# Patient Record
Sex: MALE | Race: WHITE | NOT HISPANIC OR LATINO | Employment: PART TIME | URBAN - METROPOLITAN AREA
[De-identification: names, ages, dates, MRNs, and addresses within clinical notes are randomized per-mention and may not be internally consistent; named-entity substitution may affect disease eponyms.]

---

## 2017-01-12 DIAGNOSIS — K25.9 GASTRIC ULCER: ICD-10-CM

## 2017-01-12 DIAGNOSIS — K21.9 GASTROESOPHAGEAL REFLUX DISEASE WITHOUT ESOPHAGITIS: Primary | ICD-10-CM

## 2017-01-12 RX ORDER — OMEPRAZOLE 40 MG/1
CAPSULE, DELAYED RELEASE ORAL
Qty: 90 CAPSULE | Refills: 0 | Status: SHIPPED | OUTPATIENT
Start: 2017-01-12 | End: 2017-02-16

## 2017-01-12 NOTE — PROGRESS NOTES
SUBJECTIVE:                                                    Dev Powell is a 71 year old male who presents to clinic today for the following health issues:    Dev reported URI symptoms with an onset of 10 days ago, characterized by rhinorrhea with clear color and postnasal drip, ear pressure/popping, sinus congestion, sore throat, productive cough with clear sputum, worse in the morning. Denied vomiting or diarrhea.     Heartburn -- well controlled with omeprazole     Low T -- using Androgel intermittently    HTN -- lisinopril  BP Readings from Last 3 Encounters:   01/13/17 132/82   01/28/16 124/76   07/23/15 125/75       Acute Illness   Acute illness concerns: Sore Throat  Onset: x 10 days     Fever: no    Chills/Sweats: no    Headache (location?): no    Sinus Pressure:no    Conjunctivitis:  no    Ear Pain: no    Rhinorrhea: YES    Congestion: YES    Sore Throat: YES     Cough: YES-productive of clear sputum    Wheeze: no    Decreased Appetite: no    Nausea: no    Vomiting: no    Diarrhea:  no    Dysuria/Freq.: no    Fatigue/Achiness: no    Sick/Strep Exposure: no     Therapies Tried and outcome: none        Problem list and histories reviewed & adjusted, as indicated.  Additional history: as documented    BP Readings from Last 3 Encounters:   01/13/17 132/82   01/28/16 124/76   07/23/15 125/75       Wt Readings from Last 4 Encounters:   01/13/17 235 lb (106.595 kg)   01/28/16 233 lb (105.688 kg)   03/25/15 246 lb (111.585 kg)   02/10/15 246 lb (111.585 kg)       Health Maintenance    Health Maintenance Due   Topic Date Due     HEPATITIS C SCREENING  03/28/1963     PNEUMOCOCCAL (1 of 2 - PCV13) 03/28/2010     FALL RISK ASSESSMENT  01/30/2016     WELLNESS VISIT Q1 YR (NO INBASKET)  01/30/2016     INFLUENZA VACCINE (SYSTEM ASSIGNED)  09/01/2016     BMP Q1 YR (NO INBASKET)  01/29/2017     MICROALBUMIN Q1 YEAR( NO INBASKET)  01/29/2017       Current Problem List    Patient Active Problem List   Diagnosis      Gastroesophageal reflux disease without esophagitis = ran out of PPI- has been taking lots of TUMS     Hyperlipidemia with target LDL less than 130- pravastatin = leg cramps; lipitor =calf pains     Snoring     Erectile dysfunction     Osteoarthritis     Lipoma of skin     Gastric ulcer     Malignant melanoma (H)     Tobacco use disorder     Enlarged prostate- saw Dr. Solomon     Rectal pain     Prostatitis, acute     Hearing loss     Inguinal hernia- right      Pulmonary nodules- tiny per screening chest CT - repeat CT chest low dose without contrast in 6 months 7/2015- or sooner if needed.      Hypertension goal BP (blood pressure) < 140/90     Hypotestosteronism- never started androgel last year     Hypothyroidism, unspecified     Chronic constipation       Past Medical History    Past Medical History   Diagnosis Date     Hyperlipidemia LDL goal < 130      lipitor 40mg= calf pain-off since 2008     Elevated blood pressure (not hypertension)      GERD (gastroesophageal reflux disease)      worse lying down at night.      Snoring      Erectile dysfunction      Lipoma of skin 2007     chest - biopsied at [facility] = benign      Osteoarthritis      mostly hands and knees      Gastric ulcer 9/25/2007     EGD @ [facility]-EGD - gastric ulcer w/ erosions/ LA grade B erosive esophagitis     Malignant melanoma (H) 1995       Past Surgical History    Past Surgical History   Procedure Laterality Date     Colonoscopy  9/25/2007     repeat in 2017      esophagoscopy, diagnostic  9/25/2007     EGD - gastric ulcer w/ erosions/ LA grade B erosive esophagitis     Wide excision  1995     for malignant melanoma     Esophagoscopy, gastroscopy, duodenoscopy (egd), combined N/A 2/5/2015     Procedure: COMBINED ESOPHAGOSCOPY, GASTROSCOPY, DUODENOSCOPY (EGD);  Surgeon: Ender Dixon MD;  Location:  GI       Current Medications    Current Outpatient Prescriptions   Medication Sig Dispense Refill     omeprazole (PRILOSEC) 40 MG capsule TAKE  1 CAPSULE BY MOUTH ONCE DAILY TAKE 30-60 MINUTES BEFORE A MEAL. 90 capsule 0     levothyroxine (SYNTHROID, LEVOTHROID) 88 MCG tablet TAKE 1 TABLET BY MOUTH ONCE DAILY 90 tablet 0     lisinopril (PRINIVIL,ZESTRIL) 10 MG tablet TAKE 1 TABLET BY MOUTH ONCE DAILY (DUE FOR YEARLY PHYSICAL FOR FUTURE REFILLS) 90 tablet 0     ANDROGEL 1.62% PUMP 20.25 MG/ACT gel PLACE 1 PUMP (20.25MG ) ONTO THE SKIN ONCE DAILY TO CLEAN,DRY,INTACT SKIN OF UPPER ARMS & SHOULDER 750 mg 5     cholecalciferol (VITAMIN D) 1000 UNIT tablet Take 1 tablet (1,000 Units) by mouth daily 100 tablet 3     aspirin 81 MG tablet Take by mouth daily 30 tablet      Multiple Vitamins-Minerals (CENTRUM SILVER) per tablet Take 1 tablet by mouth daily 30 tablet      omega-3 fatty acids 1200 MG capsule Take 1 capsule by mouth daily 90 capsule      Glucosamine-Chondroit-Vit C-Mn (GLUCOSAMINE CHONDROITIN 1500 COMPLEX) CAPS Take by mouth daily         Allergies    No Known Allergies    Immunizations    Immunization History   Administered Date(s) Administered     Influenza (IIV3) 2007     TD (ADULT, 7+) 2007     TDAP (BOOSTRIX AGES 10-64) 2013       Family History    Family History   Problem Relation Age of Onset     C.A.D. Father 62      at age 62 of MI     DIABETES Father      early type 2      Neurologic Disorder Mother      mild dementia - @ 92     C.A.D. 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      Weight Concern Yes     Exercise Yes     stays very active      Seat Belt Yes     always      Self-Exams Yes     RANJIT encouraged monthly      Social History Narrative       All above reviewed and updated, all stable unless otherwise noted    Recent labs reviewed    ROS:  Constitutional, HEENT, cardiovascular, pulmonary, GI, , musculoskeletal, neuro, skin, endocrine and psych systems are negative, except as in HPI or otherwise noted     OBJECTIVE:                                                    /82 mmHg  Pulse 104  Temp(Src) 97.3  F (36.3  C) (Tympanic)  Resp 12  Ht 6' 1\" (1.854 m)  Wt 235 lb (106.595 kg)  BMI 31.01 kg/m2  SpO2 97%  Body mass index is 31.01 kg/(m^2).  GENERAL: healthy, alert and no distress, obese   EYES: Eyes grossly normal to inspection, extraocular movements - intact, and PERRL  HENT: ear canals- normal; TMs- normal; Nose- normal; Mouth- erythema noted to posterior oropharynx with viral ulcers x 2  NECK: mild tenderness, no adenopathy, no asymmetry, no masses, no stiffness; thyroid- normal to palpation  RESP: lungs clear to auscultation - no rales, no rhonchi, no wheezes  CV: regular rates and rhythm, normal S1 S2, no S3 or S4 and no murmur, no click or rub -  ABDOMEN: soft, no tenderness, no  hepatosplenomegaly, no masses, normal bowel sounds  MS: extremities- no gross deformities noted, no edema  SKIN: no suspicious lesions, no rashes  NEURO: mentation intact and speech normal  BACK: no CVA tenderness, no paralumbar tenderness  PSYCH: Alert and oriented " times 3; speech- coherent , normal rate and volume; able to articulate logical thoughts, able to abstract reason, no tangential thoughts, no hallucinations or delusions, affect- normal    DIAGNOSTICS/PROCEDURES:                                                      Results for orders placed or performed in visit on 01/13/17   Strep, Rapid Screen   Result Value Ref Range    Specimen Description Throat     Rapid Strep A Screen       NEGATIVE: No Group A streptococcal antigen detected by immunoassay, await   culture report.      Micro Report Status FINAL 01/13/2017         ASSESSMENT/PLAN:                                                        ICD-10-CM    1. Throat pain R07.0 Strep, Rapid Screen     Beta strep group A culture   2. Acute sinusitis with symptoms > 10 days J01.90    3. Viral syndrome B34.9    4. Gastroesophageal reflux disease without esophagitis = ran out of PPI- has been taking lots of TUMS K21.9    5. Hypertension goal BP (blood pressure) < 140/90 I10    6. Medication monitoring encounter Z51.81      Discussed treatment/modality options, including risk and benefits, he desires further health care maintenance, antibiotics if not improving, OTC meds and observation. All diagnosis above reviewed and noted above, otherwise stable.  See [facility] orders for further details.  Follow up as needed.    Sx cares, antibiotics if not improving    Health Maintenance Due   Topic Date Due     HEPATITIS C SCREENING  03[birth year]28/1963     PNEUMOCOCCAL (1 of 2 - PCV13) 03/28/2010     FALL RISK ASSESSMENT  01/30/2016     WELLNESS VISIT Q1 YR (NO INBASKET)  01/30/2016     INFLUENZA VACCINE (SYSTEM ASSIGNED)  09/01/2016     BMP Q1 YR (NO INBASKET)  01/29/2017     MICROALBUMIN Q1 YEAR( NO INBASKET)  01/29/2017       See Patient Instructions    This document serves as a record of the services and decisions personally performed and made by Israel Fernández MD [facility]. It was created on their behalf by Paramjit Bo, a trained medical scribe.  The creation of this document is based the provider's statements to the medical scribe.  Paramjit Bo January 13, 2017 8:32 AM               Israel Fernández MD [address]  [address] [phone] Fax

## 2017-01-12 NOTE — TELEPHONE ENCOUNTER
omeprazole      Last Written Prescription Date: 1/28/2016  Last Fill Quantity: 90,  # refills: 3   Last Office Visit with [facility], P or [facility] prescribing provider: 1/28/2016    Medication is being filled for 1 time refill only due to:  Patient needs to be seen because will be due for an OV for further refills.   Georgia Fu RN

## 2017-01-13 ENCOUNTER — OFFICE VISIT (OUTPATIENT)
Dept: FAMILY MEDICINE | Facility: CLINIC | Age: 72
End: 2017-01-13
Payer: COMMERCIAL

## 2017-01-13 VITALS
BODY MASS INDEX: 31.14 KG/M2 | SYSTOLIC BLOOD PRESSURE: 132 MMHG | WEIGHT: 235 LBS | TEMPERATURE: 97.3 F | OXYGEN SATURATION: 97 % | HEART RATE: 104 BPM | RESPIRATION RATE: 12 BRPM | HEIGHT: 73 IN | DIASTOLIC BLOOD PRESSURE: 82 MMHG

## 2017-01-13 DIAGNOSIS — I10 HYPERTENSION GOAL BP (BLOOD PRESSURE) < 140/90: ICD-10-CM

## 2017-01-13 DIAGNOSIS — R07.0 THROAT PAIN: Primary | ICD-10-CM

## 2017-01-13 DIAGNOSIS — K21.9 GASTROESOPHAGEAL REFLUX DISEASE WITHOUT ESOPHAGITIS: ICD-10-CM

## 2017-01-13 DIAGNOSIS — Z51.81 MEDICATION MONITORING ENCOUNTER: ICD-10-CM

## 2017-01-13 DIAGNOSIS — J01.90 ACUTE SINUSITIS WITH SYMPTOMS > 10 DAYS: ICD-10-CM

## 2017-01-13 DIAGNOSIS — B34.9 VIRAL SYNDROME: ICD-10-CM

## 2017-01-13 LAB
DEPRECATED S PYO AG THROAT QL EIA: NORMAL
MICRO REPORT STATUS: NORMAL
SPECIMEN SOURCE: NORMAL

## 2017-01-13 PROCEDURE — 87081 CULTURE SCREEN ONLY: CPT | Performed by: FAMILY MEDICINE

## 2017-01-13 PROCEDURE — 87880 STREP A ASSAY W/OPTIC: CPT | Performed by: FAMILY MEDICINE

## 2017-01-13 PROCEDURE — 99213 OFFICE O/P EST LOW 20 MIN: CPT | Performed by: FAMILY MEDICINE

## 2017-01-13 NOTE — NURSING NOTE
"Chief Complaint   Patient presents with     Pharyngitis       Initial /82 mmHg  Pulse 104  Temp(Src) 97.3  F (36.3  C) (Tympanic)  Resp 12  Ht 6' 1\" (1.854 m)  Wt 235 lb (106.595 kg)  BMI 31.01 kg/m2  SpO2 97% Estimated body mass index is 31.01 kg/(m^2) as calculated from the following:    Height as of this encounter: 6' 1\" (1.854 m).    Weight as of this encounter: 235 lb (106.595 kg).  BP completed using cuff size: large  "

## 2017-01-13 NOTE — PATIENT INSTRUCTIONS
[facility]                        To reach your care team during and after hours:   [phone]  To reach our pharmacy:        [phone]    Clinic Hours                        Our clinic hours are:    Monday   7:30 am to 7:00 pm                  Tuesday through Friday 7:30 am to 5:00 pm                             Saturday   8:00 am to 12:00 pm      Sunday   Closed      Pharmacy Hours                        Our pharmacy hours are:    Monday   8:30 am to 7:00 pm       Tuesday to Friday  8:30 am to 6:00 pm                       Saturday    9:00 am to 1:00 pm              Sunday    Closed              There is also information available at our web site:  [email or web address]    If your provider ordered any lab tests and you do not receive the results within 10 business days, please call the clinic.    If you need a medication refill please contact your pharmacy.  Please allow 2-3 business days for your refill to be completed.    Our clinic offers telephone visits and e visits.  Please ask one of your team members to explain more.      Use [employer] (secure email communication and access to your chart) to send your primary care provider a message or make an appointment. Ask someone on your Team how to sign up for [employer].  Immunizations                      Immunization History   Administered Date(s) Administered     Influenza (IIV3) 12/13/2007     TD (ADULT, 7+) 09/20/2007     TDAP (BOOSTRIX AGES 10-64) 09/18/2013        Health Maintenance                         Health Maintenance Due   Topic Date Due     Hepatitis C Screening  03/28/1963     Pneumococcal Vaccine (1 of 2 - PCV13) 03/28/2010     FALL RISK ASSESSMENT  01/30/2016     Wellness Visit with your Primary Provider - yearly  01/30/2016     Flu Vaccine - yearly  09/01/2016     Basic Metabolic Lab - yearly  01/29/2017     Microalbumin Lab - yearly  01/29/2017       Use OTC Mucinex DM and warm showers to help with sinus and nasal  congestion.    Tylenol and Motrin as needed

## 2017-01-13 NOTE — MR AVS SNAPSHOT
After Visit Summary   1/13/2017    Dev Powell    MRN: [id number]           Patient Information     Date Of Birth          1945        Visit Information        Provider Department      1/13/2017 8:00 AM Israel Fernández MD [facility]  [place]        Today's Diagnoses     Throat pain    -  1       Care Instructions        [facility]                        To reach your care team during and after hours:   [phone]  To reach our pharmacy:        [phone]    Clinic Hours                        Our clinic hours are:    Monday   7:30 am to 7:00 pm                  Tuesday through Friday 7:30 am to 5:00 pm                             Saturday   8:00 am to 12:00 pm      Sunday   Closed      Pharmacy Hours                        Our pharmacy hours are:    Monday   8:30 am to 7:00 pm       Tuesday to Friday  8:30 am to 6:00 pm                       Saturday    9:00 am to 1:00 pm              Sunday    Closed              There is also information available at our web site:  [email or web address]    If your provider ordered any lab tests and you do not receive the results within 10 business days, please call the clinic.    If you need a medication refill please contact your pharmacy.  Please allow 2-3 business days for your refill to be completed.    Our clinic offers telephone visits and e visits.  Please ask one of your team members to explain more.      Use [employer] (secure email communication and access to your chart) to send your primary care provider a message or make an appointment. Ask someone on your Team how to sign up for [employer].  Immunizations                      Immunization History   Administered Date(s) Administered     Influenza (IIV3) 12/13/2007     TD (ADULT, 7+) 09/20/2007     TDAP (BOOSTRIX AGES 10-64) 09/18/2013        Health Maintenance                         Health Maintenance Due   Topic Date Due     Hepatitis C Screening  03/28/1963     Pneumococcal  "Vaccine (1 of 2 - PCV13) 2010     FALL RISK ASSESSMENT  2016     Wellness Visit with your Primary Provider - yearly  2016     Flu Vaccine - yearly  2016     Basic Metabolic Lab - yearly  2017     Microalbumin Lab - yearly  2017       Use OTC Mucinex DM and warm showers to help with sinus and nasal congestion.    Tylenol and Motrin as needed        Follow-ups after your visit        Who to contact     If you have questions or need follow up information about today's clinic visit or your schedule please contact [facility] [place] directly at [phone].  Normal or non-critical lab and imaging results will be communicated to you by [employer], letter or phone within 4 business days after the clinic has received the results. If you do not hear from us within 7 days, please contact the clinic through [employer] or phone. If you have a critical or abnormal lab result, we will notify you by phone as soon as possible.  Submit refill requests through [employer] or call your pharmacy and they will forward the refill request to us. Please allow 3 business days for your refill to be completed.          Additional Information About Your Visit        [employer] Information     [employer] lets you send messages to your doctor, view your test results, renew your prescriptions, schedule appointments and more. To sign up, go to [email or web address] . Click on \"Log in\" on the left side of the screen, which will take you to the Welcome page. Then click on \"Sign up Now\" on the right side of the page.     You will be asked to enter the access code listed below, as well as some personal information. Please follow the directions to create your username and password.     Your access code is: [email or web address]  Expires: 2017  8:40 AM     Your access code will  in 90 days. If you need help or a new code, please call your [facility] clinic or [phone].        Care EveryWhere ID     This is your Care " "EveryWhere ID. This could be used by other organizations to access your [facility] medical records  [id number]        Your Vitals Were     Pulse Temperature Respirations Height BMI (Body Mass Index) Pulse Oximetry    104 97.3  F (36.3  C) (Tympanic) 12 6' 1\" (1.854 m) 31.01 kg/m2 97%       Blood Pressure from Last 3 Encounters:   01/13/17 132/82   01/28/16 124/76   07/23/15 125/75    Weight from Last 3 Encounters:   01/13/17 235 lb (106.595 kg)   01/28/16 233 lb (105.688 kg)   03/25/15 246 lb (111.585 kg)              We Performed the Following     Beta strep group A culture     Strep, Rapid Screen        Primary Care Provider Office Phone # Fax #    Vee Beth [phone]       [facility] [address] [place] [id number]        Thank you!     Thank you for choosing [facility]  for your care. Our goal is always to provide you with excellent care. Hearing back from our patients is one way we can continue to improve our services. Please take a few minutes to complete the written survey that you may receive in the mail after your visit with us. Thank you!             Your Updated Medication List - Protect others around you: Learn how to safely use, store and throw away your medicines at www.disposemymeds.org.          This list is accurate as of: 1/13/17  8:40 AM.  Always use your most recent med list.                   Brand Name Dispense Instructions for use    ANDROGEL 1.62% PUMP 20.25 MG/ACT gel   Generic drug:  testosterone     750 mg    PLACE 1 PUMP (20.25MG ) ONTO THE SKIN ONCE DAILY TO CLEAN,DRY,INTACT SKIN OF UPPER ARMS & SHOULDER       aspirin 81 MG tablet     30 tablet    Take by mouth daily       CENTRUM SILVER per tablet     30 tablet    Take 1 tablet by mouth daily       cholecalciferol 1000 UNIT tablet    vitamin D    100 tablet    Take 1 tablet (1,000 Units) by mouth daily       glucosamine chondroitin 1500 complex Caps      Take by " mouth daily       levothyroxine 88 MCG tablet    SYNTHROID/LEVOTHROID    90 tablet    TAKE 1 TABLET BY MOUTH ONCE DAILY       lisinopril 10 MG tablet    PRINIVIL/ZESTRIL    90 tablet    TAKE 1 TABLET BY MOUTH ONCE DAILY (DUE FOR YEARLY PHYSICAL FOR FUTURE REFILLS)       omega-3 fatty acids 1200 MG capsule     90 capsule    Take 1 capsule by mouth daily       omeprazole 40 MG capsule    priLOSEC    90 capsule    TAKE 1 CAPSULE BY MOUTH ONCE DAILY TAKE 30-60 MINUTES BEFORE A MEAL.

## 2017-01-16 LAB
BACTERIA SPEC CULT: NORMAL
MICRO REPORT STATUS: NORMAL
SPECIMEN SOURCE: NORMAL

## 2017-02-11 DIAGNOSIS — I10 HYPERTENSION GOAL BP (BLOOD PRESSURE) < 140/90: Primary | ICD-10-CM

## 2017-02-13 RX ORDER — LISINOPRIL 10 MG/1
10 TABLET ORAL DAILY
Qty: 90 TABLET | Refills: 0 | Status: SHIPPED | OUTPATIENT
Start: 2017-02-13 | End: 2017-03-27

## 2017-02-13 NOTE — TELEPHONE ENCOUNTER
LISINOPRIL 10 MG TABLET      Last Written Prescription Date: 11/08/2016  Last Fill Quantity: 90, # refills: 0  Last Office Visit with G, P or [facility] prescribing provider: 01/13/2017       Potassium   Date Value Ref Range Status   01/29/2016 4.2 3.4 - 5.3 mmol/L Final     Creatinine   Date Value Ref Range Status   01/29/2016 1.08 0.66 - 1.25 mg/dL Final     BP Readings from Last 3 Encounters:   01/13/17 132/82   01/28/16 124/76   07/23/15 125/75

## 2017-02-14 NOTE — TELEPHONE ENCOUNTER
.Prescription approved per [facility] Refill Protocol.  Georgia Fu RN

## 2017-02-16 ENCOUNTER — TELEPHONE (OUTPATIENT)
Dept: FAMILY MEDICINE | Facility: CLINIC | Age: 72
End: 2017-02-16

## 2017-02-16 DIAGNOSIS — K21.9 GASTROESOPHAGEAL REFLUX DISEASE WITHOUT ESOPHAGITIS: ICD-10-CM

## 2017-02-16 DIAGNOSIS — K25.9 GASTRIC ULCER: ICD-10-CM

## 2017-02-16 RX ORDER — OMEPRAZOLE 40 MG/1
CAPSULE, DELAYED RELEASE ORAL
Qty: 90 CAPSULE | Refills: 0 | Status: SHIPPED | OUTPATIENT
Start: 2017-02-16 | End: 2017-03-27

## 2017-02-16 NOTE — TELEPHONE ENCOUNTER
Pt also asked for omeprazole. Given refill.  The patient indicates understanding of these issues and agrees with the plan.  Georgia Fu RN

## 2017-02-16 NOTE — TELEPHONE ENCOUNTER
Attempted to call patient.  Received patients voicemail.  Left a detailed message pt can call clinic back and make a first available med check appointment and we will refill until med check .  Advised to call back and speak with any triage nurse with any questions or concerns.     Georgia Baez RN, BSN   [place], Triage

## 2017-02-16 NOTE — TELEPHONE ENCOUNTER
Reason for Call: Patient called to schedule a med check with Dr. Beth only. He stated he would like to be seen ASAP, possibly tomorrow Friday 2/17 early in the AM. He stated he is out of his Lisinopril and was told he needs to be seen again before they will refill.     Best phone number to reach pt at is: [phone]  Ok to leave a message with medical info? Yes    Pharmacy preferred (if calling for a refill): [facility]

## 2017-02-20 DIAGNOSIS — E03.9 HYPOTHYROIDISM, UNSPECIFIED: ICD-10-CM

## 2017-02-20 NOTE — TELEPHONE ENCOUNTER
Levothyroxine     Last Written Prescription Date: 11/08/2016  Last Quantity: 90, # refills: 0  Last Office Visit with G, P or [facility] prescribing provider: 01/13/2017   Next 5 appointments (look out 90 days)     Mar 27, 2017  7:45 AM CDT   [facility] with [facility]ee Beth [facility]   [facility]    [address] [place] [id number] [phone]                   TSH   Date Value Ref Range Status   04/01/2016 7.23 (H) 0.40 - 4.00 mU/L Final

## 2017-02-21 RX ORDER — LEVOTHYROXINE SODIUM 88 UG/1
TABLET ORAL
Qty: 45 TABLET | Refills: 0 | Status: SHIPPED | OUTPATIENT
Start: 2017-02-21 | End: 2017-03-27

## 2017-02-21 NOTE — TELEPHONE ENCOUNTER
Due for an Office visit for further refills, only fill for 30 days     Elsi Thomas RN, BSN  [facility]

## 2017-03-27 ENCOUNTER — OFFICE VISIT (OUTPATIENT)
Dept: FAMILY MEDICINE | Facility: CLINIC | Age: 72
End: 2017-03-27
Payer: COMMERCIAL

## 2017-03-27 VITALS
WEIGHT: 240 LBS | HEIGHT: 73 IN | OXYGEN SATURATION: 95 % | SYSTOLIC BLOOD PRESSURE: 136 MMHG | HEART RATE: 105 BPM | TEMPERATURE: 98.3 F | BODY MASS INDEX: 31.81 KG/M2 | DIASTOLIC BLOOD PRESSURE: 84 MMHG

## 2017-03-27 DIAGNOSIS — E03.9 HYPOTHYROIDISM, UNSPECIFIED: ICD-10-CM

## 2017-03-27 DIAGNOSIS — H91.93 HEARING LOSS, BILATERAL: ICD-10-CM

## 2017-03-27 DIAGNOSIS — R22.2 MASS OF LEFT CHEST WALL: ICD-10-CM

## 2017-03-27 DIAGNOSIS — I10 ESSENTIAL HYPERTENSION WITH GOAL BLOOD PRESSURE LESS THAN 140/90: Chronic | ICD-10-CM

## 2017-03-27 DIAGNOSIS — F17.200 TOBACCO USE DISORDER: Chronic | ICD-10-CM

## 2017-03-27 DIAGNOSIS — C43.62 MALIGNANT MELANOMA OF LEFT UPPER EXTREMITY INCLUDING SHOULDER (H): ICD-10-CM

## 2017-03-27 DIAGNOSIS — K25.9 GASTRIC ULCER, UNSPECIFIED CHRONICITY: ICD-10-CM

## 2017-03-27 DIAGNOSIS — I10 HYPERTENSION GOAL BP (BLOOD PRESSURE) < 140/90: ICD-10-CM

## 2017-03-27 DIAGNOSIS — Z23 NEED FOR PROPHYLACTIC VACCINATION AGAINST STREPTOCOCCUS PNEUMONIAE (PNEUMOCOCCUS): ICD-10-CM

## 2017-03-27 DIAGNOSIS — E78.5 HYPERLIPIDEMIA WITH TARGET LDL LESS THAN 130: Primary | Chronic | ICD-10-CM

## 2017-03-27 DIAGNOSIS — Z12.5 SCREENING FOR PROSTATE CANCER: ICD-10-CM

## 2017-03-27 DIAGNOSIS — R91.8 PULMONARY NODULES: Chronic | ICD-10-CM

## 2017-03-27 DIAGNOSIS — D22.9 MULTIPLE PIGMENTED NEVI: ICD-10-CM

## 2017-03-27 DIAGNOSIS — K21.9 GASTROESOPHAGEAL REFLUX DISEASE WITHOUT ESOPHAGITIS: ICD-10-CM

## 2017-03-27 DIAGNOSIS — N52.9 VASCULOGENIC ERECTILE DYSFUNCTION, UNSPECIFIED VASCULOGENIC ERECTILE DYSFUNCTION TYPE: ICD-10-CM

## 2017-03-27 DIAGNOSIS — Z11.59 NEED FOR HEPATITIS C SCREENING TEST: ICD-10-CM

## 2017-03-27 DIAGNOSIS — F17.200 NEEDS SMOKING CESSATION EDUCATION: ICD-10-CM

## 2017-03-27 DIAGNOSIS — N40.0 ENLARGED PROSTATE: ICD-10-CM

## 2017-03-27 DIAGNOSIS — E34.9 HYPOTESTOSTERONISM: ICD-10-CM

## 2017-03-27 LAB
BASOPHILS # BLD AUTO: 0 10E9/L (ref 0–0.2)
BASOPHILS NFR BLD AUTO: 0.3 %
CREAT UR-MCNC: 84 MG/DL
DIFFERENTIAL METHOD BLD: NORMAL
EOSINOPHIL # BLD AUTO: 0.1 10E9/L (ref 0–0.7)
EOSINOPHIL NFR BLD AUTO: 2.3 %
ERYTHROCYTE [DISTWIDTH] IN BLOOD BY AUTOMATED COUNT: 14.2 % (ref 10–15)
HCT VFR BLD AUTO: 46.3 % (ref 40–53)
HGB BLD-MCNC: 15.4 G/DL (ref 13.3–17.7)
LYMPHOCYTES # BLD AUTO: 1.3 10E9/L (ref 0.8–5.3)
LYMPHOCYTES NFR BLD AUTO: 20.5 %
MCH RBC QN AUTO: 30.7 PG (ref 26.5–33)
MCHC RBC AUTO-ENTMCNC: 33.3 G/DL (ref 31.5–36.5)
MCV RBC AUTO: 92 FL (ref 78–100)
MICROALBUMIN UR-MCNC: 63 MG/L
MICROALBUMIN/CREAT UR: 74.97 MG/G CR (ref 0–17)
MONOCYTES # BLD AUTO: 0.6 10E9/L (ref 0–1.3)
MONOCYTES NFR BLD AUTO: 10.3 %
NEUTROPHILS # BLD AUTO: 4.1 10E9/L (ref 1.6–8.3)
NEUTROPHILS NFR BLD AUTO: 66.6 %
PLATELET # BLD AUTO: 198 10E9/L (ref 150–450)
RBC # BLD AUTO: 5.02 10E12/L (ref 4.4–5.9)
T3 SERPL-MCNC: 109 NG/DL (ref 60–181)
WBC # BLD AUTO: 6.2 10E9/L (ref 4–11)

## 2017-03-27 PROCEDURE — 99215 OFFICE O/P EST HI 40 MIN: CPT | Mod: 25 | Performed by: FAMILY MEDICINE

## 2017-03-27 PROCEDURE — 85025 COMPLETE CBC W/AUTO DIFF WBC: CPT | Performed by: FAMILY MEDICINE

## 2017-03-27 PROCEDURE — 84439 ASSAY OF FREE THYROXINE: CPT | Performed by: FAMILY MEDICINE

## 2017-03-27 PROCEDURE — 82043 UR ALBUMIN QUANTITATIVE: CPT | Performed by: FAMILY MEDICINE

## 2017-03-27 PROCEDURE — 83735 ASSAY OF MAGNESIUM: CPT | Performed by: FAMILY MEDICINE

## 2017-03-27 PROCEDURE — 84443 ASSAY THYROID STIM HORMONE: CPT | Performed by: FAMILY MEDICINE

## 2017-03-27 PROCEDURE — 90670 PCV13 VACCINE IM: CPT | Performed by: FAMILY MEDICINE

## 2017-03-27 PROCEDURE — 80053 COMPREHEN METABOLIC PANEL: CPT | Performed by: FAMILY MEDICINE

## 2017-03-27 PROCEDURE — 86803 HEPATITIS C AB TEST: CPT | Performed by: FAMILY MEDICINE

## 2017-03-27 PROCEDURE — G0103 PSA SCREENING: HCPCS | Performed by: FAMILY MEDICINE

## 2017-03-27 PROCEDURE — 84480 ASSAY TRIIODOTHYRONINE (T3): CPT | Performed by: FAMILY MEDICINE

## 2017-03-27 PROCEDURE — 90471 IMMUNIZATION ADMIN: CPT | Performed by: FAMILY MEDICINE

## 2017-03-27 PROCEDURE — 36415 COLL VENOUS BLD VENIPUNCTURE: CPT | Performed by: FAMILY MEDICINE

## 2017-03-27 PROCEDURE — 80061 LIPID PANEL: CPT | Performed by: FAMILY MEDICINE

## 2017-03-27 RX ORDER — OMEPRAZOLE 40 MG/1
CAPSULE, DELAYED RELEASE ORAL
Qty: 90 CAPSULE | Refills: 3 | Status: SHIPPED | OUTPATIENT
Start: 2017-03-27 | End: 2018-03-28

## 2017-03-27 RX ORDER — SILDENAFIL 50 MG/1
25-50 TABLET, FILM COATED ORAL DAILY PRN
Qty: 8 TABLET | Refills: 11 | Status: SHIPPED | OUTPATIENT
Start: 2017-03-27 | End: 2018-03-28

## 2017-03-27 RX ORDER — LISINOPRIL 20 MG/1
20 TABLET ORAL DAILY
Qty: 90 TABLET | Refills: 1 | Status: SHIPPED | OUTPATIENT
Start: 2017-03-27 | End: 2017-11-18

## 2017-03-27 RX ORDER — TESTOSTERONE 1.62 MG/G
GEL TRANSDERMAL
Qty: 750 MG | Refills: 5 | Status: SHIPPED | OUTPATIENT
Start: 2017-03-27 | End: 2017-04-19

## 2017-03-27 RX ORDER — LISINOPRIL 10 MG/1
10 TABLET ORAL DAILY
Qty: 90 TABLET | Refills: 1 | Status: SHIPPED | OUTPATIENT
Start: 2017-03-27 | End: 2017-03-27 | Stop reason: ALTCHOICE

## 2017-03-27 RX ORDER — LEVOTHYROXINE SODIUM 88 UG/1
TABLET ORAL
Qty: 90 TABLET | Refills: 3 | Status: SHIPPED | OUTPATIENT
Start: 2017-03-27 | End: 2018-01-04

## 2017-03-27 NOTE — PATIENT INSTRUCTIONS
Recheck with me in 6 months or sooner if needed.     Increase your current lisinopril 10mg to 2 tabs  Nightly- when those run out , fill the 20mg tablets and take 1 tab nightly. If you get lightheaded or dizzy when you stand up or make other position changes decrease back to 10mg and call.     Recheck your blood pressure in our pharmacy in 1 week or sooner if needed.  Have pharmacy send me their note.        TIPS FOR QUITTING  There are more than 37 million ex-smokers in the [place] Each one had to make the same decision you re thinking about now.  Smoking cigarettes is an expensive and destructive habit. It s time to stop.  You ve probably heard of all the reasons why you should quit, so we won t dwell on them here. However, you should reflect on the benefits of quitting. When you quit smoking, the body starts to repair itself almost immediately, unless damage has been done that cannot be reversed. Familiar symptoms like shortness of breath, sinus troubles, persistent cough start to disappear.  PREPARING TO QUIT  1. Ask yourself 3 key questions: How much do I smoke? Why do I smoke? What will be my most difficult rafal in quitting?  2. If you re feeling ambivalent about quitting, ask yourself which you want most: to smoke or to stop (Remember, you don t have to get rid of the desire to smoke before stopping)  3. Choose a method of quitting. Cold turkey is the most successful, but a gradual approach is fine.  4. Set a final quit date.  WAYS TO CUT DOWN YOUR SMOKING DAY BY DAY  (NOTE: Do not allow this gradual approach to become a way of procrastinating, rather than quitting)  1. Decide to cut down by a certain number of cigarettes per day, and increase your reduction by that number each succeeding day. OR postpone the 1st cigarette of the day by an hour and extend that time daily.  2. Make it hard to get and smoke a cigarette. Wrap up the package and put elastic bands around it. Smoke with your left hand if you usually  smoke with your right.  3. Change to a brand you don t like.  Buy only one pack @ a time.  4. If you always have a smoke with your coffee, switch to tea, juice or soda.  5. Do something for your body. Get into shape. Exercise is great for relaxation.  6. Call your friends and tell them you re going to quit.(Choose to tell the friends who will offer only positive reinforcement.)  7. If you quit for one day, you can quit for another. Try it.  8. Save all the money you would have spent on cigarettes and buy yourself something special.  You deserve it.  9. If you break down and have a cigarette, don t give up. Some people take several tries before they make it. Just don t have a 2nd cigarette.  ON THE DAY YOU QUIT  1. Throw away all cigarettes and matches. Hide Lighters and ashtrays  2. Visit the dentist and have your teeth cleaned to get rid of the tobacco stains. Notice how nice they look and resolve to keep them that way.  3. Make a list of things you d like to buy yourself or someone else. Estimate the cost in terms of packs of cigarettes and out the money aside to buy these presents.  4. Keep very busy on the big day. Go to the movies, exercise, take long walks, go bike riding.  5. Buy yourself a treat or do something special to celebrate.  TIPS FOR STAYING QUIT  1. For the 1st few days after you quit, spend as much time as possible in places where smoking is prohibited-libraries,museums,theaters,churches.  2. Drink large quantities of water and fruit juice  3. Avoid alcohol, coffee and other beverages that you associate with smoking.  4. Strike up a conversation instead of a match for a cigarette.  5. If you miss the sensation of having a cigarette in your hand, play with something else like a pencil, a paper clip or marble.  6. If you miss having something in our mouth ,try toothpicks,cinnamon,celery or carrots sticks.  7. AVOID TEMPTATION: Stay away from situations you associate with smoking.  8. FIND NEW HABITS  and develop a non-smoking environment around you.  9. Stress constructive, not destructive thinking to lesson discomfort.  10. Avoid resuming the habit by anticipating future situations/crises that might lead to smoking and assert your reasons for not giving in  11. Take deep rhythmic breaths, similar to smoking, to relax.  12. Remember your goal and the fact that the urge will eventually pass.  13. Think positive thoughts and avoid negative ones.  14. Brush your teeth  15. Do brief exercise (isometrics,push-ups,deep knee bends,walk up a flight of stairs)  16. Call a supportive friend  17. Compile a list of  Urge Activities   and start at the top when it hits  18. Eat several small meals. This maintains constant blood sugar levels and helps prevent the urge to smoke. Avoid sugary or spicy foods that trigger a desire for cigarettes.  19. Above all, reward yourself. Plan to do something fun for doing your best  WHEN YOU GET THE   CRAZIES    1. Keep oral substitutes handy: carrots,pickles,apples,celery,raisins or gum.  2. Take 10 deep breaths, hold the last one while lighting  a match. Exhale slowly, and blow out the match. Pretend it is a cigarette and out it out in an ashtray.  3. Take a bath or shower if possible.  4. Learn to relax quickly and deeply.Make yourself limp. Visualize a soothing pleasing situation and get away from it all.  5. Light incense or a candle, instead of a cigarette.  6. Never allow yourself to think that  one won t hurt , because it will.                Recognizing Skin Cancer  Doing monthly skin checkups is the best way to find new marks or skin changes. During your skin checkups, be sure to follow the ABCDEs of skin checks. This means checking moles or growths for Asymmetry, Border, Color, Diameter, and Evolving (changing). Note, too, if your growths bleed, itch, or are painful.  The ABCDEs of Skin Checks  Check your moles or growths for signs of melanoma using ABCDE:    Asymmetry: the  sides of the mole or growth don t match    Border: the edges are ragged, notched, or blurred    Color: the color within the mole or growth varies    Diameter: the mole or growth is larger than 6 mm (size of a pencil eraser)    Evolving: the size, shape, or color of the mole or growth is changing (evolving is not shown below.)     Who s At Risk?  Anyone can get skin cancer. But you are at greater risk if you have:    Fair skin, light-colored hair, or light-colored eyes    Many moles on your skin    A history of sunburns from sunlight or tanning beds    A family history of skin cancer    A history of exposure to radiation or chemicals    A weakened immune system  Also, a personal history of skin cancer puts you at risk for recurring skin cancer.  How to Check Your Skin  Do your monthly skin checkups in front of a full-length mirror. Check all parts of your body, including your:    Head (ears, face, neck, and scalp)    Torso (front, back, and sides)    Arms (tops, undersides, upper, and lower)    Hands (palms, backs, and fingers)    Buttocks and genitals    Legs (front, back, and sides)    Feet (tops, soles, toes, and between toes)  If you have a lot of moles, take digital photos of them each month. Make sure to take photos both up close and from a distance. These can help you see if any moles change over time.  When to Seek Medical Treatment  Most skin changes are not cancer. But if you see any changes in your skin, call your doctor right away. Only he or she can diagnose a problem. If you have skin cancer, seeing your doctor can be the first step toward getting the treatment that could save your life.     [id number] [facility], [address] [place] [address], [place] [address]. All rights reserved. This information is not intended as a substitute for professional medical care. Always follow your healthcare professional's instructions.                   Groin Hernia         What is a groin hernia?   When you have a  hernia, part of the intestine (bowel) bulges through a weak area or gap in the muscles in your belly. A groin hernia happens in the groin. The groin is the lower abdominal area where the legs join the body. Another name for groin hernia is inguinal hernia.   How does it occur?   A groin hernia happens when the bowel pushes through a weak spot in the inguinal canal. The inguinal canal is an opening between layers of muscle in the groin.   Some people, especially men, are born with a weakness in their groin muscles. With or without this weakness, a hernia may be caused by anything that causes the intestine to push against the inguinal canal. Activities or conditions that might cause this pressure are:   lifting heavy objects   coughing or sneezing a lot   being constipated or pushing too hard when having a bowel movement   being overweight   being pregnant   in men, pushing too hard to urinate if their prostate is enlarged.   What are the symptoms?   Symptoms of a groin hernia may include:   a lump in the groin that you can push back in   pain or discomfort in the lower belly or groin, especially with physical activity   a lump in the groin that cannot be pushed back in.   A lump that cannot be pushed back in can become a life-threatening problem. The bowel may get caught in the gap. This could cut off its blood supply. Or bowel movement might be blocked and not able to move through the bowel.   How is it diagnosed?   Your healthcare provider will ask about your symptoms and medical history and examine you. You may have X-rays, ultrasound or CT scans, or blood tests.   How is it treated?   The main treatment for a painful groin hernia is surgery to repair the opening in the muscle wall. The surgeon closes the weak spot. Sometimes, before closing the skin, the surgeon will sew a piece of mesh over the weak spot and under the skin to make the area stronger. Your healthcare provider will usually suggest that you have the  operation as soon as possible to avoid complications.   If your hernia is causing few or no symptoms, you may choose not to have surgery. You may need to use a groin support. You need to discuss with your provider what symptoms you should watch for and when you should seek medical care for possible problems resulting from your hernia, such as bowel blockage.   How long will the effects last?   The hernia will not get better on its own, but it may not get worse for months or even years. A complication of a groin hernia is that after the bowel has pushed through the muscle wall, its contents may become trapped. A dangerous complication of this trapping is that the blood supply to the bowel may be cut off and the tissue may die, resulting in gangrene. This is a medical emergency requiring surgery.   How can I take care of myself?   Follow your healthcare provider's instructions.   Be careful when you lift, pull, or push heavy objects. Learn to lift, push, or pull heavy objects the correct way. Change your work duties or your recreational activities if necessary.   Ask your provider if you need to wear a groin support. Follow your provider's advice for losing weight if you are overweight.   Avoid constipation by eating foods that are high in fiber, using stool softeners, or drinking a natural stimulant beverage such as prune juice. Use laxatives or enemas only if recommended by your provider.   Avoid smoking to help prevent coughing. Coughing puts extra pressure on the abdominal and groin muscles.   Take medicine to reduce sneezing and coughing from allergies.   If your symptoms continue or if you develop new symptoms, tell your provider right away.   Also call your healthcare provider if:   You have nausea and vomiting that doesn't get better after a few hours.   You can't have a bowel movement.   You are unable to urinate.   The hernia bulges through the muscles and will not go back in.   The skin over the hernia  becomes red or darker than your usual skin color.   You have severe abdominal pain.   You have a fever higher than 101.5? F (38.6? C) orally.   How can I help prevent a groin hernia?   Follow safe practices when you move heavy things. Learn how to lift and move heavy items safely. Remember to use your legs. Bend at your knees, not at your waist.   Keep a healthy weight.   Avoid becoming constipated.     Published by [employer].  This content is reviewed periodically and is subject to change as new health information becomes available. The information is intended to inform and educate and is not a replacement for medical evaluation, advice, diagnosis or treatment by a healthcare professional.   Developed by [employer].   ? 2010 [employer] and/or its affiliates. All Rights Reserved.   Copyright   [employer] 2011  Adult [employer] Advisor                 Thank you for choosing [facility]  for your Health Care. It was a pleasure seeing you at your visit today. Please contact us with any questions or concerns you may have.                   Vee Beth MD                                  To reach your [facility] care team after hours call:   [phone]    Our clinic hours are:     Monday- 7:30 am - 7:00 pm                             Tuesday through Friday- 7:30 am - 5:00 pm                                        Saturday- 8:00 am - 12:00 pm                  Phone:  [phone]    Our pharmacy hours are:     Monday  8:00 am to 7:00 pm      Tuesday through Friday 8:00am to 6:00pm                        Saturday - 9:00 am to 1:00 pm      Sunday : Closed.              Phone:  [phone]      There is also information available at our web site:  [email or web address]    If your provider ordered any lab tests and you do not receive the results within 10 business days, please call the clinic.    If you need a medication refill please contact your pharmacy.  Please  allow 2 business days for your refill to be completed.    Our clinic offers telephone visits and e visits.  Please ask one of your team members to explain more.      Use MyChart (secure email communication and access to your chart) to send your primary care provider a message or make an appointment. Ask someone on your Team how to sign up for [employer].

## 2017-03-27 NOTE — PROGRESS NOTES
SUBJECTIVE:                                                    Dev Powell is a 71 year old male who presents to clinic today for the following health issues:    Hyperlipidemia Follow-Up:       Rate your low fat/cholesterol diet?: not monitoring fat    Taking statin?  Stopped due to side effects    Other lipid medications/supplements?:  No  Recent Labs   Lab Test  01/29/16   0842  01/30/15   1305  04/30/14   0802   CHOL  247*  237*  219*   HDL  52  46  38*   LDL  178*  149*  155*   TRIG  85  208*  135   CHOLHDLRATIO   --   5.2*  5.8*            Hypertension Follow-up:       Outpatient blood pressures are being checked at home.  Results are 190/100.    Low Salt Diet: not monitoring salt.  Hasn't been taking his blood pressure medication regularly lately.   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    Problems taking medications regularly: No    Medication side effects: none    Diet: regular (no restrictions)    Acute Illness:    Acute illness concerns: Cold  Onset: 10 days    Fever: no    Chills/Sweats: no    Headache (location?): YES- frontal    Sinus Pressure:no    Conjunctivitis:  no    Ear Pain: no    Rhinorrhea: YES- clear    Congestion: YES- nasal    Sore Throat: no     Cough: YES - on and off, minimal phlegm production    Wheeze: no    Decreased Appetite: no    Nausea: no    Vomiting: no    Diarrhea:  no    Dysuria/Freq.: no    Fatigue/Achiness: YES- fatigue    Sick/Strep Exposure: no     Therapies Tried and outcome: None    Having difficulty getting an erection and maintaining an erection for years.  Requests viagra.  Was on this several years ago.  Wife has a lot of medical issues as well, but is  feeling better and would like to resume sexual activity.   Not having any chest pain or shortness of breath with exertion.   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       Pt also noting hearing loss bilaterally.  Not always wearing hearing protection when using grinders, etc. Strongly encouraged him to do so.     Still smoking - 1/2 ppd of cigarettes. Strongly encouraged smoking cessation again.   Had a chest ct for pulmonary nodules 2/2/2016: IMPRESSION:  1. Multiple tiny indeterminate bilateral lung nodules unchanged since  prior exam performed one year ago. Return to interval 12 months low  dose chest CT screening recommended. No new or enlarging lung nodules.  2. Calcified atherosclerotic changes in the coronary arteries and  thoracic aorta. No evidence of aortic aneurysm.  Problem list and histories reviewed & adjusted, as indicated.  Additional history: as documented    Reviewed and updated as needed this visit by clinical staff  Tobacco  Allergies  Meds  Med Hx  Surg Hx  Fam Hx  Soc Hx      Reviewed and updated as needed this visit by Provider       Patient Active Problem List   Diagnosis     Gastroesophageal reflux disease without esophagitis = ran out of PPI- has been taking lots of TUMS     Hyperlipidemia with target LDL less than 130- pravastatin = leg cramps; lipitor =calf pains     Snoring     Vasculogenic erectile dysfunction, unspecified vasculogenic erectile dysfunction type     Osteoarthritis     Lipoma of skin     Gastric ulcer     Malignant melanoma of left upper extremity including shoulder (H)     Tobacco use disorder     Enlarged prostate- saw Dr. Solomon in the past      Rectal pain     Prostatitis, acute     Hearing loss     Inguinal hernia- right      Pulmonary nodules- tiny per screening chest CT -  repeat CT chest low dose w/o contrast in 2/2017-- or sooner if needed.       Essential hypertension with goal blood pressure less than 140/90     Hypotestosteronism- ran out of androgel last year     Hypothyroidism, unspecified     Chronic constipation     Mass of left chest wall - ? there for 18+ years        Current Outpatient Prescriptions   Medication Sig Dispense Refill     sildenafil (REVATIO/VIAGRA) 50 MG cap/tab Take 0.5-1 tablets (25-50 mg) by mouth daily as needed for erectile dysfunction Take 30 min to 4 hours before intercourse.  Never use with nitroglycerin, terazosin or doxazosin. 8 tablet 11     levothyroxine (SYNTHROID/LEVOTHROID) 88 MCG tablet TAKE 1 TABLET BY MOUTH ONCE DAILY( NEED TO BE SEEN ) 90 tablet 3     omeprazole (PRILOSEC) 40 MG capsule TAKE 1 CAPSULE BY MOUTH ONCE DAILY TAKE 30-60 MINUTES BEFORE A MEAL. 90 capsule 3     lisinopril (PRINIVIL/ZESTRIL) 10 MG tablet Take 1 tablet (10 mg) by mouth daily 90 tablet 1     testosterone (ANDROGEL 1.62% PUMP) 20.25 MG/ACT gel PLACE 1 PUMP (20.25MG ) ONTO THE SKIN ONCE DAILY TO CLEAN,DRY,INTACT SKIN OF UPPER ARMS & SHOULDER 750 mg 5     Glucosamine-Chondroit-Vit C-Mn (GLUCOSAMINE CHONDROITIN 1500 COMPLEX) CAPS Take by mouth daily       aspirin 81 MG tablet Take by mouth daily 30 tablet      Multiple Vitamins-Minerals (CENTRUM SILVER) per tablet Take 1 tablet by mouth daily 30 tablet      [DISCONTINUED] levothyroxine (SYNTHROID/LEVOTHROID) 88 MCG tablet TAKE 1 TABLET BY MOUTH ONCE DAILY( NEED TO BE SEEN ) 45 tablet 0     [DISCONTINUED] lisinopril (PRINIVIL/ZESTRIL) 10 MG tablet Take 1 tablet (10 mg) by mouth daily 90 tablet 0     [DISCONTINUED] ANDROGEL 1.62% PUMP 20.25 MG/ACT gel PLACE 1 PUMP (20.25MG ) ONTO THE SKIN ONCE DAILY TO CLEAN,DRY,INTACT SKIN OF UPPER ARMS & SHOULDER 750 mg 5        No Known Allergies       ROS:   ROS: 12 point ROS neg other than the symptoms noted above    OBJECTIVE:                                                    BP (!) 138/96 (BP Location: Left arm, Patient Position: Chair, Cuff Size:  "Adult Regular)  Pulse 105  Temp 98.3  F (36.8  C) (Oral)  Ht 6' 1\" (1.854 m)  Wt 240 lb (108.9 kg)  SpO2 95%  BMI 31.66 kg/m2  Body mass index is 31.66 kg/(m^2).   GENERAL: healthy, alert, well nourished, well hydrated, no distress  HENT: ear canals- normal; TMs- normal; Nose- normal; Mouth- no ulcers, no lesions  NECK: no tenderness, no adenopathy, no asymmetry, no masses, no stiffness; thyroid- normal to palpation  RESP: lungs clear to auscultation - no rales, no rhonchi, no wheezes  CV: regular rates and rhythm, normal S1 S2, no S3 or S4 and no murmur, no click or rub   Breast: Breasts are symmetric.  There is a 1cm diameter mildly firm mass at 7:30 in the left chest/breast area. Pt thinks this has been there for many years -? 18-20 years.  No hx of breast cancer in family.  No other dominant, discrete, fixed  or suspicious masses are noted.  Mild symmetric fibrocystic densities are noted in both upper outer quadrants. No skin or nipple changes or axillary nodes. Self exam is taught and encouraged. Mammogram - not for men. .   ABDOMEN: soft, no tenderness, no  hepatosplenomegaly, no masses, normal bowel sounds  MS: extremities- no gross deformities noted, no edema  - male: testicles- normal, no atrophy, no masses;  Right direct inguinal hernia noted - easily reducible   RECTAL- male: no masses, no hemorhoids, Prostate- symmetric, no  nodularity, no masses, no hypertrophy    Note:pt declined a chaperone for the genital and rectal exams. --Vee Beth MD     Diagnostic test results:  See [facility] orders.      ASSESSMENT/PLAN:                                                        ICD-10-CM    1. Hyperlipidemia with target LDL less than 130- pravastatin = leg cramps; lipitor =calf pains E78.5 Albumin Random Urine Quantitative     Comprehensive metabolic panel     Lipid Profile with reflex to direct LDL     CBC with platelets differential   2. Tobacco use disorder F17.200 CT Chest w/o Contrast "   3. Essential hypertension with goal blood pressure less than 140/90 - not well controlled today - not taking any decongestants I10 Albumin Random Urine Quantitative     Comprehensive metabolic panel     CBC with platelets differential     lisinopril (PRINIVIL/ZESTRIL) 20 MG tablet   4. Hypothyroidism, unspecified E03.9 TSH     T4, free     T3, total     levothyroxine (SYNTHROID/LEVOTHROID) 88 MCG tablet   5. Multiple pigmented nevi D22.9    6. Malignant melanoma of left upper extremity including shoulder (H) C43.62 SKIN CARE REFERRAL   7. Pulmonary nodules- tiny per screening chest CT - repeat CT chest low dose w/o contrast in 2/2017-or sooner if needed. R91.8 CT Chest w/o Contrast   8. Gastroesophageal reflux disease without esophagitis = ran out of PPI- has been taking lots of TUMS K21.9 Magnesium     omeprazole (PRILOSEC) 40 MG capsule   9. Vasculogenic erectile dysfunction, unspecified vasculogenic erectile dysfunction type N52.9 TESTOSTERONE, FREE & TOTAL     sildenafil (REVATIO/VIAGRA) 50 MG cap/tab   10. Enlarged prostate- saw Dr. Solomon in the past  N40.0 Prostate spec antigen screen   11. Need for prophylactic vaccination against Streptococcus pneumoniae (pneumococcus) Z23 Pneumococcal vaccine 13 valent PCV13 IM (Prevnar) [34889]     ADMIN: Vaccine, Initial (24162)   12. Need for hepatitis C screening test Z11.59 Hepatitis C Screen Reflex to HCV RNA Quant and Genotype   13. Screening for prostate cancer Z12.5 Prostate spec antigen screen   14. Hypotestosteronism- ran out of androgel last week  E29.1 TESTOSTERONE, FREE & TOTAL   15. Hearing loss, bilateral H91.93 OTOLARYNGOLOGY REFERRAL   16. Gastric ulcer, unspecified chronicity K25.9 omeprazole (PRILOSEC) 40 MG capsule   17. Hypertension goal BP (blood pressure) < 140/90 I10 DISCONTINUED: lisinopril (PRINIVIL/ZESTRIL) 10 MG tablet   18. Hypotestosteronism- never started androgel last year E29.1 testosterone (ANDROGEL 1.62% PUMP) 20.25 MG/ACT gel   19.  Mass of left chest wall- left breast 1cm mass at 7:30  R22.2 US Breast Left Complete 4 Quadrants     GENERAL SURG ADULT REFERRAL     STOP SMOKING INSTRUCTION:    Instruction is provided on the importance of smoking cessation and its impact on current and future health.    - alternatives available to help   - making commitment and decision to quit   - The nature of nicotine addiction is discussed   - behavioral therapy is discussed and suggested.    - nicotine replacement therapyis discussed.    - Chantix side effects and risk discussed   Handout given with warning about psychiatric risks- advised to report if excessive dysphoria   Pt declined any smoking cessation intervention today.     Increase lisinopril to 20mg daily since home bp's have been in the high 190's /100. Recheck your blood pressure in our pharmacy in 1 week or sooner if needed.  Have pharmacy send me their note.       See Patient Instructions.      Spent  50 minutes on pt care today outside of preventative care. All face to face time from 0805 to 0855am.  Greater than 50% of time spent in coordination of care/counseling today re:  1. Hyperlipidemia with target LDL less than 130- pravastatin = leg cramps; lipitor =calf pains    2. Tobacco use disorder    3. Essential hypertension with goal blood pressure less than 140/90 - not well controlled today - not taking any decongestants    4. Hypothyroidism, unspecified    5. Multiple pigmented nevi    6. Malignant melanoma of left upper extremity including shoulder (H)    7. Pulmonary nodules- tiny per screening chest CT - repeat CT chest low dose w/o contrast in 2/2017-or sooner if needed.    8. Gastroesophageal reflux disease without esophagitis = ran out of PPI- has been taking lots of TUMS    9. Vasculogenic erectile dysfunction, unspecified vasculogenic erectile dysfunction type    10. Enlarged prostate- saw Dr. Solomon in the past     11. Need for prophylactic vaccination against Streptococcus  pneumoniae (pneumococcus)    12. Need for hepatitis C screening test    13. Screening for prostate cancer    14. Hypotestosteronism- ran out of androgel last week     15. Hearing loss, bilateral    16. Gastric ulcer, unspecified chronicity    17. Hypertension goal BP (blood pressure) < 140/90    18. Hypotestosteronism- never started androgel last year    19. Mass of left chest wall- left breast 1cm mass at 7:30               Vee Beth MD    [facility]

## 2017-03-27 NOTE — MR AVS SNAPSHOT
After Visit Summary   3/27/2017    Dev Powell    MRN: [id number]           Patient Information     Date Of Birth          1945        Visit Information        Provider Department      3/27/2017 7:45 AM Vee Beth MD [facility] [place]        Today's Diagnoses     Hyperlipidemia with target LDL less than 130- pravastatin = leg cramps; lipitor =calf pains    -  1    Tobacco use disorder        Essential hypertension with goal blood pressure less than 140/90 - not well controlled today - not taking any decongestants        Hypothyroidism, unspecified        Multiple pigmented nevi        Malignant melanoma of left upper extremity including shoulder (H)        Pulmonary nodules- tiny per screening chest CT - repeat CT chest low dose w/o contrast in 2/2017-or sooner if needed.        Gastroesophageal reflux disease without esophagitis = ran out of PPI- has been taking lots of TUMS        Vasculogenic erectile dysfunction, unspecified vasculogenic erectile dysfunction type        Enlarged prostate- saw Dr. Solomon in the past         Need for prophylactic vaccination against Streptococcus pneumoniae (pneumococcus)        Need for hepatitis C screening test        Screening for prostate cancer        Hypotestosteronism- ran out of androgel last week         Hearing loss, bilateral        Gastric ulcer, unspecified chronicity        Hypertension goal BP (blood pressure) < 140/90        Hypotestosteronism- never started androgel last year        Mass of left chest wall          Care Instructions    Recheck with me in 6 months or sooner if needed.     Increase your current lisinopril 10mg to 2 tabs  Nightly- when those run out , fill the 20mg tablets and take 1 tab nightly. If you get lightheaded or dizzy when you stand up or make other position changes decrease back to 10mg and call.     Recheck your blood pressure in our pharmacy in 1 week or sooner if needed.  Have pharmacy send  me their note.        TIPS FOR QUITTING  There are more than 37 million ex-smokers in the [place] Each one had to make the same decision you re thinking about now.  Smoking cigarettes is an expensive and destructive habit. It s time to stop.  You ve probably heard of all the reasons why you should quit, so we won t dwell on them here. However, you should reflect on the benefits of quitting. When you quit smoking, the body starts to repair itself almost immediately, unless damage has been done that cannot be reversed. Familiar symptoms like shortness of breath, sinus troubles, persistent cough start to disappear.  PREPARING TO QUIT  1. Ask yourself 3 key questions: How much do I smoke? Why do I smoke? What will be my most difficult rafal in quitting?  2. If you re feeling ambivalent about quitting, ask yourself which you want most: to smoke or to stop (Remember, you don t have to get rid of the desire to smoke before stopping)  3. Choose a method of quitting. Cold turkey is the most successful, but a gradual approach is fine.  4. Set a final quit date.  WAYS TO CUT DOWN YOUR SMOKING DAY BY DAY  (NOTE: Do not allow this gradual approach to become a way of procrastinating, rather than quitting)  1. Decide to cut down by a certain number of cigarettes per day, and increase your reduction by that number each succeeding day. OR postpone the 1st cigarette of the day by an hour and extend that time daily.  2. Make it hard to get and smoke a cigarette. Wrap up the package and put elastic bands around it. Smoke with your left hand if you usually smoke with your right.  3. Change to a brand you don t like.  Buy only one pack @ a time.  4. If you always have a smoke with your coffee, switch to tea, juice or soda.  5. Do something for your body. Get into shape. Exercise is great for relaxation.  6. Call your friends and tell them you re going to quit.(Choose to tell the friends who will offer only positive reinforcement.)  7. If  you quit for one day, you can quit for another. Try it.  8. Save all the money you would have spent on cigarettes and buy yourself something special.  You deserve it.  9. If you break down and have a cigarette, don t give up. Some people take several tries before they make it. Just don t have a 2nd cigarette.  ON THE DAY YOU QUIT  1. Throw away all cigarettes and matches. Hide Lighters and ashtrays  2. Visit the dentist and have your teeth cleaned to get rid of the tobacco stains. Notice how nice they look and resolve to keep them that way.  3. Make a list of things you d like to buy yourself or someone else. Estimate the cost in terms of packs of cigarettes and out the money aside to buy these presents.  4. Keep very busy on the big day. Go to the movies, exercise, take long walks, go bike riding.  5. Buy yourself a treat or do something special to celebrate.  TIPS FOR STAYING QUIT  1. For the 1st few days after you quit, spend as much time as possible in places where smoking is prohibited-libraries,museums,theaters,churches.  2. Drink large quantities of water and fruit juice  3. Avoid alcohol, coffee and other beverages that you associate with smoking.  4. Strike up a conversation instead of a match for a cigarette.  5. If you miss the sensation of having a cigarette in your hand, play with something else like a pencil, a paper clip or marble.  6. If you miss having something in our mouth ,try toothpicks,cinnamon,celery or carrots sticks.  7. AVOID TEMPTATION: Stay away from situations you associate with smoking.  8. FIND NEW HABITS and develop a non-smoking environment around you.  9. Stress constructive, not destructive thinking to lesson discomfort.  10. Avoid resuming the habit by anticipating future situations/crises that might lead to smoking and assert your reasons for not giving in  11. Take deep rhythmic breaths, similar to smoking, to relax.  12. Remember your goal and the fact that the urge will  eventually pass.  13. Think positive thoughts and avoid negative ones.  14. Brush your teeth  15. Do brief exercise (isometrics,push-ups,deep knee bends,walk up a flight of stairs)  16. Call a supportive friend  17. Compile a list of  Urge Activities   and start at the top when it hits  18. Eat several small meals. This maintains constant blood sugar levels and helps prevent the urge to smoke. Avoid sugary or spicy foods that trigger a desire for cigarettes.  19. Above all, reward yourself. Plan to do something fun for doing your best  WHEN YOU GET THE   CRAZIES    1. Keep oral substitutes handy: carrots,pickles,apples,celery,raisins or gum.  2. Take 10 deep breaths, hold the last one while lighting  a match. Exhale slowly, and blow out the match. Pretend it is a cigarette and out it out in an ashtray.  3. Take a bath or shower if possible.  4. Learn to relax quickly and deeply.Make yourself limp. Visualize a soothing pleasing situation and get away from it all.  5. Light incense or a candle, instead of a cigarette.  6. Never allow yourself to think that  one won t hurt , because it will.                Recognizing Skin Cancer  Doing monthly skin checkups is the best way to find new marks or skin changes. During your skin checkups, be sure to follow the ABCDEs of skin checks. This means checking moles or growths for Asymmetry, Border, Color, Diameter, and Evolving (changing). Note, too, if your growths bleed, itch, or are painful.  The ABCDEs of Skin Checks  Check your moles or growths for signs of melanoma using ABCDE:    Asymmetry: the sides of the mole or growth don t match    Border: the edges are ragged, notched, or blurred    Color: the color within the mole or growth varies    Diameter: the mole or growth is larger than 6 mm (size of a pencil eraser)    Evolving: the size, shape, or color of the mole or growth is changing (evolving is not shown below.)     Who s At Risk?  Anyone can get skin cancer. But you  are at greater risk if you have:    Fair skin, light-colored hair, or light-colored eyes    Many moles on your skin    A history of sunburns from sunlight or tanning beds    A family history of skin cancer    A history of exposure to radiation or chemicals    A weakened immune system  Also, a personal history of skin cancer puts you at risk for recurring skin cancer.  How to Check Your Skin  Do your monthly skin checkups in front of a full-length mirror. Check all parts of your body, including your:    Head (ears, face, neck, and scalp)    Torso (front, back, and sides)    Arms (tops, undersides, upper, and lower)    Hands (palms, backs, and fingers)    Buttocks and genitals    Legs (front, back, and sides)    Feet (tops, soles, toes, and between toes)  If you have a lot of moles, take digital photos of them each month. Make sure to take photos both up close and from a distance. These can help you see if any moles change over time.  When to Seek Medical Treatment  Most skin changes are not cancer. But if you see any changes in your skin, call your doctor right away. Only he or she can diagnose a problem. If you have skin cancer, seeing your doctor can be the first step toward getting the treatment that could save your life.     [id number] [facility], [address] [place] [address], [place] [address]. All rights reserved. This information is not intended as a substitute for professional medical care. Always follow your healthcare professional's instructions.                   Groin Hernia         What is a groin hernia?   When you have a hernia, part of the intestine (bowel) bulges through a weak area or gap in the muscles in your belly. A groin hernia happens in the groin. The groin is the lower abdominal area where the legs join the body. Another name for groin hernia is inguinal hernia.   How does it occur?   A groin hernia happens when the bowel pushes through a weak spot in the inguinal canal. The inguinal canal is  an opening between layers of muscle in the groin.   Some people, especially men, are born with a weakness in their groin muscles. With or without this weakness, a hernia may be caused by anything that causes the intestine to push against the inguinal canal. Activities or conditions that might cause this pressure are:   lifting heavy objects   coughing or sneezing a lot   being constipated or pushing too hard when having a bowel movement   being overweight   being pregnant   in men, pushing too hard to urinate if their prostate is enlarged.   What are the symptoms?   Symptoms of a groin hernia may include:   a lump in the groin that you can push back in   pain or discomfort in the lower belly or groin, especially with physical activity   a lump in the groin that cannot be pushed back in.   A lump that cannot be pushed back in can become a life-threatening problem. The bowel may get caught in the gap. This could cut off its blood supply. Or bowel movement might be blocked and not able to move through the bowel.   How is it diagnosed?   Your healthcare provider will ask about your symptoms and medical history and examine you. You may have X-rays, ultrasound or CT scans, or blood tests.   How is it treated?   The main treatment for a painful groin hernia is surgery to repair the opening in the muscle wall. The surgeon closes the weak spot. Sometimes, before closing the skin, the surgeon will sew a piece of mesh over the weak spot and under the skin to make the area stronger. Your healthcare provider will usually suggest that you have the operation as soon as possible to avoid complications.   If your hernia is causing few or no symptoms, you may choose not to have surgery. You may need to use a groin support. You need to discuss with your provider what symptoms you should watch for and when you should seek medical care for possible problems resulting from your hernia, such as bowel blockage.   How long will the effects  last?   The hernia will not get better on its own, but it may not get worse for months or even years. A complication of a groin hernia is that after the bowel has pushed through the muscle wall, its contents may become trapped. A dangerous complication of this trapping is that the blood supply to the bowel may be cut off and the tissue may die, resulting in gangrene. This is a medical emergency requiring surgery.   How can I take care of myself?   Follow your healthcare provider's instructions.   Be careful when you lift, pull, or push heavy objects. Learn to lift, push, or pull heavy objects the correct way. Change your work duties or your recreational activities if necessary.   Ask your provider if you need to wear a groin support. Follow your provider's advice for losing weight if you are overweight.   Avoid constipation by eating foods that are high in fiber, using stool softeners, or drinking a natural stimulant beverage such as prune juice. Use laxatives or enemas only if recommended by your provider.   Avoid smoking to help prevent coughing. Coughing puts extra pressure on the abdominal and groin muscles.   Take medicine to reduce sneezing and coughing from allergies.   If your symptoms continue or if you develop new symptoms, tell your provider right away.   Also call your healthcare provider if:   You have nausea and vomiting that doesn't get better after a few hours.   You can't have a bowel movement.   You are unable to urinate.   The hernia bulges through the muscles and will not go back in.   The skin over the hernia becomes red or darker than your usual skin color.   You have severe abdominal pain.   You have a fever higher than 101.5? F (38.6? C) orally.   How can I help prevent a groin hernia?   Follow safe practices when you move heavy things. Learn how to lift and move heavy items safely. Remember to use your legs. Bend at your knees, not at your waist.   Keep a healthy weight.   Avoid becoming  constipated.     Published by [employer].  This content is reviewed periodically and is subject to change as new health information becomes available. The information is intended to inform and educate and is not a replacement for medical evaluation, advice, diagnosis or treatment by a healthcare professional.   Developed by [employer].   ? 2010 [facility] and/or its affiliates. All Rights Reserved.   Copyright   [employer] Reference [employer] 2011  Adult Health Advisor                 Thank you for choosing [facility]  for your Health Care. It was a pleasure seeing you at your visit today. Please contact us with any questions or concerns you may have.                   Vee Beth MD                                  To reach your [facility] care team after hours call:   [phone]    Our clinic hours are:     Monday- 7:30 am - 7:00 pm                             Tuesday through Friday- 7:30 am - 5:00 pm                                        Saturday- 8:00 am - 12:00 pm                  Phone:  [phone]    Our pharmacy hours are:     Monday  8:00 am to 7:00 pm      Tuesday through Friday 8:00am to 6:00pm                        Saturday - 9:00 am to 1:00 pm      Sunday : Closed.              Phone:  [phone]      There is also information available at our web site:  [email or web address]    If your provider ordered any lab tests and you do not receive the results within 10 business days, please call the clinic.    If you need a medication refill please contact your pharmacy.  Please allow 2 business days for your refill to be completed.    Our clinic offers telephone visits and e visits.  Please ask one of your team members to explain more.      Use Offerialt (secure email communication and access to your chart) to send your primary care provider a message or make an appointment. Ask someone on your Team how to sign up for Offerialt.                     Follow-ups after  your visit        Additional Services     GENERAL SURG ADULT REFERRAL       Your provider has referred you to: FMG: [facility] [place] [phone]   [email or web address]    Please be aware that coverage of these services is subject to the terms and limitations of your health insurance plan.  Call member services at your health plan with any benefit or coverage questions.      Please bring the following with you to your appointment:    (1) Any X-Rays, CTs or MRIs which have been performed.  Contact the facility where they were done to arrange for  prior to your scheduled appointment.   (2) List of current medications   (3) This referral request   (4) Any documents/labs given to you for this referral            OTOLARYNGOLOGY REFERRAL       Your provider has referred you to: N: [facility] [phone]   [email or web address]  [place] [phone]   [email or web address]    N: [facility] [place] [phone]   [email or web address]  [place] [phone]   [email or web address]  [place] [phone]   [email or web address]  [place] [phone]   [email or web address]    Please be aware that coverage of these services is subject to the terms and limitations of your health insurance plan.  Call member services at your health plan with any benefit or coverage questions.      Please bring the following with you to your appointment:    (1) Any X-Rays, CTs or MRIs which have been performed.  Contact the facility where they were done to arrange for  prior to your scheduled appointment.   (2) List of current medications  (3) This referral request   (4) Any documents/labs given to you for this referral            SKIN CARE REFERRAL       Your provider has referred you to:  FMG: [facility] [place] [phone]   [email or web address]     Please be aware that coverage of these services is subject to the terms and limitations of your health insurance plan.  Please check with your insurance prior to the appointment to ensure appropriate coverage for any services considered cosmetic in nature or not medically necessary.    Please bring the following with you to your appointment:    (1) Any X-Rays, CTs or MRIs which have been performed.  Contact the facility where they were done to arrange for  prior to your scheduled appointment.  Any new CT, MRI or other procedures ordered by your specialist must be performed at a [facility] facility or coordinated by your clinic's referral office.  (2) List of current medications  (3) This referral request   (4) Any documents/labs given to you for this referral                  Future tests that were ordered for you today     Open Future Orders        Priority Expected Expires Ordered    US Breast Left Complete 4 Quadrants Routine  3/27/2018 3/27/2017    CT Chest w/o Contrast Routine  3/27/2018 3/27/2017            Who to contact     If you have questions or need follow up information about today's clinic visit or your schedule please contact [facility] directly at [phone].  Normal or non-critical lab and imaging results will be communicated to you by MyChart, letter or phone within 4 business days after the clinic has received the results. If you do not hear from us within 7 days, please contact the clinic through [employer] or phone. If you have a critical or abnormal lab result, we will notify you by phone as soon as possible.  Submit refill requests through [employer] or call your pharmacy and they will forward the refill request to us. Please allow 3 business days for your refill to be completed.          Additional Information About Your Visit        MyChart Information  "    RegBinder lets you send messages to your doctor, view your test results, renew your prescriptions, schedule appointments and more. To sign up, go to [email or web address] . Click on \"Log in\" on the left side of the screen, which will take you to the Welcome page. Then click on \"Sign up Now\" on the right side of the page.     You will be asked to enter the access code listed below, as well as some personal information. Please follow the directions to create your username and password.     Your access code is: [email or web address]  Expires: 2017  9:40 AM     Your access code will  in 90 days. If you need help or a new code, please call your [place] clinic or [phone].        Care EveryWhere ID     This is your Care EveryWhere ID. This could be used by other organizations to access your [place] medical records  [id number]        Your Vitals Were     Pulse Temperature Height Pulse Oximetry BMI (Body Mass Index)       105 98.3  F (36.8  C) (Oral) 6' 1\" (1.854 m) 95% 31.66 kg/m2        Blood Pressure from Last 3 Encounters:   17 136/84   17 132/82   16 124/76    Weight from Last 3 Encounters:   17 240 lb (108.9 kg)   17 235 lb (106.6 kg)   16 233 lb (105.7 kg)              We Performed the Following     ADMIN: Vaccine, Initial (90630)     Albumin Random Urine Quantitative     CBC with platelets differential     Comprehensive metabolic panel     GENERAL SURG ADULT REFERRAL     Hepatitis C Screen Reflex to HCV RNA Quant and Genotype     Lipid Profile with reflex to direct LDL     Magnesium     OTOLARYNGOLOGY REFERRAL     Pneumococcal vaccine 13 valent PCV13 IM (Prevnar) [62040]     Prostate spec antigen screen     SKIN CARE REFERRAL     T3, total     T4, free     TESTOSTERONE, FREE & TOTAL     TSH          Today's Medication Changes          These changes are accurate as of: 3/27/17  8:55 AM.  If you have any questions, ask your nurse or doctor.               Start taking " these medicines.        Dose/Directions    sildenafil 50 MG cap/tab   Commonly known as:  REVATIO/VIAGRA   Used for:  Vasculogenic erectile dysfunction, unspecified vasculogenic erectile dysfunction type   Started by:  Vee Beth MD        Dose:  25-50 mg   Take 0.5-1 tablets (25-50 mg) by mouth daily as needed for erectile dysfunction Take 30 min to 4 hours before intercourse.  Never use with nitroglycerin, terazosin or doxazosin.   Quantity:  8 tablet   Refills:  11         These medicines have changed or have updated prescriptions.        Dose/Directions    levothyroxine 88 MCG tablet   Commonly known as:  SYNTHROID/LEVOTHROID   This may have changed:  See the new instructions.   Used for:  Hypothyroidism, unspecified   Changed by:  Vee Beth MD        TAKE 1 TABLET BY MOUTH ONCE DAILY( NEED TO BE SEEN )   Quantity:  90 tablet   Refills:  3       lisinopril 20 MG tablet   Commonly known as:  PRINIVIL/ZESTRIL   This may have changed:    - medication strength  - how much to take   Used for:  Essential hypertension with goal blood pressure less than 140/90   Changed by:  Vee Beth MD        Dose:  20 mg   Take 1 tablet (20 mg) by mouth daily   Quantity:  90 tablet   Refills:  1       testosterone 20.25 MG/ACT gel   Commonly known as:  ANDROGEL 1.62% PUMP   This may have changed:  See the new instructions.   Used for:  Hypotestosteronism, Hypotestosteronism   Changed by:  Vee Beth MD        PLACE 1 PUMP (20.25MG ) ONTO THE SKIN ONCE DAILY TO CLEAN,DRY,INTACT SKIN OF UPPER ARMS & SHOULDER   Quantity:  750 mg   Refills:  5            Where to get your medicines      These medications were sent to [facility]/pharmacy #[id number] - [place], [place] - [address] [facility]  [address] [facility] [address]    Hours:  [facility] Phone:  [phone]     levothyroxine 88 MCG tablet    lisinopril 20 MG tablet    omeprazole 40 MG capsule         Some of these will  need a paper prescription and others can be bought over the counter.  Ask your nurse if you have questions.     Bring a paper prescription for each of these medications     sildenafil 50 MG cap/tab    testosterone 20.25 MG/ACT gel                Primary Care Provider Office Phone # Fax #    Vee Beth [phone]       [facility] [address] [place] [id number]        Thank you!     Thank you for choosing [facility] [facility]  for your care. Our goal is always to provide you with excellent care. Hearing back from our patients is one way we can continue to improve our services. Please take a few minutes to complete the written survey that you may receive in the mail after your visit with us. Thank you!             Your Updated Medication List - Protect others around you: Learn how to safely use, store and throw away your medicines at www.disposemymeds.org.          This list is accurate as of: 3/27/17  8:55 AM.  Always use your most recent med list.                   Brand Name Dispense Instructions for use    aspirin 81 MG tablet     30 tablet    Take by mouth daily       CENTRUM SILVER per tablet     30 tablet    Take 1 tablet by mouth daily       glucosamine chondroitin 1500 complex Caps      Take by mouth daily       levothyroxine 88 MCG tablet    SYNTHROID/LEVOTHROID    90 tablet    TAKE 1 TABLET BY MOUTH ONCE DAILY( NEED TO BE SEEN )       lisinopril 20 MG tablet    PRINIVIL/ZESTRIL    90 tablet    Take 1 tablet (20 mg) by mouth daily       omeprazole 40 MG capsule    priLOSEC    90 capsule    TAKE 1 CAPSULE BY MOUTH ONCE DAILY TAKE 30-60 MINUTES BEFORE A MEAL.       sildenafil 50 MG cap/tab    REVATIO/VIAGRA    8 tablet    Take 0.5-1 tablets (25-50 mg) by mouth daily as needed for erectile dysfunction Take 30 min to 4 hours before intercourse.  Never use with nitroglycerin, terazosin or doxazosin.       testosterone 20.25 MG/ACT gel    ANDROGEL  1.62% PUMP    750 mg    PLACE 1 PUMP (20.25MG ) ONTO THE SKIN ONCE DAILY TO CLEAN,DRY,INTACT SKIN OF UPPER ARMS & SHOULDER

## 2017-03-27 NOTE — NURSING NOTE
"Chief Complaint   Patient presents with     Recheck Medication       Initial BP (!) 138/96 (BP Location: Left arm, Patient Position: Chair, Cuff Size: Adult Regular)  Pulse 105  Temp 98.3  F (36.8  C) (Oral)  Ht 6' 1\" (1.854 m)  Wt 240 lb (108.9 kg)  SpO2 95%  BMI 31.66 kg/m2 Estimated body mass index is 31.66 kg/(m^2) as calculated from the following:    Height as of this encounter: 6' 1\" (1.854 m).    Weight as of this encounter: 240 lb (108.9 kg).  Medication Reconciliation: complete   Jessi Thurston CMA  "

## 2017-03-28 LAB
ALBUMIN SERPL-MCNC: 4 G/DL (ref 3.4–5)
ALP SERPL-CCNC: 103 U/L (ref 40–150)
ALT SERPL W P-5'-P-CCNC: 20 U/L (ref 0–70)
ANION GAP SERPL CALCULATED.3IONS-SCNC: 7 MMOL/L (ref 3–14)
AST SERPL W P-5'-P-CCNC: 14 U/L (ref 0–45)
BILIRUB SERPL-MCNC: 0.5 MG/DL (ref 0.2–1.3)
BUN SERPL-MCNC: 18 MG/DL (ref 7–30)
CALCIUM SERPL-MCNC: 9.3 MG/DL (ref 8.5–10.1)
CHLORIDE SERPL-SCNC: 106 MMOL/L (ref 94–109)
CHOLEST SERPL-MCNC: 276 MG/DL
CO2 SERPL-SCNC: 28 MMOL/L (ref 20–32)
CREAT SERPL-MCNC: 1.11 MG/DL (ref 0.66–1.25)
GFR SERPL CREATININE-BSD FRML MDRD: 65 ML/MIN/1.7M2
GLUCOSE SERPL-MCNC: 108 MG/DL (ref 70–99)
HCV AB SERPL QL IA: NORMAL
HDLC SERPL-MCNC: 45 MG/DL
LDLC SERPL CALC-MCNC: 198 MG/DL
MAGNESIUM SERPL-MCNC: 2.2 MG/DL (ref 1.6–2.3)
NONHDLC SERPL-MCNC: 231 MG/DL
POTASSIUM SERPL-SCNC: 4.7 MMOL/L (ref 3.4–5.3)
PROT SERPL-MCNC: 7.7 G/DL (ref 6.8–8.8)
PSA SERPL-ACNC: 0.77 UG/L (ref 0–4)
SODIUM SERPL-SCNC: 141 MMOL/L (ref 133–144)
T4 FREE SERPL-MCNC: 1.09 NG/DL (ref 0.76–1.46)
TRIGL SERPL-MCNC: 166 MG/DL
TSH SERPL DL<=0.05 MIU/L-ACNC: 6.8 MU/L (ref 0.4–4)

## 2017-04-03 DIAGNOSIS — E78.5 HYPERLIPIDEMIA LDL GOAL <130: Primary | ICD-10-CM

## 2017-04-03 DIAGNOSIS — E03.9 HYPOTHYROIDISM: ICD-10-CM

## 2017-04-03 RX ORDER — ROSUVASTATIN CALCIUM 10 MG/1
10 TABLET, COATED ORAL DAILY
Qty: 90 TABLET | Refills: 0 | Status: SHIPPED | OUTPATIENT
Start: 2017-04-03 | End: 2017-07-20

## 2017-04-03 RX ORDER — LEVOTHYROXINE SODIUM 100 UG/1
100 TABLET ORAL DAILY
Qty: 90 TABLET | Refills: 0 | Status: SHIPPED | OUTPATIENT
Start: 2017-04-03 | End: 2017-07-20

## 2017-04-10 ENCOUNTER — HOSPITAL ENCOUNTER (OUTPATIENT)
Dept: CT IMAGING | Facility: CLINIC | Age: 72
Discharge: HOME | End: 2017-04-10
Attending: FAMILY MEDICINE | Admitting: FAMILY MEDICINE
Payer: COMMERCIAL

## 2017-04-10 DIAGNOSIS — R91.8 PULMONARY NODULES: Chronic | ICD-10-CM

## 2017-04-10 DIAGNOSIS — F17.200 TOBACCO USE DISORDER: Chronic | ICD-10-CM

## 2017-04-10 PROCEDURE — 71250 CT THORAX DX C-: CPT

## 2017-04-14 ENCOUNTER — TELEPHONE (OUTPATIENT)
Dept: FAMILY MEDICINE | Facility: CLINIC | Age: 72
End: 2017-04-14

## 2017-04-14 NOTE — TELEPHONE ENCOUNTER
Central scheduling calling to see if pt still needs to US and mammo on order as he had a recent CT chest.  Please advise.  Caty can be reached at [phone] until 330 today.  After that please call Geri at [phone].    Cindy Luis  Patient

## 2017-04-14 NOTE — TELEPHONE ENCOUNTER
No mention on the chest CT report re the left outer chest mass.   Please do have pt go forward with US of the left chest and also call radiology to have them comment if they see anything on the chest CT re: a left upper chest extrathoracic mass. Please call Geri castellanos at Central scheduling, too.

## 2017-04-14 NOTE — TELEPHONE ENCOUNTER
Caty from below will schedule this, no need to call central scheduling per her.    Called radiology per below to ask about CT imaging.  Left detailed vm for them to call us back to advise.      Georgia Fu RN

## 2017-04-17 NOTE — TELEPHONE ENCOUNTER
John at [facility].      They will clarify with radiology and call us back.    Georgia Fu RN

## 2017-04-17 NOTE — TELEPHONE ENCOUNTER
Attempt #2 per below, radiology call back.    Left detailed vm for them to call us back with advice/recommendations.     Georgia uF RN

## 2017-04-17 NOTE — TELEPHONE ENCOUNTER
Dr. Snow read this and is off today.      They will read this and do an addendum to the note.    Georgia Fu RN

## 2017-04-18 DIAGNOSIS — E34.9 HYPOTESTOSTERONISM: ICD-10-CM

## 2017-04-19 DIAGNOSIS — E34.9 HYPOTESTOSTERONISM: ICD-10-CM

## 2017-04-19 RX ORDER — TESTOSTERONE 16.2 MG/G
GEL TRANSDERMAL
Qty: 75 G | OUTPATIENT
Start: 2017-04-19

## 2017-04-19 NOTE — TELEPHONE ENCOUNTER
ANDROGEL 1.62% GEL PUMP      Last Written Prescription Date: 03/27/2017  Last Fill Quantity: 750mg, # refills: 5  Last Office Visit with [facility], [facility] or [facility] prescribing provider: 03/27/2017       BP Readings from Last 3 Encounters:   03/27/17 136/84   01/13/17 132/82   01/28/16 124/76

## 2017-04-19 NOTE — TELEPHONE ENCOUNTER
See addendum on imaging please.  Routing to PCP for further review/recommendations/orders.  Georgia Fu RN

## 2017-04-20 ENCOUNTER — HOSPITAL ENCOUNTER (OUTPATIENT)
Dept: MAMMOGRAPHY | Facility: CLINIC | Age: 72
Discharge: HOME | End: 2017-04-20
Attending: FAMILY MEDICINE | Admitting: FAMILY MEDICINE
Payer: COMMERCIAL

## 2017-04-20 ENCOUNTER — HOSPITAL ENCOUNTER (OUTPATIENT)
Dept: ULTRASOUND IMAGING | Facility: CLINIC | Age: 72
End: 2017-04-20
Attending: FAMILY MEDICINE
Payer: COMMERCIAL

## 2017-04-20 DIAGNOSIS — R22.2 MASS OF LEFT CHEST WALL: ICD-10-CM

## 2017-04-20 PROCEDURE — 76642 ULTRASOUND BREAST LIMITED: CPT | Mod: LT

## 2017-04-20 PROCEDURE — G0204 DX MAMMO INCL CAD BI: HCPCS

## 2017-04-21 NOTE — TELEPHONE ENCOUNTER
Androgel       Last Written Prescription Date: 3/27/17  Last Fill Quantity: 750mg,  # refills: 5   Last Office Visit with G, [facility] or [facility] prescribing provider: 3/17/17

## 2017-04-24 RX ORDER — TESTOSTERONE 16.2 MG/G
GEL TRANSDERMAL
Qty: 75 G | Refills: 1 | Status: SHIPPED | OUTPATIENT
Start: 2017-04-24 | End: 2017-10-04

## 2017-04-24 NOTE — TELEPHONE ENCOUNTER
Routing refill request to provider for review/approval because:  Drug not on the [facility] refill protocol     Elsi Thomas RN, BSN  [place] [facility]

## 2017-04-24 NOTE — TELEPHONE ENCOUNTER
PSA   Date Value Ref Range Status   03/27/2017 0.77 0 - 4 ug/L Final     Comment:     Assay Method:  Chemiluminescence using Siemens Vista analyzer

## 2017-04-24 NOTE — TELEPHONE ENCOUNTER
mammo and US report= negative - almost entirely fat.   pt aware of results. Informed by radiology on date of imaging.

## 2017-04-26 NOTE — PROGRESS NOTES
Please call pt with results below:        Focused ultrasound of the left breast was performed by radiologist and  technologist. Prominent fat lobule is seen at the patient's palpable  area of concern. No concerning findings identified.         IMPRESSION: BI-RADS CATEGORY: 2 - Benign Finding(s).    Recommend if enlarging,  surgical consultation for possible removal altogether.

## 2017-06-23 DIAGNOSIS — K25.9 GASTRIC ULCER: ICD-10-CM

## 2017-06-23 DIAGNOSIS — K21.9 GASTROESOPHAGEAL REFLUX DISEASE WITHOUT ESOPHAGITIS: ICD-10-CM

## 2017-06-23 RX ORDER — OMEPRAZOLE 40 MG/1
CAPSULE, DELAYED RELEASE ORAL
Qty: 90 CAPSULE | Refills: 0 | OUTPATIENT
Start: 2017-06-23

## 2017-06-23 NOTE — TELEPHONE ENCOUNTER
omeprazole (PRILOSEC) 40 MG capsule      Last Written Prescription Date: 3/27/2017  Last Fill Quantity: 90 capsule,  # refills: 3   Last Office Visit with [facility], [facility] or [facility] prescribing provider: 3/27/2017

## 2017-07-10 DIAGNOSIS — E78.5 HYPERLIPIDEMIA LDL GOAL <130: ICD-10-CM

## 2017-07-10 DIAGNOSIS — E03.9 HYPOTHYROIDISM: ICD-10-CM

## 2017-07-10 PROCEDURE — 36415 COLL VENOUS BLD VENIPUNCTURE: CPT | Performed by: FAMILY MEDICINE

## 2017-07-10 PROCEDURE — 84443 ASSAY THYROID STIM HORMONE: CPT | Performed by: FAMILY MEDICINE

## 2017-07-10 PROCEDURE — 84439 ASSAY OF FREE THYROXINE: CPT | Performed by: FAMILY MEDICINE

## 2017-07-10 PROCEDURE — 80061 LIPID PANEL: CPT | Performed by: FAMILY MEDICINE

## 2017-07-11 LAB
CHOLEST SERPL-MCNC: 188 MG/DL
HDLC SERPL-MCNC: 58 MG/DL
LDLC SERPL CALC-MCNC: 103 MG/DL
NONHDLC SERPL-MCNC: 130 MG/DL
TRIGL SERPL-MCNC: 137 MG/DL
TSH SERPL DL<=0.005 MIU/L-ACNC: 8.04 MU/L (ref 0.4–4)

## 2017-07-12 LAB — T4 FREE SERPL-MCNC: 0.95 NG/DL (ref 0.76–1.46)

## 2017-07-20 DIAGNOSIS — E03.9 HYPOTHYROIDISM, UNSPECIFIED: Primary | ICD-10-CM

## 2017-07-20 DIAGNOSIS — E78.5 HYPERLIPIDEMIA WITH TARGET LDL LESS THAN 130: Chronic | ICD-10-CM

## 2017-07-20 DIAGNOSIS — E78.5 HYPERLIPIDEMIA LDL GOAL <130: ICD-10-CM

## 2017-07-20 RX ORDER — ROSUVASTATIN CALCIUM 10 MG/1
10 TABLET, COATED ORAL DAILY
Qty: 90 TABLET | Refills: 1 | Status: SHIPPED | OUTPATIENT
Start: 2017-07-20 | End: 2018-02-04

## 2017-07-20 RX ORDER — LEVOTHYROXINE SODIUM 112 UG/1
112 TABLET ORAL DAILY
Qty: 90 TABLET | Refills: 1 | Status: SHIPPED | OUTPATIENT
Start: 2017-07-20 | End: 2018-02-12

## 2017-07-20 NOTE — PROGRESS NOTES
Please call pt with results below:     Still  hypothyroid - ? Exactly what dose is pt taking ? 88mcg daily or 100mcg daily.   If 88mcg daily, then increase dose [id number] daily and adjust med list, please.   If 100mcg daily, then increase dose to112 mcg daily and adjust med list, please.      after med changed: Recheck tsh, free t4, total t3 in 4-6 weeks. Please  enter future orders for this and pt  can do this as a lab only appointment.       Lipids = much improved - please continue current medications and recheck fasting lipids, ast , alt (liver function tests)  in 6 months again. Please reorder pt's crestor - same dose - #90 tabs with 1 refill.

## 2017-10-04 DIAGNOSIS — E34.9 HYPOTESTOSTERONISM: Primary | ICD-10-CM

## 2017-10-04 RX ORDER — TESTOSTERONE 16.2 MG/G
GEL TRANSDERMAL
Qty: 150 G | Refills: 2 | Status: SHIPPED | OUTPATIENT
Start: 2017-10-04 | End: 2018-03-28

## 2017-10-04 NOTE — TELEPHONE ENCOUNTER
Pt's last PSA = 0.77 3/2017. Refilled. at [facility] unit coordinator's in basket /file box.

## 2017-10-04 NOTE — TELEPHONE ENCOUNTER
Controlled Substance Refill Request for Androgel  Problem List Complete:  No     PROVIDER TO CONSIDER COMPLETION OF PROBLEM LIST AND OVERVIEW/CONTROLLED SUBSTANCE AGREEMENT    Last Written Prescription Date:  4/24/2017  Last Fill Quantity: 75 g,   # refills: 1    Last Office Visit with [facility] primary care provider: 3/27/2017    Future Office visit:     Controlled substance agreement on file: No.     Processing:  Fax Rx to [facility] pharmacy     checked in past 6 months?  No      DAYNA Clifton, RN, PHN  [facility] [phone]

## 2017-11-18 DIAGNOSIS — I10 ESSENTIAL HYPERTENSION WITH GOAL BLOOD PRESSURE LESS THAN 140/90: Chronic | ICD-10-CM

## 2017-11-20 RX ORDER — LISINOPRIL 20 MG/1
TABLET ORAL
Qty: 90 TABLET | Refills: 1 | Status: SHIPPED | OUTPATIENT
Start: 2017-11-20 | End: 2018-03-28

## 2017-11-20 NOTE — TELEPHONE ENCOUNTER
Requested Prescriptions   Pending Prescriptions Disp Refills     lisinopril (PRINIVIL/ZESTRIL) 20 MG tablet [Pharmacy Med Name: LISINOPRIL 20 MG TABLET] 90 tablet 1     Sig: TAKE 1 TABLET BY MOUTH ONCE DAILY    ACE Inhibitors (Including Combos) Protocol Passed    11/18/2017 12:25 PM       Passed - Blood pressure under 140/90    BP Readings from Last 3 Encounters:   03/27/17 136/84   01/13/17 132/82   01/28/16 124/76                Passed - Recent or future visit with authorizing provider's specialty    Patient had office visit in the last year or has a visit in the next 30 days with authorizing provider.  See chart review.              Passed - Patient is age 18 or older       Passed - Normal serum creatinine on file in past 12 months    Recent Labs   Lab Test  03/27/17   0902   CR  1.11            Passed - Normal serum potassium on file in past 12 months    Recent Labs   Lab Test  03/27/17   0902   POTASSIUM  4.7             Prescription approved per [facility] Refill Protocol.  Georgia Fu RN  [facility]

## 2018-01-04 ENCOUNTER — OFFICE VISIT (OUTPATIENT)
Dept: FAMILY MEDICINE | Facility: CLINIC | Age: 73
End: 2018-01-04
Payer: COMMERCIAL

## 2018-01-04 VITALS
TEMPERATURE: 98.8 F | SYSTOLIC BLOOD PRESSURE: 134 MMHG | HEART RATE: 112 BPM | BODY MASS INDEX: 29.39 KG/M2 | WEIGHT: 229 LBS | HEIGHT: 74 IN | RESPIRATION RATE: 14 BRPM | OXYGEN SATURATION: 97 % | DIASTOLIC BLOOD PRESSURE: 76 MMHG

## 2018-01-04 DIAGNOSIS — J10.1 INFLUENZA A: Primary | ICD-10-CM

## 2018-01-04 DIAGNOSIS — J01.90 ACUTE NON-RECURRENT SINUSITIS, UNSPECIFIED LOCATION: ICD-10-CM

## 2018-01-04 DIAGNOSIS — I10 ESSENTIAL HYPERTENSION WITH GOAL BLOOD PRESSURE LESS THAN 140/90: Chronic | ICD-10-CM

## 2018-01-04 DIAGNOSIS — R05.9 COUGH: ICD-10-CM

## 2018-01-04 DIAGNOSIS — H92.03 OTALGIA, BILATERAL: ICD-10-CM

## 2018-01-04 DIAGNOSIS — K21.9 GASTROESOPHAGEAL REFLUX DISEASE WITHOUT ESOPHAGITIS: ICD-10-CM

## 2018-01-04 DIAGNOSIS — Z51.81 MEDICATION MONITORING ENCOUNTER: ICD-10-CM

## 2018-01-04 DIAGNOSIS — J06.9 ACUTE URI: ICD-10-CM

## 2018-01-04 LAB
FLUAV+FLUBV AG SPEC QL: NEGATIVE
FLUAV+FLUBV AG SPEC QL: POSITIVE
SPECIMEN SOURCE: ABNORMAL

## 2018-01-04 PROCEDURE — 99214 OFFICE O/P EST MOD 30 MIN: CPT | Performed by: FAMILY MEDICINE

## 2018-01-04 PROCEDURE — 87804 INFLUENZA ASSAY W/OPTIC: CPT | Performed by: FAMILY MEDICINE

## 2018-01-04 RX ORDER — OSELTAMIVIR PHOSPHATE 75 MG/1
75 CAPSULE ORAL 2 TIMES DAILY
Qty: 10 CAPSULE | Refills: 0 | Status: SHIPPED | OUTPATIENT
Start: 2018-01-04 | End: 2018-03-28

## 2018-01-04 RX ORDER — DEXTROMETHORPHAN HYDROBROMIDE AND PROMETHAZINE HYDROCHLORIDE 15; 6.25 MG/5ML; MG/5ML
5 SYRUP ORAL EVERY 4 HOURS PRN
Qty: 180 ML | Refills: 1 | Status: SHIPPED | OUTPATIENT
Start: 2018-01-04 | End: 2018-03-28

## 2018-01-04 NOTE — PROGRESS NOTES
SUBJECTIVE:   Dev Powell is a 72 year old male who presents to clinic today for the following health issues:    Acute Illness   Acute illness concerns: URI  Onset: 01/01/2018    Fever: no    Chills/Sweats: YES    Headache (location?): YES    Sinus Pressure:YES    Conjunctivitis:  no    Ear Pain: YES: bilateral    Rhinorrhea: YES    Congestion: YES    Sore Throat: no     Cough: YES-productive of clear sputum    Wheeze: YES    Decreased Appetite: YES    Nausea: no    Vomiting: no    Diarrhea:  no    Dysuria/Freq.: no    Fatigue/Achiness: YES-     Sick/Strep Exposure: YES- Wife in [facility] with Pneumonia     Therapies Tried and outcome: Tylenol    Patient with productive cough, chest congestion, sinus pressure/congestion, post nasal drip, bilateral ear pain, and chills/sweats for 2-3 days. Reduced sleep quality due to cough. At onset, he was experiencing wheezing and sore throat, but these have improved. He has been taking Tylenol and OTC decongestant with some relief.  Denies diarrhea, emesis. GERD symptoms have been controlled. His wife is currently hospitalized with pneumonia.    Problem list and histories reviewed & adjusted, as indicated.  Additional history: as documented        Reviewed and updated as needed this visit by clinical staffTobacco  Allergies  Meds  Med Hx  Surg Hx  Fam Hx  Soc Hx      Reviewed and updated as needed this visit by Provider         BP Readings from Last 3 Encounters:   01/04/18 134/76   03/27/17 136/84   01/13/17 132/82       Wt Readings from Last 4 Encounters:   01/04/18 229 lb (103.9 kg)   03/27/17 240 lb (108.9 kg)   01/13/17 235 lb (106.6 kg)   01/28/16 233 lb (105.7 kg)       Health Maintenance    Health Maintenance Due   Topic Date Due     WELLNESS VISIT Q1 YR  01/30/2016     INFLUENZA VACCINE (SYSTEM ASSIGNED)  09/01/2017     COLON CANCER SCREEN (SYSTEM ASSIGNED)  09/25/2017     FALL RISK ASSESSMENT  01/13/2018       Current Problem List    Patient Active Problem List    Diagnosis     Gastroesophageal reflux disease without esophagitis = ran out of PPI- has been taking lots of TUMS     Hyperlipidemia with target LDL less than 130- pravastatin = leg cramps; lipitor =calf pains     Snoring     Vasculogenic erectile dysfunction, unspecified vasculogenic erectile dysfunction type     Osteoarthritis     Lipoma of skin     Gastric ulcer     Malignant melanoma of left upper extremity including shoulder (H)     Tobacco use disorder     Enlarged prostate- saw Dr. Solomon in the past      Rectal pain     Prostatitis, acute     Hearing loss     Inguinal hernia- right      Pulmonary nodules- tiny per screening chest CT -  repeat CT chest low dose w/o contrast in 2/2017-- or sooner if needed.      Essential hypertension with goal blood pressure less than 140/90     Hypotestosteronism- androgel working well      Hypothyroidism, unspecified     Chronic constipation     Mass of left chest wall - ? there for 18+ years -left breast 1cm mass at 7:30        Past Medical History    Past Medical History:   Diagnosis Date     Elevated blood pressure (not hypertension)      Erectile dysfunction      Gastric ulcer 9/25/2007    EGD @ [place] - gastric ulcer w/ erosions/ LA grade B erosive esophagitis     GERD (gastroesophageal reflux disease)     worse lying down at night.      Hyperlipidemia LDL goal < 130     lipitor 40mg= calf pain-off since 2008     Lipoma of skin 2007    chest - biopsied at [place] = benign      Malignant melanoma (H) 1995     Osteoarthritis     mostly hands and knees      Snoring        Past Surgical History    Past Surgical History:   Procedure Laterality Date     COLONOSCOPY  9/25/2007    repeat in 2017     ESOPHAGOSCOPY, GASTROSCOPY, DUODENOSCOPY (EGD), COMBINED N/A 2/5/2015    Procedure: COMBINED ESOPHAGOSCOPY, GASTROSCOPY, DUODENOSCOPY (EGD);  Surgeon: Ender Dixon MD;  Location: [facility] ESOPHAGOSCOPY, DIAGNOSTIC  9/25/2007    EGD - gastric ulcer w/ erosions/ LA grade B  erosive esophagitis     wide excision      for malignant melanoma       Current Medications    Current Outpatient Prescriptions   Medication Sig Dispense Refill     oseltamivir (TAMIFLU) 75 MG capsule Take 1 capsule (75 mg) by mouth 2 times daily 10 capsule 0     promethazine-DM (PHENERGAN-DM) 6.25-15 MG/5ML SYRP syrup Take 5 mLs by mouth every 4 hours as needed 180 mL 1     lisinopril (PRINIVIL/ZESTRIL) 20 MG tablet TAKE 1 TABLET BY MOUTH ONCE DAILY 90 tablet 1     levothyroxine (SYNTHROID/LEVOTHROID) 112 MCG tablet Take 1 tablet (112 mcg) by mouth daily 90 tablet 1     rosuvastatin (CRESTOR) 10 MG tablet Take 1 tablet (10 mg) by mouth daily 90 tablet 1     Glucosamine-Chondroit-Vit C-Mn (GLUCOSAMINE CHONDROITIN 1500 COMPLEX) CAPS Take by mouth daily       aspirin 81 MG tablet Take by mouth daily 30 tablet      Multiple Vitamins-Minerals (CENTRUM SILVER) per tablet Take 1 tablet by mouth daily 30 tablet      ANDROGEL 1.62% PUMP 20.25 MG/ACT gel PLACE 1 PUMP TO CLEAN DRY HAIRLESS SKIN OF UPPER ARMS & SHOULDER ONCE DAILY 150 g 2     sildenafil (REVATIO/VIAGRA) 50 MG cap/tab Take 0.5-1 tablets (25-50 mg) by mouth daily as needed for erectile dysfunction Take 30 min to 4 hours before intercourse.  Never use with nitroglycerin, terazosin or doxazosin. 8 tablet 11     omeprazole (PRILOSEC) 40 MG capsule TAKE 1 CAPSULE BY MOUTH ONCE DAILY TAKE 30-60 MINUTES BEFORE A MEAL. 90 capsule 3     [DISCONTINUED] levothyroxine (SYNTHROID/LEVOTHROID) 88 MCG tablet TAKE 1 TABLET BY MOUTH ONCE DAILY( NEED TO BE SEEN ) 90 tablet 3       Allergies    No Known Allergies    Immunizations    Immunization History   Administered Date(s) Administered     Influenza (IIV3) PF 2007     Pneumo Conj 13-V (2010&after) 2017     TD (ADULT, 7+) 2007     TDAP Vaccine (Boostrix) 2013       Family History    Family History   Problem Relation Age of Onset     C.A.D. Father 62      at age 62 of MI     DIABETES Father       early type 2      Neurologic Disorder Mother      mild dementia - @ 92     C.A.D. 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    This document serves as a record of the services and decisions personally performed and made by Israel Fernández MD. It was created on their behalf by Isabel Chaparro, a trained medical scribe. The creation of this document is based the provider's statements to the medical scribe.  Isabel Chaparro 2018 11:25 AM      OBJECTIVE:                                                   "  /76  Pulse 112  Temp 98.8  F (37.1  C) (Tympanic)  Resp 14  Ht 6' 2\" (1.88 m)  Wt 229 lb (103.9 kg)  SpO2 97%  BMI 29.4 kg/m2  Body mass index is 29.4 kg/(m^2).  GENERAL: healthy, alert and no distress  EYES: Eyes grossly normal to inspection, extraocular movements - intact, and PERRL  HENT: ear canals- normal; TMs- normal; Nose- normal; Mouth- no ulcers, no lesions; Mild erythema of posterior pharynx   NECK: no tenderness, no adenopathy, no asymmetry, no masses, no stiffness; thyroid- normal to palpation  RESP: lungs clear to auscultation - no rales, no rhonchi, no wheezes  CV: regular rates and rhythm, normal S1 S2, no S3 or S4 and no murmur, no click or rub -  ABDOMEN: soft, no tenderness, no  hepatosplenomegaly, no masses, normal bowel sounds  MS: extremities- no gross deformities noted, no edema  SKIN: no suspicious lesions, no rashes  NEURO: strength and tone- normal, sensory exam- grossly normal, mentation- intact, speech- normal, reflexes- symmetric  BACK: no CVA tenderness, no paralumbar tenderness  PSYCH: Alert and oriented times 3; speech- coherent , normal rate and volume; able to articulate logical thoughts, able to abstract reason, no tangential thoughts, no hallucinations or delusions, affect- normal    DIAGNOSTICS/PROCEDURES:                                                      Results for orders placed or performed in visit on 01/04/18 (from the past 24 hour(s))   Influenza A/B antigen   Result Value Ref Range    Influenza A/B Agn Specimen Nasal     Influenza A Positive (A) NEG^Negative    Influenza B Negative NEG^Negative        ASSESSMENT/PLAN:                                                        ICD-10-CM    1. Influenza A J10.1 oseltamivir (TAMIFLU) 75 MG capsule     promethazine-DM (PHENERGAN-DM) 6.25-15 MG/5ML SYRP syrup   2. Acute URI J06.9 oseltamivir (TAMIFLU) 75 MG capsule     promethazine-DM (PHENERGAN-DM) 6.25-15 MG/5ML SYRP syrup   3. Acute non-recurrent sinusitis, " unspecified location J01.90    4. Cough R05 Influenza A/B antigen   5. Otalgia, bilateral H92.03    6. Essential hypertension with goal blood pressure less than 140/90 I10    7. Gastroesophageal reflux disease without esophagitis = ran out of PPI- has been taking lots of TUMS K21.9    8. Medication monitoring encounter Z51.81        Discussed treatment/modality options, including risk and benefits, he desires new medication(Tamiflu and phernergan DM), symptomatic cares reviewed, avoid contact with wife in the hospital. All diagnosis above reviewed and noted above, otherwise stable.  See [facility] orders for further details.  Follow up as needed or if worsening.     Health Maintenance Due   Topic Date Due     WELLNESS VISIT Q1 YR  01/30/2016     INFLUENZA VACCINE (SYSTEM ASSIGNED)  09/01/2017     COLON CANCER SCREEN (SYSTEM ASSIGNED)  09/25/2017     FALL RISK ASSESSMENT  01/13/2018       The information in this document, created by the medical scribe for me, accurately reflects the services I personally performed and the decisions made by me. I have reviewed and approved this document for accuracy.   Israel Fernández MD FAAFP              Israel Fernández MD [address]  [address] [phone] Fax

## 2018-01-04 NOTE — NURSING NOTE
"Chief Complaint   Patient presents with     URI       Initial /76  Pulse 112  Temp 98.8  F (37.1  C) (Tympanic)  Resp 14  Ht 6' 2\" (1.88 m)  Wt 229 lb (103.9 kg)  SpO2 97%  BMI 29.4 kg/m2 Estimated body mass index is 29.4 kg/(m^2) as calculated from the following:    Height as of this encounter: 6' 2\" (1.88 m).    Weight as of this encounter: 229 lb (103.9 kg).  Medication Reconciliation: complete   Indira Bloedow LPN    "

## 2018-01-04 NOTE — MR AVS SNAPSHOT
After Visit Summary   1/4/2018    Dev Powell    MRN: [id number]           Patient Information     Date Of Birth          1945        Visit Information        Provider Department      1/4/2018 11:20 AM Israel Fernández MD [facility]        Today's Diagnoses     Influenza A    -  1    Acute URI        Acute non-recurrent sinusitis, unspecified location        Cough        Otalgia, bilateral        Essential hypertension with goal blood pressure less than 140/90        Gastroesophageal reflux disease without esophagitis = ran out of PPI- has been taking lots of TUMS        Medication monitoring encounter          Care Instructions    Take Tamiflu 1 tablet twice daily for 5 days     Take Phenergan DM     Take OTC Tylenol as needed     Follow up with worsening symptoms     [facility]                        To reach your care team during and after hours:   [phone]  To reach our pharmacy:        [phone]    Clinic Hours                        Our clinic hours are:    Monday   7:30 am to 7:00 pm                  Tuesday through Friday 7:30 am to 5:00 pm                             Saturday   8:00 am to 12:00 pm      Sunday   Closed      Pharmacy Hours                        Our pharmacy hours are:    Monday   8:30 am to 7:00 pm       Tuesday to Friday  8:30 am to 6:00 pm                       Saturday    9:00 am to 1:00 pm              Sunday    Closed              There is also information available at our web site:  [email or web address]    If your provider ordered any lab tests and you do not receive the results within 10 business days, please call the clinic.    If you need a medication refill please contact your pharmacy.  Please allow 2-3 business days for your refill to be completed.    Our clinic offers telephone visits and e visits.  Please ask one of your team members to explain more.      Use pMDsoft (secure email communication and access to your chart) to  "send your primary care provider a message or make an appointment. Ask someone on your Team how to sign up for [employer].  Immunizations                      Immunization History   Administered Date(s) Administered     Influenza (IIV3) PF 12/13/2007     Pneumo Conj 13-V (2010&after) 03/27/2017     TD (ADULT, 7+) 09/20/2007     TDAP Vaccine (Boostrix) 09/18/2013        Health Maintenance                         Health Maintenance Due   Topic Date Due     Wellness Visit with your Primary Provider - yearly  01/30/2016     Flu Vaccine - yearly  09/01/2017     Colon Cancer Screening - every 10 years.  09/25/2017     FALL RISK ASSESSMENT  01/13/2018               Follow-ups after your visit        Who to contact     If you have questions or need follow up information about today's clinic visit or your schedule please contact [facility] directly at [phone].  Normal or non-critical lab and imaging results will be communicated to you by MyChart, letter or phone within 4 business days after the clinic has received the results. If you do not hear from us within 7 days, please contact the clinic through [employer] or phone. If you have a critical or abnormal lab result, we will notify you by phone as soon as possible.  Submit refill requests through [employer] or call your pharmacy and they will forward the refill request to us. Please allow 3 business days for your refill to be completed.          Additional Information About Your Visit        [employer] Information     [employer] lets you send messages to your doctor, view your test results, renew your prescriptions, schedule appointments and more. To sign up, go to [email or web address] . Click on \"Log in\" on the left side of the screen, which will take you to the Welcome page. Then click on \"Sign up Now\" on the right side of the page.     You will be asked to enter the access code listed below, as well as some personal information. Please follow the directions to " "create your username and password.     Your access code is: [email or web address]  Expires: 2018 11:42 AM     Your access code will  in 90 days. If you need help or a new code, please call your [facility] or [phone].        Care EveryWhere ID     This is your Care EveryWhere ID. This could be used by other organizations to access your [facility] medical records  [id number]        Your Vitals Were     Pulse Temperature Respirations Height Pulse Oximetry BMI (Body Mass Index)    112 98.8  F (37.1  C) (Tympanic) 14 6' 2\" (1.88 m) 97% 29.4 kg/m2       Blood Pressure from Last 3 Encounters:   18 134/76   17 136/84   17 132/82    Weight from Last 3 Encounters:   18 229 lb (103.9 kg)   17 240 lb (108.9 kg)   17 235 lb (106.6 kg)              We Performed the Following     Influenza A/B antigen          Today's Medication Changes          These changes are accurate as of: 18 11:42 AM.  If you have any questions, ask your nurse or doctor.               Start taking these medicines.        Dose/Directions    oseltamivir 75 MG capsule   Commonly known as:  TAMIFLU   Used for:  Influenza A, Acute URI   Started by:  Israel Fernández MD        Dose:  75 mg   Take 1 capsule (75 mg) by mouth 2 times daily   Quantity:  10 capsule   Refills:  0       promethazine-DM 6.25-15 MG/5ML Syrp syrup   Commonly known as:  PHENERGAN-DM   Used for:  Influenza A, Acute URI   Started by:  Israel Fernández MD        Dose:  5 mL   Take 5 mLs by mouth every 4 hours as needed   Quantity:  180 mL   Refills:  1            Where to get your medicines      These medications were sent to [facility]/pharmacy #[id number] - [place], [place] - [address] [facility] [facility]  [address] [facility] [address]    Hours:  [facility] [employer] Phone:  [phone]     oseltamivir 75 MG capsule    promethazine-DM 6.25-15 MG/5ML Syrp syrup                Primary Care Provider Office Phone # Fax #    Vee Beth MD " [phone]       [address] [place] [address]        Equal Access to Services     ELIZABETH ART : Hadii aad ku hadtazdaphne Lyric, wabiancada latoshatray, qamoniqueta kakadeemda che, liliam jalenin hayaan rogerjasper couch kelly yousif. So [facility] [phone].    ATENCIÓN: Si habla español, tiene a cope disposición servicios gratuitos de asistencia lingüística. Llame al [phone].    We comply with applicable federal civil rights laws and [place] laws. We do not discriminate on the basis of race, color, national origin, age, disability, sex, sexual orientation, or gender identity.            Thank you!     Thank you for choosing [facility]  for your care. Our goal is always to provide you with excellent care. Hearing back from our patients is one way we can continue to improve our services. Please take a few minutes to complete the written survey that you may receive in the mail after your visit with us. Thank you!             Your Updated Medication List - Protect others around you: Learn how to safely use, store and throw away your medicines at www.disposemymeds.org.          This list is accurate as of: 1/4/18 11:42 AM.  Always use your most recent med list.                   Brand Name Dispense Instructions for use Diagnosis    ANDROGEL 1.62% PUMP 20.25 MG/ACT gel   Generic drug:  testosterone     150 g    PLACE 1 PUMP TO CLEAN DRY HAIRLESS SKIN OF UPPER ARMS & SHOULDER ONCE DAILY    Hypotestosteronism       aspirin 81 MG tablet     30 tablet    Take by mouth daily        CENTRUM SILVER per tablet     30 tablet    Take 1 tablet by mouth daily        glucosamine chondroitin 1500 complex Caps      Take by mouth daily        levothyroxine 112 MCG tablet    SYNTHROID/LEVOTHROID    90 tablet    Take 1 tablet (112 mcg) by mouth daily    Hypothyroidism, unspecified       lisinopril 20 MG tablet    PRINIVIL/ZESTRIL    90 tablet    TAKE 1 TABLET BY MOUTH ONCE DAILY    Essential hypertension with  goal blood pressure less than 140/90       omeprazole 40 MG capsule    priLOSEC    90 capsule    TAKE 1 CAPSULE BY MOUTH ONCE DAILY TAKE 30-60 MINUTES BEFORE A MEAL.    Gastroesophageal reflux disease without esophagitis, Gastric ulcer, unspecified chronicity       oseltamivir 75 MG capsule    TAMIFLU    10 capsule    Take 1 capsule (75 mg) by mouth 2 times daily    Influenza A, Acute URI       promethazine-DM 6.25-15 MG/5ML Syrp syrup    PHENERGAN-DM    180 mL    Take 5 mLs by mouth every 4 hours as needed    Influenza A, Acute URI       rosuvastatin 10 MG tablet    CRESTOR    90 tablet    Take 1 tablet (10 mg) by mouth daily    Hyperlipidemia LDL goal <130       sildenafil 50 MG tablet    VIAGRA    8 tablet    Take 0.5-1 tablets (25-50 mg) by mouth daily as needed for erectile dysfunction Take 30 min to 4 hours before intercourse.  Never use with nitroglycerin, terazosin or doxazosin.    Vasculogenic erectile dysfunction, unspecified vasculogenic erectile dysfunction type

## 2018-01-04 NOTE — PATIENT INSTRUCTIONS
Take Tamiflu 1 tablet twice daily for 5 days     Take Phenergan DM     Take OTC Tylenol as needed     Follow up with worsening symptoms     [facility]                        To reach your care team during and after hours:   [phone]  To reach our pharmacy:        [phone]    Clinic Hours                        Our clinic hours are:    Monday   7:30 am to 7:00 pm                  Tuesday through Friday 7:30 am to 5:00 pm                             Saturday   8:00 am to 12:00 pm      Sunday   Closed      Pharmacy Hours                        Our pharmacy hours are:    Monday   8:30 am to 7:00 pm       Tuesday to Friday  8:30 am to 6:00 pm                       Saturday    9:00 am to 1:00 pm              Sunday    Closed              There is also information available at our web site:  [email or web address]    If your provider ordered any lab tests and you do not receive the results within 10 business days, please call the clinic.    If you need a medication refill please contact your pharmacy.  Please allow 2-3 business days for your refill to be completed.    Our clinic offers telephone visits and e visits.  Please ask one of your team members to explain more.      Use [employer] (secure email communication and access to your chart) to send your primary care provider a message or make an appointment. Ask someone on your Team how to sign up for [employer].  Immunizations                      Immunization History   Administered Date(s) Administered     Influenza (IIV3) PF 12/13/2007     Pneumo Conj 13-V (2010&after) 03/27/2017     TD (ADULT, 7+) 09/20/2007     TDAP Vaccine (Boostrix) 09/18/2013        Health Maintenance                         Health Maintenance Due   Topic Date Due     Wellness Visit with your Primary Provider - yearly  01/30/2016     Flu Vaccine - yearly  09/01/2017     Colon Cancer Screening - every 10 years.  09/25/2017     FALL RISK ASSESSMENT  01/13/2018

## 2018-02-04 DIAGNOSIS — E78.5 HYPERLIPIDEMIA LDL GOAL <130: ICD-10-CM

## 2018-02-07 RX ORDER — ROSUVASTATIN CALCIUM 10 MG/1
TABLET, COATED ORAL
Qty: 90 TABLET | Refills: 0 | Status: SHIPPED | OUTPATIENT
Start: 2018-02-07 | End: 2018-03-28

## 2018-02-07 NOTE — TELEPHONE ENCOUNTER
Patient due to recheck labs (labs ordered in chart)    Called [phone] - LAB ONLY scheduled 02/09/2018  Patient stated he is completely out of medication - temporary supply sent      Emily Wilson RN  [place] [facility]

## 2018-02-12 DIAGNOSIS — E03.9 HYPOTHYROIDISM: ICD-10-CM

## 2018-02-13 NOTE — TELEPHONE ENCOUNTER
"Requested Prescriptions   Pending Prescriptions Disp Refills     levothyroxine (SYNTHROID/LEVOTHROID) 112 MCG tablet [Pharmacy Med Name: LEVOTHYROXINE 112 MCG TABLET] 90 tablet 1    Last Written Prescription Date:  7.20.17  Last Fill Quantity: 90,  # refills: 1   Last office visit: 1/4/2018 with prescribing provider:  1.4.18   Future Office Visit:   Next 5 appointments (look out 90 days)     Mar 28, 2018  8:15 AM CDT   PHYSICAL with Vee Beth MD   [facility]    [address] [place] [id number]   [phone]                  Sig: TAKE 1 TABLET BY MOUTH ONCE DAILY    Thyroid Protocol Failed    2/12/2018 10:01 AM       Failed - Normal TSH on file in past 12 months    Recent Labs   Lab Test  07/10/17   0957   TSH  8.04*             Passed - Patient is 12 years or older       Passed - Recent or future visit with authorizing provider's specialty    Patient had office visit in the last year or has a visit in the next 30 days with authorizing provider.  See \"Patient Info\" tab in inbasket, or \"Choose Columns\" in Meds & Orders section of the refill encounter.               "

## 2018-02-15 RX ORDER — LEVOTHYROXINE SODIUM 112 UG/1
TABLET ORAL
Qty: 90 TABLET | Refills: 0 | Status: SHIPPED | OUTPATIENT
Start: 2018-02-15 | End: 2018-03-28

## 2018-02-15 NOTE — TELEPHONE ENCOUNTER
Medication is being filled for 1 time refill only due to:  Patient needs labs see protocol, pt is scheduled for appt.   Georgia Fu RN  [place] [facility]

## 2018-03-28 ENCOUNTER — OFFICE VISIT (OUTPATIENT)
Dept: FAMILY MEDICINE | Facility: CLINIC | Age: 73
End: 2018-03-28
Payer: COMMERCIAL

## 2018-03-28 VITALS
SYSTOLIC BLOOD PRESSURE: 136 MMHG | HEART RATE: 89 BPM | OXYGEN SATURATION: 95 % | TEMPERATURE: 97.9 F | BODY MASS INDEX: 30.39 KG/M2 | WEIGHT: 236.8 LBS | DIASTOLIC BLOOD PRESSURE: 88 MMHG | HEIGHT: 74 IN

## 2018-03-28 DIAGNOSIS — E34.9 HYPOTESTOSTERONISM: ICD-10-CM

## 2018-03-28 DIAGNOSIS — E78.5 HYPERLIPIDEMIA WITH TARGET LDL LESS THAN 130: Chronic | ICD-10-CM

## 2018-03-28 DIAGNOSIS — Z00.01 ENCOUNTER FOR GENERAL ADULT MEDICAL EXAMINATION WITH ABNORMAL FINDINGS: Primary | ICD-10-CM

## 2018-03-28 DIAGNOSIS — C43.62 MALIGNANT MELANOMA OF LEFT UPPER EXTREMITY INCLUDING SHOULDER (H): ICD-10-CM

## 2018-03-28 DIAGNOSIS — I10 ESSENTIAL HYPERTENSION WITH GOAL BLOOD PRESSURE LESS THAN 140/90: Chronic | ICD-10-CM

## 2018-03-28 DIAGNOSIS — E03.9 HYPOTHYROIDISM, UNSPECIFIED TYPE: ICD-10-CM

## 2018-03-28 DIAGNOSIS — Z00.00 MEDICARE ANNUAL WELLNESS VISIT, SUBSEQUENT: ICD-10-CM

## 2018-03-28 DIAGNOSIS — N52.9 VASCULOGENIC ERECTILE DYSFUNCTION, UNSPECIFIED VASCULOGENIC ERECTILE DYSFUNCTION TYPE: ICD-10-CM

## 2018-03-28 DIAGNOSIS — N40.0 ENLARGED PROSTATE: ICD-10-CM

## 2018-03-28 DIAGNOSIS — D22.9 MULTIPLE PIGMENTED NEVI: ICD-10-CM

## 2018-03-28 DIAGNOSIS — F17.210 CIGARETTE SMOKER ONE HALF PACK A DAY OR LESS: ICD-10-CM

## 2018-03-28 DIAGNOSIS — K21.9 GASTROESOPHAGEAL REFLUX DISEASE WITHOUT ESOPHAGITIS: ICD-10-CM

## 2018-03-28 DIAGNOSIS — K40.90 REDUCIBLE RIGHT INGUINAL HERNIA: ICD-10-CM

## 2018-03-28 DIAGNOSIS — E78.5 HYPERLIPIDEMIA LDL GOAL <130: ICD-10-CM

## 2018-03-28 DIAGNOSIS — R91.8 PULMONARY NODULES: Chronic | ICD-10-CM

## 2018-03-28 DIAGNOSIS — R22.2 MASS OF LEFT CHEST WALL: ICD-10-CM

## 2018-03-28 DIAGNOSIS — Z23 NEED FOR PROPHYLACTIC VACCINATION AGAINST STREPTOCOCCUS PNEUMONIAE (PNEUMOCOCCUS): ICD-10-CM

## 2018-03-28 DIAGNOSIS — Z12.11 SCREEN FOR COLON CANCER: ICD-10-CM

## 2018-03-28 PROCEDURE — G0439 PPPS, SUBSEQ VISIT: HCPCS | Performed by: FAMILY MEDICINE

## 2018-03-28 PROCEDURE — 99214 OFFICE O/P EST MOD 30 MIN: CPT | Mod: 25 | Performed by: FAMILY MEDICINE

## 2018-03-28 PROCEDURE — 90732 PPSV23 VACC 2 YRS+ SUBQ/IM: CPT | Performed by: FAMILY MEDICINE

## 2018-03-28 PROCEDURE — G0009 ADMIN PNEUMOCOCCAL VACCINE: HCPCS | Performed by: FAMILY MEDICINE

## 2018-03-28 RX ORDER — LISINOPRIL 20 MG/1
20 TABLET ORAL DAILY
Qty: 90 TABLET | Refills: 1 | Status: SHIPPED | OUTPATIENT
Start: 2018-03-28 | End: 2019-01-30

## 2018-03-28 RX ORDER — ROSUVASTATIN CALCIUM 10 MG/1
10 TABLET, COATED ORAL DAILY
Qty: 90 TABLET | Refills: 1 | Status: SHIPPED | OUTPATIENT
Start: 2018-03-28 | End: 2018-11-10

## 2018-03-28 RX ORDER — OMEPRAZOLE 40 MG/1
CAPSULE, DELAYED RELEASE ORAL
Qty: 90 CAPSULE | Refills: 3 | Status: SHIPPED | OUTPATIENT
Start: 2018-03-28 | End: 2019-03-28

## 2018-03-28 RX ORDER — TESTOSTERONE 1.62 MG/G
GEL TRANSDERMAL
Qty: 150 G | Refills: 3 | Status: SHIPPED | OUTPATIENT
Start: 2018-03-28 | End: 2018-05-17

## 2018-03-28 RX ORDER — SILDENAFIL 50 MG/1
25-50 TABLET, FILM COATED ORAL DAILY PRN
Qty: 8 TABLET | Refills: 11 | Status: SHIPPED | OUTPATIENT
Start: 2018-03-28 | End: 2019-03-28

## 2018-03-28 RX ORDER — LEVOTHYROXINE SODIUM 112 UG/1
112 TABLET ORAL DAILY
Qty: 90 TABLET | Refills: 3 | Status: SHIPPED | OUTPATIENT
Start: 2018-03-28 | End: 2018-08-20

## 2018-03-28 ASSESSMENT — ANXIETY QUESTIONNAIRES
7. FEELING AFRAID AS IF SOMETHING AWFUL MIGHT HAPPEN: NOT AT ALL
IF YOU CHECKED OFF ANY PROBLEMS ON THIS QUESTIONNAIRE, HOW DIFFICULT HAVE THESE PROBLEMS MADE IT FOR YOU TO DO YOUR WORK, TAKE CARE OF THINGS AT HOME, OR GET ALONG WITH OTHER PEOPLE: NOT DIFFICULT AT ALL
5. BEING SO RESTLESS THAT IT IS HARD TO SIT STILL: NOT AT ALL
3. WORRYING TOO MUCH ABOUT DIFFERENT THINGS: NOT AT ALL
GAD7 TOTAL SCORE: 0
2. NOT BEING ABLE TO STOP OR CONTROL WORRYING: NOT AT ALL
1. FEELING NERVOUS, ANXIOUS, OR ON EDGE: NOT AT ALL
6. BECOMING EASILY ANNOYED OR IRRITABLE: NOT AT ALL

## 2018-03-28 ASSESSMENT — PATIENT HEALTH QUESTIONNAIRE - PHQ9: 5. POOR APPETITE OR OVEREATING: NOT AT ALL

## 2018-03-28 NOTE — NURSING NOTE
"Chief Complaint   Patient presents with     Wellness Visit       Initial BP (!) 140/96 (BP Location: Right arm, Patient Position: Chair, Cuff Size: Adult Large)  Pulse 89  Temp 97.9  F (36.6  C) (Oral)  Ht 6' 2\" (1.88 m)  Wt 236 lb 12.8 oz (107.4 kg)  SpO2 95%  BMI 30.4 kg/m2 Estimated body mass index is 30.4 kg/(m^2) as calculated from the following:    Height as of this encounter: 6' 2\" (1.88 m).    Weight as of this encounter: 236 lb 12.8 oz (107.4 kg).  Medication Reconciliation: complete   Jessi Thurston CMA  "

## 2018-03-28 NOTE — PROGRESS NOTES
SUBJECTIVE:   Dev Powell is a 73 year old male who presents for Preventive Visit.  Are you in the first 12 months of your Medicare Part B coverage?  No    Healthy Habits:    Do you get at least three servings of calcium containing foods daily (dairy, green leafy vegetables, etc.)? yes    Amount of exercise or daily activities, outside of work: carrying cement and stone    Problems taking medications regularly No    Medication side effects: No    Have you had an eye exam in the past two years? no    Do you see a dentist twice per year? no    Do you have sleep apnea, excessive snoring or daytime drowsiness? no      Ability to successfully perform activities of daily living: Yes, no assistance needed    Home safety:  none identified     Hearing impairment: Yes    Fall risk:  Fallen 2 or more times in the past year?: No  Any fall with injury in the past year?: No      About a month ago, his right eye started to become irritated and feels like there is something in there.  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 senthil to totally gut the basement.   Has been about 5 yrs since he's had an eye exam.  Has been using some Visine A drops which seem to help.      COGNITIVE SCREEN:   1) Repeat 3 items (Banana, Sunrise, Chair)  2) Clock draw: NORMAL  3) 3 item recall: Recalls 3 objects  Results: 3 items recalled: COGNITIVE IMPAIRMENT LESS LIKELY    Mini-CogTM Copyright S Leonor. Licensed by the author for use in [facility]; reprinted with permission (inés@.[facility]). All rights reserved.        Hyperlipidemia Follow-Up:       Rate your low fat/cholesterol diet?: fair    Taking statin?  Yes, no muscle aches from statin    Other lipid medications/supplements?:  none    Lab Results   Component Value Date    CHOL 188 07/10/2017    CHOL 276 03/27/2017     Lab Results   Component Value Date    HDL 58 07/10/2017    HDL 45 03/27/2017     Lab Results   Component Value Date     07/10/2017      03/27/2017     Lab Results   Component Value Date    TRIG 137 07/10/2017    TRIG 166 03/27/2017     Lab Results   Component Value Date    CHOLHDLRATIO 5.2 01/30/2015    CHOLHDLRATIO 5.8 04/30/2014       Hypertension Follow-up:       Outpatient blood pressures are being checked at home.  Results are 120s/80s.    Low Salt Diet: no added salt.     BP Readings from Last 5 Encounters:   03/28/18 136/88   01/04/18 134/76   03/27/17 136/84   01/13/17 132/82   01/28/16 124/76         Hypothyroidism Follow-up:       Since last visit, patient describes the following symptoms: Weight stable, no hair loss, no skin changes, no constipation, no loose stools    TSH   Date Value Ref Range Status   07/10/2017 8.04 (H) 0.40 - 4.00 mU/L Final   03/27/2017 6.80 (H) 0.40 - 4.00 mU/L Final   04/01/2016 7.23 (H) 0.40 - 4.00 mU/L Final   01/29/2016 21.41 (H) 0.40 - 4.00 mU/L Final   02/10/2015 35.24 (H) 0.40 - 4.00 mU/L Final     Comment:     Effective 7/30/2014, the reference range for this assay has changed to reflect   new instrumentation/methodology.       T4 Free   Date Value Ref Range Status   07/10/2017 0.95 0.76 - 1.46 ng/dL Final   03/27/2017 1.09 0.76 - 1.46 ng/dL Final   04/01/2016 0.99 0.76 - 1.46 ng/dL Final   01/29/2016 0.76 0.76 - 1.46 ng/dL Final   02/10/2015 0.59 (L) 0.76 - 1.46 ng/dL Final     Comment:     Effective 7/30/2014, the reference range for this assay has changed to reflect   new instrumentation/methodology.         Reviewed and updated as needed this visit by clinical staff  Tobacco  Allergies  Meds  Med Hx  Surg Hx  Fam Hx  Soc Hx        Reviewed and updated as needed this visit by Provider        Social History   Substance Use Topics     Smoking status: Current Every Day Smoker     Packs/day: 0.50     Years: 50.00     Smokeless tobacco: Never Used      Comment: strongly encourage smoking cessation with every visit     Alcohol use 0.0 oz/week     0 Standard drinks or equivalent per week      Comment: not  regular - very occasional 1-2 martinis when out to dinner 1-2x/month, if that        If you drink alcohol do you typically have >3 drinks per day or >7 drinks per week? No                        Today's PHQ-2 Score:   PHQ-2 ( 1999 [employer]) 3/28/2018 1/13/2017   Q1: Little interest or pleasure in doing things 0 0   Q2: Feeling down, depressed or hopeless 0 0   PHQ-2 Score 0 0       Do you feel safe in your environment - Yes    Do you have a Health Care Directive?: No: Advance care planning was reviewed with patient; patient declined at this time.    Current providers sharing in care for this patient include:   Patient Care Team:  Vee Beth MD as PCP - General (Family Practice)  Vee Beth MD as MD (Family Practice)    The following health maintenance items are reviewed in Epic and correct as of today:  Health Maintenance   Topic Date Due     FIT Q1 YR  09/24/2014     COLONOSCOPY Q10 YR  09/25/2017     FALL RISK ASSESSMENT  01/13/2018     BMP Q1 YR  03/27/2018     MICROALBUMIN Q1 YEAR  03/27/2018     INFLUENZA VACCINE (SYSTEM ASSIGNED)  09/01/2018     WELLNESS VISIT Q1 YR  03/28/2019     ADVANCE DIRECTIVE PLANNING Q5 YRS  01/30/2020     LIPID SCREEN Q5 YR MALE (SYSTEM ASSIGNED)  07/10/2022     TETANUS IMMUNIZATION (SYSTEM ASSIGNED)  09/18/2023     PNEUMOCOCCAL  Completed     AORTIC ANEURYSM SCREENING (SYSTEM ASSIGNED)  Completed     HEPATITIS C SCREENING  Completed     BP Readings from Last 3 Encounters:   03/28/18 136/88   01/04/18 134/76   03/27/17 136/84    Wt Readings from Last 3 Encounters:   03/28/18 236 lb 12.8 oz (107.4 kg)   01/04/18 229 lb (103.9 kg)   03/27/17 240 lb (108.9 kg)                  Patient Active Problem List   Diagnosis     Gastroesophageal reflux disease without esophagitis = ran out of PPI- has been taking lots of TUMS     Hyperlipidemia LDL goal <130 - pravastatin = leg cramps; lipitor =calf pains     Snoring     Vasculogenic erectile dysfunction, unspecified  vasculogenic erectile dysfunction type     Osteoarthritis     Lipoma of skin     Gastric ulcer     Malignant melanoma of left upper extremity including shoulder (H)     Cigarette smoker one half pack a day or less- for > 45 years      Enlarged prostate- has seen  Dr. Solomon in the past - doesn't need to see him again per Dr. Solomon     Rectal pain     Prostatitis, acute     Hearing loss     Inguinal hernia- right      Pulmonary nodules- tiny per screening chest CT -  repeat CT chest low dose w/o contrast 2017 = no change since  - no need for further CT's      Essential hypertension with goal blood pressure less than 140/90     Hypotestosteronism- androgel working well      Hypothyroidism, unspecified type     Chronic constipation     Mass of left chest wall - ? there for 18+ years -left breast 1cm mass at 7:30      Multiple pigmented nevi     Reducible right inguinal hernia     Past Surgical History:   Procedure Laterality Date     COLONOSCOPY  2007    repeat in      ESOPHAGOSCOPY, GASTROSCOPY, DUODENOSCOPY (EGD), COMBINED N/A 2015    Procedure: COMBINED ESOPHAGOSCOPY, GASTROSCOPY, DUODENOSCOPY (EGD);  Surgeon: Ender Dixon MD;  [facility] ESOPHAGOSCOPY, DIAGNOSTIC  2007    EGD - gastric ulcer w/ erosions/ LA grade B erosive esophagitis     wide excision      for malignant melanoma       Social History   Substance Use Topics     Smoking status: Current Every Day Smoker     Packs/day: 0.50     Years: 50.00     Smokeless tobacco: Never Used      Comment: strongly encourage smoking cessation with every visit     Alcohol use 0.0 oz/week     0 Standard drinks or equivalent per week      Comment: not regular - very occasional 1-2 martinis when out to dinner 1-2x/month, if that      Family History   Problem Relation Age of Onset     C.A.D. Father 62      at age 62 of MI     DIABETES Father      early type 2      Neurologic Disorder Mother      mild dementia - @ 92      C.A.D. Mother      angina      Thyroid Disease Mother      s/p thyroid          Current Outpatient Prescriptions   Medication Sig Dispense Refill     levothyroxine (SYNTHROID/LEVOTHROID) 112 MCG tablet Take 1 tablet (112 mcg) by mouth daily 90 tablet 3     rosuvastatin (CRESTOR) 10 MG tablet Take 1 tablet (10 mg) by mouth daily 90 tablet 1     lisinopril (PRINIVIL/ZESTRIL) 20 MG tablet Take 1 tablet (20 mg) by mouth daily 90 tablet 1     testosterone (ANDROGEL 1.62% PUMP) 20.25 MG/ACT gel PLACE 1 PUMP TO CLEAN DRY HAIRLESS SKIN OF UPPER ARMS & SHOULDER ONCE DAILY 150 g 3     sildenafil (VIAGRA) 50 MG tablet Take 0.5-1 tablets (25-50 mg) by mouth daily as needed Take 30 min to 4 hours before intercourse.  Never use with nitroglycerin, terazosin or doxazosin. 8 tablet 11     omeprazole (PRILOSEC) 40 MG capsule TAKE 1 CAPSULE BY MOUTH ONCE DAILY TAKE 30-60 MINUTES BEFORE A MEAL. 90 capsule 3     Glucosamine-Chondroit-Vit C-Mn (GLUCOSAMINE CHONDROITIN 1500 COMPLEX) CAPS Take by mouth daily       aspirin 81 MG tablet Take by mouth daily 30 tablet      Multiple Vitamins-Minerals (CENTRUM SILVER) per tablet Take 1 tablet by mouth daily 30 tablet      [DISCONTINUED] levothyroxine (SYNTHROID/LEVOTHROID) 112 MCG tablet TAKE 1 TABLET BY MOUTH ONCE DAILY 90 tablet 0     [DISCONTINUED] rosuvastatin (CRESTOR) 10 MG tablet TAKE 1 TABLET BY MOUTH ONCE DAILY 90 tablet 0     [DISCONTINUED] lisinopril (PRINIVIL/ZESTRIL) 20 MG tablet TAKE 1 TABLET BY MOUTH ONCE DAILY 90 tablet 1     [DISCONTINUED] ANDROGEL 1.62% PUMP 20.25 MG/ACT gel PLACE 1 PUMP TO CLEAN DRY HAIRLESS SKIN OF UPPER ARMS & SHOULDER ONCE DAILY 150 g 2     [DISCONTINUED] sildenafil (REVATIO/VIAGRA) 50 MG cap/tab Take 0.5-1 tablets (25-50 mg) by mouth daily as needed for erectile dysfunction Take 30 min to 4 hours before intercourse.  Never use with nitroglycerin, terazosin or doxazosin. 8 tablet 11     No Known Allergies  Recent Labs   Lab Test  07/10/17   0957   "03/27/17   0902   01/29/16   0842   01/30/15   1305   LDL  103*  198*   --   178*   --   149*   HDL  58  45   --   52   --   46   TRIG  137  166*   --   85   --   208*   ALT   --   20   --   18   --   21   CR   --   1.11   --   1.08   --   1.09   GFRESTIMATED   --   65   --   67   --   67   GFRESTBLACK   --   79   --   82   --   81   POTASSIUM   --   4.7   --   4.2   --   4.4   TSH  8.04*  6.80*   < >  21.41*   < >   --     < > = values in this interval not displayed.        Pneumonia Vaccine:Adults age 65+ who have not received previous Pneumovax (PPSV23) or PCV13 as an adult: Should first be given PCV13 AND then should be given PPSV23 6-12 months after PCV13    Rechecked CT scan results from 4/2017 -no need for further CT's for the nodules.   ROS:  Constitutional, HEENT, cardiovascular, pulmonary, GI, , musculoskeletal, neuro, skin, endocrine and psych systems are negative, except as otherwise noted.    OBJECTIVE:   /88  Pulse 89  Temp 97.9  F (36.6  C) (Oral)  Ht 6' 2\" (1.88 m)  Wt 236 lb 12.8 oz (107.4 kg)  SpO2 95%  BMI 30.4 kg/m2 Estimated body mass index is 30.4 kg/(m^2) as calculated from the following:    Height as of this encounter: 6' 2\" (1.88 m).    Weight as of this encounter: 236 lb 12.8 oz (107.4 kg).  EXAM:   GENERAL: healthy, alert and no distress  EYES: Eyes grossly normal to inspection, PERRL and conjunctivae and sclerae normal  HENT: ear canals and TM's normal, nose and mouth without ulcers or lesions  NECK: no adenopathy, no asymmetry, masses, or scars and thyroid normal to palpation  RESP: lungs clear to auscultation - no rales, rhonchi or wheezes  BREAST: normal without masses, tenderness or nipple discharge and no palpable axillary masses or adenopathy  CV: regular rate and rhythm, normal S1 S2, no S3 or S4, no murmur, click or rub, no peripheral edema and peripheral pulses strong  ABDOMEN: soft, nontender, no hepatosplenomegaly, no masses and bowel sounds normal   (male): " normal male genitalia without lesions or urethral discharge, right reducible direct inguinal hernia noted. - not painful for patient and stable from previous.   RECTAL: normal sphincter tone, no rectal masses, prostate normal size, smooth, nontender without nodules or masses  MS: no gross musculoskeletal defects noted, no edema  SKIN: no suspicious lesions or rashes, but has many pigmented nevi - needs full body skin exam - referral given.   NEURO: Normal strength and tone, mentation intact and speech normal  PSYCH: mentation appears normal, affect normal/bright.     Note:pt declined a chaperone for the genital and rectal exams. --Vee Beth MD       ASSESSMENT / PLAN:       ICD-10-CM    1. Encounter for general adult medical examination with abnormal findings Z00.01    2. Medicare annual wellness visit, subsequent Z00.00    3. Hyperlipidemia with target LDL less than 130- pravastatin = leg cramps; lipitor =calf pains E78.5 Lipid panel reflex to direct LDL Fasting   4. Essential hypertension with goal blood pressure less than 140/90 I10 Albumin Random Urine Quantitative with Creat Ratio     Comprehensive metabolic panel     CBC with platelets   5. Hypothyroidism, unspecified type E03.9 TSH     T4 free     T3, total     levothyroxine (SYNTHROID/LEVOTHROID) 112 MCG tablet   6. Screen for colon cancer Z12.11 GASTROENTEROLOGY ADULT REF PROCEDURE ONLY [facility] [phone]; [facility]     Fecal colorectal cancer screen FIT - Future (S+30)   7. Need for prophylactic vaccination against Streptococcus pneumoniae (pneumococcus) Z23 Pneumococcal vaccine 23 valent PPSV23  (Pneumovax) [34287]     ADMIN: Vaccine, Initial (73016)   8. Hyperlipidemia LDL goal <130 E78.5 rosuvastatin (CRESTOR) 10 MG tablet   9. Cigarette smoker one half pack a day or less- for > 45 years  F17.210    10. Gastroesophageal reflux disease without esophagitis = ran out of PPI- has been taking lots of TUMS K21.9  "omeprazole (PRILOSEC) 40 MG capsule   11. Vasculogenic erectile dysfunction, unspecified vasculogenic erectile dysfunction type N52.9 sildenafil (VIAGRA) 50 MG tablet     TESTOSTERONE, FREE & TOTAL   12. Enlarged prostate- has seen  Dr. Solomon in the past - doesn't need to see him again per Dr. Solomon N40.0    13. Malignant melanoma of left upper extremity including shoulder (H) C43.62 DERMATOLOGY REFERRAL   14. Mass of left chest wall - ? there for 18+ years -left breast 1cm mass at 7:30  R22.2    15. Essential hypertension with goal blood pressure less than 140/90 - not well controlled today - not taking any decongestants I10 lisinopril (PRINIVIL/ZESTRIL) 20 MG tablet   16. Hypotestosteronism- androgel working well  E34.9 testosterone (ANDROGEL 1.62% PUMP) 20.25 MG/ACT gel     TESTOSTERONE, FREE & TOTAL   17. Pulmonary nodules- tiny per screening chest CT -  repeat CT chest low dose w/o contrast 4/2017 = no change since 2015 - no need for further CT's  R91.8    18. Multiple pigmented nevi D22.9 DERMATOLOGY REFERRAL   19. Reducible right inguinal hernia K40.90        End of Life Planning:  Patient currently has an advanced directive: Yes.  Practitioner is supportive of decision. Full code desired.     COUNSELING:  Reviewed preventive health counseling, as reflected in patient instructions        Estimated body mass index is 30.4 kg/(m^2) as calculated from the following:    Height as of this encounter: 6' 2\" (1.88 m).    Weight as of this encounter: 236 lb 12.8 oz (107.4 kg).  Weight management plan: see next     Start walking for exercise - If walking outside,   - rain or shine - walk a block, then come home, next day walk   2 blocks , then come home ; and then add a block further from home daily - work up to 30-45minutes daily or 3-4x/week - or can work  up to  3-4 miles briskly daily.       If walking on treadmill or at  the mall, start with 5 minutes first day, then 6 minutes next day , then 7 minutes next " "day, then 8 minutes next day, etc.   Gradually work up to the above goals.  No stopping.      Can also try marching in place - try to have your knees come up as high to your chest as possible.  Start with 1-2 minutes at a time, and gradually add 30-60 seconds each workout. Try to work up to 15-20 minutes of walking/marching in place  Daily - great for during those days, when you are not comfortable walking outside.     Establish an exercise regimen. Activity goal: 45 minutes 5 days a week. New exercise routine: cardiovascular workout on exercise equipment, walking and weightlifting. Diet regimen was discussed and plan is self-directed dieting: reduce calories, reduce portions, reduce processed  carbs, increase fruits/vegetables and avoid sweets and supervised diet program. Avoid artificial sweeteners and \"diet\" drinks and sodas.        reports that he has been smoking.  He has a 25.00 pack-year smoking history. He has never used smokeless tobacco.  Tobacco Cessation Action Plan: Information offered: Patient not interested at this time    Appropriate preventive services were discussed with this patient, including applicable screening as appropriate for cardiovascular disease, diabetes, osteopenia/osteoporosis, and glaucoma.  As appropriate for age/gender, discussed screening for colorectal cancer, prostate cancer, breast cancer, and cervical cancer. Checklist reviewing preventive services available has been given to the patient.    Reviewed patients plan of care and provided an AVS. The Complex Care Plan (for patients with higher acuity and needing more deliberate coordination of services) for Dev meets the Care Plan requirement. This Care Plan has been established and reviewed with the Patient.    Spent  25 minutes on pt care today outside of preventative care. All face to face time from 0900am  to 0925 hrs .  Greater than 50% of time spent in coordination of care/counseling today re:  Hyperlipidemia with target " LDL less than 130- pravastatin = leg cramps; lipitor =calf pains    Essential hypertension with goal blood pressure less than 140/90    Hypothyroidism, unspecified type    Hyperlipidemia LDL goal <130    Cigarette smoker one half pack a day or less- for > 45 years  - strongly encouraged smoking cessation yet again today - pt very resistant to quitting.    Gastroesophageal reflux disease without esophagitis = ran out of PPI- has been taking lots of TUMS    Vasculogenic erectile dysfunction, unspecified vasculogenic erectile dysfunction type    Enlarged prostate- has seen  Dr. Solomon in the past - doesn't need to see him again per Dr. Solomon    Malignant melanoma of left upper extremity including shoulder (H)    Mass of left chest wall - ? there for 18+ years -left breast 1cm mass at 7:30     Essential hypertension with goal blood pressure less than 140/90 - not well controlled today - not taking any decongestants    Hypotestosteronism- androgel working well     Pulmonary nodules- tiny per screening chest CT -  repeat CT chest low dose w/o contrast 4/2017 = no change since 2015 - no need for further CT's     Multiple pigmented nevi    Reducible right inguinal hernia       Recheck with me in BP Recheck, cholesterol recheck in 6months and fasting lab only in the next month.      Counseling Resources:  ATP IV Guidelines  Pooled Cohorts Equation Calculator  Breast Cancer Risk Calculator  FRAX Risk Assessment  ICSI Preventive Guidelines  Dietary Guidelines for Americans, 2010  USDA's MyPlate  ASA Prophylaxis  Lung CA Screening    Vee Beth MD  [facility]

## 2018-03-28 NOTE — PATIENT INSTRUCTIONS
Preventive Health Recommendations:       Male Ages 65 and over    Yearly exam:             See your health care provider every year in order to  o   Review health changes.   o   Discuss preventive care.    o   Review your medicines if your doctor has prescribed any.    Talk with your health care provider about whether you should have a test to screen for prostate cancer (PSA).    Every 3 years, have a diabetes test (fasting glucose). If you are at risk for diabetes, you should have this test more often.    Every 5 years, have a cholesterol test. Have this test more often if you are at risk for high cholesterol or heart disease.     Every 10 years, have a colonoscopy. Or, have a yearly FIT test (stool test). These exams will check for colon cancer.    Talk to with your health care provider about screening for Abdominal Aortic Aneurysm if you have a family history of AAA or have a history of smoking.  Shots:     Get a flu shot each year.     Get a tetanus shot every 10 years.     Talk to your doctor about your pneumonia vaccines. There are now two you should receive - Pneumovax (PPSV 23) and Prevnar (PCV 13).    Talk to your doctor about a shingles vaccine.     Talk to your doctor about the hepatitis B vaccine.  Nutrition:     Eat at least 5 servings of fruits and vegetables each day.     Eat whole-grain bread, whole-wheat pasta and brown rice instead of white grains and rice.     Talk to your doctor about Calcium and Vitamin D.   Lifestyle    Exercise for at least 150 minutes a week (30 minutes a day, 5 days a week). This will help you control your weight and prevent disease.     Limit alcohol to one drink per day.     No smoking.     Wear sunscreen to prevent skin cancer.     See your dentist every six months for an exam and cleaning.     See your eye doctor every 1 to 2 years to screen for conditions such as glaucoma, macular degeneration and cataracts.               Thank you for choosing [facility]  for your Health Care. It was a pleasure seeing you at your visit today. Please contact us with any questions or concerns you may have.                   Vee Beth MD                                  To reach your [facility] care team after hours call:   [phone]    Our clinic hours are:     Monday- 7:30 am - 7:00 pm                             Tuesday through Friday- 7:30 am - 5:00 pm                                        Saturday- 8:00 am - 12:00 pm                  Phone:  [phone]    Our pharmacy hours are:     Monday  8:00 am to 7:00 pm      Tuesday through Friday 8:00am to 6:00pm                        Saturday - 9:00 am to 1:00 pm      Sunday : Closed.              Phone:  [phone]      There is also information available at our web site:  [email or web address]    If your provider ordered any lab tests and you do not receive the results within 10 business days, please call the clinic.    If you need a medication refill please contact your pharmacy.  Please allow 2 business days for your refill to be completed.    Our clinic offers telephone visits and e visits.  Please ask one of your team members to explain more.      Use [employer] (secure email communication and access to your chart) to send your primary care provider a message or make an appointment. Ask someone on your Team how to sign up for [facility].                 Start walking for exercise - If walking outside,   - rain or shine - walk a block, then come home, next day walk   2 blocks , then come home ; and then add a block further from home daily - work up to 30-45minutes daily or 3-4x/week - or can work  up to  3-4 miles briskly daily.       If walking on treadmill or at  the mall, start with 5 minutes first day, then 6 minutes next day , then 7 minutes next day, then 8 minutes next day, etc.   Gradually work up to the above goals.  No stopping.      Can also try marching in place - try to have your knees  "come up as high to your chest as possible.  Start with 1-2 minutes at a time, and gradually add 30-60 seconds each workout. Try to work up to 15-20 minutes of walking/marching in place  Daily - great for during those days, when you are not comfortable walking outside.       Goal after the above is to Establish an exercise regimen. Activity goal: 45 minutes 5 days a week. New exercise routine: cardiovascular workout on exercise equipment, walking and weightlifting. Diet regimen was discussed and plan is self-directed dieting: reduce calories, reduce portions, reduce processed  carbs, increase fruits/vegetables and avoid sweets and supervised diet program. Avoid artificial sweeteners and \"diet\" drinks and sodas.       "

## 2018-03-28 NOTE — MR AVS SNAPSHOT
After Visit Summary   3/28/2018    Dev Powell    MRN: [id number]           Patient Information     Date Of Birth          1945        Visit Information        Provider Department      3/28/2018 8:15 AM Vee Beth MD [facility]        Today's Diagnoses     Encounter for general adult medical examination with abnormal findings    -  1    Medicare annual wellness visit, subsequent        Hyperlipidemia with target LDL less than 130- pravastatin = leg cramps; lipitor =calf pains        Essential hypertension with goal blood pressure less than 140/90        Hypothyroidism, unspecified type        Screen for colon cancer        Need for prophylactic vaccination against Streptococcus pneumoniae (pneumococcus)        Hyperlipidemia LDL goal <130        Cigarette smoker one half pack a day or less- for > 45 years         Gastroesophageal reflux disease without esophagitis = ran out of PPI- has been taking lots of TUMS        Vasculogenic erectile dysfunction, unspecified vasculogenic erectile dysfunction type        Enlarged prostate- has seen  Dr. Solomon in the past - doesn't need to see him again per Dr. Solomon        Malignant melanoma of left upper extremity including shoulder (H)        Mass of left chest wall - ? there for 18+ years -left breast 1cm mass at 7:30         Essential hypertension with goal blood pressure less than 140/90 - not well controlled today - not taking any decongestants        Hypotestosteronism- androgel working well         Pulmonary nodules- tiny per screening chest CT -  repeat CT chest low dose w/o contrast 4/2017 = no change since 2015 - no need for further CT's         Multiple pigmented nevi        Reducible right inguinal hernia          Care Instructions      Preventive Health Recommendations:       Male Ages 65 and over    Yearly exam:             See your health care provider every year in order to  o   Review health changes.   o    Discuss preventive care.    o   Review your medicines if your doctor has prescribed any.    Talk with your health care provider about whether you should have a test to screen for prostate cancer (PSA).    Every 3 years, have a diabetes test (fasting glucose). If you are at risk for diabetes, you should have this test more often.    Every 5 years, have a cholesterol test. Have this test more often if you are at risk for high cholesterol or heart disease.     Every 10 years, have a colonoscopy. Or, have a yearly FIT test (stool test). These exams will check for colon cancer.    Talk to with your health care provider about screening for Abdominal Aortic Aneurysm if you have a family history of AAA or have a history of smoking.  Shots:     Get a flu shot each year.     Get a tetanus shot every 10 years.     Talk to your doctor about your pneumonia vaccines. There are now two you should receive - Pneumovax (PPSV 23) and Prevnar (PCV 13).    Talk to your doctor about a shingles vaccine.     Talk to your doctor about the hepatitis B vaccine.  Nutrition:     Eat at least 5 servings of fruits and vegetables each day.     Eat whole-grain bread, whole-wheat pasta and brown rice instead of white grains and rice.     Talk to your doctor about Calcium and Vitamin D.   Lifestyle    Exercise for at least 150 minutes a week (30 minutes a day, 5 days a week). This will help you control your weight and prevent disease.     Limit alcohol to one drink per day.     No smoking.     Wear sunscreen to prevent skin cancer.     See your dentist every six months for an exam and cleaning.     See your eye doctor every 1 to 2 years to screen for conditions such as glaucoma, macular degeneration and cataracts.               Thank you for choosing [facility]  for your Health Care. It was a pleasure seeing you at your visit today. Please contact us with any questions or concerns you may have.                   Vee REDDY  MD [place]                                  To reach your [facility] care team after hours call:   [phone]    Our clinic hours are:     Monday- 7:30 am - 7:00 pm                             Tuesday through Friday- 7:30 am - 5:00 pm                                        Saturday- 8:00 am - 12:00 pm                  Phone:  [phone]    Our pharmacy hours are:     Monday  8:00 am to 7:00 pm      Tuesday through Friday 8:00am to 6:00pm                        Saturday - 9:00 am to 1:00 pm      Sunday : Closed.              Phone:  [phone]      There is also information available at our web site:  [email or web address]    If your provider ordered any lab tests and you do not receive the results within 10 business days, please call the clinic.    If you need a medication refill please contact your pharmacy.  Please allow 2 business days for your refill to be completed.    Our clinic offers telephone visits and e visits.  Please ask one of your team members to explain more.      Use Cloudant (secure email communication and access to your chart) to send your primary care provider a message or make an appointment. Ask someone on your Team how to sign up for Cloudant.                 Start walking for exercise - If walking outside,   - rain or shine - walk a block, then come home, next day walk   2 blocks , then come home ; and then add a block further from home daily - work up to 30-45minutes daily or 3-4x/week - or can work  up to  3-4 miles briskly daily.       If walking on treadmill or at  the mall, start with 5 minutes first day, then 6 minutes next day , then 7 minutes next day, then 8 minutes next day, etc.   Gradually work up to the above goals.  No stopping.      Can also try marching in place - try to have your knees come up as high to your chest as possible.  Start with 1-2 minutes at a time, and gradually add 30-60 seconds each workout. Try to work up to 15-20 minutes of  "walking/marching in place  Daily - great for during those days, when you are not comfortable walking outside.       Goal after the above is to Establish an exercise regimen. Activity goal: 45 minutes 5 days a week. New exercise routine: cardiovascular workout on exercise equipment, walking and weightlifting. Diet regimen was discussed and plan is self-directed dieting: reduce calories, reduce portions, reduce processed  carbs, increase fruits/vegetables and avoid sweets and supervised diet program. Avoid artificial sweeteners and \"diet\" drinks and sodas.               Follow-ups after your visit        Additional Services     DERMATOLOGY REFERRAL       Your provider has referred you to: [facility]: [facility] [place] [phone]   [email or web address]    Please be aware that coverage of these services is subject to the terms and limitations of your health insurance plan.  Call member services at your health plan with any benefit or coverage questions.      Please bring the following with you to your appointment:    (1) Any X-Rays, CTs or MRIs which have been performed.  Contact the facility where they were done to arrange for  prior to your scheduled appointment.  Any new CT, MRI or other procedures ordered by your specialist must be performed at a [facility] facility or coordinated by your clinic's referral office.  (2) List of current medications  (3) This referral request   (4) Any documents/labs given to you for this referral            GASTROENTEROLOGY ADULT REF PROCEDURE ONLY [facility] [phone]; [facility]       Last Lab Result: Creatinine (mg/dL)       Date                     Value                 03/27/2017               1.11             ----------  There is no height or weight on file to calculate BMI.     Needed:  No  Language:  English    Patient will be contacted to schedule procedure.     Please be aware " that coverage of these services is subject to the terms and limitations of your health insurance plan.  Call member services at your health plan with any benefit or coverage questions.  Any procedures must be performed at a [place] facility OR coordinated by your clinic's referral office.    Please bring the following with you to your appointment:    (1) Any X-Rays, CTs or MRIs which have been performed.  Contact the facility where they were done to arrange for  prior to your scheduled appointment.    (2) List of current medications   (3) This referral request   (4) Any documents/labs given to you for this referral                  Follow-up notes from your care team     Return in about 6 months (around 9/28/2018) for BP Recheck, cholesterol recheck in 6months and fasting lab only in the next month .      Future tests that were ordered for you today     Open Future Orders        Priority Expected Expires Ordered    CBC with platelets Routine 4/7/2018 6/28/2018 3/28/2018    Lipid panel reflex to direct LDL Fasting Routine 4/7/2018 6/28/2018 3/28/2018    TSH Routine 4/7/2018 6/28/2018 3/28/2018    T4 free Routine 4/7/2018 6/28/2018 3/28/2018    T3, total Routine 4/7/2018 6/28/2018 3/28/2018    TESTOSTERONE, FREE & TOTAL Routine 4/7/2018 6/28/2018 3/28/2018    Albumin Random Urine Quantitative with Creat Ratio Routine 4/7/2018 6/28/2018 3/28/2018    Fecal colorectal cancer screen FIT - Future (S+30) Routine 4/18/2018 4/27/2018 3/28/2018    Comprehensive metabolic panel Routine 4/7/2018 6/28/2018 3/28/2018            Who to contact     If you have questions or need follow up information about today's clinic visit or your schedule please contact [facility] directly at [phone].  Normal or non-critical lab and imaging results will be communicated to you by MyChart, letter or phone within 4 business days after the clinic has received the results. If you do not hear from us within 7 days, please  "contact the clinic through Tedcas or phone. If you have a critical or abnormal lab result, we will notify you by phone as soon as possible.  Submit refill requests through Tedcas or call your pharmacy and they will forward the refill request to us. Please allow 3 business days for your refill to be completed.          Additional Information About Your Visit        [email or web address] Information     Tedcas lets you send messages to your doctor, view your test results, renew your prescriptions, schedule appointments and more. To sign up, go to [email or web address] . Click on \"Log in\" on the left side of the screen, which will take you to the Welcome page. Then click on \"Sign up Now\" on the right side of the page.     You will be asked to enter the access code listed below, as well as some personal information. Please follow the directions to create your username and password.     Your access code is: [email or web address]  Expires: 2018 12:42 PM     Your access code will  in 90 days. If you need help or a new code, please call your [facility] clinic or [phone].        Care EveryWhere ID     This is your Care EveryWhere ID. This could be used by other organizations to access your [facility] medical records  [id number]        Your Vitals Were     Pulse Temperature Height Pulse Oximetry BMI (Body Mass Index)       89 97.9  F (36.6  C) (Oral) 6' 2\" (1.88 m) 95% 30.4 kg/m2        Blood Pressure from Last 3 Encounters:   18 136/88   18 134/76   17 136/84    Weight from Last 3 Encounters:   18 236 lb 12.8 oz (107.4 kg)   18 229 lb (103.9 kg)   17 240 lb (108.9 kg)              We Performed the Following     ADMIN: Vaccine, Initial (74636)     DERMATOLOGY REFERRAL     GASTROENTEROLOGY ADULT REF PROCEDURE ONLY [facility]ina [phone]; [facility]     Pneumococcal vaccine 23 valent PPSV23  (Pneumovax) [35572]          Today's Medication Changes          These changes are " accurate as of 3/28/18  9:35 AM.  If you have any questions, ask your nurse or doctor.               These medicines have changed or have updated prescriptions.        Dose/Directions    levothyroxine 112 MCG tablet   Commonly known as:  SYNTHROID/LEVOTHROID   This may have changed:  See the new instructions.   Used for:  Hypothyroidism, unspecified type   Changed by:  Vee Beth MD        Dose:  112 mcg   Take 1 tablet (112 mcg) by mouth daily   Quantity:  90 tablet   Refills:  3       lisinopril 20 MG tablet   Commonly known as:  PRINIVIL/ZESTRIL   This may have changed:  See the new instructions.   Used for:  Essential hypertension with goal blood pressure less than 140/90   Changed by:  Vee Beth MD        Dose:  20 mg   Take 1 tablet (20 mg) by mouth daily   Quantity:  90 tablet   Refills:  1       rosuvastatin 10 MG tablet   Commonly known as:  CRESTOR   This may have changed:  See the new instructions.   Used for:  Hyperlipidemia LDL goal <130   Changed by:  Vee Beth MD        Dose:  10 mg   Take 1 tablet (10 mg) by mouth daily   Quantity:  90 tablet   Refills:  1       sildenafil 50 MG tablet   Commonly known as:  VIAGRA   This may have changed:  reasons to take this   Used for:  Vasculogenic erectile dysfunction, unspecified vasculogenic erectile dysfunction type   Changed by:  Vee Beth MD        Dose:  25-50 mg   Take 0.5-1 tablets (25-50 mg) by mouth daily as needed Take 30 min to 4 hours before intercourse.  Never use with nitroglycerin, terazosin or doxazosin.   Quantity:  8 tablet   Refills:  11       testosterone 20.25 MG/ACT gel   Commonly known as:  ANDROGEL 1.62% PUMP   This may have changed:  See the new instructions.   Used for:  Hypotestosteronism   Changed by:  Vee Beth MD        PLACE 1 PUMP TO CLEAN DRY HAIRLESS SKIN OF UPPER ARMS & SHOULDER ONCE DAILY   Quantity:  150 g   Refills:  3            Where to get your  medicines      These medications were sent to [facility]/pharmacy #[id number] - [place], [place] - [address]  [address] [address] [address]    Hours:  [facility] Phone:  [phone]     levothyroxine 112 MCG tablet    lisinopril 20 MG tablet    omeprazole 40 MG capsule    rosuvastatin 10 MG tablet         Some of these will need a paper prescription and others can be bought over the counter.  Ask your nurse if you have questions.     Bring a paper prescription for each of these medications     sildenafil 50 MG tablet    testosterone 20.25 MG/ACT gel                Primary Care Provider Office Phone # Fax #    Vee Beth [phone] [address] [facility] [address] [address]        Equal Access to Services     ELIZABETH ART : Ayo aguiaro Soradha, waaxda luqadaha, qaybta kaalmada che, liliam yousif. So [facility] [phone].    ATENCIÓN: Si habla español, tiene a cope disposición servicios gratuitos de asistencia lingüística. [facility] [phone].    We comply with applicable federal civil rights laws and [place] laws. We do not discriminate on the basis of race, color, national origin, age, disability, sex, sexual orientation, or gender identity.            Thank you!     Thank you for choosing [facility]  for your care. Our goal is always to provide you with excellent care. Hearing back from our patients is one way we can continue to improve our services. Please take a few minutes to complete the written survey that you may receive in the mail after your visit with us. Thank you!             Your Updated Medication List - Protect others around you: Learn how to safely use, store and throw away your medicines at www.disposemymeds.org.          This list is accurate as of 3/28/18  9:35 AM.  Always use your most recent med list.                   Brand Name Dispense Instructions for use Diagnosis     aspirin 81 MG tablet     30 tablet    Take by mouth daily        CENTRUM SILVER per tablet     30 tablet    Take 1 tablet by mouth daily        glucosamine chondroitin 1500 complex Caps      Take by mouth daily        levothyroxine 112 MCG tablet    SYNTHROID/LEVOTHROID    90 tablet    Take 1 tablet (112 mcg) by mouth daily    Hypothyroidism, unspecified type       lisinopril 20 MG tablet    PRINIVIL/ZESTRIL    90 tablet    Take 1 tablet (20 mg) by mouth daily    Essential hypertension with goal blood pressure less than 140/90       omeprazole 40 MG capsule    priLOSEC    90 capsule    TAKE 1 CAPSULE BY MOUTH ONCE DAILY TAKE 30-60 MINUTES BEFORE A MEAL.    Gastroesophageal reflux disease without esophagitis       rosuvastatin 10 MG tablet    CRESTOR    90 tablet    Take 1 tablet (10 mg) by mouth daily    Hyperlipidemia LDL goal <130       sildenafil 50 MG tablet    VIAGRA    8 tablet    Take 0.5-1 tablets (25-50 mg) by mouth daily as needed Take 30 min to 4 hours before intercourse.  Never use with nitroglycerin, terazosin or doxazosin.    Vasculogenic erectile dysfunction, unspecified vasculogenic erectile dysfunction type       testosterone 20.25 MG/ACT gel    ANDROGEL 1.62% PUMP    150 g    PLACE 1 PUMP TO CLEAN DRY HAIRLESS SKIN OF UPPER ARMS & SHOULDER ONCE DAILY    Hypotestosteronism

## 2018-03-29 DIAGNOSIS — N52.9 VASCULOGENIC ERECTILE DYSFUNCTION, UNSPECIFIED VASCULOGENIC ERECTILE DYSFUNCTION TYPE: ICD-10-CM

## 2018-03-29 DIAGNOSIS — E34.9 HYPOTESTOSTERONISM: ICD-10-CM

## 2018-03-29 DIAGNOSIS — E78.5 HYPERLIPIDEMIA WITH TARGET LDL LESS THAN 130: Chronic | ICD-10-CM

## 2018-03-29 DIAGNOSIS — E03.9 HYPOTHYROIDISM, UNSPECIFIED TYPE: ICD-10-CM

## 2018-03-29 DIAGNOSIS — I10 ESSENTIAL HYPERTENSION WITH GOAL BLOOD PRESSURE LESS THAN 140/90: Chronic | ICD-10-CM

## 2018-03-29 LAB
ALBUMIN SERPL-MCNC: 4.1 G/DL (ref 3.4–5)
ALP SERPL-CCNC: 100 U/L (ref 40–150)
ALT SERPL W P-5'-P-CCNC: 23 U/L (ref 0–70)
ANION GAP SERPL CALCULATED.3IONS-SCNC: 7 MMOL/L (ref 3–14)
AST SERPL W P-5'-P-CCNC: 21 U/L (ref 0–45)
BILIRUB SERPL-MCNC: 1 MG/DL (ref 0.2–1.3)
BUN SERPL-MCNC: 25 MG/DL (ref 7–30)
CALCIUM SERPL-MCNC: 9.5 MG/DL (ref 8.5–10.1)
CHLORIDE SERPL-SCNC: 104 MMOL/L (ref 94–109)
CHOLEST SERPL-MCNC: 195 MG/DL
CO2 SERPL-SCNC: 27 MMOL/L (ref 20–32)
CREAT SERPL-MCNC: 1.11 MG/DL (ref 0.66–1.25)
CREAT UR-MCNC: 112 MG/DL
ERYTHROCYTE [DISTWIDTH] IN BLOOD BY AUTOMATED COUNT: 13.8 % (ref 10–15)
GFR SERPL CREATININE-BSD FRML MDRD: 65 ML/MIN/1.7M2
GLUCOSE SERPL-MCNC: 127 MG/DL (ref 70–99)
HCT VFR BLD AUTO: 45.4 % (ref 40–53)
HDLC SERPL-MCNC: 43 MG/DL
HGB BLD-MCNC: 15.3 G/DL (ref 13.3–17.7)
LDLC SERPL CALC-MCNC: 116 MG/DL
MCH RBC QN AUTO: 31.2 PG (ref 26.5–33)
MCHC RBC AUTO-ENTMCNC: 33.7 G/DL (ref 31.5–36.5)
MCV RBC AUTO: 93 FL (ref 78–100)
MICROALBUMIN UR-MCNC: 46 MG/L
MICROALBUMIN/CREAT UR: 40.62 MG/G CR (ref 0–17)
NONHDLC SERPL-MCNC: 152 MG/DL
PLATELET # BLD AUTO: 222 10E9/L (ref 150–450)
POTASSIUM SERPL-SCNC: 4.5 MMOL/L (ref 3.4–5.3)
PROT SERPL-MCNC: 7.6 G/DL (ref 6.8–8.8)
RBC # BLD AUTO: 4.91 10E12/L (ref 4.4–5.9)
SODIUM SERPL-SCNC: 138 MMOL/L (ref 133–144)
T3 SERPL-MCNC: 114 NG/DL (ref 60–181)
T4 FREE SERPL-MCNC: 1.02 NG/DL (ref 0.76–1.46)
TRIGL SERPL-MCNC: 182 MG/DL
TSH SERPL DL<=0.005 MIU/L-ACNC: 4.42 MU/L (ref 0.4–4)
WBC # BLD AUTO: 7.2 10E9/L (ref 4–11)

## 2018-03-29 PROCEDURE — 80053 COMPREHEN METABOLIC PANEL: CPT | Performed by: FAMILY MEDICINE

## 2018-03-29 PROCEDURE — 80061 LIPID PANEL: CPT | Performed by: FAMILY MEDICINE

## 2018-03-29 PROCEDURE — 84439 ASSAY OF FREE THYROXINE: CPT | Performed by: FAMILY MEDICINE

## 2018-03-29 PROCEDURE — 84403 ASSAY OF TOTAL TESTOSTERONE: CPT | Performed by: FAMILY MEDICINE

## 2018-03-29 PROCEDURE — 84443 ASSAY THYROID STIM HORMONE: CPT | Performed by: FAMILY MEDICINE

## 2018-03-29 PROCEDURE — 84480 ASSAY TRIIODOTHYRONINE (T3): CPT | Performed by: FAMILY MEDICINE

## 2018-03-29 PROCEDURE — 84270 ASSAY OF SEX HORMONE GLOBUL: CPT | Performed by: FAMILY MEDICINE

## 2018-03-29 PROCEDURE — 36415 COLL VENOUS BLD VENIPUNCTURE: CPT | Performed by: FAMILY MEDICINE

## 2018-03-29 PROCEDURE — 82043 UR ALBUMIN QUANTITATIVE: CPT | Performed by: FAMILY MEDICINE

## 2018-03-29 PROCEDURE — 85027 COMPLETE CBC AUTOMATED: CPT | Performed by: FAMILY MEDICINE

## 2018-03-29 ASSESSMENT — ANXIETY QUESTIONNAIRES: GAD7 TOTAL SCORE: 0

## 2018-03-29 ASSESSMENT — PATIENT HEALTH QUESTIONNAIRE - PHQ9: SUM OF ALL RESPONSES TO PHQ QUESTIONS 1-9: 2

## 2018-04-03 LAB
SHBG SERPL-SCNC: 29 NMOL/L (ref 11–80)
TESTOST FREE SERPL-MCNC: 6.02 NG/DL (ref 4.7–24.4)
TESTOST SERPL-MCNC: 286 NG/DL (ref 240–950)

## 2018-04-06 PROCEDURE — 82274 ASSAY TEST FOR BLOOD FECAL: CPT | Performed by: FAMILY MEDICINE

## 2018-04-09 DIAGNOSIS — Z12.11 SCREEN FOR COLON CANCER: ICD-10-CM

## 2018-04-09 DIAGNOSIS — E03.9 HYPOTHYROIDISM, UNSPECIFIED TYPE: ICD-10-CM

## 2018-04-09 DIAGNOSIS — R73.03 PREDIABETES: Primary | ICD-10-CM

## 2018-04-09 DIAGNOSIS — E34.9 HYPOTESTOSTERONISM: ICD-10-CM

## 2018-04-09 LAB — HEMOCCULT STL QL IA: NEGATIVE

## 2018-04-09 NOTE — PROGRESS NOTES
Please call pt with results below:     -Liver and gallbladder tests (ALT,AST, Alk phos,bilirubin) are normal.  -Kidney function (GFR) is normal.  -Sodium is normal.  -Potassium is normal.  -Glucose is slight elevated and may be sign of early diabetes (prediabetes). ADVISE:: low carbohydrate diet, exercise, try to lose weight (if necessary) and recheck glucose in 1 months. (GLU,A1C, DX: prediabetes) - Triage, please place below orders. Dx: elevated fasting glucose.    -LDL(bad) cholesterol level is elevated, HDL(good) cholesterol level is low and your triglycerides are elevated which can increase your heart disease risk.  A diet high in fat and simple carbohydrates, genetics and being overweight can contribute to this. ADVISE: Continuing your current Crestor dose, exercise, a low fat, low carbohydrate diet, weight control, and omega-3 fatty acids (fish oil) 9001-2666 mg daily are helpful to improve this.  Rechecking your fasting cholesterol panel in 6 months is recommended (Lipid w/ LDL reflex, DX: hyperlipidemia)  -TSH (thyroid stimulating hormone) is abnormal suggesting you are currently underreplaced.  ADVISE: changing your medication dose to 125 micrograms/day and  Recheck tsh, free t4, total t3 in 4-6 weeks. Please  enter future orders for this and pt  can do this as a lab only appointment.   Dx:Hypothyroidism, unspecified type  Normal testosterone level with supplementation.  Needs recheck PSA test in 1 month when rechecking glucose and A1c.  Triage please add orders for this future and patient can do it as lab only visit.  Diagnosis:  hypo-testosteroneism on supplementation.  -Microalbumin (urine protein) level is elevated. This is suggestive of early damage to your kidneys from high blood pressure.  ADVISE: avoiding anti-inflamatory agents such as ibuprofen (Advil, Motrin) or naproxen (Aleve) as much as possible, keeping your blood pressure in a normal range, and continuing your medication , lisinopril, that  helps protect your kidneys.  Recheck in 1 year.      For additional lab test information, labtestsonline.org is an excellent reference.

## 2018-04-14 DIAGNOSIS — K21.9 GASTROESOPHAGEAL REFLUX DISEASE WITHOUT ESOPHAGITIS: ICD-10-CM

## 2018-04-14 DIAGNOSIS — K25.9 GASTRIC ULCER: ICD-10-CM

## 2018-04-17 RX ORDER — OMEPRAZOLE 40 MG/1
CAPSULE, DELAYED RELEASE ORAL
Qty: 90 CAPSULE | Refills: 2 | OUTPATIENT
Start: 2018-04-17

## 2018-04-17 NOTE — TELEPHONE ENCOUNTER
"Requested Prescriptions   Pending Prescriptions Disp Refills     omeprazole (PRILOSEC) 40 MG capsule [Pharmacy Med Name: OMEPRAZOLE DR 40 MG CAPSULE]  Last Written Prescription Date:  3/28/18  Last Fill Quantity: 90,  # refills: 3   Last office visit: 3/28/2018 with prescribing provider   Future Office Visit:     90 capsule 2     Sig: TAKE 1 CAPSULE BY MOUTH ONCE DAILY TAKE 30-60 MINUTES BEFORE A MEAL.    PPI Protocol Passed    4/16/2018 11:04 AM       Passed - Not on Clopidogrel (unless Pantoprazole ordered)       Passed - No diagnosis of osteoporosis on record       Passed - Recent (12 mo) or future (30 days) visit within the authorizing provider's specialty    Patient had office visit in the last 12 months or has a visit in the next 30 days with authorizing provider or within the authorizing provider's specialty.  See \"Patient Info\" tab in inbasket, or \"Choose Columns\" in Meds & Orders section of the refill encounter.           Passed - Patient is age 18 or older        Duplicate request.  Renea Dennis RN, BSN  [facility]  "

## 2018-05-17 DIAGNOSIS — E34.9 HYPOTESTOSTERONISM: ICD-10-CM

## 2018-05-17 RX ORDER — TESTOSTERONE 16.2 MG/G
GEL TRANSDERMAL
Qty: 150 G | Refills: 0 | Status: SHIPPED | OUTPATIENT
Start: 2018-05-17 | End: 2018-12-20

## 2018-05-17 NOTE — TELEPHONE ENCOUNTER
PSA   Date Value Ref Range Status   03/27/2017 0.77 0 - 4 ug/L Final     Comment:     Assay Method:  Chemiluminescence using Siemens Vista analyzer      Pt needs updated psa screening if on testosterone supplementation.   Please  enter future orders for this and pt  can do this as a lab only appointment.     Dx: testosterone deficiency   Hypogonadism

## 2018-05-17 NOTE — TELEPHONE ENCOUNTER
Prescription faxed to [facility].  Given to RN pool to place orders    Camilla Wallis

## 2018-05-17 NOTE — TELEPHONE ENCOUNTER
Last Written Prescription Date:  3/28/2018  Last Fill Quantity: 150 g,  # refills: 3   Last office visit: 3/28/2018 with prescribing provider:  Dr. Beth   Future Office Visit:      Requested Prescriptions   Pending Prescriptions Disp Refills     ANDROGEL 1.62% PUMP 20.25 MG/ACT gel [Pharmacy Med Name: ANDROGEL 1.62% GEL PUMP] 150 g      Sig: PLACE 1 PUMP TO CLEAN DRY HAIRLESS SKIN OF UPPER ARMS & SHOULDER ONCE DAILY    There is no refill protocol information for this order            I called [facility] and they never received 3/28/2018 order for testosterone.      Routing refill request to provider for review/approval because:  Drug not on the G refill protocol       Michelle Kruse, DAYNA, RN, N  [facility] [phone]

## 2018-05-17 NOTE — TELEPHONE ENCOUNTER
Called [phone] - advised of MD BROOKE message below  PSA is already futured.     Will call back to schedule a lab only appt    Emily Wilson RN  [place] Triage

## 2018-07-27 DIAGNOSIS — R73.03 PREDIABETES: ICD-10-CM

## 2018-07-27 DIAGNOSIS — E03.9 HYPOTHYROIDISM, UNSPECIFIED TYPE: ICD-10-CM

## 2018-07-27 DIAGNOSIS — E34.9 HYPOTESTOSTERONISM: ICD-10-CM

## 2018-07-27 LAB
GLUCOSE SERPL-MCNC: 109 MG/DL (ref 70–99)
HBA1C MFR BLD: 5.9 % (ref 0–5.6)
PSA SERPL-ACNC: 0.72 UG/L (ref 0–4)
T3 SERPL-MCNC: 90 NG/DL (ref 60–181)
T4 FREE SERPL-MCNC: 0.97 NG/DL (ref 0.76–1.46)
TSH SERPL DL<=0.005 MIU/L-ACNC: 5.78 MU/L (ref 0.4–4)

## 2018-07-27 PROCEDURE — G0103 PSA SCREENING: HCPCS | Performed by: FAMILY MEDICINE

## 2018-07-27 PROCEDURE — 83036 HEMOGLOBIN GLYCOSYLATED A1C: CPT | Performed by: FAMILY MEDICINE

## 2018-07-27 PROCEDURE — 84439 ASSAY OF FREE THYROXINE: CPT | Performed by: FAMILY MEDICINE

## 2018-07-27 PROCEDURE — 36415 COLL VENOUS BLD VENIPUNCTURE: CPT | Performed by: FAMILY MEDICINE

## 2018-07-27 PROCEDURE — 84443 ASSAY THYROID STIM HORMONE: CPT | Performed by: FAMILY MEDICINE

## 2018-07-27 PROCEDURE — 82947 ASSAY GLUCOSE BLOOD QUANT: CPT | Performed by: FAMILY MEDICINE

## 2018-07-27 PROCEDURE — 84480 ASSAY TRIIODOTHYRONINE (T3): CPT | Performed by: FAMILY MEDICINE

## 2018-08-17 NOTE — PROGRESS NOTES
Please call pt with results below:     -PSA (prostate specific antigen) test is normal.  This indicates a low likelihood of prostate cancer.  ADVISE: yearly recheck.   -TSH (thyroid stimulating hormone) is abnormal suggesting you are currently underreplaced.  ADVISE: changing your medication dose to 125 micrograms/day and . Recheck tsh, free t4, total t3 in 4-6 weeks. Please  enter future orders for this and pt  can do this as a lab only appointment.     -A1C (test of diabetes control the last 2-3 months) is at your goal. Please continue with current plan. Also, see me and recheck your A1C test in 6 months.   -Glucose (diabetic screening test) is normal.    For additional lab test information, labtestsonline.org is an excellent reference.

## 2018-08-20 DIAGNOSIS — E03.9 HYPOTHYROIDISM, UNSPECIFIED TYPE: ICD-10-CM

## 2018-08-20 RX ORDER — LEVOTHYROXINE SODIUM 125 UG/1
125 TABLET ORAL DAILY
Qty: 90 TABLET | Refills: 0 | Status: SHIPPED | OUTPATIENT
Start: 2018-08-20 | End: 2018-11-21

## 2018-11-10 DIAGNOSIS — E78.5 HYPERLIPIDEMIA LDL GOAL <130: ICD-10-CM

## 2018-11-12 NOTE — TELEPHONE ENCOUNTER
"Requested Prescriptions   Pending Prescriptions Disp Refills     rosuvastatin (CRESTOR) 10 MG tablet [Pharmacy Med Name: ROSUVASTATIN CALCIUM 10 MG TAB] 90 tablet 1        Last Written Prescription Date:  3.28.18  Last Fill Quantity: 90,  # refills: 1   Last Office Visit: 3/28/2018   Future Office Visit:      Sig: TAKE 1 TABLET (10 MG) BY MOUTH DAILY    Statins Protocol Passed    11/10/2018  1:47 AM       Passed - LDL on file in past 12 months    Recent Labs   Lab Test  03/29/18   0739   LDL  116*            Passed - No abnormal creatine kinase in past 12 months    Recent Labs   Lab Test  01/29/16   0842   CKT  166               Passed - Recent (12 mo) or future (30 days) visit within the authorizing provider's specialty    Patient had office visit in the last 12 months or has a visit in the next 30 days with authorizing provider or within the authorizing provider's specialty.  See \"Patient Info\" tab in inbasket, or \"Choose Columns\" in Meds & Orders section of the refill encounter.             Passed - Patient is age 18 or older          "

## 2018-11-12 NOTE — TELEPHONE ENCOUNTER
Due for recheck:      Notes Recorded by Vee Beth MD on 4/9/2018 at 5:41 AM  Please call pt with results below:     -Liver and gallbladder tests (ALT,AST, Alk phos,bilirubin) are normal.  -Kidney function (GFR) is normal.  -Sodium is normal.  -Potassium is normal.  -Glucose is slight elevated and may be sign of early diabetes (prediabetes). ADVISE:: low carbohydrate diet, exercise, try to lose weight (if necessary) and recheck glucose in 1 months. (GLU,A1C, DX: prediabetes) - Triage, please place below orders. Dx: elevated fasting glucose.    -LDL(bad) cholesterol level is elevated, HDL(good) cholesterol level is low and your triglycerides are elevated which can increase your heart disease risk.  A diet high in fat and simple carbohydrates, genetics and being overweight can contribute to this. ADVISE: Continuing your current Crestor dose, exercise, a low fat, low carbohydrate diet, weight control, and omega-3 fatty acids (fish oil) 6144-2807 mg daily are helpful to improve this.  Rechecking your fasting cholesterol panel in 6 months is recommended (Lipid w/ LDL reflex, DX: hyperlipidemia)

## 2018-11-14 RX ORDER — ROSUVASTATIN CALCIUM 10 MG/1
TABLET, COATED ORAL
Qty: 90 TABLET | Refills: 0 | Status: SHIPPED | OUTPATIENT
Start: 2018-11-14 | End: 2019-02-15

## 2018-11-14 NOTE — TELEPHONE ENCOUNTER
done x 1 only. Pt needs recheck office visit or at least fasting lipid panel with ast, alt, before more refills. Please inform pt and  Please assist pt in making appt to be seen.

## 2018-11-15 NOTE — TELEPHONE ENCOUNTER
Called [phone]     Advised of MD BROOKE message below - Patient stated an understanding and agreed with plan.  Lab Only scheduled for 11/21/2018  Labs Futured      Emily Wilson RN  [place] [facility]

## 2018-11-21 DIAGNOSIS — E03.9 HYPOTHYROIDISM, UNSPECIFIED TYPE: ICD-10-CM

## 2018-11-21 DIAGNOSIS — E78.5 HYPERLIPIDEMIA LDL GOAL <130: ICD-10-CM

## 2018-11-21 LAB
ALT SERPL W P-5'-P-CCNC: 25 U/L (ref 0–70)
AST SERPL W P-5'-P-CCNC: 21 U/L (ref 0–45)
CHOLEST SERPL-MCNC: 171 MG/DL
HDLC SERPL-MCNC: 59 MG/DL
LDLC SERPL CALC-MCNC: 99 MG/DL
NONHDLC SERPL-MCNC: 112 MG/DL
T3 SERPL-MCNC: 83 NG/DL (ref 60–181)
T4 FREE SERPL-MCNC: 0.75 NG/DL (ref 0.76–1.46)
TRIGL SERPL-MCNC: 67 MG/DL
TSH SERPL DL<=0.005 MIU/L-ACNC: 11.47 MU/L (ref 0.4–4)

## 2018-11-21 PROCEDURE — 84480 ASSAY TRIIODOTHYRONINE (T3): CPT | Performed by: FAMILY MEDICINE

## 2018-11-21 PROCEDURE — 84443 ASSAY THYROID STIM HORMONE: CPT | Performed by: FAMILY MEDICINE

## 2018-11-21 PROCEDURE — 36415 COLL VENOUS BLD VENIPUNCTURE: CPT | Performed by: FAMILY MEDICINE

## 2018-11-21 PROCEDURE — 84439 ASSAY OF FREE THYROXINE: CPT | Performed by: FAMILY MEDICINE

## 2018-11-21 PROCEDURE — 84460 ALANINE AMINO (ALT) (SGPT): CPT | Performed by: FAMILY MEDICINE

## 2018-11-21 PROCEDURE — 84450 TRANSFERASE (AST) (SGOT): CPT | Performed by: FAMILY MEDICINE

## 2018-11-21 PROCEDURE — 80061 LIPID PANEL: CPT | Performed by: FAMILY MEDICINE

## 2018-11-21 RX ORDER — LEVOTHYROXINE SODIUM 137 UG/1
137 TABLET ORAL DAILY
Qty: 90 TABLET | Refills: 0 | Status: SHIPPED | OUTPATIENT
Start: 2018-11-21 | End: 2019-01-30

## 2018-11-21 NOTE — PROGRESS NOTES
Note to Staff: please send a result letter and call the patient to explain results.    -Cholesterol levels are at your goal levels.  ADVISE: continuing your medication, a regular exercise program with at least 150 minutes of aerobic exercise per week, and eating a low saturated fat/low carbohydrate diet.  Also, you should recheck this fasting cholesterol panel in 12 months.  -TSH (thyroid stimulating hormone) is abnormal suggesting you are currently underreplaced.  ADVISE: changing your medication dose to 137 micrograms/day (a prescription has been sent to your pharmacy) and rechecking your TSH with a lab appointment in 6-8 weeks.  -Liver and gallbladder tests (ALT,AST, Alk phos,bilirubin) are normal.     For additional lab test information, [email or web address] is an excellent reference.

## 2018-12-20 DIAGNOSIS — E34.9 HYPOTESTOSTERONISM: ICD-10-CM

## 2018-12-20 RX ORDER — TESTOSTERONE 16.2 MG/G
GEL TRANSDERMAL
Qty: 150 G | Refills: 1 | Status: SHIPPED | OUTPATIENT
Start: 2018-12-20 | End: 2019-03-28

## 2018-12-20 NOTE — TELEPHONE ENCOUNTER
Requested Prescriptions   Pending Prescriptions Disp Refills     ANDROGEL 1.62% PUMP 20.25 MG/ACT gel [Pharmacy Med Name: ANDROGEL 1.62% GEL PUMP] 150 g         Last Written Prescription Date:  5.17.18  Last Fill Quantity: 150 g,  # refills: 0   Last Office Visit: 3/28/2018   Future Office Visit:      Sig: PLACE 1 PUMP TO CLEAN DRY HAIRLESS SKIN OF UPPER ARMS & SHOULDER ONCE DAILY    Androgen Agents Failed - 12/20/2018  1:48 PM       Failed - Refills for this classification require provider review       Failed - Blood pressure under 140/90 in past 6 months    BP Readings from Last 3 Encounters:   03/28/18 136/88   01/04/18 134/76   03/27/17 136/84                Failed - Recent (6 mo) or future (30 days) visit within the authorizing provider's specialty       Passed - Patient is of age 12 and older       Passed - Lipid panel on file in past 12 mos    Recent Labs   Lab Test 11/21/18  0826  01/30/15  1305   CHOL 171   < > 237*   TRIG 67   < > 208*   HDL 59   < > 46   LDL 99   < > 149*   NHDL 112   < >  --    VLDL  --   --  42*   CHOLHDLRATIO  --   --  5.2*    < > = values in this interval not displayed.              Passed - ALT on file within past 12 mos    Recent Labs   Lab Test 11/21/18  0826   ALT 25            Passed - Medication is active on med list       Passed - HCT less than 54% on file within past 12 mos    Recent Labs   Lab Test 03/29/18  0739   HCT 45.4            Passed - Serum Testosterone on file within past 12 mos    Recent Labs   Lab Test 03/29/18  0746   TESTOSTTOTAL 286            Passed - Serum PSA on file within past 12 mos    Lab Results   Component Value Date    PSA 0.72 07/27/2018            Passed - Patient is not pregnant       Passed - No positive pregnancy test on file within past 12 mos       Passed - AST on file within past 12 mos    Recent Labs   Lab Test 11/21/18  0826   AST 21

## 2018-12-20 NOTE — TELEPHONE ENCOUNTER
Routing refill request to provider for review/approval because:  Drug not on the [facility] refill protocol   Georgia Fu RN  [place] [facility]

## 2019-01-29 DIAGNOSIS — I10 ESSENTIAL HYPERTENSION WITH GOAL BLOOD PRESSURE LESS THAN 140/90: Chronic | ICD-10-CM

## 2019-01-29 DIAGNOSIS — E03.9 HYPOTHYROIDISM, UNSPECIFIED TYPE: ICD-10-CM

## 2019-01-29 NOTE — TELEPHONE ENCOUNTER
"Requested Prescriptions   Pending Prescriptions Disp Refills     lisinopril (PRINIVIL/ZESTRIL) 20 MG tablet 90 tablet 1      Last Written Prescription Date:  03/28/2018  Last Fill Quantity: 90,  # refills: 1   Last office visit: 3/28/2018 with prescribing provider:       Sig: Take 1 tablet (20 mg) by mouth daily    ACE Inhibitors (Including Combos) Protocol Passed - 1/29/2019  4:15 PM       Passed - Blood pressure under 140/90 in past 12 months    BP Readings from Last 3 Encounters:   03/28/18 136/88   01/04/18 134/76   03/27/17 136/84                Passed - Recent (12 mo) or future (30 days) visit within the authorizing provider's specialty    Patient had office visit in the last 12 months or has a visit in the next 30 days with authorizing provider or within the authorizing provider's specialty.  See \"Patient Info\" tab in inbasket, or \"Choose Columns\" in Meds & Orders section of the refill encounter.             Passed - Medication is active on med list       Passed - Patient is age 18 or older       Passed - Normal serum creatinine on file in past 12 months    Recent Labs   Lab Test 03/29/18  0739   CR 1.11            Passed - Normal serum potassium on file in past 12 months    Recent Labs   Lab Test 03/29/18  0739   POTASSIUM 4.5             levothyroxine (SYNTHROID/LEVOTHROID) 137 MCG tablet 90 tablet 0      Last Written Prescription Date:  11/21/2018  Last Fill Quantity: 90,  # refills: 0   Last office visit: 3/28/2018 with prescribing provider:        Sig: Take 1 tablet (137 mcg) by mouth daily    Thyroid Protocol Failed - 1/29/2019  4:15 PM       Failed - Normal TSH on file in past 12 months    Recent Labs   Lab Test 11/21/18  0826   TSH 11.47*             Passed - Patient is 12 years or older       Passed - Recent (12 mo) or future (30 days) visit within the authorizing provider's specialty    Patient had office visit in the last 12 months or has a visit in the next 30 days with authorizing provider or " "within the authorizing provider's specialty.  See \"Patient Info\" tab in inbasket, or \"Choose Columns\" in Meds & Orders section of the refill encounter.             Passed - Medication is active on med list          "

## 2019-01-29 NOTE — TELEPHONE ENCOUNTER
levothyroxine (SYNTHROID/LEVOTHROID) 137 MCG tablet    Last Written Prescription Date:  11.21.18  Last Fill Quantity: 90,  # refills: 0   Last Office Visit: 3/28/2018   Future Office Visit:         lisinopril (PRINIVIL/ZESTRIL) 20 MG tablet    Last Written Prescription Date:  3.28.18  Last Fill Quantity: 90,  # refills: 1   Last Office Visit: 3/28/2018   Future Office Visit:

## 2019-01-30 RX ORDER — LISINOPRIL 20 MG/1
20 TABLET ORAL DAILY
Qty: 90 TABLET | Refills: 0 | Status: SHIPPED | OUTPATIENT
Start: 2019-01-30 | End: 2019-03-28

## 2019-01-30 NOTE — TELEPHONE ENCOUNTER
Routing refill request to provider for review/approval because:  Labs out of range:  TSH    Filled per [facility] protocol.     DAYNA HarrisN, RN  [facility]

## 2019-01-31 RX ORDER — LEVOTHYROXINE SODIUM 137 UG/1
137 TABLET ORAL DAILY
Qty: 30 TABLET | Refills: 0 | Status: SHIPPED | OUTPATIENT
Start: 2019-01-31 | End: 2019-02-26 | Stop reason: DRUGHIGH

## 2019-01-31 NOTE — TELEPHONE ENCOUNTER
Recent Labs   Lab Test 11/21/18  0826 03/29/18  0739  01/30/15  1305 04/30/14  0802   CHOL 171 195   < > 237* 219*   HDL 59 43   < > 46 38*   LDL 99 116*   < > 149* 155*   TRIG 67 182*   < > 208* 135   CHOLHDLRATIO  --   --   --  5.2* 5.8*    < > = values in this interval not displayed.      Pt is up to date with his lipids. Therefore  Doesn't need to be fasting for his 2/2019 labs.  Needs UA, urine for microalbumin CBC, CMP in addition to his thyroid labs already ordered. Please  enter future orders for this and pt  can do this as a lab only appointment.     Dx: Essential hypertension with goal blood pressure less than 140/90 - not well controlled recently.     Lab Results   Component Value Date    MICROL 46 03/29/2018     No results found for: MICROALBUMIN   CBC RESULTS:   Recent Labs   Lab Test 03/29/18  0739   WBC 7.2   RBC 4.91   HGB 15.3   HCT 45.4   MCV 93   MCH 31.2   MCHC 33.7   RDW 13.8        Last Comprehensive Metabolic Panel:  Sodium   Date Value Ref Range Status   03/29/2018 138 133 - 144 mmol/L Final     Potassium   Date Value Ref Range Status   03/29/2018 4.5 3.4 - 5.3 mmol/L Final     Chloride   Date Value Ref Range Status   03/29/2018 104 94 - 109 mmol/L Final     Carbon Dioxide   Date Value Ref Range Status   03/29/2018 27 20 - 32 mmol/L Final     Anion Gap   Date Value Ref Range Status   03/29/2018 7 3 - 14 mmol/L Final     Glucose   Date Value Ref Range Status   07/27/2018 109 (H) 70 - 99 mg/dL Final     Urea Nitrogen   Date Value Ref Range Status   03/29/2018 25 7 - 30 mg/dL Final     Creatinine   Date Value Ref Range Status   03/29/2018 1.11 0.66 - 1.25 mg/dL Final     GFR Estimate   Date Value Ref Range Status   03/29/2018 65 >60 mL/min/1.7m2 Final     Comment:     Non  GFR Calc     Calcium   Date Value Ref Range Status   03/29/2018 9.5 8.5 - 10.1 mg/dL Final     Bilirubin Total   Date Value Ref Range Status   03/29/2018 1.0 0.2 - 1.3 mg/dL Final     Alkaline  Phosphatase   Date Value Ref Range Status   03/29/2018 100 40 - 150 U/L Final     ALT   Date Value Ref Range Status   11/21/2018 25 0 - 70 U/L Final     AST   Date Value Ref Range Status   11/21/2018 21 0 - 45 U/L Final

## 2019-01-31 NOTE — TELEPHONE ENCOUNTER
Pt overdue for  Recheck tsh, free t4, total t3 as directed from 11/21/2018 labs.  Was supposed to be in 1-2 weeks ago. Please  enter future orders for this and pt  can do this as a lab only appointment.   Ok to fill #30 days until then. Please inform patient.

## 2019-01-31 NOTE — TELEPHONE ENCOUNTER
Labs futured    Called patient @ [phone] -   Advised of MD BROOKE message below - Patient stated an understanding and agreed with plan.        Patient requesting pre-px labs be done the same day as well    Routing to PCP for further review/recommendations/orders.  Please advise on labs    Emily Wilson RN  [place] [facility]riage

## 2019-02-05 DIAGNOSIS — E03.9 HYPOTHYROIDISM, UNSPECIFIED TYPE: ICD-10-CM

## 2019-02-05 DIAGNOSIS — I10 ESSENTIAL HYPERTENSION WITH GOAL BLOOD PRESSURE LESS THAN 140/90: Chronic | ICD-10-CM

## 2019-02-05 LAB
ALBUMIN SERPL-MCNC: 3.8 G/DL (ref 3.4–5)
ALBUMIN UR-MCNC: NEGATIVE MG/DL
ALP SERPL-CCNC: 102 U/L (ref 40–150)
ALT SERPL W P-5'-P-CCNC: 26 U/L (ref 0–70)
ANION GAP SERPL CALCULATED.3IONS-SCNC: 5 MMOL/L (ref 3–14)
APPEARANCE UR: CLEAR
AST SERPL W P-5'-P-CCNC: 18 U/L (ref 0–45)
BASOPHILS # BLD AUTO: 0 10E9/L (ref 0–0.2)
BASOPHILS NFR BLD AUTO: 0.6 %
BILIRUB SERPL-MCNC: 0.9 MG/DL (ref 0.2–1.3)
BILIRUB UR QL STRIP: NEGATIVE
BUN SERPL-MCNC: 25 MG/DL (ref 7–30)
CALCIUM SERPL-MCNC: 9.3 MG/DL (ref 8.5–10.1)
CHLORIDE SERPL-SCNC: 108 MMOL/L (ref 94–109)
CO2 SERPL-SCNC: 25 MMOL/L (ref 20–32)
COLOR UR AUTO: YELLOW
CREAT SERPL-MCNC: 1.11 MG/DL (ref 0.66–1.25)
CREAT UR-MCNC: 148 MG/DL
DIFFERENTIAL METHOD BLD: NORMAL
EOSINOPHIL # BLD AUTO: 0.3 10E9/L (ref 0–0.7)
EOSINOPHIL NFR BLD AUTO: 4 %
ERYTHROCYTE [DISTWIDTH] IN BLOOD BY AUTOMATED COUNT: 13.5 % (ref 10–15)
GFR SERPL CREATININE-BSD FRML MDRD: 65 ML/MIN/{1.73_M2}
GLUCOSE SERPL-MCNC: 123 MG/DL (ref 70–99)
GLUCOSE UR STRIP-MCNC: NEGATIVE MG/DL
HCT VFR BLD AUTO: 44.7 % (ref 40–53)
HGB BLD-MCNC: 15.2 G/DL (ref 13.3–17.7)
HGB UR QL STRIP: ABNORMAL
KETONES UR STRIP-MCNC: NEGATIVE MG/DL
LEUKOCYTE ESTERASE UR QL STRIP: NEGATIVE
LYMPHOCYTES # BLD AUTO: 2 10E9/L (ref 0.8–5.3)
LYMPHOCYTES NFR BLD AUTO: 31.6 %
MCH RBC QN AUTO: 31.1 PG (ref 26.5–33)
MCHC RBC AUTO-ENTMCNC: 34 G/DL (ref 31.5–36.5)
MCV RBC AUTO: 92 FL (ref 78–100)
MICROALBUMIN UR-MCNC: 42 MG/L
MICROALBUMIN/CREAT UR: 28.11 MG/G CR (ref 0–17)
MONOCYTES # BLD AUTO: 0.7 10E9/L (ref 0–1.3)
MONOCYTES NFR BLD AUTO: 10.4 %
NEUTROPHILS # BLD AUTO: 3.4 10E9/L (ref 1.6–8.3)
NEUTROPHILS NFR BLD AUTO: 53.4 %
NITRATE UR QL: NEGATIVE
PH UR STRIP: 5.5 PH (ref 5–7)
PLATELET # BLD AUTO: 211 10E9/L (ref 150–450)
POTASSIUM SERPL-SCNC: 4.2 MMOL/L (ref 3.4–5.3)
PROT SERPL-MCNC: 7.4 G/DL (ref 6.8–8.8)
RBC # BLD AUTO: 4.88 10E12/L (ref 4.4–5.9)
RBC #/AREA URNS AUTO: NORMAL /HPF
SODIUM SERPL-SCNC: 138 MMOL/L (ref 133–144)
SOURCE: ABNORMAL
SP GR UR STRIP: 1.02 (ref 1–1.03)
T3 SERPL-MCNC: 88 NG/DL (ref 60–181)
T4 FREE SERPL-MCNC: 1.1 NG/DL (ref 0.76–1.46)
TSH SERPL DL<=0.005 MIU/L-ACNC: 4.88 MU/L (ref 0.4–4)
UROBILINOGEN UR STRIP-ACNC: 0.2 EU/DL (ref 0.2–1)
WBC # BLD AUTO: 6.4 10E9/L (ref 4–11)
WBC #/AREA URNS AUTO: NORMAL /HPF

## 2019-02-05 PROCEDURE — 82043 UR ALBUMIN QUANTITATIVE: CPT | Performed by: FAMILY MEDICINE

## 2019-02-05 PROCEDURE — 84439 ASSAY OF FREE THYROXINE: CPT | Performed by: FAMILY MEDICINE

## 2019-02-05 PROCEDURE — 80053 COMPREHEN METABOLIC PANEL: CPT | Performed by: FAMILY MEDICINE

## 2019-02-05 PROCEDURE — 36415 COLL VENOUS BLD VENIPUNCTURE: CPT | Performed by: FAMILY MEDICINE

## 2019-02-05 PROCEDURE — 85025 COMPLETE CBC W/AUTO DIFF WBC: CPT | Performed by: FAMILY MEDICINE

## 2019-02-05 PROCEDURE — 84443 ASSAY THYROID STIM HORMONE: CPT | Performed by: FAMILY MEDICINE

## 2019-02-05 PROCEDURE — 84480 ASSAY TRIIODOTHYRONINE (T3): CPT | Performed by: FAMILY MEDICINE

## 2019-02-05 PROCEDURE — 81001 URINALYSIS AUTO W/SCOPE: CPT | Performed by: FAMILY MEDICINE

## 2019-02-13 DIAGNOSIS — E03.9 HYPOTHYROIDISM, UNSPECIFIED TYPE: Primary | ICD-10-CM

## 2019-02-13 RX ORDER — LEVOTHYROXINE SODIUM 150 UG/1
150 TABLET ORAL DAILY
Qty: 90 TABLET | Refills: 3 | Status: SHIPPED | OUTPATIENT
Start: 2019-02-13 | End: 2019-09-12

## 2019-02-13 NOTE — PROGRESS NOTES
Future labs ordered per provider as well as 150 mcg levothyroxine.    DAYNA HartmanN, RN  [facility]

## 2019-02-13 NOTE — RESULT ENCOUNTER NOTE
Please call pt with results below:     -All of your labs are normal for nonfasting levels except your TSH is a little high still, indicating you are still a bit hypothyroid.  If you've been taking your levothyroxine 137mcg daily , then we need to increase it to 150mcg daily and  Recheck tsh, free t4, total t3 in 4-6 weeks. Please  enter future orders for this and pt  can do this as a lab only appointment.     Adjust med list , too, please.    Urinalysis - routine testing of your urine - was normal.      For additional lab test information, [email or web address] is an excellent reference.

## 2019-02-15 DIAGNOSIS — E78.5 HYPERLIPIDEMIA LDL GOAL <130: ICD-10-CM

## 2019-02-15 RX ORDER — ROSUVASTATIN CALCIUM 10 MG/1
10 TABLET, COATED ORAL DAILY
Qty: 90 TABLET | Refills: 0 | Status: SHIPPED | OUTPATIENT
Start: 2019-02-15 | End: 2019-03-28

## 2019-02-15 NOTE — TELEPHONE ENCOUNTER
Prescription approved per [facility] Refill Protocol.  Georgia Fu RN  [facility]

## 2019-02-15 NOTE — TELEPHONE ENCOUNTER
rosuvastatin (CRESTOR) 10 MG tablet    Last Written Prescription Date:  11.14.18  Last Fill Quantity: 90,  # refills: 0   Last Office Visit: 3/28/2018   Future Office Visit:    Next 5 appointments (look out 90 days)    Mar 28, 2019  2:40 PM CDT  PHYSICAL with Vee Beth MD  [facility] ([facility]) [address] [place] [id number] [phone]

## 2019-02-26 ENCOUNTER — OFFICE VISIT (OUTPATIENT)
Dept: FAMILY MEDICINE | Facility: CLINIC | Age: 74
End: 2019-02-26
Payer: COMMERCIAL

## 2019-02-26 ENCOUNTER — TELEPHONE (OUTPATIENT)
Dept: FAMILY MEDICINE | Facility: CLINIC | Age: 74
End: 2019-02-26

## 2019-02-26 VITALS
OXYGEN SATURATION: 97 % | WEIGHT: 228 LBS | HEART RATE: 104 BPM | DIASTOLIC BLOOD PRESSURE: 80 MMHG | TEMPERATURE: 97.2 F | HEIGHT: 74 IN | BODY MASS INDEX: 29.26 KG/M2 | SYSTOLIC BLOOD PRESSURE: 122 MMHG

## 2019-02-26 DIAGNOSIS — Z72.0 TOBACCO ABUSE: ICD-10-CM

## 2019-02-26 DIAGNOSIS — C43.62 MALIGNANT MELANOMA OF LEFT UPPER EXTREMITY INCLUDING SHOULDER (H): ICD-10-CM

## 2019-02-26 DIAGNOSIS — J20.9 ACUTE BRONCHITIS WITH SYMPTOMS > 10 DAYS: Primary | ICD-10-CM

## 2019-02-26 PROCEDURE — 99213 OFFICE O/P EST LOW 20 MIN: CPT | Performed by: PHYSICIAN ASSISTANT

## 2019-02-26 RX ORDER — AZITHROMYCIN 250 MG/1
TABLET, FILM COATED ORAL
Qty: 6 TABLET | Refills: 0 | Status: SHIPPED | OUTPATIENT
Start: 2019-02-26 | End: 2019-03-28

## 2019-02-26 ASSESSMENT — MIFFLIN-ST. JEOR: SCORE: 1848.95

## 2019-02-26 NOTE — PROGRESS NOTES
SUBJECTIVE:   Dev Powell is a 73 year old male who presents to clinic today for the following health issues:    RESPIRATORY SYMPTOMS      Duration: 2-3 weeks     Description  Productive cough with greenish brown sputum, chest congestion, nasal congestion, headaches, sinus pressure, no energy    Severity: moderate    Accompanying signs and symptoms: knees are achy     History (predisposing factors):  none    Precipitating or alleviating factors: None    Therapies tried and outcome:  otc cold and flu with tylenol-not effective     Dev presents today with productive cough, nasal drainage, sinus pain/pressure and congestion that he states has been going on for 2 weeks and is getting worse. He states it began as a cough and nasal congestion and has moved down into his chest and he is now experiencing fatigue and body aches. He reports OTC medication is not helping but he has been continuing to take it including today.    Problem list and histories reviewed & adjusted, as indicated.  Additional history: as documented    Patient Active Problem List   Diagnosis     Gastroesophageal reflux disease without esophagitis = ran out of PPI- has been taking lots of TUMS     Hyperlipidemia LDL goal <130 - pravastatin = leg cramps; lipitor =calf pains     Snoring     Vasculogenic erectile dysfunction, unspecified vasculogenic erectile dysfunction type     Osteoarthritis     Lipoma of skin     Gastric ulcer     Malignant melanoma of left upper extremity including shoulder (H)     Cigarette smoker one half pack a day or less- for > 45 years      Enlarged prostate- has seen  Dr. Solomon in the past - doesn't need to see him again per Dr. Solomon     Rectal pain     Prostatitis, acute     Hearing loss     Inguinal hernia- right      Pulmonary nodules- tiny per screening chest CT -  repeat CT chest low dose w/o contrast 4/2017 = no change since 2015 - no need for further CT's      Essential hypertension with goal blood pressure less  than 140/90     Hypotestosteronism- androgel working well      Hypothyroidism, unspecified type     Chronic constipation     Mass of left chest wall - ? there for 18+ years -left breast 1cm mass at 7:30      Multiple pigmented nevi     Reducible right inguinal hernia     Past Surgical History:   Procedure Laterality Date     COLONOSCOPY  2007    repeat in 2017     ESOPHAGOSCOPY, GASTROSCOPY, DUODENOSCOPY (EGD), COMBINED N/A 2015    Procedure: COMBINED ESOPHAGOSCOPY, GASTROSCOPY, DUODENOSCOPY (EGD);  Surgeon: Ender Dixon MD;  Location:  GI     HC ESOPHAGOSCOPY, DIAGNOSTIC  2007    EGD - gastric ulcer w/ erosions/ LA grade B erosive esophagitis     wide excision      for malignant melanoma       Social History     Tobacco Use     Smoking status: Current Every Day Smoker     Packs/day: 0.50     Years: 50.00     Pack years: 25.00     Smokeless tobacco: Never Used     Tobacco comment: strongly encourage smoking cessation with every visit   Substance Use Topics     Alcohol use: Yes     Alcohol/week: 0.0 oz     Comment: not regular - very occasional 1-2 martinis when out to dinner 1-2x/month, if that      Family History   Problem Relation Age of Onset     C.A.D. Father 62         at age 62 of MI     Diabetes Father         early type 2      Neurologic Disorder Mother         mild dementia - @ 92     C.A.D. Mother         angina      Thyroid Disease Mother         s/p thyroid          Current Outpatient Medications   Medication Sig Dispense Refill     ANDROGEL 1.62% PUMP 20.25 MG/ACT gel PLACE 1 PUMP TO CLEAN DRY HAIRLESS SKIN OF UPPER ARMS & SHOULDER ONCE DAILY 150 g 1     aspirin 81 MG tablet Take by mouth daily 30 tablet      azithromycin (ZITHROMAX) 250 MG tablet Two tablets first day, then one tablet daily for four days 6 tablet 0     Glucosamine-Chondroit-Vit C-Mn (GLUCOSAMINE CHONDROITIN 1500 COMPLEX) CAPS Take by mouth daily       levothyroxine (SYNTHROID/LEVOTHROID) 150 MCG tablet  "Take 1 tablet (150 mcg) by mouth daily 90 tablet 3     lisinopril (PRINIVIL/ZESTRIL) 20 MG tablet Take 1 tablet (20 mg) by mouth daily 90 tablet 0     Multiple Vitamins-Minerals (CENTRUM SILVER) per tablet Take 1 tablet by mouth daily 30 tablet      omeprazole (PRILOSEC) 40 MG capsule TAKE 1 CAPSULE BY MOUTH ONCE DAILY TAKE 30-60 MINUTES BEFORE A MEAL. 90 capsule 3     rosuvastatin (CRESTOR) 10 MG tablet Take 1 tablet (10 mg) by mouth daily 90 tablet 0     sildenafil (VIAGRA) 50 MG tablet Take 0.5-1 tablets (25-50 mg) by mouth daily as needed Take 30 min to 4 hours before intercourse.  Never use with nitroglycerin, terazosin or doxazosin. 8 tablet 11     No Known Allergies    Reviewed and updated as needed this visit by clinical staff  Tobacco  Allergies  Meds  Problems  Med Hx  Surg Hx  Fam Hx  Soc Hx        Reviewed and updated as needed this visit by Provider  Tobacco  Allergies  Meds  Problems  Med Hx  Surg Hx  Fam Hx         ROS:  Constitutional, HEENT, cardiovascular, pulmonary, GI, , musculoskeletal, neuro, skin, endocrine and psych systems are negative, except as otherwise noted.    OBJECTIVE:   /80 (BP Location: Right arm, Cuff Size: Adult Regular)   Pulse 104   Temp 97.2  F (36.2  C) (Oral)   Ht 1.88 m (6' 2\")   Wt 103.4 kg (228 lb)   SpO2 97%   BMI 29.27 kg/m   Body mass index is 29.27 kg/m .    GENERAL: alert and no distress  EYES: Eyes grossly normal to inspection, PERRL and conjunctivae and sclerae normal  HENT: ear canals and TM's normal and nose and mouth without ulcers or lesions. Mild erythema in throat and PND noted. Clear sputum with cough. Sinuses not tender to palpation.  NECK: anterior cervical adenopathy bilateral  RESP: lungs clear to auscultation - no rales, rhonchi or wheezes  CV: regular rate and rhythm, normal S1 S2, no S3 or S4, no murmur, click or rub, no peripheral edema and peripheral pulses strong  MS: no gross musculoskeletal defects noted, no " edema  SKIN: no suspicious lesions or rashes  NEURO: Normal strength and tone, mentation intact and speech normal  PSYCH: mentation appears normal, affect normal/bright    Diagnostic Test Results:  none  ASSESSMENT/PLAN:   Dev was seen today for uri.    Diagnoses and all orders for this visit:    Acute bronchitis with symptoms > 10 days, Tobacco abuse  Start azithromycin as prescribed.  Continue symptomatic support and pushing fluids. If no improvement or worsening symptoms RTC.  Encouraged tobacco cessation.  -     azithromycin (ZITHROMAX) 250 MG tablet; Two tablets first day, then one tablet daily for four days    Return in about 4 weeks (around 3/26/2019) for Physical Exam, Lab Work.      Estefania Eubanks MS, PA-C    Estefania Eubanks, PA-C  [facility] [place]

## 2019-02-26 NOTE — TELEPHONE ENCOUNTER
Reason for Call:  Other     Detailed comments: The patient is calling saying he forgot to ask Estefania today if he is contagious. He would like a call back.    Phone Number Patient can be reached at: Cell number on file:    Telephone Information:   Mobile [phone]     Best Time: Anytime    Can we leave a detailed message on this number? YES    Call taken on 2/26/2019 at 9:27 AM by Mervat Cox

## 2019-02-26 NOTE — TELEPHONE ENCOUNTER
Returned call to patient to explain he is not contagious and needs to follow good hand hygiene and to cover his cough/sneeze.  Patient stated understanding and was agreeable with plan.    DAYNA HartmanN, RN  [facility]

## 2019-03-28 ENCOUNTER — OFFICE VISIT (OUTPATIENT)
Dept: FAMILY MEDICINE | Facility: CLINIC | Age: 74
End: 2019-03-28
Payer: COMMERCIAL

## 2019-03-28 VITALS
DIASTOLIC BLOOD PRESSURE: 78 MMHG | OXYGEN SATURATION: 95 % | WEIGHT: 230.2 LBS | BODY MASS INDEX: 29.54 KG/M2 | SYSTOLIC BLOOD PRESSURE: 120 MMHG | HEART RATE: 103 BPM | HEIGHT: 74 IN | TEMPERATURE: 97.7 F

## 2019-03-28 DIAGNOSIS — E29.1 HYPOGONADISM IN MALE: ICD-10-CM

## 2019-03-28 DIAGNOSIS — Z00.01 ENCOUNTER FOR GENERAL ADULT MEDICAL EXAMINATION WITH ABNORMAL FINDINGS: Primary | ICD-10-CM

## 2019-03-28 DIAGNOSIS — E34.9 TESTOSTERONE DEFICIENCY: ICD-10-CM

## 2019-03-28 DIAGNOSIS — Z12.11 SCREEN FOR COLON CANCER: ICD-10-CM

## 2019-03-28 DIAGNOSIS — E34.9 HYPOTESTOSTERONISM: ICD-10-CM

## 2019-03-28 DIAGNOSIS — R91.8 PULMONARY NODULES: Chronic | ICD-10-CM

## 2019-03-28 DIAGNOSIS — K40.90 REDUCIBLE RIGHT INGUINAL HERNIA: ICD-10-CM

## 2019-03-28 DIAGNOSIS — Z23 NEED FOR PROPHYLACTIC VACCINATION AND INOCULATION AGAINST INFLUENZA: ICD-10-CM

## 2019-03-28 DIAGNOSIS — I10 ESSENTIAL HYPERTENSION WITH GOAL BLOOD PRESSURE LESS THAN 140/90: ICD-10-CM

## 2019-03-28 DIAGNOSIS — R01.1 UNDIAGNOSED CARDIAC MURMURS: ICD-10-CM

## 2019-03-28 DIAGNOSIS — Z12.5 SCREENING FOR PROSTATE CANCER: ICD-10-CM

## 2019-03-28 DIAGNOSIS — N52.9 VASCULOGENIC ERECTILE DYSFUNCTION, UNSPECIFIED VASCULOGENIC ERECTILE DYSFUNCTION TYPE: ICD-10-CM

## 2019-03-28 DIAGNOSIS — E03.9 HYPOTHYROIDISM, UNSPECIFIED TYPE: ICD-10-CM

## 2019-03-28 DIAGNOSIS — R73.01 ELEVATED FASTING GLUCOSE: ICD-10-CM

## 2019-03-28 DIAGNOSIS — Z00.00 MEDICARE ANNUAL WELLNESS VISIT, SUBSEQUENT: ICD-10-CM

## 2019-03-28 DIAGNOSIS — Z23 NEED FOR SHINGLES VACCINE: ICD-10-CM

## 2019-03-28 DIAGNOSIS — E78.5 HYPERLIPIDEMIA LDL GOAL <130: ICD-10-CM

## 2019-03-28 DIAGNOSIS — K21.9 GASTROESOPHAGEAL REFLUX DISEASE WITHOUT ESOPHAGITIS: ICD-10-CM

## 2019-03-28 DIAGNOSIS — N40.0 ENLARGED PROSTATE: ICD-10-CM

## 2019-03-28 DIAGNOSIS — Z87.891 PERSONAL HISTORY OF TOBACCO USE: ICD-10-CM

## 2019-03-28 DIAGNOSIS — Z85.820 HISTORY OF MALIGNANT MELANOMA OF SKIN: ICD-10-CM

## 2019-03-28 DIAGNOSIS — R22.2 MASS OF LEFT CHEST WALL: ICD-10-CM

## 2019-03-28 DIAGNOSIS — K40.91 UNILATERAL RECURRENT INGUINAL HERNIA WITHOUT OBSTRUCTION OR GANGRENE: ICD-10-CM

## 2019-03-28 DIAGNOSIS — Z72.0 TOBACCO ABUSE: ICD-10-CM

## 2019-03-28 LAB — HBA1C MFR BLD: 6 % (ref 0–5.6)

## 2019-03-28 PROCEDURE — 83036 HEMOGLOBIN GLYCOSYLATED A1C: CPT | Performed by: FAMILY MEDICINE

## 2019-03-28 PROCEDURE — 36415 COLL VENOUS BLD VENIPUNCTURE: CPT | Performed by: FAMILY MEDICINE

## 2019-03-28 PROCEDURE — 84480 ASSAY TRIIODOTHYRONINE (T3): CPT | Performed by: FAMILY MEDICINE

## 2019-03-28 PROCEDURE — G0103 PSA SCREENING: HCPCS | Performed by: FAMILY MEDICINE

## 2019-03-28 PROCEDURE — 90662 IIV NO PRSV INCREASED AG IM: CPT | Performed by: FAMILY MEDICINE

## 2019-03-28 PROCEDURE — 80061 LIPID PANEL: CPT | Performed by: FAMILY MEDICINE

## 2019-03-28 PROCEDURE — 84443 ASSAY THYROID STIM HORMONE: CPT | Performed by: FAMILY MEDICINE

## 2019-03-28 PROCEDURE — G0296 VISIT TO DETERM LDCT ELIG: HCPCS | Performed by: FAMILY MEDICINE

## 2019-03-28 PROCEDURE — 84439 ASSAY OF FREE THYROXINE: CPT | Performed by: FAMILY MEDICINE

## 2019-03-28 PROCEDURE — G0439 PPPS, SUBSEQ VISIT: HCPCS | Performed by: FAMILY MEDICINE

## 2019-03-28 PROCEDURE — G0008 ADMIN INFLUENZA VIRUS VAC: HCPCS | Performed by: FAMILY MEDICINE

## 2019-03-28 PROCEDURE — 99214 OFFICE O/P EST MOD 30 MIN: CPT | Mod: 25 | Performed by: FAMILY MEDICINE

## 2019-03-28 RX ORDER — OMEPRAZOLE 40 MG/1
CAPSULE, DELAYED RELEASE ORAL
Qty: 90 CAPSULE | Refills: 3 | Status: SHIPPED | OUTPATIENT
Start: 2019-03-28 | End: 2019-09-12

## 2019-03-28 RX ORDER — SILDENAFIL CITRATE 20 MG/1
40-60 TABLET ORAL
Qty: 30 TABLET | Refills: 11 | Status: SHIPPED | OUTPATIENT
Start: 2019-03-28 | End: 2020-06-19

## 2019-03-28 RX ORDER — LISINOPRIL 20 MG/1
20 TABLET ORAL DAILY
Qty: 90 TABLET | Refills: 3 | Status: SHIPPED | OUTPATIENT
Start: 2019-03-28 | End: 2019-09-12

## 2019-03-28 RX ORDER — TESTOSTERONE GEL, 1% 10 MG/G
2 GEL TRANSDERMAL DAILY
Qty: 75 G | Refills: 5 | Status: SHIPPED | OUTPATIENT
Start: 2019-03-28 | End: 2019-11-06

## 2019-03-28 RX ORDER — ROSUVASTATIN CALCIUM 10 MG/1
10 TABLET, COATED ORAL DAILY
Qty: 90 TABLET | Refills: 3 | Status: SHIPPED | OUTPATIENT
Start: 2019-03-28 | End: 2019-09-12

## 2019-03-28 ASSESSMENT — ANXIETY QUESTIONNAIRES
2. NOT BEING ABLE TO STOP OR CONTROL WORRYING: NOT AT ALL
IF YOU CHECKED OFF ANY PROBLEMS ON THIS QUESTIONNAIRE, HOW DIFFICULT HAVE THESE PROBLEMS MADE IT FOR YOU TO DO YOUR WORK, TAKE CARE OF THINGS AT HOME, OR GET ALONG WITH OTHER PEOPLE: NOT DIFFICULT AT ALL
5. BEING SO RESTLESS THAT IT IS HARD TO SIT STILL: NOT AT ALL
7. FEELING AFRAID AS IF SOMETHING AWFUL MIGHT HAPPEN: NOT AT ALL
GAD7 TOTAL SCORE: 0
6. BECOMING EASILY ANNOYED OR IRRITABLE: NOT AT ALL
1. FEELING NERVOUS, ANXIOUS, OR ON EDGE: NOT AT ALL
3. WORRYING TOO MUCH ABOUT DIFFERENT THINGS: NOT AT ALL

## 2019-03-28 ASSESSMENT — PATIENT HEALTH QUESTIONNAIRE - PHQ9
5. POOR APPETITE OR OVEREATING: NOT AT ALL
SUM OF ALL RESPONSES TO PHQ QUESTIONS 1-9: 1

## 2019-03-28 ASSESSMENT — MIFFLIN-ST. JEOR: SCORE: 1853.93

## 2019-03-28 NOTE — PROGRESS NOTES
"  SUBJECTIVE:   Dev Powell is a 74 year old male who presents for Preventive Visit.  Are you in the first 12 months of your Medicare Part B coverage?  No    Physical Health:    In general, how would you rate your overall physical health? good    Outside of work, how many days during the week do you exercise? none    Outside of work, approximately how many minutes a day do you exercise?not applicable    If you drink alcohol do you typically have >3 drinks per day or >7 drinks per week? No    Do you usually eat at least 4 servings of fruit and vegetables a day, include whole grains & fiber and avoid regularly eating high fat or \"junk\" foods? NO    Do you have any problems taking medications regularly?  No    Do you have any side effects from medications? none    Needs assistance for the following daily activities: no assistance needed    Which of the following safety concerns are present in your home?  none identified     Hearing impairment: Yes, not as good as it is used to be    In the past 6 months, have you been bothered by leaking of urine? no    Mental Health:    In general, how would you rate your overall mental or emotional health? good  PHQ-2 Score:    PHQ-2 (  [employer]) 3/28/2018 2017   Q1: Little interest or pleasure in doing things 0 0   Q2: Feeling down, depressed or hopeless 0 0   PHQ-2 Score 0 0       Do you feel safe in your environment? Yes    Do you have a Health Care Directive? No: Advance care planning was reviewed with patient; patient declined at this time.      Additional concerns to address?  No    Fall risk:  Fallen 2 or more times in the past year?: No  Any fall with injury in the past year?: No      Cognitive Screenin) Repeat 3 items (Leader, Season, Table)  2) Clock draw: NORMAL  3) 3 item recall: Recalls 3 objects  Results: 3 items recalled: COGNITIVE IMPAIRMENT LESS LIKELY    Mini-CogTM Copyright S Leonor. Licensed by the author for use in [facility]; " reprinted with permission (inés@.[facility]). All rights reserved.      Do you have sleep apnea, excessive snoring or daytime drowsiness?: yes- has not done the sleep test as of yet    Hyperlipidemia Follow-Up  Controlled with rosuvastatin 10 mg. Reports compliance with this medication and no adverse side effects.     Rate your low fat/cholesterol diet?: good    Taking statin?  Yes, no muscle aches from statin    Other lipid medications/supplements?:  none    Lab Results   Component Value Date    CHOL 171 11/21/2018    CHOL 195 03/29/2018     Lab Results   Component Value Date    HDL 59 11/21/2018    HDL 43 03/29/2018     Lab Results   Component Value Date    LDL 99 11/21/2018     03/29/2018     Lab Results   Component Value Date    TRIG 67 11/21/2018    TRIG 182 03/29/2018     Lab Results   Component Value Date    CHOLHDLRATIO 5.2 01/30/2015    CHOLHDLRATIO 5.8 04/30/2014       Hypertension Follow-up  Currently taking lisinopril 20 mg to control his BP. Reports medication is working well, no complaints.     Outpatient blood pressures are being checked at home.  Results have normal for the most part, taking blood pressure medication at night.    Low Salt Diet: low salt    BP Readings from Last 5 Encounters:   03/28/19 120/78   02/26/19 122/80   03/28/18 136/88   01/04/18 134/76   03/27/17 136/84       Hypothyroidism Follow-up  Controlled with levothyroxine 150 mcg. Reports compliance with this medication and no adverse side effects.     Since last visit, patient describes the following symptoms: Weight stable, no hair loss, no skin changes, no constipation, no loose stools    TSH   Date Value Ref Range Status   02/05/2019 4.88 (H) 0.40 - 4.00 mU/L Final   11/21/2018 11.47 (H) 0.40 - 4.00 mU/L Final   07/27/2018 5.78 (H) 0.40 - 4.00 mU/L Final   03/29/2018 4.42 (H) 0.40 - 4.00 mU/L Final   07/10/2017 8.04 (H) 0.40 - 4.00 mU/L Final     T4 Free   Date Value Ref Range Status   02/05/2019 1.10 0.76 - 1.46 ng/dL Final    11/21/2018 0.75 (L) 0.76 - 1.46 ng/dL Final   07/27/2018 0.97 0.76 - 1.46 ng/dL Final   03/29/2018 1.02 0.76 - 1.46 ng/dL Final   07/10/2017 0.95 0.76 - 1.46 ng/dL Final     GERD  Currently controlled with Omeprazole 40 mg. No recent flares.     Erectile Dysfunction  Dev has not been taking his Sildenafil 50 mg. Reports he did not fill his last prescription due to cost. Went to [employer] where he was told the 20 mg tablets are significantly cheaper. Would like to alter prescription to this lower dosed tablet.    Hypotestosteronism  Currently using androgel 1.62% pump. Reports working well.     Hx of Malignant Melanoma 1996  No recent issues however he would like a referral to have a full body skin check. Has a mole  that has not changed.    Health Maintenance  Dev is due for repeat colonoscopy. Last colonoscopy performed 9/25/2007.    Tobacco Abuse  Dev reports he is still smoking. He has smoked a half pack a day for over 50 years. He is not interested in quitting at this point in time. Elected to participate in lung cancer screening. Participated last year as well (April 2018)    Lung Cancer Screening Shared Decision Making Visit     Dev Powell is eligible for lung cancer screening on the basis of the information provided in my signed lung cancer screening order.     I have discussed with patient the risks and benefits of screening for lung cancer with low-dose CT.     The risks include:  radiation exposure: one low dose chest CT has as much ionizing radiation as about 15 chest x-rays or 6 months of background radiation living in [place]    false positives: 96% of positive findings/nodules are NOT cancer, but some might still require additional diagnostic evaluation, including biopsy  over-diagnosis: some slow growing cancers that might never have been clinically significant will be detected and treated unnecessarily     The benefit of early detection of lung cancer is contingent upon adherence to annual  screening or more frequent follow up if indicated.     Furthermore, reaping the benefits of screening requires Dev Powell to be willing and physically able to undergo diagnostic procedures, if indicated. Although no specific guide is available for determining severity of comorbidities, it is reasonable to withhold screening in patients who have greater mortality risk from other diseases.     We did discuss that the only way to prevent lung cancer is to not smoke. Smoking cessation assistance was offered.    I did offer risk estimation using a calculator such as this one:    ShouldIScreen    Health Maintenance  Pt due for colonoscopy. Last one 10/8/2007.    Reviewed and updated as needed this visit by clinical staff  Tobacco  Allergies  Meds  Med Hx  Surg Hx  Fam Hx  Soc Hx        Reviewed and updated as needed this visit by Provider        Social History     Tobacco Use     Smoking status: Current Every Day Smoker     Packs/day: 0.50     Years: 50.00     Pack years: 25.00     Smokeless tobacco: Never Used     Tobacco comment: strongly encourage smoking cessation with every visit   Substance Use Topics     Alcohol use: Yes     Alcohol/week: 0.0 oz     Comment: not regular - very occasional 1-2 martinis when out to dinner 1-2x/month, if that                            Current providers sharing in care for this patient include:   Patient Care Team:  Vee Beth MD as PCP - General (Family Practice)  Vee Beth MD as MD (Family Practice)  Vee Beth MD as Assigned PCP    The following health maintenance items are reviewed in Epic and correct as of today:  Health Maintenance   Topic Date Due     ZOSTER IMMUNIZATION (1 of 2) 03/28/1995     COLONOSCOPY Q10 YR  09/25/2017     LUNG CANCER SCREENING ANNUAL  04/10/2018     INFLUENZA VACCINE (1) 09/01/2018     MEDICARE ANNUAL WELLNESS VISIT  03/28/2019     PHQ-2 Q1 YR  03/28/2019     FIT Q1 YR  04/06/2019     ADVANCE DIRECTIVE  PLANNING Q5 YRS  01/30/2020     BMP Q1 YR  02/05/2020     MICROALBUMIN Q1 YEAR  02/05/2020     FALL RISK ASSESSMENT  02/26/2020     DTAP/TDAP/TD IMMUNIZATION (3 - Td) 09/18/2023     LIPID SCREEN Q5 YR MALE (SYSTEM ASSIGNED)  11/21/2023     AORTIC ANEURYSM SCREENING (SYSTEM ASSIGNED)  Completed     HEPATITIS C SCREENING  Completed     IPV IMMUNIZATION  Aged Out     MENINGITIS IMMUNIZATION  Aged Out     BP Readings from Last 3 Encounters:   03/28/19 120/78   02/26/19 122/80   03/28/18 136/88    Wt Readings from Last 3 Encounters:   03/28/19 104.4 kg (230 lb 3.2 oz)   02/26/19 103.4 kg (228 lb)   03/28/18 107.4 kg (236 lb 12.8 oz)                  Patient Active Problem List   Diagnosis     Gastroesophageal reflux disease without esophagitis = ran out of PPI- has been taking lots of TUMS     Hyperlipidemia LDL goal <130 - pravastatin = leg cramps; lipitor =calf pains     Snoring     Vasculogenic erectile dysfunction, unspecified vasculogenic erectile dysfunction type     Osteoarthritis     Lipoma of skin     Gastric ulcer     Malignant melanoma of left upper extremity including shoulder (H)     Cigarette smoker one half pack a day or less- for > 45 years      Enlarged prostate- has seen  Dr. Solomon in the past - doesn't need to see him again per Dr. Solomon     Rectal pain     Prostatitis, acute     Hearing loss     Inguinal hernia- right      Pulmonary nodules- tiny per screening chest CT -  repeat CT chest low dose w/o contrast 4/2017 = no change since 2015 - no need for further CT's      Essential hypertension with goal blood pressure less than 140/90     Hypotestosteronism- androgel working well      Hypothyroidism, unspecified type     Chronic constipation     Mass of left chest wall - ? there for 18+ years -left breast 1cm mass at 7:30      Multiple pigmented nevi     Reducible right inguinal hernia     Past Surgical History:   Procedure Laterality Date     COLONOSCOPY  9/25/2007    repeat in 2017      ESOPHAGOSCOPY, GASTROSCOPY, DUODENOSCOPY (EGD), COMBINED N/A 2015    Procedure: COMBINED ESOPHAGOSCOPY, GASTROSCOPY, DUODENOSCOPY (EGD);  Surgeon: Ender Dixon MD;  Location: [facility] ESOPHAGOSCOPY, DIAGNOSTIC  2007    EGD - gastric ulcer w/ erosions/ LA grade B erosive esophagitis     wide excision      for malignant melanoma       Social History     Tobacco Use     Smoking status: Current Every Day Smoker     Packs/day: 0.50     Years: 50.00     Pack years: 25.00     Smokeless tobacco: Never Used     Tobacco comment: strongly encourage smoking cessation with every visit   Substance Use Topics     Alcohol use: Yes     Alcohol/week: 0.0 oz     Comment: not regular - very occasional 1-2 martinis when out to dinner 1-2x/month, if that      Family History   Problem Relation Age of Onset     C.A.D. Father 62         at age 62 of MI     Diabetes Father         early type 2      Neurologic Disorder Mother         mild dementia - @ 92     C.A.D. Mother         angina      Thyroid Disease Mother         s/p thyroid          Current Outpatient Medications   Medication Sig Dispense Refill     ANDROGEL 1.62% PUMP 20.25 MG/ACT gel PLACE 1 PUMP TO CLEAN DRY HAIRLESS SKIN OF UPPER ARMS & SHOULDER ONCE DAILY 150 g 1     aspirin 81 MG tablet Take by mouth daily 30 tablet      Glucosamine-Chondroit-Vit C-Mn (GLUCOSAMINE CHONDROITIN 1500 COMPLEX) CAPS Take by mouth daily       levothyroxine (SYNTHROID/LEVOTHROID) 150 MCG tablet Take 1 tablet (150 mcg) by mouth daily 90 tablet 3     lisinopril (PRINIVIL/ZESTRIL) 20 MG tablet Take 1 tablet (20 mg) by mouth daily 90 tablet 0     Multiple Vitamins-Minerals (CENTRUM SILVER) per tablet Take 1 tablet by mouth daily 30 tablet      omeprazole (PRILOSEC) 40 MG capsule TAKE 1 CAPSULE BY MOUTH ONCE DAILY TAKE 30-60 MINUTES BEFORE A MEAL. 90 capsule 3     rosuvastatin (CRESTOR) 10 MG tablet Take 1 tablet (10 mg) by mouth daily 90 tablet 0     No Known  "Allergies  Recent Labs   Lab Test 02/05/19  0748 11/21/18  0826 07/27/18  0820 03/29/18  0739 07/10/17  0957   A1C  --   --  5.9*  --   --    LDL  --  99  --  116* 103*   HDL  --  59  --  43 58   TRIG  --  67  --  182* 137   ALT 26 25  --  23  --    CR 1.11  --   --  1.11  --    GFRESTIMATED 65  --   --  65  --    GFRESTBLACK 75  --   --  79  --    POTASSIUM 4.2  --   --  4.5  --    TSH 4.88* 11.47* 5.78* 4.42* 8.04*      Pneumonia Vaccine:Adults age 65+ who have not received previous Pneumovax (PPSV23) or PCV13 as an adult: Should first be given PCV13 AND then should be given PPSV23 6-12 months after PCV13      ROS:  Constitutional, HEENT, cardiovascular, pulmonary, GI, , musculoskeletal, neuro, skin, endocrine and psych systems are negative, except as otherwise noted.    This document serves as a record of the services and decisions personally performed and made by Vee Beth MD. It was created on her behalf by Nadiya Ramey, a trained medical scribe. The creation of this document is based on the provider's statements to the medical scribe.  Nadiya Ramey March 28, 2019 3:22 PM      OBJECTIVE:   /78 (BP Location: Left arm, Patient Position: Chair, Cuff Size: Adult Large)   Pulse 103   Temp 97.7  F (36.5  C) (Oral)   Ht 1.88 m (6' 2\")   Wt 104.4 kg (230 lb 3.2 oz)   SpO2 95%   BMI 29.56 kg/m   Estimated body mass index is 29.56 kg/m  as calculated from the following:    Height as of this encounter: 1.88 m (6' 2\").    Weight as of this encounter: 104.4 kg (230 lb 3.2 oz).    EXAM:   GENERAL: healthy, alert and no distress  EYES: Eyes grossly normal to inspection, PERRL and conjunctivae and sclerae normal  HENT: ear canals and TM's normal, nose and mouth without ulcers or lesions  NECK: no adenopathy, no asymmetry, masses, or scars and thyroid normal to palpation  RESP: lungs clear to auscultation - no rales, rhonchi or wheezes  CV: II/VI low pitched systolic ejection murmur, regular " rate and rhythm, normal S1 S2, no S3 or S4, click or rub, no peripheral edema and peripheral pulses strong  ABDOMEN: soft, nontender, no hepatosplenomegaly, no masses and bowel sounds normal   (male): direct inguinal hernia noted on the right easily reducible, no hernia on the left, normal male genitalia without lesions or urethral discharge  RECTAL (male): normal sphincter tone, no rectal masses, prostate slightly enlarged in size, smooth, nontender with? Some semifirm  Nodularity  On the lower left and right lateral prostate, proximal prostate could not be reached, no other masses palpated.   MS: no gross musculoskeletal defects noted, no edema  SKIN: no suspicious lesions or rashes, has many seborrhoeic keratoses - many pigmented nevi - needs full body skin exam - referral given.   NEURO: Normal strength and tone, mentation intact and speech normal  BACK: no CVA tenderness, no paralumbar tenderness  PSYCH: mentation appears normal, affect normal/bright  LYMPH: no cervical, supraclavicular, axillary, or inguinal adenopathy    I, Nadiya Ramey, a medical scribe was present in room for genital and rectal exams per Dr. Beth's request as a chaperone. Pt gave approval.    Diagnostic Test Results:  No results found for this or any previous visit (from the past 24 hour(s)).    ASSESSMENT / PLAN:       ICD-10-CM    1. Encounter for general adult medical examination with abnormal findings Z00.01    2. Medicare annual wellness visit, subsequent Z00.00    3. Hyperlipidemia LDL goal <130 - pravastatin = leg cramps; lipitor =calf pains E78.5 Lipid panel reflex to direct LDL Fasting     OFFICE/OUTPT VISIT,EST,LEVL IV   4. Essential hypertension with goal blood pressure less than 140/90 I10 lisinopril (PRINIVIL/ZESTRIL) 20 MG tablet     OFFICE/OUTPT VISIT,EST,LEVL IV   5. Hypothyroidism, unspecified type E03.9 TSH     T4 free     T3, total     OFFICE/OUTPT VISIT,EST,LEVL IV   6. Reducible right inguinal hernia K40.90  OFFICE/OUTPT VISIT,EST,LEVL IV   7. Pulmonary nodules- tiny per screening chest CT -  repeat CT chest low dose w/o contrast 4/2017 = no change since 2015 - no need for further CT's  R91.8 OFFICE/OUTPT VISIT,EST,LEVL IV   8. Hypogonadism in male E29.1 testosterone (ANDROGEL 1 % PUMP) 12.5 MG/ACT (1%) gel     OFFICE/OUTPT VISIT,EST,LEVL IV   9. Gastroesophageal reflux disease without esophagitis = ran out of PPI- has been taking lots of TUMS K21.9 omeprazole (PRILOSEC) 40 MG DR capsule     OFFICE/OUTPT VISIT,EST,LEVL IV   10. Hypotestosteronism- androgel working well  E34.9 OFFICE/OUTPT VISIT,EST,LEVL IV   11. Unilateral recurrent inguinal hernia without obstruction or gangrene K40.91 OFFICE/OUTPT VISIT,EST,LEVL IV   12. Enlarged prostate- has seen  Dr. Solomon in the past - doesn't need to see him again per Dr. Solomon- , but today prostate seems a bit more nodular than previous  N40.0 UROLOGY ADULT REFERRAL     OFFICE/OUTPT VISIT,EST,LEVL IV   13. Vasculogenic erectile dysfunction, unspecified vasculogenic erectile dysfunction type N52.9 sildenafil (REVATIO) 20 MG tablet     OFFICE/OUTPT VISIT,EST,LEVL IV   14. Mass of left chest wall - ? there for 18+ years -left breast 1cm mass at 7:30  R22.2 OFFICE/OUTPT VISIT,EST,LEVL IV   15. Testosterone deficiency E34.9 testosterone (ANDROGEL 1 % PUMP) 12.5 MG/ACT (1%) gel     OFFICE/OUTPT VISIT,EST,LEVL IV   16. Elevated fasting glucose R73.01 Hemoglobin A1c     OFFICE/OUTPT VISIT,EST,LEVL IV   17. Undiagnosed cardiac murmurs R01.1 Echocardiogram Complete     OFFICE/OUTPT VISIT,EST,LEVL IV   18. History of malignant melanoma of skin Z85.820 DERMATOLOGY REFERRAL   19. Screening for prostate cancer Z12.5 Prostate spec antigen screen   20. Screen for colon cancer Z12.11 GASTROENTEROLOGY ADULT REF PROCEDURE ONLY [facility] [place] [phone]; [facility]     Fecal colorectal cancer screen FIT - Future (S+30)   21. Need for prophylactic vaccination and inoculation  "against influenza Z23 FLU VACCINE, INCREASED ANTIGEN, PRESV FREE, AGE 65+ [79331]          ADMIN VACCINE, FIRST [80128]   22. Need for shingles vaccine Z23 zoster vaccine recombinant adjuvanted (SHINGRIX) injection   23. Personal history of tobacco use Z87.891 Prof fee: Shared Decisionmaking for Lung Cancer Screening     CT Chest Lung Cancer Scrn Low Dose wo     Okay for Smoking Cessation Study (PLUTO) to Contact Patient   24. Tobacco abuse Z72.0      Reducing sildenafil to 20 mg tablets due to cost coverage.   Pt due for colonoscopy - [facility] will call to schedule.   Referral given today for skin care specialists to monitor seborrhoeic keratosis and do full body skin exam   Echo order for II/VI systolic ejection murmur - [facility] will call to schedule   Cigarette smoker one half pack a day - for > 60 years  - strongly encouraged smoking cessation yet again today - pt very resistant to quitting.     End of Life Planning:  Patient currently has an advanced directive: Yes.  Practitioner is supportive of decision.    COUNSELING:  Reviewed preventive health counseling, as reflected in patient instructions       Regular exercise       Healthy diet/nutrition       Vision screening       Immunizations    Vaccinated for: Influenza      Discussed shingles vaccine       Consider lung cancer screening for ages 55-80 years and 30 pack-year smoking history          Colon cancer screening    BP Readings from Last 1 Encounters:   03/28/19 120/78     Estimated body mass index is 29.56 kg/m  as calculated from the following:    Height as of this encounter: 1.88 m (6' 2\").    Weight as of this encounter: 104.4 kg (230 lb 3.2 oz).    Weight management plan: Discussed healthy diet and exercise guidelines.    Start walking for exercise - If walking outside,   - rain or shine - walk a block, then come home, next day walk   2 blocks , then come home ; and then add a block further from home daily - work up to 30-45minutes daily or " 3-4x/week - or can work  up to  3-4 miles briskly daily.       If walking on treadmill or at  the mall, start with 5 minutes first day, then 6 minutes next day , then 7 minutes next day, then 8 minutes next day, etc.   Gradually work up to the above goals.  No stopping.      Can also try marching in place - try to have your knees come up as high to your chest as possible.  Start with 1-2 minutes at a time, and gradually add 30-60 seconds each workout. Try to work up to 15-20 minutes of walking/marching in place  Daily - great for during those days, when you are not comfortable walking outside.        reports that he has been smoking.  He has a 25.00 pack-year smoking history. he has never used smokeless tobacco.  Tobacco Cessation Action Plan: Information offered: Patient not interested at this time    Appropriate preventive services were discussed with this patient, including applicable screening as appropriate for cardiovascular disease, diabetes, osteopenia/osteoporosis, and glaucoma.  As appropriate for age/gender, discussed screening for colorectal cancer, prostate cancer, breast cancer, and cervical cancer. Checklist reviewing preventive services available has been given to the patient.    Reviewed patients plan of care and provided an AVS. The Complex Care Plan (for patients with higher acuity and needing more deliberate coordination of services) for Dev meets the Care Plan requirement. This Care Plan has been established and reviewed with the Patient.    Counseling Resources:  ATP IV Guidelines  Pooled Cohorts Equation Calculator  Breast Cancer Risk Calculator  FRAX Risk Assessment  ICSI Preventive Guidelines  Dietary Guidelines for Americans, 2010  USDA's MyPlate  ASA Prophylaxis  Lung CA Screening    Return in about 6 months (around 9/28/2019) for BP Recheck, cholesterol recheck.    The information in this document, created by the medical scribe for me, accurately reflects the services I personally  performed and the decisions made by me. I have reviewed and approved this document for accuracy prior to leaving the patient care area.  March 28, 2019 3:23 PM      Vee Beth MD  [facility] [place]    Pt qualifies for lung cancer screening based on pk-yr history and that he is still smoking.  Declined smoking cessation again. He is aware of the risks of radiation - discussed today.

## 2019-03-28 NOTE — NURSING NOTE

## 2019-03-28 NOTE — LETTER
[facility]  [address] [place] [address]  [place] [address]                  [phone]   April 8, 2019    Dev Powell  [address] [place] [address]      Dear Dev,    Here is a summary of your recent test results:    -PSA (prostate specific antigen) test is normal.  This indicates a low likelihood of prostate cancer.  ADVISE: rechecking this in 1 year, but since your prostate did have a little nodularity to it on exam, I'd like to have you see your Urologist again for their opinion on this nodularity again. Please let us know , if you need another referral.   -Cholesterol levels are at your goal levels.  ADVISE: continuing your medication, a regular exercise program with at least 150 minutes of aerobic exercise per week, and eating a low saturated fat/low carbohydrate diet.  Also, you should recheck this fasting cholesterol panel in 6 months.  -A1C (test of diabetes control the last 2-3 months) is at your goal. Please recheck your A1C test in 6 months. It is still at a Prediabetes level of  5.7-6.4%.  Recommend weight loss, limiting processed flours and processed sugars in your diet and exercise - working up to at least 30 minutes of cardiovascular exercise, such as brisk walking or biking, daily.   -TSH (thyroid stimulating hormone) level is normal which indicates appropriate thyroid replacement dosing.  ADVISE: continuing same replacement dose and rechecking this in 6 months.    For additional lab test information, [email or web address] is an excellent reference.    Your test results are enclosed.      Please contact me if you have any questions.    In addition, here is a list of due or overdue Health Maintenance reminders.    Health Maintenance Due   Topic Date Due     Zoster (Shingles) Vaccine (1 of 2) 03/28/1995     Colonoscopy - every 10 years  09/25/2017       Please call us at [phone] (or use [employer]) to address the above recommendations.             Thank you very much for trusting [facility]..     Healthy regards,       Vee Beth M.D.          Results for orders placed or performed in visit on 03/28/19   Lipid panel reflex to direct LDL Fasting   Result Value Ref Range    Cholesterol 187 <200 mg/dL    Triglycerides 57 <150 mg/dL    HDL Cholesterol 60 >39 mg/dL    LDL Cholesterol Calculated 116 (H) <100 mg/dL    Non HDL Cholesterol 127 <130 mg/dL   TSH   Result Value Ref Range    TSH 1.83 0.40 - 4.00 mU/L   T4 free   Result Value Ref Range    T4 Free 1.20 0.76 - 1.46 ng/dL   T3, total   Result Value Ref Range    Triiodothyronine (T3) 94 60 - 181 ng/dL   Prostate spec antigen screen   Result Value Ref Range    PSA 0.65 0 - 4 ug/L   Hemoglobin A1c   Result Value Ref Range    Hemoglobin A1C 6.0 (H) 0 - 5.6 %

## 2019-03-28 NOTE — LETTER
[facility]  [address] [place] [address]  [place] [address]                  [phone]   April 8, 2019    Dev Powell  [address] [place] [address]      Dear Dev,    Here is a summary of your recent test results:    -PSA (prostate specific antigen) test is normal.  This indicates a low likelihood of prostate cancer.  ADVISE: rechecking this in 1 year, but since your prostate did have a little nodularity to it on exam, I'd like to have you see your Urologist again for their opinion on this nodularity again. Please let us know , if you need another referral.   -Cholesterol levels are at your goal levels.  ADVISE: continuing your medication, a regular exercise program with at least 150 minutes of aerobic exercise per week, and eating a low saturated fat/low carbohydrate diet.  Also, you should recheck this fasting cholesterol panel in 6 months.   -A1C (test of diabetes control the last 2-3 months) is at your goal. Please recheck your A1C test in 6 months. It is still at a Prediabetes level of  5.7-6.4%.  Recommend weight loss, limiting processed flours and processed sugars in your diet and exercise - working up to at least 30 minutes of cardiovascular exercise, such as brisk walking or biking, daily.   -TSH (thyroid stimulating hormone) level is normal which indicates appropriate thyroid replacement dosing.  ADVISE: continuing same replacement dose and rechecking this in 6 months.     Your test results are enclosed.      Please contact me if you have any questions.    In addition, here is a list of due or overdue Health Maintenance reminders.    Health Maintenance Due   Topic Date Due     Zoster (Shingles) Vaccine (1 of 2) 03/28/1995     Colonoscopy - every 10 years  09/25/2017       Please call us at [phone] (or use [employer]) to address the above recommendations.            Thank you very much for trusting [facility]..     Healthy regards,        Vee Beth M.D.          Results for orders placed or performed in visit on 03/28/19   Lipid panel reflex to direct LDL Fasting   Result Value Ref Range    Cholesterol 187 <200 mg/dL    Triglycerides 57 <150 mg/dL    HDL Cholesterol 60 >39 mg/dL    LDL Cholesterol Calculated 116 (H) <100 mg/dL    Non HDL Cholesterol 127 <130 mg/dL   TSH   Result Value Ref Range    TSH 1.83 0.40 - 4.00 mU/L   T4 free   Result Value Ref Range    T4 Free 1.20 0.76 - 1.46 ng/dL   T3, total   Result Value Ref Range    Triiodothyronine (T3) 94 60 - 181 ng/dL   Prostate spec antigen screen   Result Value Ref Range    PSA 0.65 0 - 4 ug/L   Hemoglobin A1c   Result Value Ref Range    Hemoglobin A1C 6.0 (H) 0 - 5.6 %

## 2019-03-28 NOTE — PATIENT INSTRUCTIONS
call [employer] help desk: Access Services [phone].      Lung Cancer Screening   Frequently Asked Questions  If you are at high-risk for lung cancer, getting screened with low-dose computed tomography (LDCT) every year can help save your life. This handout offers answers to some of the most common questions about lung cancer screening. If you have other questions, please call [phone] ([phone]).     What is it?  Lung cancer screening uses special X-ray technology to create an image of your lung tissue. The exam is quick and easy and takes less than 10 seconds. We don t give you any medicine or use any needles. You can eat before and after the exam. You don t need to change your clothes as long as the clothing on your chest doesn t contain metal. But, you do need to be able to hold your breath for at least 6 seconds during the exam.    What is the goal of lung cancer screening?  The goal of lung cancer screening is to save lives. Many times, lung cancer is not found until a person starts having physical symptoms. Lung cancer screening can help detect lung cancer in the earliest stages when it may be easier to treat.    Who should be screened for lung cancer?  We suggest lung cancer screening for anyone who is at high-risk for lung cancer. You are in the high-risk group if you:      are between the ages of 55 and 79, and    have smoked at least 1 pack of cigarettes a day for 30 or more years, and    still smoke or have quit within the past 15 years.    However, if you have a new cough or shortness of breath, you should talk to your doctor before being screened.    Some national lung health advocacy groups also recommend screening for people ages 50 to 79 who have smoked an average of 1 pack of cigarettes a day for 20 years. They must also have at least 1 other risk factor for lung cancer, not including exposure to secondhand smoke. Other risk factors are having had cancer in the past, emphysema,  pulmonary fibrosis, COPD, a family history of lung cancer, or exposure to certain materials such as arsenic, asbestos, beryllium, cadmium, chromium, diesel fumes, nickel, radon or silica. Your care team can help you know if you have one of these risk factors.     Why does it matter if I have symptoms?  Certain symptoms can be a sign that you have a condition in your lungs that should be checked and treated by your doctor. These symptoms include fever, chest pain, a new or changing cough, shortness of breath that you have never felt before, coughing up blood or unexplained weight loss. Having any of these symptoms can greatly affect the results of lung cancer screening.       Should all smokers get an LDCT lung cancer screening exam?  It depends. Lung cancer screening is for a very specific group of men and women who have a history of heavy smoking over a long period of time (see  Who should be screened for lung cancer  above).  I am in the high-risk group, but have been diagnosed with cancer in the past. Is LDCT lung cancer screening right for me?  In some cases, you should not have LDCT lung screening, such as when your doctor is already following your cancer with CT scan studies. Your doctor will help you decide if LDCT lung screening is right for you.  Do I need to have a screening exam every year?  Yes. If you are in the high-risk group described earlier, you should get an LDCT lung cancer screening exam every year until you are 79, or are no longer willing or able to undergo screening and possible procedures to diagnose and treat lung cancer.  How effective is LDCT at preventing death from lung cancer?  Studies have shown that LDCT lung cancer screening can lower the risk of death from lung cancer by 20 percent in people who are at high-risk.  What are the risks?  There are some risks and limitations of LDCT lung cancer screening. We want to make sure you understand the risks and benefits, so please let us know  if you have any questions. Your doctor may want to talk with you more about these risks.    Radiation exposure: As with any exam that uses radiation, there is a very small increased risk of cancer. The amount of radiation in LDCT is small--about the same amount a person would get from a mammogram. Your doctor orders the exam when he or she feels the potential benefits outweigh the risks.    False negatives: No test is perfect, including LDCT. It is possible that you may have a medical condition, including lung cancer, that is not found during your exam. This is called a false negative result.    False positives and more testing: LDCT very often finds something in the lung that could be cancer, but in fact is not. This is called a false positive result. False positive tests often cause anxiety. To make sure these findings are not cancer, you may need to have more tests. These tests will be done only if you give us permission. Sometimes patients need a treatment that can have side effects, such as a biopsy. For more information on false positives, see  What can I expect from the results?     Findings not related to lung cancer: Your LDCT exam also takes pictures of areas of your body next to your lungs. In a very small number of cases, the CT scan will show an abnormal finding in one of these areas, such as your kidneys, adrenal glands, liver or thyroid. This finding may not be serious, but you may need more tests. Your doctor can help you decide what other tests you may need, if any.  What can I expect from the results?  About 1 out of 4 LDCT exams will find something that may need more tests. Most of the time, these findings are lung nodules. Lung nodules are very small collections of tissue in the lung. These nodules are very common, and the vast majority--more than 97 percent--are not cancer (benign). Most are normal lymph nodes or small areas of scarring from past infections.  But, if a small lung nodule is found  to be cancer, the cancer can be cured more than 90 percent of the time. To know if the nodule is cancer, we may need to get more images before your next yearly screening exam. If the nodule has suspicious features (for example, it is large, has an odd shape or grows over time), we will refer you to a specialist for further testing.  Will my doctor also get the results?  Yes. Your doctor will get a copy of your results.  Is it okay to keep smoking now that there s a cancer screening exam?  No. Tobacco is one of the strongest cancer-causing agents. It causes not only lung cancer, but other cancers and cardiovascular (heart) diseases as well. The damage caused by smoking builds over time. This means that the longer you smoke, the higher your risk of disease. While it is never too late to quit, the sooner you quit, the better.  Where can I find help to quit smoking?  The best way to prevent lung cancer is to stop smoking. If you have already quit smoking, congratulations and keep it up! For help on quitting smoking, please call "[employer] at 5-284-759-HUBO (6782) or the American Cancer Society at 1-435.339.4611 to find local resources near you.  One-on-one health coaching:  If you d prefer to work individually with a health care provider on tobacco cessation, we offer:      Medication Therapy Management:  Our specially trained pharmacists work closely with you and your doctor to help you quit smoking.  Call [phone] or [phone] (toll free).     Can Do: Health coaching offered by [facility].  [email or web address]    Preventive Health Recommendations:     See your health care provider every year to    Review health changes.     Discuss preventive care.      Review your medicines if your doctor has prescribed any.    Talk with your health care provider about whether you should have a test to screen for prostate cancer (PSA).    Every 3 years, have a diabetes test (fasting glucose). If you are at  risk for diabetes, you should have this test more often.    Every 5 years, have a cholesterol test. Have this test more often if you are at risk for high cholesterol or heart disease.     Every 10 years, have a colonoscopy. Or, have a yearly FIT test (stool test). These exams will check for colon cancer.    Talk to with your health care provider about screening for Abdominal Aortic Aneurysm if you have a family history of AAA or have a history of smoking.  Shots:     Get a flu shot each year.     Get a tetanus shot every 10 years.     Talk to your doctor about your pneumonia vaccines. There are now two you should receive - Pneumovax (PPSV 23) and Prevnar (PCV 13).    Talk to your pharmacist about a shingles vaccine.     Talk to your doctor about the hepatitis B vaccine.  Nutrition:     Eat at least 5 servings of fruits and vegetables each day.     Eat whole-grain bread, whole-wheat pasta and brown rice instead of white grains and rice.     Get adequate Calcium and Vitamin D.   Lifestyle    Exercise for at least 150 minutes a week (30 minutes a day, 5 days a week). This will help you control your weight and prevent disease.     Limit alcohol to one drink per day.     No smoking.     Wear sunscreen to prevent skin cancer.     See your dentist every six months for an exam and cleaning.     See your eye doctor every 1 to 2 years to screen for conditions such as glaucoma, macular degeneration and cataracts.    Personalized Prevention Plan  You are due for the preventive services outlined below.  Your care team is available to assist you in scheduling these services.  If you have already completed any of these items, please share that information with your care team to update in your medical record.    [facility] - Dr. Jessie Mcelroy, Dr. Leti Grayson, Dr. Irivng Sánchez   Health Maintenance Due   Topic Date Due     Zoster (Shingles) Vaccine (1 of 2) 03/28/1995     Colonoscopy - every 10  years  09/25/2017     Lung Cancer Screening (CT Scan) - yearly  04/10/2018     Flu Vaccine (1) 09/01/2018     Annual Wellness Visit  03/28/2019     Depression Assessment 2 - yearly  03/28/2019     Colon Cancer Screening - FIT Test - yearly  04/06/2019

## 2019-03-29 ENCOUNTER — TELEPHONE (OUTPATIENT)
Dept: FAMILY MEDICINE | Facility: CLINIC | Age: 74
End: 2019-03-29
Payer: COMMERCIAL

## 2019-03-29 DIAGNOSIS — Z12.11 SCREEN FOR COLON CANCER: ICD-10-CM

## 2019-03-29 LAB
CHOLEST SERPL-MCNC: 187 MG/DL
HDLC SERPL-MCNC: 60 MG/DL
LDLC SERPL CALC-MCNC: 116 MG/DL
NONHDLC SERPL-MCNC: 127 MG/DL
PSA SERPL-ACNC: 0.65 UG/L (ref 0–4)
T3 SERPL-MCNC: 94 NG/DL (ref 60–181)
T4 FREE SERPL-MCNC: 1.2 NG/DL (ref 0.76–1.46)
TRIGL SERPL-MCNC: 57 MG/DL
TSH SERPL DL<=0.005 MIU/L-ACNC: 1.83 MU/L (ref 0.4–4)

## 2019-03-29 PROCEDURE — 82274 ASSAY TEST FOR BLOOD FECAL: CPT | Performed by: FAMILY MEDICINE

## 2019-03-29 ASSESSMENT — ANXIETY QUESTIONNAIRES: GAD7 TOTAL SCORE: 0

## 2019-03-29 NOTE — TELEPHONE ENCOUNTER
Please call pt today re: recent  prostate exam - upon further reflection and review of pt's chart - I believe his digital prostate exam has changed from previous = more nodular feeling and I'd like to have him see urology to have this checked again.     Referral entered.     Please see also result note - had normal PSA test.

## 2019-03-29 NOTE — LETTER
[address], [place] [address] [phone]   April 1, 2019    Dev Powell  [address] [place] [address]      Dear Dev,    Here is a summary of your recent test results:      -FIT test (screening test for colon cancer) was normal.   WE ADVISE: rechecking this test in 1 year.     Your test results are enclosed.      Please contact me if you have any questions.             Thank you very much for trusting [facility]..     Healthy regards,       Vee Beth M.D.          Results for orders placed or performed in visit on 03/29/19   Fecal colorectal cancer screen FIT - Future (S+30)   Result Value Ref Range    Occult Blood Scn FIT Negative NEG^Negative

## 2019-03-31 LAB — HEMOCCULT STL QL IA: NEGATIVE

## 2019-04-01 ENCOUNTER — APPOINTMENT (OUTPATIENT)
Dept: LAB | Facility: CLINIC | Age: 74
End: 2019-04-01
Payer: COMMERCIAL

## 2019-04-06 ENCOUNTER — HOSPITAL ENCOUNTER (OUTPATIENT)
Dept: CT IMAGING | Facility: CLINIC | Age: 74
Discharge: HOME | End: 2019-04-06
Attending: FAMILY MEDICINE | Admitting: FAMILY MEDICINE
Payer: COMMERCIAL

## 2019-04-06 DIAGNOSIS — Z87.891 PERSONAL HISTORY OF TOBACCO USE: ICD-10-CM

## 2019-04-06 PROCEDURE — G0297 LDCT FOR LUNG CA SCREEN: HCPCS

## 2019-04-08 NOTE — TELEPHONE ENCOUNTER
Patient notified by phone.  He said he will contact his previous urologist and if he needs new referral he will call us back.    DAYNA Clifton, RN, N  [facility]) [phone]

## 2019-04-08 NOTE — TELEPHONE ENCOUNTER
Mandy Fu contacted Dev on 04/08/19 and left a message. If patient calls back please contact RN team.  Georgia Fu RN  [facility]

## 2019-04-10 NOTE — RESULT ENCOUNTER NOTE
Please call pt with results below: stable CT chest from previous.  Please mail him below detailed results as well.       FINDINGS:  Lungs: Stable right major fissure lymph node is noted on series 6,  image 93 measuring 0.3 cm. Another is noted in the right mid lung  along the major fissure on image 161. A tiny 0.2 cm nodule is noted  along the anterior lingula which is stable. A 0.3 cm lateral right  upper lobe lung nodule is noted on image 172. Left upper lobe nodule  measures 0.3 cm on image 118 and is also unchanged. No new or  enlarging lung lesions. No infiltrate or consolidation. A few areas of  probable mild subpleural scarring are noted scattered throughout each  lung.     Additional findings: No pleural or pericardial fluid. Heart is normal  in size. The esophagus is unremarkable. There are no enlarged lymph  nodes. Extensive coronary artery calcification is present. Calcified  plaque is noted in the thoracic aorta without evidence of aneurysm.  Limited images upper abdomen are unremarkable. Bone window examination  is within normal limits    Recommend annual follow up secondary to long history of smoking.     For additional lab test information, [email or web address].org is an excellent reference.

## 2019-04-29 ENCOUNTER — HOSPITAL ENCOUNTER (OUTPATIENT)
Facility: CLINIC | Age: 74
Discharge: HOME | End: 2019-04-29
Attending: INTERNAL MEDICINE | Admitting: INTERNAL MEDICINE
Payer: COMMERCIAL

## 2019-04-29 VITALS
RESPIRATION RATE: 22 BRPM | WEIGHT: 220 LBS | BODY MASS INDEX: 28.23 KG/M2 | HEIGHT: 74 IN | HEART RATE: 84 BPM | SYSTOLIC BLOOD PRESSURE: 119 MMHG | DIASTOLIC BLOOD PRESSURE: 84 MMHG | OXYGEN SATURATION: 93 %

## 2019-04-29 LAB — COLONOSCOPY: NORMAL

## 2019-04-29 PROCEDURE — 88305 TISSUE EXAM BY PATHOLOGIST: CPT | Performed by: INTERNAL MEDICINE

## 2019-04-29 PROCEDURE — 45385 COLONOSCOPY W/LESION REMOVAL: CPT | Mod: PT | Performed by: INTERNAL MEDICINE

## 2019-04-29 PROCEDURE — G0500 MOD SEDAT ENDO SERVICE >5YRS: HCPCS | Performed by: INTERNAL MEDICINE

## 2019-04-29 PROCEDURE — 25000128 H RX IP 250 OP 636: Performed by: INTERNAL MEDICINE

## 2019-04-29 RX ORDER — ONDANSETRON 2 MG/ML
4 INJECTION INTRAMUSCULAR; INTRAVENOUS EVERY 6 HOURS PRN
Status: DISCONTINUED | OUTPATIENT
Start: 2019-04-29 | End: 2019-04-29 | Stop reason: HOSPADM

## 2019-04-29 RX ORDER — FENTANYL CITRATE 50 UG/ML
INJECTION, SOLUTION INTRAMUSCULAR; INTRAVENOUS PRN
Status: DISCONTINUED | OUTPATIENT
Start: 2019-04-29 | End: 2019-04-29 | Stop reason: HOSPADM

## 2019-04-29 RX ORDER — ONDANSETRON 4 MG/1
4 TABLET, ORALLY DISINTEGRATING ORAL EVERY 6 HOURS PRN
Status: DISCONTINUED | OUTPATIENT
Start: 2019-04-29 | End: 2019-04-29 | Stop reason: HOSPADM

## 2019-04-29 RX ORDER — LIDOCAINE 40 MG/G
CREAM TOPICAL
Status: DISCONTINUED | OUTPATIENT
Start: 2019-04-29 | End: 2019-04-29 | Stop reason: HOSPADM

## 2019-04-29 RX ORDER — NALOXONE HYDROCHLORIDE 0.4 MG/ML
.1-.4 INJECTION, SOLUTION INTRAMUSCULAR; INTRAVENOUS; SUBCUTANEOUS
Status: DISCONTINUED | OUTPATIENT
Start: 2019-04-29 | End: 2019-04-29 | Stop reason: HOSPADM

## 2019-04-29 RX ORDER — ONDANSETRON 2 MG/ML
4 INJECTION INTRAMUSCULAR; INTRAVENOUS
Status: DISCONTINUED | OUTPATIENT
Start: 2019-04-29 | End: 2019-04-29 | Stop reason: HOSPADM

## 2019-04-29 RX ORDER — FLUMAZENIL 0.1 MG/ML
0.2 INJECTION, SOLUTION INTRAVENOUS
Status: DISCONTINUED | OUTPATIENT
Start: 2019-04-29 | End: 2019-04-29 | Stop reason: HOSPADM

## 2019-04-29 ASSESSMENT — MIFFLIN-ST. JEOR: SCORE: 1799.72

## 2019-04-29 NOTE — LETTER
April 3, 2019      Dev Powell  [address] [facility]   [place] [address]       Thank you for choosing [facility]. You are scheduled for the following service.    Date:  Tuesday April 23, 2019  Procedure: COLONOSCOPY   Doctor: Dr Dixon           Arrival Time:  2pm  *Check in at Emergency/Endoscopy desk*  Procedure Time: 230pm     Location:   [facility]        Endoscopy Department, First Floor (Enter through ER Doors) *        [address] [place], [place] [address]      [phone] or [phone] () to reschedule     Colonoscopy is the most accurate test to detect colon polyps and colon cancer; and the only test where polyps can be removed. During this procedure, a doctor examines the lining of your large intestine and rectum through a flexible tube.     Transportation  Arrange for a ride for the day of your procedure with a responsible adult.  A taxi ride is not an option unless you are accompanied by a responsible adult. If you fail to arrange transportation with a responsible adult, your procedure will be cancelled and rescheduled    MIRALAX -GATORADE  DOUBLE PREP  Purchase the  following supplies at your local pharmacy:  - 2 (two) bisacodyl tablets: each tablet contains 5 mg.  (Dulcolax  laxative NOT Dulcolax  stool softener)   - 2 (two) 8.3 oz bottles of Polyethylene Glycol (PEG) 3350 Powder   (MiraLAX , Smooth LAX , ClearLAX  or equivalent)  - 128 oz Gatorade    Regular Gatorade , Gatorade G2 , Powerade , Powerade Zero  or Pedialyte  is acceptable. Red colored flavors are not allowed; all other colors (yellow, green, orange, purple and blue) are okay. It is also okay to buy four 2.12 oz packets of powdered Gatorade that can be mixed with water to a total volume of 128 oz of liquid.  - 1 (one) 10 oz bottle of Magnesium Citrate (Red colored flavors are not allowed)  It is also okay for you to use a 0.5 oz package of powdered magnesium  citrate (17 g) mixed with 10 oz of water.      PREPARATION FOR COLONOSCOPY    7 days before:    Discontinue fiber supplements and medications containing iron. This includes Metamucil  and Fibercon ; and multivitamins with iron.  3 days before:     Begin a low-fiber diet. A low-fiber diet helps make the cleanout more effective.     Examples of a low-fiber diet include (but are not limited to): white bread, white rice, pasta, crackers, fish, chicken, eggs, ground beef, creamy peanut butter, cooked/steamed/boiled vegetables, canned fruit, bananas, melons, milk, plain yogurt cheese, salad dressing and other condiments.     The following are not allowed on a low-fiber diet: seeds, nuts, popcorn, bran, whole wheat, corn, quinoa, raw fruits and vegetables, berries and dried fruit, beans and lentils.    For additional details on low-fiber diet, please refer to the table on the last page.  2 days before:    Stop eating solid foods in the morning.    Begin a clear liquid diet (liquids you can see through).     Examples of a clear liquid diet include: water, tea, clear broth or bouillon, Gatorade, Pedialyte or Powerade, carbonated and non-carbonated soft drinks (Sprite , 7-Up , ginger ale), strained fruit juices without pulp (apple, white grape, white cranberry), Jell-O  and popsicles.     The following are not allowed on a clear liquid diet: red liquids, alcoholic beverages,  dairy products (milk, creamer and yogurt), protein shakes, creamy broths, juice with pulp and chewing tobacco.    At 4 pm (and no later than 6pm): start drinking the Miralax-Gatorade preparation (8.3 oz of Miralax mixed with 64 oz of Gatorade in a large pitcher). Drink 1 (one) 8 oz glass every 15 minutes thereafter, until the mixture is gone.  1 day before:    Continue the clear liquid diet.    At noon: take 2 (two) bisacodyl tablets.     At 4 pm (and no later than 6pm): start drinking the Miralax-Gatorade preparation (8.3 oz of Miralax mixed with 64 oz  of Gatorade in a large pitcher). Drink 1 (one) 8 oz glass every 15 minutes thereafter, until the mixture is gone.    COLON CLEANSING TIPS: drink adequate amounts of fluids before and after your colon cleansing to prevent dehydration. Stay near a toilet because you will have diarrhea. Even if you are sitting on the toilet, continue to drink the cleansing solution every 15 minutes. If you feel nauseous or vomit, rinse your mouth with water, take a 15 to 30-minute-break and then continue drinking the solution. You will be uncomfortable until the stool has flushed from your colon (in about 2 to 4 hours). You may feel chilled.    Day of your procedure  You may take all of your morning medications including blood pressure medications, blood thinners (if you have not been instructed to stop these by our office), methadone, anti-seizure medications with sips of water 3 hours prior to your procedure or earlier. Do not take insulin or vitamins prior to your procedure. Continue the clear liquid diet.   4 hours prior: Drink 10 oz magnesium citrate. It may be easier to drink it with a straw.     STOP consuming all liquids after that.     Do not take anything by mouth during this time.     Allow extra time to travel to your procedure as you may need to stop and use a restroom along the way.  You are ready for the procedure, if you followed all instructions and your stool is no longer formed, but clear or yellow liquid. If you are unsure whether your colon is clean, please call our office at [phone] before you leave for your appointment.  Bring the following to your procedure:  - Insurance Card/Photo ID.   - List of current medications including over-the-counter medications and supplements.   - Your rescue inhaler if you currently use one to control asthma.     Canceling or rescheduling your appointment:  If you must cancel or reschedule your appointment, please call [phone] as soon as possible.    COLONOSCOPY  PRE-PROCEDURE CHECKLIST  If you have diabetes, ask your regular doctor for diet and medication restrictions.  If you take an anticoagulant or anti-platelet medication (such as Coumadin , Lovenox , Pradaxa , Xarelto , Eliquis , etc.), please call your primary doctor for advice on holding this medication.  If you take aspirin you may continue to do so.  If you are or may be pregnant, please discuss the risks and benefits of this procedure with your doctor.    What happens during a colonoscopy?    Plan to spend up to two hours, starting at registration time, at the endoscopy center the day of your procedure. The colonoscopy takes an average of 15 to 30 minutes. Recovery time is about 30 minutes.    Before the exam:    You will change into a gown.    Your medical history and medication list will be reviewed with you, unless that has been done over the phone prior to the procedure.     A nurse will insert an intravenous (IV) line into your hand or arm.    The doctor will meet with you and will give you a consent form to sign.    During the exam:     Medicine will be given through the IV line to help you relax.     Your heart rate and oxygen levels will be monitored. If your blood pressure is low, you may be given fluids through the IV line.     The doctor will insert a flexible hollow tube, called a colonoscope, into your rectum. The scope will be advanced slowly through the large intestine (colon).    You may have a feeling of fullness or pressure.     If an abnormal tissue or a polyp is found, the doctor may remove it through the endoscope for closer examination, or biopsy. Tissue removal is painless    After the exam:           Any tissue samples removed during the exam will be sent to a lab for evaluation. It may take 5-7 working days for you to be notified of the results.     A nurse will provide you with complete discharge instructions before you leave the endoscopy center. Be sure to ask the nurse for specific  instructions if you take blood thinners such as Aspirin, Coumadin or Plavix.     The doctor will prepare a full report for you and for the physician who referred you for the procedure.     Your doctor will talk with you about the initial results of your exam.      Medication given during the exam will prohibit you from driving for the rest of the day.     Following the exam, you may resume your normal diet. Your first meal should be light, no greasy foods. Avoid alcohol until the next day.     You may resume your regular activities the day after the procedure.     LOW-FIBER DIET  Foods RECOMMENDED Foods to AVOID   Breads, Cereal, Rice and Pasta:   White bread, rolls, biscuits, croissant and ophelia toast.   Waffles, Puerto Rican toast and pancakes.   White rice, noodles, pasta, macaroni and peeled cooked potatoes.   Plain crackers and saltines.   Cooked cereals: farina, cream of rice.   Cold cereals: Puffed Rice , Rice Krispies , Corn Flakes  and Special K    Breads, Cereal, Rice and Pasta:   Breads or rolls with nuts, seeds or fruit.   Whole wheat, pumpernickel, rye breads and cornbread.   Potatoes with skin, brown or wild rice, and kasha (buckwheat).     Vegetables:   Tender cooked and canned vegetables without seeds: carrots, asparagus tips, green or wax beans, pumpkin, spinach, lima beans. Vegetables:   Raw or steamed vegetables.   Vegetables with seeds.   Sauerkraut.   Winter squash, peas, broccoli, Brussel sprouts, cabbage, onions, cauliflower, baked beans, peas and corn.   Fruits:   Strained fruit juice.   Canned fruit, except pineapple.   Ripe bananas and melon. Fruits:   Prunes and prune juice.   Raw fruits.   Dried fruits: figs, dates and raisins.   Milk/Dairy:   Milk: plain or flavored.   Yogurt, custard and ice cream.   Cheese and cottage cheese Milk/Dairy:     Meat and other proteins:   ground, well-cooked tender beef, lamb, ham, veal, pork, fish, poultry and organ meats.   Eggs.   Peanut butter without nuts.  Meat and other proteins:   Tough, fibrous meats with gristle.   Dry beans, peas and lentils.   Peanut butter with nuts.   Tofu.   Fats, Snack, Sweets, Condiments and Beverages:   Margarine, butter, oils, mayonnaise, sour cream and salad dressing, plain gravy.   Sugar, hard candy, clear jelly, honey and syrup.   Spices, cooked herbs, bouillon, broth and soups made with allowed vegetable, ketchup and mustard.   Coffee, tea and carbonated drinks.   Plain cakes, cookies and pretzels.   Gelatin, plain puddings, custard, ice cream, sherbet and popsicles. Fats, Snack, Sweets, Condiments and Beverages:   Nuts, seeds and coconut.   Jam, marmalade and preserves.   Pickles, olives, relish and horseradish.   All desserts containing nuts, seeds, dried fruit and coconut; or made from whole grains or bran.   Candy made with nuts or seeds.   Popcorn.       DIRECTIONS TO THE ENDOSCOPY DEPARTMENT     From the north ([facility])  Take [address] South, exit on [address] [address]. Get into the left hand laura, turn left (east), go one-half mile to [address] and turn left. Go north to the first stoplight, take a right on [address] and follow it to the Emergency entrance.  From the south ([facility])  Take 35N to the 35E split and exit on [address] . On [address] , turn left (west) to [address]. Turn right (north) on [address]. Go north to the first stoplight, take a right on [address] and follow it to the Emergency entrance.    From the east via 35E ([facility])  Take 35E south to [address] [address] exit. Turn right on [address]. Go west to [address]. Turn right (north) on [address]. Go to the first stoplight, take a right and follow on [address] to the Emergency entrance.  From the east via [address] ([facility])  Take [address] to [address]. Turn left (south) on [address] to [address]. Turn left (east) on [address] and  follow it to the Emergency entrance.    From the west via [address] ([place], [place])  Take [address] east to [address]. Turn right (south) on [address] to [address]. Turn left (east) on [address] and follow it to the Emergency entrance.

## 2019-04-29 NOTE — H&P
Pre-Endoscopy History and Physical     Dev Powell MRN# [id number]   YOB: 1945 Age: 74 year old     Date of Procedure: 4/29/2019  Primary care provider: Vee Beth  Type of Endoscopy: Colonoscopy with possible biopsy, possible polypectomy  Reason for Procedure: screen  Type of Anesthesia Anticipated: Conscious Sedation    HPI:    Dev is a 74 year old male who will be undergoing the above procedure.      A history and physical has been performed. The patient's medications and allergies have been reviewed. The risks and benefits of the procedure and the sedation options and risks were discussed with the patient.  All questions were answered and informed consent was obtained.      He denies a personal or family history of anesthesia complications or bleeding disorders.     Patient Active Problem List   Diagnosis     Gastroesophageal reflux disease without esophagitis = ran out of PPI- has been taking lots of TUMS     Hyperlipidemia LDL goal <130 - pravastatin = leg cramps; lipitor =calf pains     Snoring     Vasculogenic erectile dysfunction, unspecified vasculogenic erectile dysfunction type     Osteoarthritis     Lipoma of skin     Gastric ulcer     Malignant melanoma of left upper extremity including shoulder (H)     Cigarette smoker one half pack a day or less- for > 45 years      Enlarged prostate- has seen  Dr. Solomon in the past - doesn't need to see him again per Dr. Solomon     Rectal pain     Prostatitis, acute     Hearing loss     Inguinal hernia- right      Pulmonary nodules- tiny per screening chest CT -  repeat CT chest low dose w/o contrast 4/2017 = no change since 2015 - no need for further CT's      Essential hypertension with goal blood pressure less than 140/90     Testosterone deficiency     Hypothyroidism, unspecified type     Chronic constipation     Mass of left chest wall - ? there for 18+ years -left breast 1cm mass at 7:30      Multiple pigmented nevi      Reducible right inguinal hernia     Undiagnosed cardiac murmurs     Elevated fasting glucose        Past Medical History:   Diagnosis Date     Elevated blood pressure (not hypertension)      Erectile dysfunction      Gastric ulcer 2007    EGD @ [facility] - gastric ulcer w/ erosions/ LA grade B erosive esophagitis     GERD (gastroesophageal reflux disease)     worse lying down at night.      Hyperlipidemia LDL goal < 130     lipitor 40mg= calf pain-off since      Lipoma of skin     chest - biopsied at [facility] = benign      Malignant melanoma (H)      Osteoarthritis     mostly hands and knees      Snoring         Past Surgical History:   Procedure Laterality Date     COLONOSCOPY  2007    repeat in 2017     ESOPHAGOSCOPY, GASTROSCOPY, DUODENOSCOPY (EGD), COMBINED N/A 2015    Procedure: COMBINED ESOPHAGOSCOPY, GASTROSCOPY, DUODENOSCOPY (EGD);  Surgeon: Ender Dixon MD;  Location:  GI     HC ESOPHAGOSCOPY, DIAGNOSTIC  2007    EGD - gastric ulcer w/ erosions/ LA grade B erosive esophagitis     wide excision      for malignant melanoma       Social History     Tobacco Use     Smoking status: Current Every Day Smoker     Packs/day: 0.50     Years: 60.00     Pack years: 30.00     Smokeless tobacco: Never Used     Tobacco comment: strongly encourage smoking cessation with every visit   Substance Use Topics     Alcohol use: Yes     Alcohol/week: 0.0 oz     Comment: not regular - very occasional 1-2 martinis when out to dinner 1-2x/month, if that        Family History   Problem Relation Age of Onset     C.A.D. Father 62         at age 62 of MI     Diabetes Father         early type 2      Neurologic Disorder Mother         mild dementia - @ 92     C.A.D. Mother         angina      Thyroid Disease Mother         s/p thyroid        Prior to Admission medications    Medication Sig Start Date End Date Taking? Authorizing Provider   aspirin 81 MG tablet Take by mouth daily 13  Yes  "Vee Bteh MD   Glucosamine-Chondroit-Vit C-Mn (GLUCOSAMINE CHONDROITIN 1500 COMPLEX) CAPS Take by mouth daily 9/18/13  Yes Vee Beth MD   levothyroxine (SYNTHROID/LEVOTHROID) 150 MCG tablet Take 1 tablet (150 mcg) by mouth daily 2/13/19 2/13/20 Yes Vee Beth MD   lisinopril (PRINIVIL/ZESTRIL) 20 MG tablet Take 1 tablet (20 mg) by mouth daily 3/28/19  Yes Vee Beth MD   Multiple Vitamins-Minerals (CENTRUM SILVER) per tablet Take 1 tablet by mouth daily 9/18/13  Yes Vee Beth MD   omeprazole (PRILOSEC) 40 MG DR capsule TAKE 1 CAPSULE BY MOUTH ONCE DAILY TAKE 30-60 MINUTES BEFORE A MEAL. 3/28/19  Yes Vee Beth MD   rosuvastatin (CRESTOR) 10 MG tablet Take 1 tablet (10 mg) by mouth daily 3/28/19  Yes Vee Beth MD   testosterone (ANDROGEL 1 % PUMP) 12.5 MG/ACT (1%) gel Place 2 Pump onto the skin daily Apply from dispenser to clean, dry, intact skin of the shoulders, upper arms, or abdomen. 3/28/19  Yes Vee Beth MD   sildenafil (REVATIO) 20 MG tablet Take 2-3 tablets (40-60 mg) by mouth twice a week 3/28/19   Vee Beth MD   zoster vaccine recombinant adjuvanted (SHINGRIX) injection Inject 0.5 mLs into the muscle once for 1 dose 3/28/19 3/28/19  Vee Beth MD       No Known Allergies     REVIEW OF SYSTEMS:   5 point ROS negative except as noted above in HPI, including Gen., Resp., CV, GI &  system review.    PHYSICAL EXAM:   There were no vitals taken for this visit. Estimated body mass index is 29.56 kg/m  as calculated from the following:    Height as of 3/28/19: 1.88 m (6' 2\").    Weight as of 3/28/19: 104.4 kg (230 lb 3.2 oz).   GENERAL APPEARANCE: alert, and oriented  MENTAL STATUS: alert  AIRWAY EXAM: Mallampatti Class I (visualization of the soft palate, fauces, uvula, anterior and posterior pillars)  RESP: lungs clear to auscultation - no rales, rhonchi or wheezes  CV: " regular rates and rhythm  DIAGNOSTICS:    Not indicated    IMPRESSION   ASA Class 1 - Healthy patient, no medical problems    PLAN:   Plan for Colonoscopy with possible biopsy, possible polypectomy. We discussed the risks, benefits and alternatives and the patient wished to proceed.    The above has been forwarded to the consulting provider.      Signed Electronically by: Ender Dixon  April 29, 2019

## 2019-04-30 LAB — COPATH REPORT: NORMAL

## 2019-05-29 ENCOUNTER — TELEPHONE (OUTPATIENT)
Dept: FAMILY MEDICINE | Facility: CLINIC | Age: 74
End: 2019-05-29

## 2019-05-29 NOTE — TELEPHONE ENCOUNTER
Prior Authorization Retail Medication Request    Medication/Dose: Testosterone 12.5 mg/1.25 Gram      ICD code (if different than what is on RX):    Previously Tried and Failed:    Rationale:      Insurance Name:    Insurance ID:        Pharmacy Information (if different than what is on RX)  Name:  [facility]   Phone:  [phone]

## 2019-05-30 NOTE — TELEPHONE ENCOUNTER
PA Initiation    Medication: Testosterone 12.5 mg/1.25 Gram -   Insurance Company: [employer] - Phone [phone] Fax [phone]  Pharmacy Filling the Rx: [id number] - [place], [place] - [address] [address] [facility] [place] [facility]  Filling Pharmacy Phone: [phone]  Filling Pharmacy Fax: [phone]  Start Date: 5/30/2019

## 2019-05-30 NOTE — TELEPHONE ENCOUNTER
Prior Authorization Approval    Authorization Effective Date: 4/30/2019  Authorization Expiration Date: 5/29/2020  Medication: Testosterone 12.5 mg/1.25 Gram - APPROVED  Approved Dose/Quantity:   Reference #: [id number]   Insurance Company: [employer] - Phone [phone] Fax [phone]  Expected CoPay:       CoPay Card Available:      Foundation Assistance Needed:    Which Pharmacy is filling the prescription (Not needed for infusion/clinic administered): [id number] - [place], [place] - [address] [facility]  Pharmacy Notified: Yes  Patient Notified: Comment:  **Instructed pharmacy to notify patient when script is ready to /ship.**

## 2019-06-11 NOTE — TELEPHONE ENCOUNTER
"Requested Prescriptions   Pending Prescriptions Disp Refills     rosuvastatin (CRESTOR) 10 MG tablet [Pharmacy Med Name: ROSUVASTATIN CALCIUM 10 MG TAB] 90 tablet 1    Last Written Prescription Date:  7.20.17  Last Fill Quantity: 90,  # refills: 1   Last Office Visit with [facility] provider:  1.4.18   Future Office Visit:      Sig: TAKE 1 TABLET BY MOUTH ONCE DAILY    Statins Protocol Passed    2/4/2018  2:47 PM       Passed - LDL on file in past 12 months    Recent Labs   Lab Test  07/10/17   0957   LDL  103*            Passed - No abnormal creatine kinase in past 12 months    No lab results found.         Passed - Recent or future visit with authorizing provider    Patient had office visit in the last year or has a visit in the next 30 days with authorizing provider.  See \"Patient Info\" tab in inbasket, or \"Choose Columns\" in Meds & Orders section of the refill encounter.            Passed - Patient is age 18 or older          " How Severe Are Your Bumps?: moderate Have Your Bumps Been Treated?: not been treated Is This A New Presentation, Or A Follow-Up?: Bumps Additional History: Pt had blood work done by his PCP since lumps became present and there was some unintentional weight loss. Pt notes thatâs sometimes lesion area painful at touch and random.

## 2019-07-15 ENCOUNTER — OFFICE VISIT (OUTPATIENT)
Dept: UROLOGY | Facility: CLINIC | Age: 74
End: 2019-07-15
Payer: COMMERCIAL

## 2019-07-15 VITALS
OXYGEN SATURATION: 93 % | BODY MASS INDEX: 29.55 KG/M2 | HEART RATE: 112 BPM | WEIGHT: 223 LBS | SYSTOLIC BLOOD PRESSURE: 118 MMHG | DIASTOLIC BLOOD PRESSURE: 58 MMHG | HEIGHT: 73 IN

## 2019-07-15 DIAGNOSIS — N40.2 PROSTATE NODULE: Primary | ICD-10-CM

## 2019-07-15 PROCEDURE — 99203 OFFICE O/P NEW LOW 30 MIN: CPT | Performed by: UROLOGY

## 2019-07-15 ASSESSMENT — PAIN SCALES - GENERAL: PAINLEVEL: NO PAIN (0)

## 2019-07-15 ASSESSMENT — MIFFLIN-ST. JEOR: SCORE: 1805.4

## 2019-07-15 NOTE — LETTER
7/15/2019       RE: Belem Powell  [address] [place] [address]     Dear Colleague,    Thank you for referring your patient, Belem Powell, to the [facility] [place] [facility]. Please see a copy of my visit note below.    History: There is a great pleasure to see this very pleasant 74-year-old gentleman for the first time in many years.  He is in stable health at the present time but I been asked to see him because of some concern about the texture of the prostate on routine examination.  There is no apparent family history of prostate cancer.  Number PSA tests have been done  Results for BELEM POWELL (MRN [id number]) as of 7/15/2019 14:56   Ref. Range 1/30/2015 13:05 1/29/2016 08:42 3/27/2017 09:02 7/27/2018 08:20 3/28/2019 16:04   PSA Latest Ref Range: 0 - 4 ug/L 0.68 0.60 0.77 0.72 0.65   The patient does have other medical good conditions as listed below however his current health is stable.  He has no other major complaints, is voiding well is no overt hematuria there is no history of urinary tract infection no major issues with frequency, nocturia or slowing of the stream    Past Medical History:   Diagnosis Date     Elevated blood pressure (not hypertension)      Erectile dysfunction      Gastric ulcer 9/25/2007    EGD @ [place] - gastric ulcer w/ erosions/ LA grade B erosive esophagitis     GERD (gastroesophageal reflux disease)     worse lying down at night.      Hyperlipidemia LDL goal < 130     lipitor 40mg= calf pain-off since 2008     Hypertension      Lipoma of skin 2007    chest - biopsied at [place] = benign      Malignant melanoma (H) 1995     Osteoarthritis     mostly hands and knees      Snoring      Thyroid disease        Past Surgical History:   Procedure Laterality Date     COLONOSCOPY  9/25/2007    repeat in 2017     ESOPHAGOSCOPY, GASTROSCOPY, DUODENOSCOPY (EGD), COMBINED N/A 2/5/2015    Procedure:  COMBINED ESOPHAGOSCOPY, GASTROSCOPY, DUODENOSCOPY (EGD);  Surgeon: Ender Dixon MD;  Location:  GI     [facility] ESOPHAGOSCOPY, DIAGNOSTIC  2007    EGD - gastric ulcer w/ erosions/ LA grade B erosive esophagitis     wide excision      for malignant melanoma       Family History   Problem Relation Age of Onset     C.A.D. Father 62         at age 62 of MI     Diabetes Father         early type 2      Neurologic Disorder Mother         mild dementia - @ 92     C.A.D. 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    Social Needs     Financial resource strain: Not on file     Food insecurity:     Worry: Not on file     Inability: Not on file     Transportation needs:     Medical: Not on file     Non-medical: Not on file   Tobacco Use     Smoking status: Current Every Day Smoker     Packs/day: 0.50     Years: 60.00     Pack years: 30.00     Smokeless tobacco: Never Used     Tobacco comment: strongly encourage smoking cessation with every visit   Substance and Sexual Activity     Alcohol use: Yes     Alcohol/week: 0.0 oz     Comment: not regular - very occasional 1-2 martinis when out to dinner 1-2x/month, if that      Drug use: No     Comment: no herbal meds      Sexual activity: Yes     Partners: Female     Comment:     Lifestyle     Physical activity:     Days per week: Not on file     Minutes per session: Not on file     Stress: Not on file   Relationships     Social connections:     Talks on phone: Not on file     Gets together: Not on file     Attends Congregational service: Not on file     Active member  of club or organization: Not on file     Attends meetings of clubs or organizations: Not on file     Relationship status: Not on file     Intimate partner violence:     Fear of current or ex partner: Not on file     Emotionally abused: Not on file     Physically abused: Not on file     Forced sexual activity: Not on file   Other Topics Concern     Parent/sibling w/ CABG, MI or angioplasty before 65F 55M? No      Service Yes     Comment: [employer] - May 9176-4672 - fitted glasses for other servicemen      Blood Transfusions Not Asked     Caffeine Concern Not Asked     Occupational Exposure Not Asked     Hobby Hazards Not Asked     Sleep Concern Not Asked     Stress Concern Not Asked     Weight Concern Yes     Special Diet Not Asked     Back Care Not Asked     Exercise Yes     Comment: stays very active      Bike Helmet Not Asked     Seat Belt Yes     Comment: always      Self-Exams Yes     Comment: RANJIT encouraged monthly    Social History Narrative    Has new Sen-Tsu puppy - copper and white - Jose - 11 months old - noted March 28, 2018 --Vee Beth MD         Wife doing dialysis 3x/week - has 7 stents in her body. Heart, kidneys, liver.  ---Vee Beth MD        Current Outpatient Medications   Medication Sig Dispense Refill     Glucosamine-Chondroit-Vit C-Mn (GLUCOSAMINE CHONDROITIN 1500 COMPLEX) CAPS Take by mouth daily       levothyroxine (SYNTHROID/LEVOTHROID) 150 MCG tablet Take 1 tablet (150 mcg) by mouth daily 90 tablet 3     lisinopril (PRINIVIL/ZESTRIL) 20 MG tablet Take 1 tablet (20 mg) by mouth daily 90 tablet 3     Multiple Vitamins-Minerals (CENTRUM SILVER) per tablet Take 1 tablet by mouth daily 30 tablet      omeprazole (PRILOSEC) 40 MG DR capsule TAKE 1 CAPSULE BY MOUTH ONCE DAILY TAKE 30-60 MINUTES BEFORE A MEAL. 90 capsule 3     rosuvastatin (CRESTOR) 10 MG tablet Take 1 tablet (10 mg) by mouth daily 90 tablet 3     sildenafil (REVATIO) 20 MG tablet Take 2-3 tablets  "(40-60 mg) by mouth twice a week 30 tablet 11     testosterone (ANDROGEL 1 % PUMP) 12.5 MG/ACT (1%) gel Place 2 Pump onto the skin daily Apply from dispenser to clean, dry, intact skin of the shoulders, upper arms, or abdomen. 75 g 5     aspirin 81 MG tablet Take by mouth daily 30 tablet        Review Of Systems:  Skin: History of melanoma  Eyes: negative  Ears/Nose/Throat: negative  Respiratory: Smoking history  Cardiovascular: negative  Gastrointestinal: reflux  Genitourinary: negative  Musculoskeletal: negative  Neurologic: negative  Psychiatric: negative  Hematologic/Lymphatic/Immunologic: negative  Endocrine: thyroid disorder    Exam:  /58   Pulse 112   Ht 1.854 m (6' 1\")   Wt 101.2 kg (223 lb)   SpO2 93%   BMI 29.42 kg/m       General Impression: Very pleasant gentleman in no acute distress, well-oriented in time place and person.    Mental status.  Normal    HEENT: There is no clinical evidence of jaundice, the mucous membranes and    Skin: Skin is otherwise normal at this time    Lymph Nodes: Not examined    Respiratory System: The respiratory cycle is normal    Cardiovascular: There is no significant peripheral edema    Abdominal: The abdomen is not obese    Extremities: Extremities are otherwise unremarkable    Back and Flank: Not examined    Genital: Not examined    Rectal: Good sphincter tone, normal perianal sensation.  Smooth rectal mucosa without hemorrhoids or fissures.  Smooth soft mildly enlarged prostate but without evidence of tenderness, bogginess and on careful palpation of the prostate I cannot detect any significant nodules in the prostate.  Seminal vesicles.  Not palpable.  Perineum is otherwise normal to examination    Neurologic: There are no focal abnormal clinical neurological signs and central, peripheral nerve systems    Impression: I cannot detect any significant nodules or concerns regarding palpation of the prostate on digital rectal examination.  In addition his PSAs " "have consistently been very low but what below 1.0.  This is very reassuring and as he is approaching 75 years of age given this history I would recommend that it not be necessary to do the PSA again, as per AUA guidelines.  However I have advised him that should he observe any of the following  1.  Gross hematuria  2.  Significant deterioration in his urinary symptoms.  He should come and see me promptly.    I did carefully discussed the situation with him in detail today.  I answered all his questions    Plan: I will see him on a as needed basis    \"This dictation was performed with voice recognition software and may contain errors,  omissions and inadvertent word substitution.\"      Again, thank you for allowing me to participate in the care of your patient.      Sincerely,    Dev Solomon MD      "

## 2019-07-15 NOTE — PROGRESS NOTES
History: There is a great pleasure to see this very pleasant 74-year-old gentleman for the first time in many years.  He is in stable health at the present time but I been asked to see him because of some concern about the texture of the prostate on routine examination.  There is no apparent family history of prostate cancer.  Number PSA tests have been done  Results for BELEM ASCENCIO (MRN [id number]) as of 7/15/2019 14:56   Ref. Range 1/30/2015 13:05 1/29/2016 08:42 3/27/2017 09:02 7/27/2018 08:20 3/28/2019 16:04   PSA Latest Ref Range: 0 - 4 ug/L 0.68 0.60 0.77 0.72 0.65   The patient does have other medical good conditions as listed below however his current health is stable.  He has no other major complaints, is voiding well is no overt hematuria there is no history of urinary tract infection no major issues with frequency, nocturia or slowing of the stream    Past Medical History:   Diagnosis Date     Elevated blood pressure (not hypertension)      Erectile dysfunction      Gastric ulcer 9/25/2007    EGD @ [place] - gastric ulcer w/ erosions/ LA grade B erosive esophagitis     GERD (gastroesophageal reflux disease)     worse lying down at night.      Hyperlipidemia LDL goal < 130     lipitor 40mg= calf pain-off since 2008     Hypertension      Lipoma of skin 2007    chest - biopsied at [place] = benign      Malignant melanoma (H) 1995     Osteoarthritis     mostly hands and knees      Snoring      Thyroid disease        Past Surgical History:   Procedure Laterality Date     COLONOSCOPY  9/25/2007    repeat in 2017     ESOPHAGOSCOPY, GASTROSCOPY, DUODENOSCOPY (EGD), COMBINED N/A 2/5/2015    Procedure: COMBINED ESOPHAGOSCOPY, GASTROSCOPY, DUODENOSCOPY (EGD);  Surgeon: Ender Dixon MD;  Location: [facility] ESOPHAGOSCOPY, DIAGNOSTIC  9/25/2007    EGD - gastric ulcer w/ erosions/ LA grade B erosive esophagitis     wide excision  1995    for malignant melanoma       Family History   Problem Relation Age of  Onset     C.A.D. Father 62         at age 62 of MI     Diabetes Father         early type 2      Neurologic Disorder Mother         mild dementia - @ 92     C.A.D. 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    Social Needs     Financial resource strain: Not on file     Food insecurity:     Worry: Not on file     Inability: Not on file     Transportation needs:     Medical: Not on file     Non-medical: Not on file   Tobacco Use     Smoking status: Current Every Day Smoker     Packs/day: 0.50     Years: 60.00     Pack years: 30.00     Smokeless tobacco: Never Used     Tobacco comment: strongly encourage smoking cessation with every visit   Substance and Sexual Activity     Alcohol use: Yes     Alcohol/week: 0.0 oz     Comment: not regular - very occasional 1-2 martinis when out to dinner 1-2x/month, if that      Drug use: No     Comment: no herbal meds      Sexual activity: Yes     Partners: Female     Comment:     Lifestyle     Physical activity:     Days per week: Not on file     Minutes per session: Not on file     Stress: Not on file   Relationships     Social connections:     Talks on phone: Not on file     Gets together: Not on file     Attends Hindu service: Not on file     Active member of club or organization: Not on file     Attends meetings of clubs or organizations: Not on file     Relationship status: Not on file     Intimate partner violence:     Fear of current or ex partner: Not on file     Emotionally abused: Not on file     Physically abused: Not on file     Forced sexual activity: Not  on file   Other Topics Concern     Parent/sibling w/ CABG, MI or angioplasty before 65F 55M? No      Service Yes     Comment: [employer] - May 5211-2456 - fitted glasses for other servicemen      Blood Transfusions Not Asked     Caffeine Concern Not Asked     Occupational Exposure Not Asked     Hobby Hazards Not Asked     Sleep Concern Not Asked     Stress Concern Not Asked     Weight Concern Yes     Special Diet Not Asked     Back Care Not Asked     Exercise Yes     Comment: stays very active      Bike Helmet Not Asked     Seat Belt Yes     Comment: always      Self-Exams Yes     Comment: RANJIT encouraged monthly    Social History Narrative    Has new Sen-Tsu puppy - copper and white - Jose - 11 months old - noted March 28, 2018 --Vee Beth MD         Wife doing dialysis 3x/week - has 7 stents in her body. Heart, kidneys, liver.  ---Vee Beth MD        Current Outpatient Medications   Medication Sig Dispense Refill     Glucosamine-Chondroit-Vit C-Mn (GLUCOSAMINE CHONDROITIN 1500 COMPLEX) CAPS Take by mouth daily       levothyroxine (SYNTHROID/LEVOTHROID) 150 MCG tablet Take 1 tablet (150 mcg) by mouth daily 90 tablet 3     lisinopril (PRINIVIL/ZESTRIL) 20 MG tablet Take 1 tablet (20 mg) by mouth daily 90 tablet 3     Multiple Vitamins-Minerals (CENTRUM SILVER) per tablet Take 1 tablet by mouth daily 30 tablet      omeprazole (PRILOSEC) 40 MG DR capsule TAKE 1 CAPSULE BY MOUTH ONCE DAILY TAKE 30-60 MINUTES BEFORE A MEAL. 90 capsule 3     rosuvastatin (CRESTOR) 10 MG tablet Take 1 tablet (10 mg) by mouth daily 90 tablet 3     sildenafil (REVATIO) 20 MG tablet Take 2-3 tablets (40-60 mg) by mouth twice a week 30 tablet 11     testosterone (ANDROGEL 1 % PUMP) 12.5 MG/ACT (1%) gel Place 2 Pump onto the skin daily Apply from dispenser to clean, dry, intact skin of the shoulders, upper arms, or abdomen. 75 g 5     aspirin 81 MG tablet Take by mouth daily 30 tablet        Review Of  "Systems:  Skin: History of melanoma  Eyes: negative  Ears/Nose/Throat: negative  Respiratory: Smoking history  Cardiovascular: negative  Gastrointestinal: reflux  Genitourinary: negative  Musculoskeletal: negative  Neurologic: negative  Psychiatric: negative  Hematologic/Lymphatic/Immunologic: negative  Endocrine: thyroid disorder    Exam:  /58   Pulse 112   Ht 1.854 m (6' 1\")   Wt 101.2 kg (223 lb)   SpO2 93%   BMI 29.42 kg/m      General Impression: Very pleasant gentleman in no acute distress, well-oriented in time place and person.    Mental status.  Normal    HEENT: There is no clinical evidence of jaundice, the mucous membranes and    Skin: Skin is otherwise normal at this time    Lymph Nodes: Not examined    Respiratory System: The respiratory cycle is normal    Cardiovascular: There is no significant peripheral edema    Abdominal: The abdomen is not obese    Extremities: Extremities are otherwise unremarkable    Back and Flank: Not examined    Genital: Not examined    Rectal: Good sphincter tone, normal perianal sensation.  Smooth rectal mucosa without hemorrhoids or fissures.  Smooth soft mildly enlarged prostate but without evidence of tenderness, bogginess and on careful palpation of the prostate I cannot detect any significant nodules in the prostate.  Seminal vesicles.  Not palpable.  Perineum is otherwise normal to examination    Neurologic: There are no focal abnormal clinical neurological signs and central, peripheral nerve systems    Impression: I cannot detect any significant nodules or concerns regarding palpation of the prostate on digital rectal examination.  In addition his PSAs have consistently been very low but what below 1.0.  This is very reassuring and as he is approaching 75 years of age given this history I would recommend that it not be necessary to do the PSA again, as per AUA guidelines.  However I have advised him that should he observe any of the following  1.  Gross " "hematuria  2.  Significant deterioration in his urinary symptoms.  He should come and see me promptly.    I did carefully discussed the situation with him in detail today.  I answered all his questions    Plan: I will see him on a as needed basis    \"This dictation was performed with voice recognition software and may contain errors,  omissions and inadvertent word substitution.\"    "

## 2019-08-01 ENCOUNTER — APPOINTMENT (OUTPATIENT)
Dept: CT IMAGING | Facility: CLINIC | Age: 74
End: 2019-08-01
Attending: EMERGENCY MEDICINE
Payer: COMMERCIAL

## 2019-08-01 ENCOUNTER — HOSPITAL ENCOUNTER (EMERGENCY)
Facility: CLINIC | Age: 74
Discharge: HOME | End: 2019-08-01
Attending: EMERGENCY MEDICINE | Admitting: EMERGENCY MEDICINE
Payer: COMMERCIAL

## 2019-08-01 VITALS
HEART RATE: 96 BPM | RESPIRATION RATE: 20 BRPM | OXYGEN SATURATION: 98 % | TEMPERATURE: 98.5 F | DIASTOLIC BLOOD PRESSURE: 78 MMHG | SYSTOLIC BLOOD PRESSURE: 122 MMHG

## 2019-08-01 DIAGNOSIS — T14.8XXA ABRASION OF SKIN: ICD-10-CM

## 2019-08-01 DIAGNOSIS — S00.01XA ABRASION OF SCALP, INITIAL ENCOUNTER: ICD-10-CM
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
## 2019-08-01 DIAGNOSIS — F10.929 ALCOHOLIC INTOXICATION WITH COMPLICATION (H): ICD-10-CM

## 2019-08-01 PROCEDURE — 70450 CT HEAD/BRAIN W/O DYE: CPT

## 2019-08-01 PROCEDURE — 99284 EMERGENCY DEPT VISIT MOD MDM: CPT | Mod: 25

## 2019-08-01 PROCEDURE — 25000132 ZZH RX MED GY IP 250 OP 250 PS 637: Performed by: EMERGENCY MEDICINE

## 2019-08-01 RX ORDER — LANOLIN ALCOHOL/MO/W.PET/CERES
100 CREAM (GRAM) TOPICAL ONCE
Status: COMPLETED | OUTPATIENT
Start: 2019-08-01 | End: 2019-08-01

## 2019-08-01 RX ORDER — FOLIC ACID 1 MG/1
1 TABLET ORAL ONCE
Status: COMPLETED | OUTPATIENT
Start: 2019-08-01 | End: 2019-08-01

## 2019-08-01 RX ADMIN — Medication 100 MG: at 16:48

## 2019-08-01 RX ADMIN — FOLIC ACID 1 MG: 1 TABLET ORAL at 16:48

## 2019-08-01 NOTE — ED AVS SNAPSHOT
[facility] Emergency Department  [address]  [facility] [address]  Phone:  [phone]  Fax:  [phone]                                    Dev Powell   MRN: [id number]    Department:  [facility] Emergency Department   Date of Visit:  8/1/2019           After Visit Summary Signature Page    I have received my discharge instructions, and my questions have been answered. I have discussed any challenges I see with this plan with the nurse or doctor.    ..........................................................................................................................................  Patient/Patient Representative Signature      ..........................................................................................................................................  Patient Representative Print Name and Relationship to Patient    ..................................................               ................................................  Date                                   Time    ..........................................................................................................................................  Reviewed by Signature/Title    ...................................................              ..............................................  Date                                               Time          22EPIC Rev 08/18
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
## 2019-08-01 NOTE — ED NOTES
Bed: ED07  Expected date: 8/1/19  Expected time:   Means of arrival: Ambulance  Comments:  Rosetta Hanks

## 2019-08-01 NOTE — ED PROVIDER NOTES
History     Chief Complaint:  Fall and Alcohol Intoxication      The history is provided by the patient.      Dev Powell is a 74 year old male, with a history of high blood pressure and high cholesterol, who presents to the ED for evaluation of a fall and alcohol intoxication. The patient says that he was out in the heat doing yard work after having a couple pre-mixed drinks. He then says that he got in his car when he was pulled over by the  and was given a sobriety test. He failed the sobriety test and fell over from the test. He also says that he has not been drinking a lot of water. The patient does say he hit his head from the fall. But, the patient does deny any symptoms of head trauma like blurred vision. Of note, the patient says he does not have any history of pass outs in the past. He is currently taking medication for his blood pressure, high cholesterol, and thyroid.    Allergies:  No known drug allergies    Medications:    Aspirin 81 mg  Glucosamine  Synthroid  Lisinopril  Centrum silver  Prilosec  Crestor  Revatio    Past Medical History:    Elevated blood pressure  Enlarged prostate  Erectile dysfunction  Gastric ulcer  GERD  Hyperlipidemia  Hypertension  Lipoma of skin  Malignant melanoma  Snoring  Thyroid Disease    Past Surgical History:    Wide excision for malignant melanoma    Family History:    CAD: Father  Diabetes: Father  Neurologic Disorder: Mother  Thyroid Disease: Mother    Social History:  Smoking status: Current Every Day Smoker-0.5 ppd  Alcohol use: Yes-regularly, but not daily  Marital Status:   [2]       Review of Systems   Eyes: Negative for visual disturbance.   All other systems reviewed and are negative.    Physical Exam   First Vitals:  Patient Vitals for the past 24 hrs:   BP Temp Temp src Pulse Heart Rate Resp SpO2   08/01/19 1645 124/79 -- -- -- -- -- --   08/01/19 1515 116/80 -- -- -- -- -- 95 %   08/01/19 1513 116/80 98.5  F (36.9  C) Oral 110 110 16 93 %      Physical Exam  Constitutional: Vital signs reviewed as above.   Head: No external signs of trauma noted.  Eyes: Pupils are equal, round, and reactive to light.   Neck: No JVD noted  Cardiovascular: Tachycardic rate, regular rhythm and normal heart sounds.  No murmur heard. Equal B/L peripheral pulses.  Pulmonary/Chest: Effort normal and breath sounds normal. No respiratory distress. Patient has no wheezes. Patient has no rales.   Gastrointestinal: Soft. There is no tenderness.   Musculoskeletal/Extremities: No edema noted. Normal tone.  Neurological: Patient is alert. GCS 14. He appears slightly intoxicated.  Skin: Skin is warm and dry. There is no diaphoresis noted.   Psychiatric: The patient appears calm.    Emergency Department Course   ECG (16:06:18):  Rate 99 bpm. NE interval 176. QRS duration 98. QT/QTc 356/456. P-R-T axes 50 49 34. Normal sinus rhythm. Normal ECG. Interpreted at 1606 by Cory Wilson DO.  No significant change compared to EKG dated 9/20/2007    Imaging:  Radiographic findings were communicated with the patient and family who voiced understanding of the findings.    Head CT w/o contrast   Final Result   IMPRESSION: No acute intracranial abnormality.      LÓPEZ CUETO MD        Interventions:  Medications   folic acid (FOLVITE) tablet 1 mg (1 mg Oral Given 8/1/19 1648)   vitamin B1 (THIAMINE) tablet 100 mg (100 mg Oral Given 8/1/19 1648)     Emergency Department Course:  Past medical records, nursing notes, and vitals reviewed.  1517: I performed an exam of the patient and obtained history, as documented above.    IV inserted and blood drawn.    The patient was sent for a CT head while in the emergency department, findings above.    1707: I rechecked the patient. Explained findings to patient and wife. Patient feels well. Plan for gait test.  GCS 15. No slurring of words.    1716: Patient ambulated with unassisted stable gait. DC with wife.      Impression & Plan      Medical  Decision Making:  This 74-year-old male patient presents the ED due to alcohol intoxication.  Please see the HPI and exam for specifics.  Patient remained stable in the ED.  Per security, please were initially here to draw the patient's blood though the patient refused so that left and my understanding is that the patient would have a charge against him for what is likely some kind of driving while intoxicated.  Patient declined blood work for me as well.  He did consent to CT imaging of his head and fortunately there is no skull fracture or intracranial hemorrhage noted.  Patient's wife was called and is at bedside and at this time is comfortable taking her  home.  On reevaluation he had a strong steady gait and did not need assistance.  The patient can follow-up in the outpatient setting.      Diagnosis:    ICD-10-CM    1. Alcoholic intoxication with complication (H) F10.929    2. Fall, initial encounter W19.XXXA    3. Abrasion of skin T14.8XXA    4. Abrasion of scalp, initial encounter S00.01XA        Disposition:  discharged to home    Discharge Medications:     Medication List      ASK your doctor about these medications    zoster vaccine recombinant adjuvanted injection  Commonly known as:  SHINGRIX  1 each, Intramuscular, ONCE  Ask about: Should I take this medication?            Jagjit Abraham  8/1/2019   [facility] EMERGENCY DEPARTMENT  Scribe Disclosure:  I, Jagjit Abraham, am serving as a scribe at 3:17 PM on 8/1/2019 to document services personally performed by Cory Wilson DO based on my observations and the provider's statements to me.        Cory Wilson DO  08/01/19 8761

## 2019-08-02 LAB — INTERPRETATION ECG - MUSE: NORMAL

## 2019-09-12 ENCOUNTER — OFFICE VISIT (OUTPATIENT)
Dept: FAMILY MEDICINE | Facility: CLINIC | Age: 74
End: 2019-09-12
Payer: COMMERCIAL

## 2019-09-12 VITALS
HEART RATE: 113 BPM | WEIGHT: 216 LBS | HEIGHT: 73 IN | SYSTOLIC BLOOD PRESSURE: 96 MMHG | BODY MASS INDEX: 28.63 KG/M2 | OXYGEN SATURATION: 95 % | DIASTOLIC BLOOD PRESSURE: 60 MMHG | TEMPERATURE: 97.9 F

## 2019-09-12 DIAGNOSIS — R73.01 ELEVATED FASTING GLUCOSE: ICD-10-CM

## 2019-09-12 DIAGNOSIS — K21.9 GASTROESOPHAGEAL REFLUX DISEASE WITHOUT ESOPHAGITIS: ICD-10-CM

## 2019-09-12 DIAGNOSIS — R80.9 MICROALBUMINURIA: ICD-10-CM

## 2019-09-12 DIAGNOSIS — Z23 NEED FOR PROPHYLACTIC VACCINATION AND INOCULATION AGAINST INFLUENZA: ICD-10-CM

## 2019-09-12 DIAGNOSIS — I10 ESSENTIAL HYPERTENSION WITH GOAL BLOOD PRESSURE LESS THAN 140/90: ICD-10-CM

## 2019-09-12 DIAGNOSIS — K40.90 HERNIA, INGUINAL, RIGHT: Primary | ICD-10-CM

## 2019-09-12 DIAGNOSIS — D36.9 TUBULAR ADENOMA: ICD-10-CM

## 2019-09-12 DIAGNOSIS — R42 DIZZINESS: ICD-10-CM

## 2019-09-12 DIAGNOSIS — E78.5 HYPERLIPIDEMIA LDL GOAL <130: ICD-10-CM

## 2019-09-12 DIAGNOSIS — E03.9 HYPOTHYROIDISM, UNSPECIFIED TYPE: ICD-10-CM

## 2019-09-12 LAB — HBA1C MFR BLD: 5.9 % (ref 0–5.6)

## 2019-09-12 PROCEDURE — 80061 LIPID PANEL: CPT | Performed by: PHYSICIAN ASSISTANT

## 2019-09-12 PROCEDURE — 82043 UR ALBUMIN QUANTITATIVE: CPT | Performed by: PHYSICIAN ASSISTANT

## 2019-09-12 PROCEDURE — 83036 HEMOGLOBIN GLYCOSYLATED A1C: CPT | Performed by: PHYSICIAN ASSISTANT

## 2019-09-12 PROCEDURE — 80053 COMPREHEN METABOLIC PANEL: CPT | Performed by: PHYSICIAN ASSISTANT

## 2019-09-12 PROCEDURE — G0008 ADMIN INFLUENZA VIRUS VAC: HCPCS | Performed by: PHYSICIAN ASSISTANT

## 2019-09-12 PROCEDURE — 99214 OFFICE O/P EST MOD 30 MIN: CPT | Mod: 25 | Performed by: PHYSICIAN ASSISTANT

## 2019-09-12 PROCEDURE — 90662 IIV NO PRSV INCREASED AG IM: CPT | Performed by: PHYSICIAN ASSISTANT

## 2019-09-12 PROCEDURE — 36415 COLL VENOUS BLD VENIPUNCTURE: CPT | Performed by: PHYSICIAN ASSISTANT

## 2019-09-12 RX ORDER — OMEPRAZOLE 40 MG/1
CAPSULE, DELAYED RELEASE ORAL
Qty: 90 CAPSULE | Refills: 1 | Status: SHIPPED | OUTPATIENT
Start: 2019-09-12 | End: 2020-06-08

## 2019-09-12 RX ORDER — LISINOPRIL 10 MG/1
10 TABLET ORAL DAILY
Qty: 90 TABLET | Refills: 0 | Status: SHIPPED | OUTPATIENT
Start: 2019-09-12 | End: 2019-12-14

## 2019-09-12 RX ORDER — LEVOTHYROXINE SODIUM 150 UG/1
150 TABLET ORAL DAILY
Qty: 90 TABLET | Refills: 1 | Status: SHIPPED | OUTPATIENT
Start: 2019-09-12 | End: 2019-12-03

## 2019-09-12 RX ORDER — ROSUVASTATIN CALCIUM 10 MG/1
10 TABLET, COATED ORAL AT BEDTIME
Qty: 90 TABLET | Refills: 1 | Status: SHIPPED | OUTPATIENT
Start: 2019-09-12 | End: 2020-05-27

## 2019-09-12 ASSESSMENT — MIFFLIN-ST. JEOR: SCORE: 1773.65

## 2019-09-12 NOTE — PROGRESS NOTES
SUBJECTIVE:   Dev Powell is a 74 year old male who presents to clinic today for the following health issues:    Pre--diabetes Follow-up  Diet managed    How often are you checking your blood sugar? Not at all    What time of day are you checking your blood sugars (select all that apply)?  n/a    Have you had any blood sugars above 200?  No    Have you had any blood sugars below 70?  No    What symptoms do you notice when your blood sugar is low?  Shaky, Dizzy and Weak    What concerns do you have today about your diabetes? Other: Has been feeling dizzy lately and unsteady.      Do you have any of these symptoms? (Select all that apply)  No numbness or tingling in feet.  No redness, sores or blisters on feet.  No complaints of excessive thirst.  No reports of blurry vision.  No significant changes to weight.     Have you had a diabetic eye exam in the last 12 months? No    Diabetes Management Resources    Hyperlipidemia Follow-Up  Taking rosuvastatin 10 mg    Are you having any of the following symptoms? (Select all that apply)  No complaints of shortness of breath, chest pain or pressure.  No increased sweating or nausea with activity.  No left-sided neck or arm pain.  No complaints of pain in calves when walking 1-2 blocks.    Are you regularly taking any medication or supplement to lower your cholesterol?   Yes- Rosuvastatin    Are you having muscle aches or other side effects that you think could be caused by your cholesterol lowering medication?  No    Hypertension Follow-up  Currently taking lisinopril 20 mg, however, concerns of low blood pressure because of dizziness when moving from a sitting to a standing position.    Do you check your blood pressure regularly outside of the clinic? Yes     Are you following a low salt diet? No    Are your blood pressures ever more than 140 on the top number (systolic) OR more   than 90 on the bottom number (diastolic), for example 140/90? No    BP Readings from Last 2  Encounters:   09/12/19 96/60   08/01/19 122/78     Hemoglobin A1C (%)   Date Value   09/12/2019 5.9 (H)   03/28/2019 6.0 (H)     LDL Cholesterol Calculated (mg/dL)   Date Value   09/12/2019 110 (H)   03/28/2019 116 (H)     Hypothyroidism Follow-up  Currently taking levothyroxine 150 mcg daily    Since last visit, patient describes the following symptoms: Weight stable, no hair loss, no skin changes, no constipation, no loose stools    Male hypogonadism  Using androgel daily      Hernia  Patient states that he has a hernia lower right abdominal area, abut one month. Very painful, describes it has a burning.   Noted by Dr. Beth in 2013.  Has not been painful in the past and simply has been monitoring.  Patient has made a compression device with an old jockstrap which helps his symptoms.  He does note pain/enlargement extending into his right testicle.        How many servings of fruits and vegetables do you eat daily?  0-1    On average, how many sweetened beverages do you drink each day (soda, juice, sweet tea, etc)?   3    How many days per week do you miss taking your medication? 0            Problem list and histories reviewed & adjusted, as indicated.  Additional history: as documented    Patient Active Problem List   Diagnosis     Gastroesophageal reflux disease without esophagitis = ran out of PPI- has been taking lots of TUMS     Hyperlipidemia LDL goal <130 - pravastatin = leg cramps; lipitor =calf pains     Snoring     Vasculogenic erectile dysfunction, unspecified vasculogenic erectile dysfunction type     Osteoarthritis     Lipoma of skin     Gastric ulcer     Malignant melanoma of left upper extremity including shoulder (H)     Cigarette smoker one half pack a day or less- for > 45 years      Enlarged prostate- has seen  Dr. Solomon in the past - no further PSA's needed as of 7/2019 - doesn't need to see him again per Dr. Solomon unless gross hematuria      Rectal pain     Prostatitis, acute      Hearing loss     Inguinal hernia- right      Pulmonary nodules- tiny per screening chest CT -  repeat CT chest low dose w/o contrast 2017 = no change since  - no need for further CT's      Essential hypertension with goal blood pressure less than 140/90     Testosterone deficiency     Hypothyroidism, unspecified type     Chronic constipation     Mass of left chest wall - ? there for 18+ years -left breast 1cm mass at 7:30      Multiple pigmented nevi     Reducible right inguinal hernia     Undiagnosed cardiac murmurs     Elevated fasting glucose     Tubular adenoma 2019 - repeat 5 years     Past Surgical History:   Procedure Laterality Date     COLONOSCOPY  2007    repeat in      ESOPHAGOSCOPY, GASTROSCOPY, DUODENOSCOPY (EGD), COMBINED N/A 2015    Procedure: COMBINED ESOPHAGOSCOPY, GASTROSCOPY, DUODENOSCOPY (EGD);  Surgeon: Ender Dixon MD;  Location:  GI     HC ESOPHAGOSCOPY, DIAGNOSTIC  2007    EGD - gastric ulcer w/ erosions/ LA grade B erosive esophagitis     wide excision      for malignant melanoma       Social History     Tobacco Use     Smoking status: Current Every Day Smoker     Packs/day: 0.50     Years: 60.00     Pack years: 30.00     Smokeless tobacco: Never Used     Tobacco comment: strongly encourage smoking cessation with every visit   Substance Use Topics     Alcohol use: Yes     Alcohol/week: 0.0 oz     Comment: not regular - very occasional 1-2 martinis when out to dinner 1-2x/month, if that      Family History   Problem Relation Age of Onset     C.A.D. Father 62         at age 62 of MI     Diabetes Father         early type 2      Neurologic Disorder Mother         mild dementia - @ 92     C.A.D. Mother         angina      Thyroid Disease Mother         s/p thyroid      Colon Cancer No family hx of          Current Outpatient Medications   Medication Sig Dispense Refill     aspirin 81 MG tablet Take by mouth daily 30 tablet       "Glucosamine-Chondroit-Vit C-Mn (GLUCOSAMINE CHONDROITIN 1500 COMPLEX) CAPS Take by mouth daily       levothyroxine (SYNTHROID/LEVOTHROID) 150 MCG tablet Take 1 tablet (150 mcg) by mouth daily 90 tablet 1     lisinopril (PRINIVIL/ZESTRIL) 10 MG tablet Take 1 tablet (10 mg) by mouth daily 90 tablet 0     Multiple Vitamins-Minerals (CENTRUM SILVER) per tablet Take 1 tablet by mouth daily 30 tablet      omeprazole (PRILOSEC) 40 MG DR capsule TAKE 1 CAPSULE BY MOUTH ONCE DAILY TAKE 30-60 MINUTES BEFORE A MEAL. 90 capsule 1     rosuvastatin (CRESTOR) 10 MG tablet Take 1 tablet (10 mg) by mouth At Bedtime For cholesterol 90 tablet 1     sildenafil (REVATIO) 20 MG tablet Take 2-3 tablets (40-60 mg) by mouth twice a week 30 tablet 11     testosterone (ANDROGEL 1 % PUMP) 12.5 MG/ACT (1%) gel Place 2 Pump onto the skin daily Apply from dispenser to clean, dry, intact skin of the shoulders, upper arms, or abdomen. 75 g 5     No Known Allergies    Reviewed and updated as needed this visit by clinical staff  Tobacco  Allergies  Meds  Problems  Med Hx  Surg Hx  Fam Hx  Soc Hx        Reviewed and updated as needed this visit by Provider  Tobacco  Problems  Surg Hx  Fam Hx  Soc Hx        ROS:  Constitutional, HEENT, cardiovascular, pulmonary, GI, , musculoskeletal, neuro, skin, endocrine and psych systems are negative, except as otherwise noted.      OBJECTIVE:   BP 96/60 (BP Location: Left arm, Cuff Size: Adult Regular)   Pulse 113   Temp 97.9  F (36.6  C) (Oral)   Ht 1.854 m (6' 1\")   Wt 98 kg (216 lb)   SpO2 95%   BMI 28.50 kg/m   Body mass index is 28.5 kg/m .    GENERAL: healthy, alert and no distress  EYES: Eyes grossly normal to inspection, PERRL and conjunctivae and sclerae normal  HENT: ear canals and TM's normal, nose and mouth without ulcers or lesions  NECK: no adenopathy, no asymmetry, masses, or scars and thyroid normal to palpation  RESP: lungs clear to auscultation - no rales, rhonchi or " wheezes  CV: regular rate and rhythm, normal S1 S2, no S3 or S4, no murmur, click or rub, no peripheral edema and peripheral pulses strong   (male): large right inguinal hernia, partially reducible  MS: no gross musculoskeletal defects noted, no edema  SKIN: no suspicious lesions or rashes  NEURO: Normal strength and tone, mentation intact and speech normal  PSYCH: mentation appears normal, affect normal/bright    Diagnostic Test Results:  Results for orders placed or performed in visit on 09/12/19   Albumin Random Urine Quantitative with Creat Ratio   Result Value Ref Range    Creatinine Urine 43 mg/dL    Albumin Urine mg/L 7 mg/L    Albumin Urine mg/g Cr 15.27 0 - 17 mg/g Cr   Lipid panel reflex to direct LDL Fasting   Result Value Ref Range    Cholesterol 176 <200 mg/dL    Triglycerides 98 <150 mg/dL    HDL Cholesterol 46 >39 mg/dL    LDL Cholesterol Calculated 110 (H) <100 mg/dL    Non HDL Cholesterol 130 (H) <130 mg/dL   Comprehensive metabolic panel   Result Value Ref Range    Sodium 140 133 - 144 mmol/L    Potassium 4.5 3.4 - 5.3 mmol/L    Chloride 110 (H) 94 - 109 mmol/L    Carbon Dioxide 25 20 - 32 mmol/L    Anion Gap 5 3 - 14 mmol/L    Glucose 106 (H) 70 - 99 mg/dL    Urea Nitrogen 25 7 - 30 mg/dL    Creatinine 1.03 0.66 - 1.25 mg/dL    GFR Estimate 71 >60 mL/min/[1.73_m2]    GFR Estimate If Black 82 >60 mL/min/[1.73_m2]    Calcium 9.6 8.5 - 10.1 mg/dL    Bilirubin Total 0.5 0.2 - 1.3 mg/dL    Albumin 4.0 3.4 - 5.0 g/dL    Protein Total 7.6 6.8 - 8.8 g/dL    Alkaline Phosphatase 104 40 - 150 U/L    ALT 17 0 - 70 U/L    AST 8 0 - 45 U/L   Hemoglobin A1c   Result Value Ref Range    Hemoglobin A1C 5.9 (H) 0 - 5.6 %     ASSESSMENT/PLAN:   Dev was seen today for recheck and imm/inj.    Diagnoses and all orders for this visit:    Hernia, inguinal, right  Symptomatic and enlarged.  Referral to surgery placed today.  Evaluation on Monday.  -     GENERAL SURG ADULT REFERRAL    Essential hypertension with goal  blood pressure less than 140/90, Dizziness   Hypotensive today and reporting symptoms of dizziness.  Decrease lisinopril to 10 mg from his current 20 mg dosage.  Continue to monitor blood pressures closely.   -     lisinopril (PRINIVIL/ZESTRIL) 10 MG tablet; Take 1 tablet (10 mg) by mouth daily    Hypothyroidism, unspecified type  Euthyroid.  Continue current regimen.  -     levothyroxine (SYNTHROID/LEVOTHROID) 150 MCG tablet; Take 1 tablet (150 mcg) by mouth daily    Tubular adenoma 4/2019 - repeat 5 years  Patient due to repeat colonoscopy in 2024    Microalbuminuria  Repeat level completed today.  We will continue to monitor especially in light of decreased lisinopril dose.  -     Albumin Random Urine Quantitative with Creat Ratio    Elevated fasting glucose  Stable.  Continue current regimen.  -     Comprehensive metabolic panel  -     Hemoglobin A1c    Hyperlipidemia LDL goal <130 - pravastatin = leg cramps; lipitor =calf pains  Stable.  Continue current regimen.  -     rosuvastatin (CRESTOR) 10 MG tablet; Take 1 tablet (10 mg) by mouth At Bedtime For cholesterol  -     Lipid panel reflex to direct LDL Fasting    Gastroesophageal reflux disease without esophagitis = ran out of PPI- has been taking lots of TUMS   Well-controlled with omeprazole.  Refilled today.  -     omeprazole (PRILOSEC) 40 MG DR capsule; TAKE 1 CAPSULE BY MOUTH ONCE DAILY TAKE 30-60 MINUTES BEFORE A MEAL.    Need for prophylactic vaccination and inoculation against influenza  Vaccinated today.  -     FLU VACCINE, INCREASED ANTIGEN, PRESV FREE, AGE 65+ [[id number]]  -     Vaccine Administration, Initial [[id number]]        Return in about 4 days (around 9/16/2019) for surgery consultation.      Estefania Eubanks PA-C  [facility]

## 2019-09-12 NOTE — LETTER
[facility]  [address] [address]  [place] [address]                              [phone]   September 13, 2019    Dev Powell  [address] [place] [address]      Dear Dev,    Here is a summary of your recent test results:  -Cholesterol levels are at your goal levels.  ADVISE: continuing your medication, a regular exercise program with at least 150 minutes of aerobic exercise per week, and eating a low saturated fat/low carbohydrate diet.  Also, you should recheck this fasting cholesterol panel in 12 months.  -Liver and gallbladder tests (ALT,AST, Alk phos,bilirubin) are normal.  -Kidney function (GFR) is normal.  -Sodium is normal.  -Potassium is normal.  -Calcium is normal.  -Glucose is slight elevated and may be a sign of early diabetes (prediabetes). ADVISE:: eating a low carbohydrate diet, exercising, trying to lose weight (if necessary) and rechecking your glucose level in 12 months.  -A1C (test of diabetes control the last 2-3 months) shows persistent prediabetes.    -Microalbumin (urine protein) test is normal.  ADVISE: rechecking this annually.    Please call us at [phone] (or use [employer]) to address the above recommendations.            Thank you very much for choosing [facility].     Best regards,      Estefania Eubanks PA-C    Results for orders placed or performed in visit on 09/12/19   Albumin Random Urine Quantitative with Creat Ratio   Result Value Ref Range    Creatinine Urine 43 mg/dL    Albumin Urine mg/L 7 mg/L    Albumin Urine mg/g Cr 15.27 0 - 17 mg/g Cr   Lipid panel reflex to direct LDL Fasting   Result Value Ref Range    Cholesterol 176 <200 mg/dL    Triglycerides 98 <150 mg/dL    HDL Cholesterol 46 >39 mg/dL    LDL Cholesterol Calculated 110 (H) <100 mg/dL    Non HDL Cholesterol 130 (H) <130 mg/dL   Comprehensive metabolic panel   Result Value Ref Range    Sodium 140 133 - 144 mmol/L    Potassium 4.5 3.4 - 5.3  mmol/L    Chloride 110 (H) 94 - 109 mmol/L    Carbon Dioxide 25 20 - 32 mmol/L    Anion Gap 5 3 - 14 mmol/L    Glucose 106 (H) 70 - 99 mg/dL    Urea Nitrogen 25 7 - 30 mg/dL    Creatinine 1.03 0.66 - 1.25 mg/dL    GFR Estimate 71 >60 mL/min/[1.73_m2]    GFR Estimate If Black 82 >60 mL/min/[1.73_m2]    Calcium 9.6 8.5 - 10.1 mg/dL    Bilirubin Total 0.5 0.2 - 1.3 mg/dL    Albumin 4.0 3.4 - 5.0 g/dL    Protein Total 7.6 6.8 - 8.8 g/dL    Alkaline Phosphatase 104 40 - 150 U/L    ALT 17 0 - 70 U/L    AST 8 0 - 45 U/L   Hemoglobin A1c   Result Value Ref Range    Hemoglobin A1C 5.9 (H) 0 - 5.6 %

## 2019-09-12 NOTE — PATIENT INSTRUCTIONS
Consultation with [facility] on Monday September 16th at 10:15am.    Please arrive 20 minutes early     [address]       [facility] [address] [phone]

## 2019-09-13 LAB
ALBUMIN SERPL-MCNC: 4 G/DL (ref 3.4–5)
ALP SERPL-CCNC: 104 U/L (ref 40–150)
ALT SERPL W P-5'-P-CCNC: 17 U/L (ref 0–70)
ANION GAP SERPL CALCULATED.3IONS-SCNC: 5 MMOL/L (ref 3–14)
AST SERPL W P-5'-P-CCNC: 8 U/L (ref 0–45)
BILIRUB SERPL-MCNC: 0.5 MG/DL (ref 0.2–1.3)
BUN SERPL-MCNC: 25 MG/DL (ref 7–30)
CALCIUM SERPL-MCNC: 9.6 MG/DL (ref 8.5–10.1)
CHLORIDE SERPL-SCNC: 110 MMOL/L (ref 94–109)
CHOLEST SERPL-MCNC: 176 MG/DL
CO2 SERPL-SCNC: 25 MMOL/L (ref 20–32)
CREAT SERPL-MCNC: 1.03 MG/DL (ref 0.66–1.25)
CREAT UR-MCNC: 43 MG/DL
GFR SERPL CREATININE-BSD FRML MDRD: 71 ML/MIN/{1.73_M2}
GLUCOSE SERPL-MCNC: 106 MG/DL (ref 70–99)
HDLC SERPL-MCNC: 46 MG/DL
LDLC SERPL CALC-MCNC: 110 MG/DL
MICROALBUMIN UR-MCNC: 7 MG/L
MICROALBUMIN/CREAT UR: 15.27 MG/G CR (ref 0–17)
NONHDLC SERPL-MCNC: 130 MG/DL
POTASSIUM SERPL-SCNC: 4.5 MMOL/L (ref 3.4–5.3)
PROT SERPL-MCNC: 7.6 G/DL (ref 6.8–8.8)
SODIUM SERPL-SCNC: 140 MMOL/L (ref 133–144)
TRIGL SERPL-MCNC: 98 MG/DL

## 2019-09-13 NOTE — RESULT ENCOUNTER NOTE
Dev  I have reviewed your recent labs. Here are the results:    -Cholesterol levels are at your goal levels.  ADVISE: continuing your medication, a regular exercise program with at least 150 minutes of aerobic exercise per week, and eating a low saturated fat/low carbohydrate diet.  Also, you should recheck this fasting cholesterol panel in 12 months.  -Liver and gallbladder tests (ALT,AST, Alk phos,bilirubin) are normal.  -Kidney function (GFR) is normal.  -Sodium is normal.  -Potassium is normal.  -Calcium is normal.  -Glucose is slight elevated and may be a sign of early diabetes (prediabetes). ADVISE:: eating a low carbohydrate diet, exercising, trying to lose weight (if necessary) and rechecking your glucose level in 12 months.  -A1C (test of diabetes control the last 2-3 months) shows persistent prediabetes.    -Microalbumin (urine protein) test is normal.  ADVISE: rechecking this annually.     If you have any questions please do not hesitate to contact our office via phone ([phone]) or MyChart.    Estefania Eubanks, MS, PA-C  [facility]

## 2019-09-16 ENCOUNTER — OFFICE VISIT (OUTPATIENT)
Dept: SURGERY | Facility: CLINIC | Age: 74
End: 2019-09-16
Payer: COMMERCIAL

## 2019-09-16 VITALS
WEIGHT: 216 LBS | HEART RATE: 84 BPM | BODY MASS INDEX: 28.63 KG/M2 | RESPIRATION RATE: 16 BRPM | HEIGHT: 73 IN | OXYGEN SATURATION: 96 % | SYSTOLIC BLOOD PRESSURE: 112 MMHG | DIASTOLIC BLOOD PRESSURE: 70 MMHG

## 2019-09-16 DIAGNOSIS — K40.90 RIGHT INGUINAL HERNIA: Primary | ICD-10-CM

## 2019-09-16 PROCEDURE — 99203 OFFICE O/P NEW LOW 30 MIN: CPT | Performed by: SURGERY

## 2019-09-16 RX ORDER — TAMSULOSIN HYDROCHLORIDE 0.4 MG/1
CAPSULE ORAL
Qty: 7 CAPSULE | Refills: 0 | Status: SHIPPED | OUTPATIENT
Start: 2019-09-16 | End: 2020-11-03

## 2019-09-16 ASSESSMENT — MIFFLIN-ST. JEOR: SCORE: 1773.65

## 2019-09-16 NOTE — LETTER
2019       Re: Dev Powell - 1945  Dev is a 74 year old male who presents for evaluation of right groin pain.  Symptoms began 1 month  ago.  This is described as burning. The hernia was first noted in .  The pain occurs with walking, prolonged standing and climbing a ladder.  Associated symptoms include none. The patient has noticed a bulge. The patient has not had a previous herniorrhaphy in this location. Employment does require heavy lifting.  Discussed the importance of stopping smoking on hernia recurrence and perioperative infection     Constipation: Yes  Colonoscopy: Yes   Dysuria: Yes  Cough: smoker  Diabetes: pre-diebetes     Past Medical History:  has a past medical history of Elevated blood pressure (not hypertension), Enlarged prostate- has seen Dr. Solomon in the past - no further PSA's needed as of 2019 - doesn't need to see him again per Dr. Solomon unless gross hematuria  (2013), Erectile dysfunction, Gastric ulcer (2007), GERD (gastroesophageal reflux disease), Hyperlipidemia LDL goal < 130, Hypertension, Lipoma of skin (), Malignant melanoma (H) (), Osteoarthritis, Snoring, and Thyroid disease.     ROS:  The 10 point review of systems is negative other than noted in the HPI and above.     PE:    General- Well-developed, well-nourished, patient able to get up on table without difficulty.  HEENT- Normocephalic and atraumatic. Pupils equal and round.  Mucous membranes moist.  Sclera are nonicteric.  Neck- No lymphadenopathy or masses   Respirations- are regular and non labored  Abdomen is abdomen is soft without significant tenderness, masses, organomegaly or guarding  Hernia- Left inguinal hernia is not present with valsalva              Right inguinal hernia is present with valsalva, moderately large              The hernia is partially reducible              Testicles are atrophicl              Varix- left present     Assesment: Moderately large,  symptomatic right inguinal hernia     Plan:    We have discussed observation, reduction techniques and importance, incarceration and strangulation signs, symptoms and importance as well as need to seek emergency treatment.    We have discussed surgery in detail, including risk, benefits, complications, mesh, infection, nerve and cord damage and their sequelae including chronic pain and testicular loss, lifting and activity limits after surgery.  We discussed both laparoscopic and open approaches.  He is interested in laparoscopic repair and we discussed transabdominal surgery, injury to adjacent organs and structures and pneumoperitoneum.  He has been given literature to review.  We also discussed postoperative urinary retention and the use of Flomax, he would like to go ahead with this and I cautioned him not to use his listed medication of Viagra at the same time.  We will schedule surgery at patient's convenience.           oRm Mccoy MD

## 2019-09-16 NOTE — PROGRESS NOTES
HPI:  Dev is a 74 year old male who presents for evaluation of right groin pain.  Symptoms began  1 month  ago.  This is described as burning. The hernia was first noted in 2013.  The pain occurs with walking, prolonged standing and climbing a ladder.  Associated symptoms include none. The patient has noticed a bulge. The patient has not had a previous herniorrhaphy in this location. Employment does require heavy lifting.  Discussed the importance of stopping smoking on hernia recurrence and perioperative infection    Constipation: Yes  Colonoscopy: Yes   Dysuria: Yes  Cough: smoker  Diabetes: pre-diebetes    Past Medical History:   has a past medical history of Elevated blood pressure (not hypertension), Enlarged prostate- has seen  Dr. Solomon in the past - no further PSA's needed as of 7/2019 - doesn't need to see him again per Dr. Solomon unless gross hematuria  (9/18/2013), Erectile dysfunction, Gastric ulcer (9/25/2007), GERD (gastroesophageal reflux disease), Hyperlipidemia LDL goal < 130, Hypertension, Lipoma of skin (2007), Malignant melanoma (H) (1995), Osteoarthritis, Snoring, and Thyroid disease.    Past Surgical History:  Past Surgical History:   Procedure Laterality Date     COLONOSCOPY  9/25/2007    repeat in 2017     ESOPHAGOSCOPY, GASTROSCOPY, DUODENOSCOPY (EGD), COMBINED N/A 2/5/2015    Procedure: COMBINED ESOPHAGOSCOPY, GASTROSCOPY, DUODENOSCOPY (EGD);  Surgeon: Ender Dixon MD;  Location: [facility] ESOPHAGOSCOPY, DIAGNOSTIC  9/25/2007    EGD - gastric ulcer w/ erosions/ LA grade B erosive esophagitis     wide excision  1995    for malignant melanoma      Additional abdominal operations: none    Social History:  Social History     Socioeconomic History     Marital status:      Spouse name: Mikayla Powell     Number of children: 3     Years of education: 19     Highest education level: Not on file   Occupational History     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    Social Needs     Financial resource strain: Not on file     Food insecurity:     Worry: Not on file     Inability: Not on file     Transportation needs:     Medical: Not on file     Non-medical: Not on file   Tobacco Use     Smoking status: Current Every Day Smoker     Packs/day: 0.50     Years: 60.00     Pack years: 30.00     Smokeless tobacco: Never Used     Tobacco comment: strongly encourage smoking cessation with every visit   Substance and Sexual Activity     Alcohol use: Yes     Alcohol/week: 0.0 oz     Comment: not regular - very occasional 1-2 martinis when out to dinner 1-2x/month, if that      Drug use: No     Comment: no herbal meds      Sexual activity: Yes     Partners: Female     Comment:     Lifestyle     Physical activity:     Days per week: Not on file     Minutes per session: Not on file     Stress: Not on file   Relationships     Social connections:     Talks on phone: Not on file     Gets together: Not on file     Attends Shinto service: Not on file     Active member of club or organization: Not on file     Attends meetings of clubs or organizations: Not on file     Relationship status: Not on file     Intimate partner violence:     Fear of current or ex partner: Not on file     Emotionally abused: Not on file     Physically abused: Not on file     Forced sexual activity: Not on file   Other Topics Concern     Parent/sibling w/ CABG, MI or angioplasty before 65F 55M? No      Service Yes     Comment: [facility] - May [id number] - fitted glasses for other servicemen      Blood Transfusions Not Asked     Caffeine Concern Not Asked     Occupational Exposure Not Asked     Hobby Hazards Not Asked     Sleep Concern Not Asked     Stress Concern Not Asked     Weight Concern Yes     Special Diet Not Asked     Back Care Not Asked     Exercise Yes     Comment: stays very active      Bike Helmet Not Asked     Seat Belt Yes      Comment: always      Self-Exams Yes     Comment: RANJIT encouraged monthly    Social History Narrative    Has new [employer] puppy - copper and white - Jose - 11 months old - noted 2018 --Vee Beth MD         Wife doing dialysis 3x/week - has 7 stents in her body. Heart, kidneys, liver.  ---Vee Beth MD         Family History:  Family History   Problem Relation Age of Onset     C.A.D. Father 62         at age 62 of MI     Diabetes Father         early type 2      Neurologic Disorder Mother         mild dementia - @ 92     C.A.D. Mother         angina      Thyroid Disease Mother         s/p thyroid      Colon Cancer No family hx of      Hernias: No    ROS:  The 10 point review of systems is negative other than noted in the HPI and above.    PE:    General- Well-developed, well-nourished, patient able to get up on table without difficulty.  HEENT- Normocephalic and atraumatic. Pupils equal and round.  Mucous membranes moist.  Sclera are nonicteric.  Neck- No lymphadenopathy or masses   Respirations- are regular and non labored  Abdomen is abdomen is soft without significant tenderness, masses, organomegaly or guarding  Hernia- Left inguinal hernia is not present with valsalva              Right inguinal hernia is present with valsalva, moderately large              The hernia is partially reducible              Testicles are atrophicl              Varix- left present    Assesment: Moderately large, symptomatic right inguinal hernia    Plan:    We have discussed observation, reduction techniques and importance, incarceration and strangulation signs, symptoms and importance as well as need to seek emergency treatment.    We have discussed surgery in detail, including risk, benefits, complications, mesh, infection, nerve and cord damage and their sequelae including chronic pain and testicular loss, lifting and activity limits after surgery.  We discussed both laparoscopic and  open approaches.  He is interested in laparoscopic repair and we discussed transabdominal surgery, injury to adjacent organs and structures and pneumoperitoneum.  He has been given literature to review.  We also discussed postoperative urinary retention and the use of Flomax, he would like to go ahead with this and I cautioned him not to use his listed medication of Viagra at the same time.  We will schedule surgery at patient's convenience.    Time spent with the patient with greater that 50% of the time in discussion was 30 minutes.     Rom Mccoy MD    Please route or send letter to:  Primary Care Provider (PCP) and Include Progress Note

## 2019-09-17 ENCOUNTER — TELEPHONE (OUTPATIENT)
Dept: SURGERY | Facility: CLINIC | Age: 74
End: 2019-09-17

## 2019-09-17 NOTE — TELEPHONE ENCOUNTER
Type of surgery: ROBOTIC ASSISTED RIGHT INGUINAL HERNIA REPAIR WITH MESH    Location of surgery: [facility]  Date and time of surgery: 10-24-19, 7:45 AM   Surgeon: DR. CASTAÑEDA   Pre-Op Appt Date: PATIENT TO SCHEDULE   Post-Op Appt Date: PATIENT TO SCHEDULE    Packet sent out: GIVEN TO PATIENT   Pre-cert/Authorization completed:  Not Applicable  Date: 9-17-19      ROBOTIC ASSISTED RIGHT INGUINAL HERNIA REPAIR WITH MESH   GENERAL    PT INST TO HAVE H&P WITH DR. GEORGE   2 HRS REQ   PA ASSIST QUINTON DAI

## 2019-10-03 ENCOUNTER — TELEPHONE (OUTPATIENT)
Dept: FAMILY MEDICINE | Facility: CLINIC | Age: 74
End: 2019-10-03

## 2019-10-03 NOTE — TELEPHONE ENCOUNTER
Patient calling to schedule a preop at [facility], he is fine seeing anyone at the clinic. Surgery date is 10/24/19 hernia. No openings available. Please advise  # [phone]  Ok to leave detailed message: yes  Thank you  Danya Rizo

## 2019-10-03 NOTE — TELEPHONE ENCOUNTER
Left non-detailed message for patient to call back.  Please schedule follow up when patient calls back.  (see previous notes for details)  Ok to use holds on [facility] schedule for preop.  If he would like a different provider, can use acute on [facility] or [facility] as well.    Camilla Wallis

## 2019-10-07 ENCOUNTER — OFFICE VISIT (OUTPATIENT)
Dept: FAMILY MEDICINE | Facility: CLINIC | Age: 74
End: 2019-10-07
Payer: COMMERCIAL

## 2019-10-07 VITALS
BODY MASS INDEX: 28.63 KG/M2 | DIASTOLIC BLOOD PRESSURE: 80 MMHG | WEIGHT: 216 LBS | HEIGHT: 73 IN | OXYGEN SATURATION: 97 % | SYSTOLIC BLOOD PRESSURE: 120 MMHG | TEMPERATURE: 98 F | HEART RATE: 108 BPM

## 2019-10-07 DIAGNOSIS — I10 ESSENTIAL HYPERTENSION WITH GOAL BLOOD PRESSURE LESS THAN 140/90: ICD-10-CM

## 2019-10-07 DIAGNOSIS — E03.9 HYPOTHYROIDISM, UNSPECIFIED TYPE: ICD-10-CM

## 2019-10-07 DIAGNOSIS — Z01.818 PREOP GENERAL PHYSICAL EXAM: Primary | ICD-10-CM

## 2019-10-07 DIAGNOSIS — K40.90 NON-RECURRENT UNILATERAL INGUINAL HERNIA WITHOUT OBSTRUCTION OR GANGRENE: ICD-10-CM

## 2019-10-07 DIAGNOSIS — F17.210 CIGARETTE SMOKER ONE HALF PACK A DAY OR LESS: ICD-10-CM

## 2019-10-07 PROCEDURE — 99214 OFFICE O/P EST MOD 30 MIN: CPT | Performed by: FAMILY MEDICINE

## 2019-10-07 ASSESSMENT — MIFFLIN-ST. JEOR: SCORE: 1773.65

## 2019-10-07 NOTE — PATIENT INSTRUCTIONS
Before Your Surgery      Call your surgeon if there is any change in your health. This includes signs of a cold or flu (such as a sore throat, runny nose, cough, rash or fever).    Do not smoke, drink alcohol or take over the counter medicine (unless your surgeon or primary care doctor tells you to) for the 24 hours before and after surgery.    If you take prescribed drugs: Follow your doctor s orders about which medicines to take and which to stop until after surgery.    Eating and drinking prior to surgery: follow the instructions from your surgeon    Take a shower or bath the night before surgery. Use the soap your surgeon gave you to gently clean your skin. If you do not have soap from your surgeon, use your regular soap. Do not shave or scrub the surgery site.  Wear clean pajamas and have clean sheets on your bed.        HOLD lisinopril the morning of surgery

## 2019-10-07 NOTE — PROGRESS NOTES
[address] [address]  [phone]  Dept: [phone]    PRE-OP EVALUATION:  Today's date: 10/7/2019    Dev Powell (: 1945) presents for pre-operative evaluation assessment as requested by Dr. Mccoy.  He requires evaluation and anesthesia risk assessment prior to undergoing surgery/procedure for treatment of right inguinal Hernia .    Proposed Surgery/ Procedure: robotic assisted right inguinal hernia repair with mesh  Date of Surgery/ Procedure: 10/24/2019  Time of Surgery/ Procedure: 7:45 am  Hospital/Surgical Facility: [facility]  Primary Physician: [facility]  Type of Anesthesia Anticipated: General    Patient has a Health Care Directive or Living Will:  NO    1. NO - Do you have a history of heart attack, stroke, stent, bypass or surgery on an artery in the head, neck, heart or legs?  2. NO - Do you ever have any pain or discomfort in your chest?  3. NO - Do you have a history of  Heart Failure?  4. NO - Are you troubled by shortness of breath when: walking on the level, up a slight hill or at night?  5. NO - Do you currently have a cold, bronchitis or other respiratory infection?  6. NO - Do you have a cough, shortness of breath or wheezing?  7. NO - Do you sometimes get pains in the calves of your legs when you walk?  8. NO - Do you or anyone in your family have previous history of blood clots?  9. NO - Do you or does anyone in your family have a serious bleeding problem such as prolonged bleeding following surgeries or cuts?  10. NO - Have you ever had problems with anemia or been told to take iron pills?  11. NO - Have you had any abnormal blood loss such as black, tarry or bloody stools, or abnormal vaginal bleeding?  12. NO - Have you ever had a blood transfusion?  13. NO - Have you or any of your relatives ever had problems with anesthesia?  14. YES - DO YOU HAVE SLEEP APNEA, EXCESSIVE SNORING OR DAYTIME  DROWSINESS? Snoring - no known apnea  15. NO - Do you have any prosthetic heart valves?  16. NO - Do you have prosthetic joints?    HPI:     HPI related to upcoming procedure: hernia on the right side      HYPERTENSION - Patient has longstanding history of HTN , currently denies any symptoms referable to elevated blood pressure. Specifically denies chest pain, palpitations, dyspnea, orthopnea, PND or peripheral edema. Blood pressure readings have been in normal range. Current medication regimen is as listed below. Patient denies any side effects of medication.     HYPOTHYROIDISM - Patient has a longstanding history of chronic Hypothyroidism. Patient has been doing well, noting no tremor, insomnia, hair loss or changes in skin texture. Continues to take medications as directed, without adverse reactions or side effects. Last TSH   Lab Results   Component Value Date    TSH 1.83 03/28/2019   .        MEDICAL HISTORY:     Patient Active Problem List    Diagnosis Date Noted     Hypothyroidism, unspecified type 07/21/2015     Priority: High     Problem list name updated by automated process. Provider to review       Essential hypertension with goal blood pressure less than 140/90 02/10/2015     Priority: High     Tubular adenoma 4/2019 - repeat 5 years 09/12/2019     Priority: Medium     Undiagnosed cardiac murmurs 03/28/2019     Priority: Medium     Elevated fasting glucose 03/28/2019     Priority: Medium     Multiple pigmented nevi 03/28/2018     Priority: Medium     Reducible right inguinal hernia 03/28/2018     Priority: Medium     Mass of left chest wall - ? there for 18+ years -left breast 1cm mass at 7:30  03/27/2017     Priority: Medium     Chronic constipation 01/28/2016     Priority: Medium     Testosterone deficiency 03/25/2015     Priority: Medium     Pulmonary nodules- tiny per screening chest CT -  repeat CT chest low dose w/o contrast 4/2017 = no change since 2015 - no need for further CT's  02/09/2015      Priority: Medium     Cigarette smoker one half pack a day or less- for > 45 years  09/18/2013     Priority: Medium     Enlarged prostate- has seen  Dr. Solomon in the past - no further PSA's needed as of 7/2019 - doesn't need to see him again per Dr. Solomon unless gross hematuria  09/18/2013     Priority: Medium     Rectal pain 09/18/2013     Priority: Medium     Hearing loss 09/18/2013     Priority: Medium     Problem list name updated by automated process. Provider to review       Inguinal hernia- right  09/18/2013     Priority: Medium     Gastroesophageal reflux disease without esophagitis = ran out of PPI- has been taking lots of TUMS      Priority: Medium     Hyperlipidemia LDL goal <130 - pravastatin = leg cramps; lipitor =calf pains      Priority: Medium     Diagnosis updated by automated process. Provider to review and confirm.       Snoring      Priority: Medium     Vasculogenic erectile dysfunction, unspecified vasculogenic erectile dysfunction type      Priority: Medium     Osteoarthritis      Priority: Medium     mostly hands and knees        Lipoma of skin      Priority: Medium     chest - biopsied at [facility] = benign        Malignant melanoma of left upper extremity including shoulder (H)      Priority: Medium     Gastric ulcer 09/25/2007     Priority: Medium     EGD @ [facility]-EGD - gastric ulcer w/ erosions/ LA grade B erosive esophagitis        Past Medical History:   Diagnosis Date     Elevated blood pressure (not hypertension)      Enlarged prostate- has seen  Dr. Solomon in the past - no further PSA's needed as of 7/2019 - doesn't need to see him again per Dr. Solomon unless gross hematuria  9/18/2013     Erectile dysfunction      Gastric ulcer 9/25/2007    [facility]/ [facility]/ LA grade B erosive esophagitis     GERD (gastroesophageal reflux disease)     worse lying down at night.      Hyperlipidemia LDL goal < 130     lipitor 40mg= calf pain-off since 2008     Hypertension       Lipoma of skin 2007    chest - biopsied at [facility] = benign      Malignant melanoma (H) 1995     Osteoarthritis     mostly hands and knees      Snoring      Thyroid disease      Past Surgical History:   Procedure Laterality Date     COLONOSCOPY  9/25/2007    repeat in 2017     ESOPHAGOSCOPY, GASTROSCOPY, DUODENOSCOPY (EGD), COMBINED N/A 2/5/2015    Procedure: COMBINED ESOPHAGOSCOPY, GASTROSCOPY, DUODENOSCOPY (EGD);  Surgeon: Ender Dixon MD;  [facility] ESOPHAGOSCOPY, DIAGNOSTIC  9/25/2007    EGD - gastric ulcer w/ erosions/ LA grade B erosive esophagitis     wide excision  1995    for malignant melanoma     Current Outpatient Medications   Medication Sig Dispense Refill     Glucosamine-Chondroit-Vit C-Mn (GLUCOSAMINE CHONDROITIN 1500 COMPLEX) CAPS Take by mouth daily       levothyroxine (SYNTHROID/LEVOTHROID) 150 MCG tablet Take 1 tablet (150 mcg) by mouth daily 90 tablet 1     lisinopril (PRINIVIL/ZESTRIL) 10 MG tablet Take 1 tablet (10 mg) by mouth daily 90 tablet 0     Multiple Vitamins-Minerals (CENTRUM SILVER) per tablet Take 1 tablet by mouth daily 30 tablet      omeprazole (PRILOSEC) 40 MG DR capsule TAKE 1 CAPSULE BY MOUTH ONCE DAILY TAKE 30-60 MINUTES BEFORE A MEAL. 90 capsule 1     rosuvastatin (CRESTOR) 10 MG tablet Take 1 tablet (10 mg) by mouth At Bedtime For cholesterol 90 tablet 1     tamsulosin (FLOMAX) 0.4 MG capsule Take 1 tablet by mouth daily for 7 days-Begin 4 days before surgery, including morning of surgery with a sip of water 7 capsule 0     sildenafil (REVATIO) 20 MG tablet Take 2-3 tablets (40-60 mg) by mouth twice a week 30 tablet 11     testosterone (ANDROGEL 1 % PUMP) 12.5 MG/ACT (1%) gel Place 2 Pump onto the skin daily Apply from dispenser to clean, dry, intact skin of the shoulders, upper arms, or abdomen. 75 g 5     OTC products: None, except as noted above    No Known Allergies   Latex Allergy: NO    Social History     Tobacco Use     Smoking status: Current Every  "Day Smoker     Packs/day: 0.50     Years: 60.00     Pack years: 30.00     Smokeless tobacco: Never Used     Tobacco comment: strongly encourage smoking cessation with every visit   Substance Use Topics     Alcohol use: Yes     Alcohol/week: 0.0 standard drinks     Comment: not regular - very occasional 1-2 martinis when out to dinner 1-2x/month, if that      History   Drug Use No     Comment: no herbal meds        REVIEW OF SYSTEMS:   Constitutional, neuro, ENT, endocrine, pulmonary, cardiac, gastrointestinal, genitourinary, musculoskeletal, integument and psychiatric systems are negative, except as otherwise noted.    EXAM:   /80   Pulse 108   Temp 98  F (36.7  C) (Oral)   Ht 1.854 m (6' 1\")   Wt 98 kg (216 lb)   SpO2 97%   BMI 28.50 kg/m      GENERAL APPEARANCE: healthy, alert and no distress     EYES: EOMI,  PERRL     HENT: ear canals and TM's normal and nose and mouth without ulcers or lesions     NECK: no adenopathy, no asymmetry, masses, or scars and thyroid normal to palpation     RESP: lungs clear to auscultation - no rales, rhonchi or wheezes     CV: regular rates and rhythm, normal S1 S2, no S3 or S4 and no murmur, click or rub     ABDOMEN:  soft, nontender, no HSM or masses and bowel sounds normal     MS: extremities normal- no gross deformities noted, no evidence of inflammation in joints, FROM in all extremities.     SKIN: no suspicious lesions or rashes     NEURO: Normal strength and tone, sensory exam grossly normal, mentation intact and speech normal     PSYCH: mentation appears normal. and affect normal/bright     LYMPHATICS: No cervical adenopathy  BACK: no CVA tenderness, no paralumbar tenderness    DIAGNOSTICS:   EK  appears normal, NSR, normal axis, normal intervals, no acute ST/T changes c/w ischemia, no LVH by voltage criteria, unchanged from previous tracings    Recent Labs   Lab Test 19  1048 19  1604 19  0748  18  0739   HGB  --   --  15.2  --  " 15.3   PLT  --   --  211  --  222     --  138  --  138   POTASSIUM 4.5  --  4.2  --  4.5   CR 1.03  --  1.11  --  1.11   A1C 5.9* 6.0*  --    < >  --     < > = values in this interval not displayed.      IMPRESSION:   Reason for surgery/procedure:     ICD-10-CM    1. Preop general physical exam Z01.818    2. Non-recurrent unilateral inguinal hernia without obstruction or gangrene K40.90    3. Essential hypertension with goal blood pressure less than 140/90 - hold lisinopril the morning of surgery I10    4. Hypothyroidism, unspecified type E03.9    5. Cigarette smoker one half pack a day or less- for > 45 years  F17.210       The proposed surgical procedure is considered INTERMEDIATE risk.    REVISED CARDIAC RISK INDEX  The patient has the following serious cardiovascular risks for perioperative complications such as (MI, PE, VFib and 3  AV Block):  No serious cardiac risks  INTERPRETATION: 0 risks: Class I (very low risk - 0.4% complication rate)    The patient has the following additional risks for perioperative complications:      RECOMMENDATIONS:         --Patient is to take all scheduled medications on the day of surgery EXCEPT for modifications listed below.    ACE Inhibitor or Angiotensin Receptor Blocker (ARB) Use  Ace inhibitor or Angiotensin Receptor Blocker (ARB) and should HOLD this medication for the 24 hours prior to surgery.      APPROVAL GIVEN to proceed with proposed procedure, without further diagnostic evaluation       Signed Electronically by: Joshua Morris MD    Copy of this evaluation report is provided to requesting physician.    [place] Preop Guidelines    Revised Cardiac Risk Index

## 2019-10-24 ENCOUNTER — APPOINTMENT (OUTPATIENT)
Dept: SURGERY | Facility: PHYSICIAN GROUP | Age: 74
End: 2019-10-24
Payer: COMMERCIAL

## 2019-10-24 ENCOUNTER — HOSPITAL ENCOUNTER (OUTPATIENT)
Facility: CLINIC | Age: 74
Discharge: HOME | End: 2019-10-24
Attending: SURGERY | Admitting: SURGERY
Payer: COMMERCIAL

## 2019-10-24 ENCOUNTER — ANESTHESIA (OUTPATIENT)
Dept: SURGERY | Facility: CLINIC | Age: 74
End: 2019-10-24
Payer: COMMERCIAL

## 2019-10-24 ENCOUNTER — ANESTHESIA EVENT (OUTPATIENT)
Dept: SURGERY | Facility: CLINIC | Age: 74
End: 2019-10-24
Payer: COMMERCIAL

## 2019-10-24 VITALS
DIASTOLIC BLOOD PRESSURE: 99 MMHG | BODY MASS INDEX: 28.36 KG/M2 | WEIGHT: 214 LBS | RESPIRATION RATE: 16 BRPM | HEART RATE: 73 BPM | OXYGEN SATURATION: 96 % | HEIGHT: 73 IN | SYSTOLIC BLOOD PRESSURE: 153 MMHG | TEMPERATURE: 98.7 F

## 2019-10-24 DIAGNOSIS — K40.90 REDUCIBLE RIGHT INGUINAL HERNIA: Primary | ICD-10-CM

## 2019-10-24 LAB
CREAT SERPL-MCNC: 1 MG/DL (ref 0.66–1.25)
GFR SERPL CREATININE-BSD FRML MDRD: 74 ML/MIN/{1.73_M2}
POTASSIUM SERPL-SCNC: 3.9 MMOL/L (ref 3.4–5.3)

## 2019-10-24 PROCEDURE — 37000009 ZZH ANESTHESIA TECHNICAL FEE, EACH ADDTL 15 MIN: Performed by: SURGERY

## 2019-10-24 PROCEDURE — 71000012 ZZH RECOVERY PHASE 1 LEVEL 1 FIRST HR: Performed by: SURGERY

## 2019-10-24 PROCEDURE — 25000128 H RX IP 250 OP 636: Performed by: ANESTHESIOLOGY

## 2019-10-24 PROCEDURE — 36000087 ZZH SURGERY LEVEL 8 EA 15 ADDTL MIN: Performed by: SURGERY

## 2019-10-24 PROCEDURE — 82565 ASSAY OF CREATININE: CPT | Performed by: ANESTHESIOLOGY

## 2019-10-24 PROCEDURE — 49650 LAP ING HERNIA REPAIR INIT: CPT | Mod: AS | Performed by: PHYSICIAN ASSISTANT

## 2019-10-24 PROCEDURE — 71000027 ZZH RECOVERY PHASE 2 EACH 15 MINS: Performed by: SURGERY

## 2019-10-24 PROCEDURE — 25000128 H RX IP 250 OP 636: Performed by: SURGERY

## 2019-10-24 PROCEDURE — 36000085 ZZH SURGERY LEVEL 8 1ST 30 MIN: Performed by: SURGERY

## 2019-10-24 PROCEDURE — C1781 MESH (IMPLANTABLE): HCPCS | Performed by: SURGERY

## 2019-10-24 PROCEDURE — 25000128 H RX IP 250 OP 636: Performed by: PHYSICIAN ASSISTANT

## 2019-10-24 PROCEDURE — 25000125 ZZHC RX 250: Performed by: NURSE ANESTHETIST, CERTIFIED REGISTERED

## 2019-10-24 PROCEDURE — 25000128 H RX IP 250 OP 636: Performed by: NURSE ANESTHETIST, CERTIFIED REGISTERED

## 2019-10-24 PROCEDURE — 27210794 ZZH OR GENERAL SUPPLY STERILE: Performed by: SURGERY

## 2019-10-24 PROCEDURE — 25800030 ZZH RX IP 258 OP 636: Performed by: ANESTHESIOLOGY

## 2019-10-24 PROCEDURE — 84132 ASSAY OF SERUM POTASSIUM: CPT | Performed by: ANESTHESIOLOGY

## 2019-10-24 PROCEDURE — 40000306 ZZH STATISTIC PRE PROC ASSESS II: Performed by: SURGERY

## 2019-10-24 PROCEDURE — 25000132 ZZH RX MED GY IP 250 OP 250 PS 637: Performed by: SURGERY

## 2019-10-24 PROCEDURE — 49650 LAP ING HERNIA REPAIR INIT: CPT | Mod: RT | Performed by: SURGERY

## 2019-10-24 PROCEDURE — S2900 ROBOTIC SURGICAL SYSTEM: HCPCS | Performed by: SURGERY

## 2019-10-24 PROCEDURE — 37000008 ZZH ANESTHESIA TECHNICAL FEE, 1ST 30 MIN: Performed by: SURGERY

## 2019-10-24 PROCEDURE — 25800030 ZZH RX IP 258 OP 636: Performed by: NURSE ANESTHETIST, CERTIFIED REGISTERED

## 2019-10-24 PROCEDURE — 36415 COLL VENOUS BLD VENIPUNCTURE: CPT | Performed by: ANESTHESIOLOGY

## 2019-10-24 DEVICE — MESH PROGRIP LAPAROSCOPIC 5.9X3.9" PARIETEX SELF-FIX LPG1510: Type: IMPLANTABLE DEVICE | Site: INGUINAL | Status: FUNCTIONAL

## 2019-10-24 RX ORDER — PROPOFOL 10 MG/ML
INJECTION, EMULSION INTRAVENOUS PRN
Status: DISCONTINUED | OUTPATIENT
Start: 2019-10-24 | End: 2019-10-24

## 2019-10-24 RX ORDER — LIDOCAINE 40 MG/G
CREAM TOPICAL
Status: DISCONTINUED | OUTPATIENT
Start: 2019-10-24 | End: 2019-10-24 | Stop reason: HOSPADM

## 2019-10-24 RX ORDER — SODIUM CHLORIDE, SODIUM LACTATE, POTASSIUM CHLORIDE, CALCIUM CHLORIDE 600; 310; 30; 20 MG/100ML; MG/100ML; MG/100ML; MG/100ML
INJECTION, SOLUTION INTRAVENOUS CONTINUOUS
Status: DISCONTINUED | OUTPATIENT
Start: 2019-10-24 | End: 2019-10-24 | Stop reason: HOSPADM

## 2019-10-24 RX ORDER — EPHEDRINE SULFATE 50 MG/ML
INJECTION, SOLUTION INTRAMUSCULAR; INTRAVENOUS; SUBCUTANEOUS PRN
Status: DISCONTINUED | OUTPATIENT
Start: 2019-10-24 | End: 2019-10-24

## 2019-10-24 RX ORDER — CEFAZOLIN SODIUM 2 G/100ML
2 INJECTION, SOLUTION INTRAVENOUS
Status: COMPLETED | OUTPATIENT
Start: 2019-10-24 | End: 2019-10-24

## 2019-10-24 RX ORDER — HYDRALAZINE HYDROCHLORIDE 20 MG/ML
2.5-5 INJECTION INTRAMUSCULAR; INTRAVENOUS EVERY 10 MIN PRN
Status: DISCONTINUED | OUTPATIENT
Start: 2019-10-24 | End: 2019-10-24 | Stop reason: HOSPADM

## 2019-10-24 RX ORDER — FENTANYL CITRATE 50 UG/ML
INJECTION, SOLUTION INTRAMUSCULAR; INTRAVENOUS PRN
Status: DISCONTINUED | OUTPATIENT
Start: 2019-10-24 | End: 2019-10-24

## 2019-10-24 RX ORDER — NEOSTIGMINE METHYLSULFATE 1 MG/ML
VIAL (ML) INJECTION PRN
Status: DISCONTINUED | OUTPATIENT
Start: 2019-10-24 | End: 2019-10-24

## 2019-10-24 RX ORDER — ACETAMINOPHEN 325 MG/1
975 TABLET ORAL ONCE
Status: COMPLETED | OUTPATIENT
Start: 2019-10-24 | End: 2019-10-24

## 2019-10-24 RX ORDER — CEFAZOLIN SODIUM 1 G/3ML
1 INJECTION, POWDER, FOR SOLUTION INTRAMUSCULAR; INTRAVENOUS SEE ADMIN INSTRUCTIONS
Status: DISCONTINUED | OUTPATIENT
Start: 2019-10-24 | End: 2019-10-24 | Stop reason: HOSPADM

## 2019-10-24 RX ORDER — FENTANYL CITRATE 50 UG/ML
25-50 INJECTION, SOLUTION INTRAMUSCULAR; INTRAVENOUS
Status: DISCONTINUED | OUTPATIENT
Start: 2019-10-24 | End: 2019-10-24 | Stop reason: HOSPADM

## 2019-10-24 RX ORDER — MEPERIDINE HYDROCHLORIDE 25 MG/ML
12.5 INJECTION INTRAMUSCULAR; INTRAVENOUS; SUBCUTANEOUS
Status: DISCONTINUED | OUTPATIENT
Start: 2019-10-24 | End: 2019-10-24 | Stop reason: HOSPADM

## 2019-10-24 RX ORDER — GLYCOPYRROLATE 0.2 MG/ML
INJECTION, SOLUTION INTRAMUSCULAR; INTRAVENOUS PRN
Status: DISCONTINUED | OUTPATIENT
Start: 2019-10-24 | End: 2019-10-24

## 2019-10-24 RX ORDER — OXYCODONE HYDROCHLORIDE 5 MG/1
5-10 TABLET ORAL EVERY 4 HOURS PRN
Qty: 10 TABLET | Refills: 0 | Status: SHIPPED | OUTPATIENT
Start: 2019-10-24 | End: 2020-11-03

## 2019-10-24 RX ORDER — ONDANSETRON 2 MG/ML
4 INJECTION INTRAMUSCULAR; INTRAVENOUS EVERY 30 MIN PRN
Status: DISCONTINUED | OUTPATIENT
Start: 2019-10-24 | End: 2019-10-24 | Stop reason: HOSPADM

## 2019-10-24 RX ORDER — ONDANSETRON 4 MG/1
4 TABLET, ORALLY DISINTEGRATING ORAL EVERY 30 MIN PRN
Status: DISCONTINUED | OUTPATIENT
Start: 2019-10-24 | End: 2019-10-24 | Stop reason: HOSPADM

## 2019-10-24 RX ORDER — LABETALOL 20 MG/4 ML (5 MG/ML) INTRAVENOUS SYRINGE
10
Status: DISCONTINUED | OUTPATIENT
Start: 2019-10-24 | End: 2019-10-24 | Stop reason: HOSPADM

## 2019-10-24 RX ORDER — LIDOCAINE HYDROCHLORIDE 10 MG/ML
INJECTION, SOLUTION INFILTRATION; PERINEURAL PRN
Status: DISCONTINUED | OUTPATIENT
Start: 2019-10-24 | End: 2019-10-24

## 2019-10-24 RX ORDER — OXYCODONE HYDROCHLORIDE 5 MG/1
5 TABLET ORAL
Status: COMPLETED | OUTPATIENT
Start: 2019-10-24 | End: 2019-10-24

## 2019-10-24 RX ORDER — BUPIVACAINE HYDROCHLORIDE 2.5 MG/ML
INJECTION, SOLUTION INFILTRATION; PERINEURAL PRN
Status: DISCONTINUED | OUTPATIENT
Start: 2019-10-24 | End: 2019-10-24 | Stop reason: HOSPADM

## 2019-10-24 RX ORDER — TAMSULOSIN HYDROCHLORIDE 0.4 MG/1
0.4 CAPSULE ORAL
Status: DISCONTINUED | OUTPATIENT
Start: 2019-10-24 | End: 2019-10-24 | Stop reason: HOSPADM

## 2019-10-24 RX ORDER — NALOXONE HYDROCHLORIDE 0.4 MG/ML
.1-.4 INJECTION, SOLUTION INTRAMUSCULAR; INTRAVENOUS; SUBCUTANEOUS
Status: DISCONTINUED | OUTPATIENT
Start: 2019-10-24 | End: 2019-10-24 | Stop reason: HOSPADM

## 2019-10-24 RX ORDER — ONDANSETRON 2 MG/ML
INJECTION INTRAMUSCULAR; INTRAVENOUS PRN
Status: DISCONTINUED | OUTPATIENT
Start: 2019-10-24 | End: 2019-10-24

## 2019-10-24 RX ADMIN — ACETAMINOPHEN 975 MG: 325 TABLET, FILM COATED ORAL at 10:18

## 2019-10-24 RX ADMIN — LIDOCAINE HYDROCHLORIDE 50 MG: 10 INJECTION, SOLUTION INFILTRATION; PERINEURAL at 07:48

## 2019-10-24 RX ADMIN — PROPOFOL 150 MG: 10 INJECTION, EMULSION INTRAVENOUS at 07:48

## 2019-10-24 RX ADMIN — FENTANYL CITRATE 100 MCG: 50 INJECTION, SOLUTION INTRAMUSCULAR; INTRAVENOUS at 07:48

## 2019-10-24 RX ADMIN — GLYCOPYRROLATE 0.2 MG: 0.2 INJECTION, SOLUTION INTRAMUSCULAR; INTRAVENOUS at 07:48

## 2019-10-24 RX ADMIN — SODIUM CHLORIDE, POTASSIUM CHLORIDE, SODIUM LACTATE AND CALCIUM CHLORIDE: 600; 310; 30; 20 INJECTION, SOLUTION INTRAVENOUS at 07:37

## 2019-10-24 RX ADMIN — PHENYLEPHRINE HYDROCHLORIDE 100 MCG: 10 INJECTION INTRAVENOUS at 08:00

## 2019-10-24 RX ADMIN — DEXMEDETOMIDINE HYDROCHLORIDE 0.4 MCG/KG/HR: 100 INJECTION, SOLUTION INTRAVENOUS at 07:40

## 2019-10-24 RX ADMIN — Medication 5 MG: at 09:25

## 2019-10-24 RX ADMIN — ROCURONIUM BROMIDE 10 MG: 10 INJECTION INTRAVENOUS at 09:08

## 2019-10-24 RX ADMIN — ROCURONIUM BROMIDE 40 MG: 10 INJECTION INTRAVENOUS at 07:48

## 2019-10-24 RX ADMIN — ROCURONIUM BROMIDE 10 MG: 10 INJECTION INTRAVENOUS at 08:53

## 2019-10-24 RX ADMIN — FENTANYL CITRATE 50 MCG: 50 INJECTION, SOLUTION INTRAMUSCULAR; INTRAVENOUS at 08:22

## 2019-10-24 RX ADMIN — CEFAZOLIN SODIUM 2 G: 2 INJECTION, SOLUTION INTRAVENOUS at 07:37

## 2019-10-24 RX ADMIN — PHENYLEPHRINE HYDROCHLORIDE 50 MCG: 10 INJECTION INTRAVENOUS at 07:55

## 2019-10-24 RX ADMIN — FENTANYL CITRATE 50 MCG: 50 INJECTION, SOLUTION INTRAMUSCULAR; INTRAVENOUS at 09:48

## 2019-10-24 RX ADMIN — ROCURONIUM BROMIDE 10 MG: 10 INJECTION INTRAVENOUS at 08:17

## 2019-10-24 RX ADMIN — GLYCOPYRROLATE 0.8 MG: 0.2 INJECTION, SOLUTION INTRAMUSCULAR; INTRAVENOUS at 09:25

## 2019-10-24 RX ADMIN — SUGAMMADEX 200 MG: 100 INJECTION, SOLUTION INTRAVENOUS at 09:25

## 2019-10-24 RX ADMIN — FENTANYL CITRATE 50 MCG: 50 INJECTION, SOLUTION INTRAMUSCULAR; INTRAVENOUS at 10:07

## 2019-10-24 RX ADMIN — ONDANSETRON HYDROCHLORIDE 4 MG: 2 INJECTION, SOLUTION INTRAVENOUS at 08:23

## 2019-10-24 RX ADMIN — ROCURONIUM BROMIDE 10 MG: 10 INJECTION INTRAVENOUS at 08:22

## 2019-10-24 RX ADMIN — Medication 5 MG: at 07:55

## 2019-10-24 RX ADMIN — SODIUM CHLORIDE, POTASSIUM CHLORIDE, SODIUM LACTATE AND CALCIUM CHLORIDE: 600; 310; 30; 20 INJECTION, SOLUTION INTRAVENOUS at 08:35

## 2019-10-24 RX ADMIN — OXYCODONE HYDROCHLORIDE 5 MG: 5 TABLET ORAL at 10:18

## 2019-10-24 ASSESSMENT — MIFFLIN-ST. JEOR: SCORE: 1764.58

## 2019-10-24 ASSESSMENT — LIFESTYLE VARIABLES: TOBACCO_USE: 1

## 2019-10-24 NOTE — ANESTHESIA PREPROCEDURE EVALUATION
Anesthesia Pre-Procedure Evaluation    Patient: Dev Powell   MRN: [id number] : 1945          Preoperative Diagnosis: right inguinal hernia    Procedure(s):  robotic assisted right inguinal hernia repair with mesh    Past Medical History:   Diagnosis Date     Elevated blood pressure (not hypertension)      Enlarged prostate- has seen  Dr. Solomon in the past - no further PSA's needed as of 2019 - doesn't need to see him again per Dr. Solomon unless gross hematuria  2013     Erectile dysfunction      Gastric ulcer 2007    EGD @ [facility]-EGD - gastric ulcer w/ erosions/ LA grade B erosive esophagitis     GERD (gastroesophageal reflux disease)     worse lying down at night.      Hyperlipidemia LDL goal < 130     lipitor 40mg= calf pain-off since      Hypertension      Lipoma of skin     chest - biopsied at [facility] = benign      Malignant melanoma (H)      Osteoarthritis     mostly hands and knees      Snoring      Thyroid disease      Past Surgical History:   Procedure Laterality Date     COLONOSCOPY  2007    repeat in 2017     ESOPHAGOSCOPY, GASTROSCOPY, DUODENOSCOPY (EGD), COMBINED N/A 2015    Procedure: COMBINED ESOPHAGOSCOPY, GASTROSCOPY, DUODENOSCOPY (EGD);  Surgeon: Ender Dixon MD;  Location: [facility] ESOPHAGOSCOPY, DIAGNOSTIC  2007    EGD - gastric ulcer w/ erosions/ LA grade B erosive esophagitis     wide excision      for malignant melanoma     Anesthesia Evaluation     . Pt has had prior anesthetic.            ROS/MED HX    ENT/Pulmonary:     (+)tobacco use, Current use , . .    Neurologic:  - neg neurologic ROS     Cardiovascular:     (+) Dyslipidemia, hypertension----. : . . . :. .       METS/Exercise Tolerance:     Hematologic:  - neg hematologic  ROS       Musculoskeletal:  - neg musculoskeletal ROS       GI/Hepatic:     (+) GERD Other GI/Hepatic PUD      Renal/Genitourinary:         Endo:     (+) thyroid problem hypothyroidism, .      Psychiatric:  -  "neg psychiatric ROS       Infectious Disease:  - neg infectious disease ROS       Malignancy:         Other:                          Physical Exam  Normal systems: cardiovascular and pulmonary    Airway   Mallampati: II  TM distance: >3 FB  Neck ROM: full    Dental     Cardiovascular       Pulmonary             Lab Results   Component Value Date    WBC 6.4 02/05/2019    HGB 15.2 02/05/2019    HCT 44.7 02/05/2019     02/05/2019     09/12/2019    POTASSIUM 4.5 09/12/2019    CHLORIDE 110 (H) 09/12/2019    CO2 25 09/12/2019    BUN 25 09/12/2019    CR 1.03 09/12/2019     (H) 09/12/2019    HARI 9.6 09/12/2019    MAG 2.2 03/27/2017    ALBUMIN 4.0 09/12/2019    PROTTOTAL 7.6 09/12/2019    ALT 17 09/12/2019    AST 8 09/12/2019    ALKPHOS 104 09/12/2019    BILITOTAL 0.5 09/12/2019    TSH 1.83 03/28/2019    T4 1.20 03/28/2019    T3 94 03/28/2019       Preop Vitals  BP Readings from Last 3 Encounters:   10/07/19 120/80   09/16/19 112/70   09/12/19 96/60    Pulse Readings from Last 3 Encounters:   10/07/19 108   09/16/19 84   09/12/19 113      Resp Readings from Last 3 Encounters:   09/16/19 16   08/01/19 20   04/29/19 22    SpO2 Readings from Last 3 Encounters:   10/07/19 97%   09/16/19 96%   09/12/19 95%      Temp Readings from Last 1 Encounters:   10/07/19 98  F (36.7  C) (Oral)    Ht Readings from Last 1 Encounters:   10/07/19 1.854 m (6' 1\")      Wt Readings from Last 1 Encounters:   10/07/19 98 kg (216 lb)    Estimated body mass index is 28.5 kg/m  as calculated from the following:    Height as of 10/7/19: 1.854 m (6' 1\").    Weight as of 10/7/19: 98 kg (216 lb).       Anesthesia Plan      History & Physical Review  History and physical reviewed and following examination; no interval change.    ASA Status:  3 .    NPO Status:  > 8 hours    Plan for General and ETT with Intravenous and Propofol induction. Maintenance will be Balanced.    PONV prophylaxis:  Ondansetron (or other 5HT-3) and Dexamethasone " or Solumedrol       Postoperative Care  Postoperative pain management:  IV analgesics.      Consents  Anesthetic plan, risks, benefits and alternatives discussed with:  Patient.  Use of blood products discussed: Yes.   Use of blood products discussed with Patient.  Consented to blood products.  .                 Luciano Chan MD                    .

## 2019-10-24 NOTE — ANESTHESIA POSTPROCEDURE EVALUATION
Patient: Dev Powell    Procedure(s):  robotic assisted right inguinal hernia repair with mesh    Diagnosis:right inguinal hernia  Diagnosis Additional Information: No value filed.    Anesthesia Type:  General, ETT    Note:  Anesthesia Post Evaluation    Patient location during evaluation: PACU  Patient participation: Able to fully participate in evaluation  Level of consciousness: awake  Pain management: adequate  Airway patency: patent  Cardiovascular status: acceptable  Respiratory status: acceptable  Hydration status: acceptable  PONV: none             Last vitals:  Vitals:    10/24/19 0941 10/24/19 0945 10/24/19 0950   BP: 134/84 (!) 130/90 129/81   Pulse: 74 74 72   Resp: 24 24 20   Temp: 97.1  F (36.2  C)     SpO2: 100% 98% 99%         Electronically Signed By: Luciano Chan MD  October 24, 2019  10:13 AM

## 2019-10-24 NOTE — DISCHARGE INSTRUCTIONS
HOME CARE FOLLOWING INGUINAL/FEMORAL HERNIA REPAIR  Melany Alvarez, RUPINDER Brooke, CHRYSTAL Gómez & SANDRO Valdovinos    DIET:  No restrictions.  Increased fluid intake is recommended. While taking pain medications, increase dietary fiber or add a fiber supplementation like Metamucil or Citrucel to help prevent constipation - a possible side effect of pain medications.      NAUSEA:  If nauseated from the anesthetic/pain meds; rest in bed, get up cautiously with assistance, and drink clear liquids (juice, tea, broth).    ACTIVITY:  Light Activity -- you may immediately be up and about as tolerated.  Driving -- you may drive when comfortable and off narcotic pain medications.  Light Work -- resume when comfortable off pain medications.  (If you can drive, you probably can work.)  Strenuous Work/Activity -- limit lifting to 20 pounds for 3 weeks.  Active Sports (running, biking, etc.) -- cautiously resume after 4 weeks.    INCISIONAL CARE:    If you have a dressing in place, keep clean and dry for 48 hours; you may replace the gauze if it becomes soiled.    After 48 hours you may remove the dressing and shower.  Do not submerse incision in water for 1 week.    If you have a Dermabond dressing (a type of skin glue), you may shower immediately.    Sutures will absorb and need not be removed.    If present, leave the steri-strips (white paper tapes) in place for 14 days after surgery..    If present, leave Dermabond glue in place until it wears/flakes off.    Expect a variable amount of swelling/black and blue discoloration that may involve the penis/scrotum or labia.    Some numbness around the incision is common.    A lump/ridge under the incision is normal and will gradually resolve.    DISCOMFORT:  Local anesthetic placed at surgery should provide relief for 4-8 hours.  Begin taking pain pills before discomfort is severe.  Take the pain medication with some food, when possible, to minimize side effects.   "Intermittent use of ice packs to the hernia repair site may help during the first 1 - 3 weeks after surgery.  Expect gradual improvement.  Over-the-counter anti-inflammatory medications (I.e. Ibuprofen/Advil/Motrin or Naprosyn/Aleve) may be   used per package instructions in addition to or while tapering off the narcotic pain medications to decrease   swelling and sensitivity at the repair site.  DO NOT TAKE these anti-inflammatory medications if your primary   physician has advised against doing so, or if you have acid reflux, ulcer or bleeding disorder, or take blood   thinner medications.  Call your primary or the surgery office if you have medication questions.     RETURN APPOINTMENT:  Schedule a follow-up visit 2-3 weeks post-op.  Office Phone:  [phone]    CONTACT US IF THE FOLLOWING DEVELOPS:  1.  A fever that is above 101   2.  If there is a large amount of drainage, bleeding, or swelling.  3.  Severe pain that is not relieved by your prescription.  4.  Drainage that is thick, cloudy, yellow, green or white.  5.  Any other questions not answered by \"Frequently Asked Questions\" sheet.    FREQUENTLY ASKED QUESTIONS AFTER SURGERY  Melany Alvarez, RUPINDER Brooke, CHRYSTAL Gómez  & SANDRO Valdovinos      Q:  How should my incision look?    A:  Normally your incision will appear slightly swollen with light redness directly along the incision itself as it heals.  It may feel like a bump or ridge as the healing/scarring happens, and over time (3-4 months) this bump or ridge feeling should slowly go away.  In general, clear or pink watery drainage can be normal at first as your incision heals, but should decrease over time.    Q:  How do I know if my incision is infected?  A:  Look at your incision for signs of infection, like redness around the incision spreading to surrounding skin, or drainage of cloudy or foul-smelling drainage.  If you feel warm, check your temperature to see if you are running a fever.  "   **If any of these things occur, please notify the nurse at our office.  We may need you to come into the office for an incision check.      Q:  How do I take care of my incision?  A:  If you have a dressing in place - Starting the day after surgery, replace the dressing 1-2 times a day until there is no further drainage from the incision.  At that time, a dressing is no longer needed.  Try to minimize tape on the skin if irritation is occurring at the tape sites.  If you have significant irritation from tape on the skin, please call the office to discuss other method of dressing your incision.    Small pieces of tape called  steri-strips  may be present directly overlying your incision; these may be removed 10 days after surgery unless otherwise specified by your surgeon.  If these tapes start to loosen at the ends, you may trim them back until they fall off or are removed.    A:  If you had  Dermabond  tissue glue used as a dressing (this causes your incision to look shiny with a clear covering over it) - This type of dressing wears off with time and does not require more dressings over the top unless it is draining around the glue as it wears off.  Do not apply ointments or lotions over the incisions until the glue has completely worn off.    Q:  There is a piece of tape or a sticky  lead  still on my skin.  Can I remove this?  A:  Sometimes the sticky  leads  used for monitoring during surgery or for evaluation in the emergency department are not all removed while you are in the hospital.  These sometimes have a tab or metal dot on them.  You can easily remove these on your own, like taking off a band-aid.  If there is a gel substance under the  lead , simply wipe/clean it off with a washcloth or paper towel.      Q:  What can I do to minimize constipation (very hard stools, or lack of stools)?  A:  Stay well hydrated.  Increase your dietary fiber intake or take a fiber supplement -with plenty of water.  Walk  around frequently.  You may consider an over-the-counter stool-softener.  Your Pharmacist can assist you with choosing one that is stocked at your pharmacy.  Constipation is also one of the most common side effects of pain medication.  If you are using pain medication, be pro-active and try to PREVENT problems with constipation by taking the steps above BEFORE constipation becomes a problem.    Q:  What do I do if I need more pain medications?  A:  Call the office to receive refills.  Be aware that certain pain meds cannot be called into a pharmacy and actually require a paper prescription.  A change may be made in your pain med as you progress thru your recovery period or if you have side effects to certain meds.    --Pain meds are NOT refilled after 5pm on weekdays, and NOT AT ALL on the weekends, so please look ahead to prevent problems.                  Q:  Why am I having a hard time sleeping now that I am at home?  A:  Many medications you receive while you are in the hospital can impact your sleep for a number of days after your surgery/hospitalization.  Decreased level of activity and naps during the day may also make sleeping at night difficult.  Try to minimize day-time naps, and get up frequently during the day to walk around your home during your recovery time.  Sleep aides may be of some help, but are not recommended for long-term use.      Q:  I am having some back discomfort.  What should I do?  A:  This may be related to certain positioning that was required for your surgery, extended periods of time in bed, or other changes in your overall activity level.  You may try ice, heat, acetaminophen, or ibuprofen to treat this temporarily.  Note that many pain medications have acetaminophen in them and would state this on the prescription bottle.  Be sure not to exceed the maximum of 4000mg per day of acetaminophen.     **If the pain you are having does not resolve, is severe, or is a flare of back pain  you have had on other occasions prior to surgery, please contact your primary physician for further recommendations or for an appointment to be examined at their office.    Q:  Why am I having headaches?  A:  Headaches can be caused by many things:  caffeine withdrawal, use of pain meds, dehydration, high blood pressure, lack of sleep, over-activity/exhaustion, flare-up of usual migraine headaches.  If you feel this is related to muscle tension (a band-like feeling around the head, or a pressure at the low-back of the head) you may try ice or heat to this area.  You may need to drink more fluids (try electrolyte drink like Gatorade), rest, or take your usual migraine medications.   **If your headaches do not resolve, worsen, are accompanied by other symptoms, or if your blood pressure is high, please call your primary physician for recommendation and/or examination.    Q:  I am unable to urinate.  What do I do?  A:  A small percentage of people can have difficulty urinating initially after surgery.  This includes being able to urinate only a very small amount at a time and feeling discomfort or pressure in the very low abdomen.  This is called  urinary retention , and is actually an urgent situation.  Proceed to your nearest Emergency department for evaluation (not an Urgent Care Center).  Sometimes the bladder does not work correctly after certain medications you receive during surgery, or related to certain procedures.  You may need to have a catheter placed until your bladder recovers.  When planning to go to an Emergency department, it may help to call the ER to let them know you are coming in for this problem after a surgery.  This may help you get in quicker to be evaluated.  **If you have symptoms of a urinary tract infection, please contact your primary physician for the proper evaluation and treatment.              If you have other questions, please call the office Monday thru Friday between 8am and 5pm to  discuss with the nurse or physician assistant.  #[phone]    There is a surgeon ON CALL on weekday evenings and over the weekend in case of urgent need only, and may be contacted at the same number.    If you are having an emergency, call 911 or proceed to your nearest emergency department.    Maximum acetaminophen (Tylenol) dose from all sources should not exceed 4 grams (4000 mg) per day.  You have had 975mg to date at 1020am.     You had 1 OXYCODONE at 1020 am.    GENERAL ANESTHESIA OR SEDATION ADULT DISCHARGE INSTRUCTIONS   SPECIAL PRECAUTIONS FOR 24 HOURS AFTER SURGERY    IT IS NOT UNUSUAL TO FEEL LIGHT-HEADED OR FAINT, UP TO 24 HOURS AFTER SURGERY OR WHILE TAKING PAIN MEDICATION.  IF YOU HAVE THESE SYMPTOMS; SIT FOR A FEW MINUTES BEFORE STANDING AND HAVE SOMEONE ASSIST YOU WHEN YOU GET UP TO WALK OR USE THE BATHROOM.    YOU SHOULD REST AND RELAX FOR THE NEXT 24 HOURS AND YOU MUST MAKE ARRANGEMENTS TO HAVE SOMEONE STAY WITH YOU FOR AT LEAST 24 HOURS AFTER YOUR DISCHARGE.  AVOID HAZARDOUS AND STRENUOUS ACTIVITIES.  DO NOT MAKE IMPORTANT DECISIONS FOR 24 HOURS.    DO NOT DRIVE ANY VEHICLE OR OPERATE MECHANICAL EQUIPMENT FOR 24 HOURS FOLLOWING THE END OF YOUR SURGERY.  EVEN THOUGH YOU MAY FEEL NORMAL, YOUR REACTIONS MAY BE AFFECTED BY THE MEDICATION YOU HAVE RECEIVED.    DO NOT DRINK ALCOHOLIC BEVERAGES FOR 24 HOURS FOLLOWING YOUR SURGERY.    DRINK CLEAR LIQUIDS (APPLE JUICE, GINGER ALE, 7-UP, BROTH, ETC.).  PROGRESS TO YOUR REGULAR DIET AS YOU FEEL ABLE.    YOU MAY HAVE A DRY MOUTH, A SORE THROAT, MUSCLES ACHES OR TROUBLE SLEEPING.  THESE SHOULD GO AWAY AFTER 24 HOURS.    CALL YOUR DOCTOR FOR ANY OF THE FOLLOWING:  SIGNS OF INFECTION (FEVER, GROWING TENDERNESS AT THE SURGERY SITE, A LARGE AMOUNT OF DRAINAGE OR BLEEDING, SEVERE PAIN, FOUL-SMELLING DRAINAGE, REDNESS OR SWELLING.    IT HAS BEEN OVER 8 TO 10 HOURS SINCE SURGERY AND YOU ARE STILL NOT ABLE TO URINATE (PASS WATER).

## 2019-10-24 NOTE — ANESTHESIA CARE TRANSFER NOTE
Patient: Dev Powell    Procedure(s):  robotic assisted right inguinal hernia repair with mesh    Diagnosis: right inguinal hernia  Diagnosis Additional Information: No value filed.    Anesthesia Type:   General, ETT     Note:  Airway :Face Mask  Patient transferred to:PACU  Handoff Report: Identifed the Patient, Identified the Reponsible Provider, Reviewed the pertinent medical history, Discussed the surgical course, Reviewed Intra-OP anesthesia mangement and issues during anesthesia, Set expectations for post-procedure period and Allowed opportunity for questions and acknowledgement of understanding      Vitals: (Last set prior to Anesthesia Care Transfer)    CRNA VITALS  10/24/2019 0909 - 10/24/2019 0951      10/24/2019             Pulse:  86    SpO2:  95 %                Electronically Signed By: KASSANDRA Grullon CRNA  October 24, 2019  9:51 AM

## 2019-10-24 NOTE — OP NOTE
General Surgery Brief Operative Note    Pre-operative diagnosis:  right inguinal hernia   Post-operative diagnosis:  Same, indirect   Procedure: Right Inguinal Herniorrhaphy with pro- mesh and robotic assistance   Surgeon: Rom Mccoy MD   Assistant(s): Lucy Vides PA-C The physicians assistant was medically necessary for their expertise in camera management, suctioning, suturing, and retraction.     Anesthesia: General    Estimated blood loss: 5 cc's   Drains placed: None   Complications:  None   Findings:   Moderately large right direct inguinal hernia, no hernia on the left, mild sigmoid diverticulosis without adhesions   Specimens: * No specimens in log *    Indications: This 74-year-old male developed a symptomatic right inguinal hernia and requests repair.  After discussion of surgical approaches including laparoscopic and open surgery as well as incisions, scars, nerve and cord damage and their sequelae including chronic pain, numbness, testicular loss, the use of mesh, the risk of mesh infection, potential need to remove the mesh if it would become infected as well as hernia recurrence, intra-abdominal injuries and adhesion formation with a laparoscopic approach, he wishes to undergo surgery with a laparoscopic approach in the hopes that he can be more active quickly and have less postoperative pain.  He is reviewed literature on the subject, all his questions been answered and he wishes to proceed with surgery.    Description: Patient is brought to the operating room placed supine on the table and after induction of anesthetic the abdomen is shaved prepped and draped in a sterile manner.  A pause is performed, the site had been marked with him in preinduction.  An incision is made in the epigastrium, 20 cm above the pubis and extended to the midline fascia which was then opened.  The peritoneum is opened in the trochars inserted gasses insufflated after after placing a straight viewing  laparoscope through the trocar.  Secondary trochars are placed with laparoscopic guidance.  There is no evidence for underlying bowel or tissue injury.  A tap block is placed on the right side for postop pain relief.  All the trocar sites are similarly anesthetized.  The patient is placed in 12 to 15 degrees of Trendelenburg position instruments are inserted under laparoscopic control.  There is no evidence for hernia on the left, on the right there is a moderately large direct hernia including some perivesicular fat.  An incision was made on the peritoneum and a peritoneal flap was developed down to the pubis then laterally and then the hernia is dissected and reduced.  There is no indirect defect or sac.  The posterior dissection is continued 4 cm posterior to the pubis.  Dissection was carried out to place a large size pro- mesh however, only the standard size is available so 10 x 15 cm pro- mesh was placed over the hernia site and carefully and flatly deployed.    Rom Mccoy MD

## 2019-11-06 DIAGNOSIS — E34.9 TESTOSTERONE DEFICIENCY: ICD-10-CM

## 2019-11-06 DIAGNOSIS — E29.1 HYPOGONADISM IN MALE: ICD-10-CM

## 2019-11-06 RX ORDER — TESTOSTERONE GEL, 1% 10 MG/G
GEL TRANSDERMAL
Qty: 75 G | Refills: 0 | Status: SHIPPED | OUTPATIENT
Start: 2019-11-06 | End: 2020-03-04

## 2019-11-06 NOTE — TELEPHONE ENCOUNTER
Pt just had a davinci ( robot) assisted right inguinal hernia repair with mesh on 10/24/2019 at [facility] with Dr. Mccoy.  Needs a serum testosterone ,CBC  and TSH, Free T4 and total T3 done. Other labs are up to date. Ok for lab only.     PSA up to date.   PSA   Date Value Ref Range Status   03/28/2019 0.65 0 - 4 ug/L Final     Comment:     Assay Method:  Chemiluminescence using Siemens Vista analyzer        Recheck your blood pressure as a nurse only visit appointment or walk in to our pharmacy  (only if you are already a member of our pharmacy blood pressure program) in 1 week or sooner if needed.  Have nursing or pharmacy send me their note.     Needs an overall medical problem follow up in the next 1-2 months.      TSH   Date Value Ref Range Status   03/28/2019 1.83 0.40 - 4.00 mU/L Final      Refill done for 1 tube = 70g.

## 2019-11-06 NOTE — TELEPHONE ENCOUNTER
Requested Prescriptions   Pending Prescriptions Disp Refills     testosterone (ANDROGEL 1 % PUMP) 12.5 MG/ACT (1%) gel [Pharmacy Med Name: TESTOSTERONE 12.5 MG/1.25 GRAM] 75 g      Sig: APPLY 2 PUMPS ONTO CLEAN DRY HAIRLESS SKIN OF SHOULDERS, UPPER ARMS OR ABDOMEN ONCE DAILY       Last Refill:    Disp Refills Start End [facility]   testosterone (ANDROGEL 1 % PUMP) 12.5 MG/ACT (1%) gel 75 g 5 3/28/2019  --   Sig - Route: Place 2 Pump onto the skin daily Apply from dispenser to clean, dry, intact skin of the shoulders, upper arms, or abdomen. - Transdermal     Androgen Agents Failed - 11/6/2019 10:50 AM        Failed - Serum Testosterone on file within past 12 mos     Recent Labs   Lab Test 03/29/18  0746   TESTOSTTOTAL 286           Failed - Refills for this classification require provider review        Failed - Blood pressure under 140/90 in past 6 months     BP Readings from Last 3 Encounters:   10/24/19 (!) 153/99   10/07/19 120/80   09/16/19 112/70           Passed - Patient is of age 12 and older        Passed - Lipid panel on file in past 12 mos     Recent Labs   Lab Test 09/12/19  1048  01/30/15  1305   CHOL 176   < > 237*   TRIG 98   < > 208*   HDL 46   < > 46   *   < > 149*   NHDL 130*   < >  --    VLDL  --   --  42*   CHOLHDLRATIO  --   --  5.2*    < > = values in this interval not displayed.           Passed - ALT on file within past 12 mos     Recent Labs   Lab Test 09/12/19  1048   ALT 17           Passed - Medication is active on med list        Passed - HCT less than 54% on file within past 12 mos     Recent Labs   Lab Test 02/05/19  0748   HCT 44.7           Passed - Serum PSA on file within past 12 mos     Lab Results   Component Value Date    PSA 0.65 03/28/2019           Passed - Patient is not pregnant        Passed - No positive pregnancy test on file within past 12 mos        Passed - Recent (6 mo) or future (30 days) visit within the authorizing provider's specialty        Passed - AST on file  within past 12 mos     Recent Labs   Lab Test 09/12/19  1048   AST 8           Routing refill request to provider for review/approval because:  Drug not on the [facility] refill protocol  Elevated BP      Emily Wilson RN  [facility]

## 2019-11-13 NOTE — TELEPHONE ENCOUNTER
Patient coming in on 11/21 at 730am for a lab appoiointment.  Needs a blood pressure check also ( is not part of the pharmacy program).   Wants to know if a nurse would be able to do his BP at same time as lab appointment? ( on 11/21 around 7:30 to 7:45am)   No nurse only appts open that day.     Please let me know and I can call patient if we need to reschedule his lab appt.     He will make an appt for an office visit later, he will call or schedule that at the time of his lab appt.       Larisa Mei

## 2019-11-13 NOTE — TELEPHONE ENCOUNTER
Schedule pt for:    Next 5 appointments (look out 90 days)    Nov 21, 2019  8:00 AM CST  Nurse Only with [facility]  [facility] [address] [place] [address] [phone]             Called #   Telephone Information:   Mobile [phone]     Advised pt on the information above      Patient stated an understanding and agreed with plan.      Elsi Thomas RN, BSN  [place] [facility]

## 2019-11-21 ENCOUNTER — ALLIED HEALTH/NURSE VISIT (OUTPATIENT)
Dept: NURSING | Facility: CLINIC | Age: 74
End: 2019-11-21
Payer: COMMERCIAL

## 2019-11-21 VITALS
BODY MASS INDEX: 27.61 KG/M2 | OXYGEN SATURATION: 96 % | HEART RATE: 97 BPM | RESPIRATION RATE: 16 BRPM | WEIGHT: 209.3 LBS | SYSTOLIC BLOOD PRESSURE: 102 MMHG | DIASTOLIC BLOOD PRESSURE: 72 MMHG

## 2019-11-21 DIAGNOSIS — E03.9 HYPOTHYROIDISM, UNSPECIFIED TYPE: ICD-10-CM

## 2019-11-21 DIAGNOSIS — I10 ESSENTIAL HYPERTENSION WITH GOAL BLOOD PRESSURE LESS THAN 140/90: Primary | ICD-10-CM

## 2019-11-21 LAB
T4 FREE SERPL-MCNC: 1.38 NG/DL (ref 0.76–1.46)
TSH SERPL DL<=0.005 MIU/L-ACNC: 0.37 MU/L (ref 0.4–4)

## 2019-11-21 PROCEDURE — 84443 ASSAY THYROID STIM HORMONE: CPT | Performed by: FAMILY MEDICINE

## 2019-11-21 PROCEDURE — 36415 COLL VENOUS BLD VENIPUNCTURE: CPT | Performed by: FAMILY MEDICINE

## 2019-11-21 PROCEDURE — 84439 ASSAY OF FREE THYROXINE: CPT | Performed by: FAMILY MEDICINE

## 2019-11-21 NOTE — PROGRESS NOTES
Hypertension Follow-up      Do you check your blood pressure regularly outside of the clinic? Yes     Are you following a low salt diet? Yes    Are your blood pressures ever more than 140 on the top number (systolic) OR more   than 90 on the bottom number (diastolic), for example 140/90? No      How many servings of fruits and vegetables do you eat daily?  2-3    On average, how many sweetened beverages do you drink each day (soda, juice, sweet tea, etc)?   0    How many days per week do you miss taking your medication? 0     Pt presents today with a follow up BP check.  BP Readings from Last 6 Encounters:   10/24/19 (!) 153/99   10/07/19 120/80   09/16/19 112/70   09/12/19 96/60   08/01/19 122/78   07/15/19 118/58     Pt questioned about hydration, Pt reports he could probably be drinking more water.   Pt had no further questions or concerns at this time.     Fred Lewis RN   [facility]

## 2019-12-02 NOTE — RESULT ENCOUNTER NOTE
Please call pt with results below:     -TSH (thyroid stimulating hormone) is abnormal suggesting you are currently slightly overreplaced.  ADVISE: changing your medication dose to 150 micrograms Tues, Thurs, Sat, Sun  alternating with 137mcg MWF, Please Adjust med list , too, please.  And   Recheck tsh, free t4, total t3 in 4-6 weeks. Please  enter future orders for this and pt  can do this as a lab only appointment.   Dx: Hypothyroidism, unspecified type     For additional lab test information, [email or web address] is an excellent reference.

## 2019-12-03 ENCOUNTER — TELEPHONE (OUTPATIENT)
Dept: FAMILY MEDICINE | Facility: CLINIC | Age: 74
End: 2019-12-03

## 2019-12-03 DIAGNOSIS — E03.9 HYPOTHYROIDISM, UNSPECIFIED TYPE: ICD-10-CM

## 2019-12-03 RX ORDER — LEVOTHYROXINE SODIUM 150 UG/1
TABLET ORAL
Qty: 90 TABLET | Refills: 1 | Status: SHIPPED | OUTPATIENT
Start: 2019-12-03 | End: 2020-07-15

## 2019-12-03 RX ORDER — LEVOTHYROXINE SODIUM 137 UG/1
TABLET ORAL
Qty: 90 TABLET | Refills: 0 | Status: SHIPPED | OUTPATIENT
Start: 2019-12-03 | End: 2020-05-21

## 2019-12-03 NOTE — TELEPHONE ENCOUNTER
Reason for Call:  Other returning call    Detailed comments: The patient is returning a call left for him by the nurse. He would like a call back.    Phone Number Patient can be reached at: Cell number on file:    Telephone Information:   Mobile [phone]     Best Time: Anytime    Can we leave a detailed message on this number? YES    Call taken on 12/3/2019 at 9:03 AM by Mervat Cox

## 2019-12-14 DIAGNOSIS — I10 ESSENTIAL HYPERTENSION WITH GOAL BLOOD PRESSURE LESS THAN 140/90: ICD-10-CM

## 2019-12-16 RX ORDER — LISINOPRIL 10 MG/1
TABLET ORAL
Qty: 90 TABLET | Refills: 0 | Status: SHIPPED | OUTPATIENT
Start: 2019-12-16 | End: 2020-04-17

## 2019-12-16 NOTE — TELEPHONE ENCOUNTER
"Requested Prescriptions   Pending Prescriptions Disp Refills     lisinopril (PRINIVIL/ZESTRIL) 10 MG tablet [Pharmacy Med Name: LISINOPRIL 10 MG TABLET]        Last Written Prescription Date:  9.12.19  Last Fill Quantity: 90 tablet,  # refills: 0   Last office visit: 10/7/2019 with prescribing provider:  KHAI Tyson           Future Office Visit:       90 tablet 0     Sig: TAKE 1 TABLET BY MOUTH EVERY DAY       ACE Inhibitors (Including Combos) Protocol Passed - 12/16/2019  8:40 AM        Passed - Blood pressure under 140/90 in past 12 months     BP Readings from Last 3 Encounters:   11/21/19 102/72   10/24/19 (!) 153/99   10/07/19 120/80                 Passed - Recent (12 mo) or future (30 days) visit within the authorizing provider's specialty     Patient has had an office visit with the authorizing provider or a provider within the authorizing providers department within the previous 12 mos or has a future within next 30 days. See \"Patient Info\" tab in inbasket, or \"Choose Columns\" in Meds & Orders section of the refill encounter.              Passed - Medication is active on med list        Passed - Patient is age 18 or older        Passed - Normal serum creatinine on file in past 12 months     Recent Labs   Lab Test 10/24/19  0708   CR 1.00             Passed - Normal serum potassium on file in past 12 months     Recent Labs   Lab Test 10/24/19  0708   POTASSIUM 3.9             "

## 2020-01-10 DIAGNOSIS — E03.9 HYPOTHYROIDISM, UNSPECIFIED TYPE: ICD-10-CM

## 2020-01-10 LAB
T3 SERPL-MCNC: 96 NG/DL (ref 60–181)
T4 FREE SERPL-MCNC: 1.39 NG/DL (ref 0.76–1.46)
TSH SERPL DL<=0.005 MIU/L-ACNC: 0.59 MU/L (ref 0.4–4)

## 2020-01-10 PROCEDURE — 36415 COLL VENOUS BLD VENIPUNCTURE: CPT | Performed by: FAMILY MEDICINE

## 2020-01-10 PROCEDURE — 84480 ASSAY TRIIODOTHYRONINE (T3): CPT | Performed by: FAMILY MEDICINE

## 2020-01-10 PROCEDURE — 84439 ASSAY OF FREE THYROXINE: CPT | Performed by: FAMILY MEDICINE

## 2020-01-10 PROCEDURE — 84443 ASSAY THYROID STIM HORMONE: CPT | Performed by: FAMILY MEDICINE

## 2020-02-21 ENCOUNTER — TELEPHONE (OUTPATIENT)
Dept: FAMILY MEDICINE | Facility: CLINIC | Age: 75
End: 2020-02-21

## 2020-02-21 NOTE — TELEPHONE ENCOUNTER
Please schedule first available or another provider (end of March very busy with multiple providers off) and offer wait list

## 2020-02-21 NOTE — TELEPHONE ENCOUNTER
Reason for Call:  Same Day Appointment, Requested Provider:  Vee Beth MD    PCP: Vee Beth    Reason for visit: Physical     Duration of symptoms:     Have you been treated for this in the past? No    Additional comments: Patient wants to be seen for his physical at end of march     Can we leave a detailed message on this number? YES    Phone number patient can be reached at: Cell number on file:    Telephone Information:   Mobile [phone]       Best Time: anytime    Call taken on 2/21/2020 at 10:29 AM by Camlia Velazquez

## 2020-03-03 DIAGNOSIS — E29.1 HYPOGONADISM IN MALE: ICD-10-CM

## 2020-03-03 DIAGNOSIS — E34.9 TESTOSTERONE DEFICIENCY: ICD-10-CM

## 2020-03-04 RX ORDER — TESTOSTERONE GEL, 1% 10 MG/G
GEL TRANSDERMAL
Qty: 75 G | Refills: 0 | Status: SHIPPED | OUTPATIENT
Start: 2020-03-04 | End: 2020-07-15

## 2020-03-04 NOTE — TELEPHONE ENCOUNTER
Routing refill request to provider for review/approval because:  Drug not on the [facility] refill protocol     Elsi Thomas RN, BSN  [place] [facility]

## 2020-04-17 DIAGNOSIS — I10 ESSENTIAL HYPERTENSION WITH GOAL BLOOD PRESSURE LESS THAN 140/90: ICD-10-CM

## 2020-04-17 RX ORDER — LISINOPRIL 10 MG/1
TABLET ORAL
Qty: 90 TABLET | Refills: 0 | Status: SHIPPED | OUTPATIENT
Start: 2020-04-17 | End: 2020-07-15

## 2020-05-14 ENCOUNTER — HOSPITAL ENCOUNTER (EMERGENCY)
Facility: CLINIC | Age: 75
Discharge: HOME | End: 2020-05-14
Attending: PHYSICIAN ASSISTANT | Admitting: PHYSICIAN ASSISTANT
Payer: COMMERCIAL

## 2020-05-14 ENCOUNTER — APPOINTMENT (OUTPATIENT)
Dept: CT IMAGING | Facility: CLINIC | Age: 75
End: 2020-05-14
Attending: PHYSICIAN ASSISTANT
Payer: COMMERCIAL

## 2020-05-14 ENCOUNTER — APPOINTMENT (OUTPATIENT)
Dept: GENERAL RADIOLOGY | Facility: CLINIC | Age: 75
End: 2020-05-14
Attending: PHYSICIAN ASSISTANT
Payer: COMMERCIAL

## 2020-05-14 ENCOUNTER — NURSE TRIAGE (OUTPATIENT)
Dept: FAMILY MEDICINE | Facility: CLINIC | Age: 75
End: 2020-05-14

## 2020-05-14 ENCOUNTER — OFFICE VISIT (OUTPATIENT)
Dept: FAMILY MEDICINE | Facility: CLINIC | Age: 75
End: 2020-05-14
Payer: COMMERCIAL

## 2020-05-14 VITALS
TEMPERATURE: 98.7 F | RESPIRATION RATE: 17 BRPM | HEART RATE: 79 BPM | OXYGEN SATURATION: 97 % | SYSTOLIC BLOOD PRESSURE: 144 MMHG | DIASTOLIC BLOOD PRESSURE: 101 MMHG

## 2020-05-14 VITALS
SYSTOLIC BLOOD PRESSURE: 128 MMHG | BODY MASS INDEX: 27.7 KG/M2 | DIASTOLIC BLOOD PRESSURE: 86 MMHG | WEIGHT: 209 LBS | TEMPERATURE: 97.7 F | HEIGHT: 73 IN | OXYGEN SATURATION: 97 % | HEART RATE: 97 BPM

## 2020-05-14 DIAGNOSIS — R51.9 NONINTRACTABLE HEADACHE, UNSPECIFIED CHRONICITY PATTERN, UNSPECIFIED HEADACHE TYPE: Primary | ICD-10-CM

## 2020-05-14 DIAGNOSIS — I48.92 ATRIAL FLUTTER, UNSPECIFIED TYPE (H): ICD-10-CM

## 2020-05-14 DIAGNOSIS — E03.9 HYPOTHYROIDISM, UNSPECIFIED TYPE: ICD-10-CM

## 2020-05-14 DIAGNOSIS — R11.0 NAUSEA: ICD-10-CM

## 2020-05-14 DIAGNOSIS — R06.02 SHORTNESS OF BREATH: ICD-10-CM

## 2020-05-14 DIAGNOSIS — H53.9 VISION CHANGES: ICD-10-CM

## 2020-05-14 DIAGNOSIS — R42 DIZZINESS: ICD-10-CM

## 2020-05-14 DIAGNOSIS — R14.2 BELCHING: ICD-10-CM

## 2020-05-14 DIAGNOSIS — R06.00 DYSPNEA, UNSPECIFIED TYPE: ICD-10-CM

## 2020-05-14 LAB
ALBUMIN SERPL-MCNC: 3.6 G/DL (ref 3.4–5)
ALBUMIN UR-MCNC: 20 MG/DL
ALP SERPL-CCNC: 102 U/L (ref 40–150)
ALT SERPL W P-5'-P-CCNC: 19 U/L (ref 0–70)
ANION GAP SERPL CALCULATED.3IONS-SCNC: 6 MMOL/L (ref 3–14)
APPEARANCE UR: CLEAR
AST SERPL W P-5'-P-CCNC: 12 U/L (ref 0–45)
BASOPHILS # BLD AUTO: 0 10E9/L (ref 0–0.2)
BASOPHILS NFR BLD AUTO: 0.3 %
BILIRUB SERPL-MCNC: 0.8 MG/DL (ref 0.2–1.3)
BILIRUB UR QL STRIP: NEGATIVE
BUN SERPL-MCNC: 29 MG/DL (ref 7–30)
CALCIUM SERPL-MCNC: 9.2 MG/DL (ref 8.5–10.1)
CHLORIDE SERPL-SCNC: 107 MMOL/L (ref 94–109)
CO2 SERPL-SCNC: 25 MMOL/L (ref 20–32)
COLOR UR AUTO: YELLOW
CREAT SERPL-MCNC: 1.07 MG/DL (ref 0.66–1.25)
DIFFERENTIAL METHOD BLD: NORMAL
EOSINOPHIL # BLD AUTO: 0.1 10E9/L (ref 0–0.7)
EOSINOPHIL NFR BLD AUTO: 1.7 %
ERYTHROCYTE [DISTWIDTH] IN BLOOD BY AUTOMATED COUNT: 13.6 % (ref 10–15)
ERYTHROCYTE [SEDIMENTATION RATE] IN BLOOD BY WESTERGREN METHOD: 9 MM/H (ref 0–20)
GFR SERPL CREATININE-BSD FRML MDRD: 67 ML/MIN/{1.73_M2}
GLUCOSE SERPL-MCNC: 97 MG/DL (ref 70–99)
GLUCOSE UR STRIP-MCNC: NEGATIVE MG/DL
HCT VFR BLD AUTO: 45.7 % (ref 40–53)
HGB BLD-MCNC: 15.2 G/DL (ref 13.3–17.7)
HGB UR QL STRIP: NEGATIVE
KETONES UR STRIP-MCNC: NEGATIVE MG/DL
LEUKOCYTE ESTERASE UR QL STRIP: NEGATIVE
LIPASE SERPL-CCNC: 105 U/L (ref 73–393)
LYMPHOCYTES # BLD AUTO: 2 10E9/L (ref 0.8–5.3)
LYMPHOCYTES NFR BLD AUTO: 26 %
MCH RBC QN AUTO: 30.6 PG (ref 26.5–33)
MCHC RBC AUTO-ENTMCNC: 33.3 G/DL (ref 31.5–36.5)
MCV RBC AUTO: 92 FL (ref 78–100)
MONOCYTES # BLD AUTO: 0.8 10E9/L (ref 0–1.3)
MONOCYTES NFR BLD AUTO: 10.2 %
MUCOUS THREADS #/AREA URNS LPF: PRESENT /LPF
NEUTROPHILS # BLD AUTO: 4.7 10E9/L (ref 1.6–8.3)
NEUTROPHILS NFR BLD AUTO: 61.8 %
NITRATE UR QL: NEGATIVE
PH UR STRIP: 6.5 PH (ref 5–7)
PLATELET # BLD AUTO: 217 10E9/L (ref 150–450)
POTASSIUM SERPL-SCNC: 4.2 MMOL/L (ref 3.4–5.3)
PROT SERPL-MCNC: 7.8 G/DL (ref 6.8–8.8)
RBC # BLD AUTO: 4.97 10E12/L (ref 4.4–5.9)
RBC #/AREA URNS AUTO: 4 /HPF (ref 0–2)
SODIUM SERPL-SCNC: 138 MMOL/L (ref 133–144)
SOURCE: ABNORMAL
SP GR UR STRIP: 1.03 (ref 1–1.03)
TROPONIN I SERPL-MCNC: <0.015 UG/L (ref 0–0.04)
TSH SERPL DL<=0.005 MIU/L-ACNC: 1.65 MU/L (ref 0.4–4)
UROBILINOGEN UR STRIP-MCNC: NORMAL MG/DL (ref 0–2)
WBC # BLD AUTO: 7.7 10E9/L (ref 4–11)
WBC #/AREA URNS AUTO: 1 /HPF (ref 0–5)

## 2020-05-14 PROCEDURE — 36415 COLL VENOUS BLD VENIPUNCTURE: CPT | Performed by: NURSE PRACTITIONER

## 2020-05-14 PROCEDURE — 84443 ASSAY THYROID STIM HORMONE: CPT | Performed by: NURSE PRACTITIONER

## 2020-05-14 PROCEDURE — 25000128 H RX IP 250 OP 636: Performed by: PHYSICIAN ASSISTANT

## 2020-05-14 PROCEDURE — 71045 X-RAY EXAM CHEST 1 VIEW: CPT

## 2020-05-14 PROCEDURE — 93005 ELECTROCARDIOGRAM TRACING: CPT

## 2020-05-14 PROCEDURE — 99285 EMERGENCY DEPT VISIT HI MDM: CPT | Mod: 25

## 2020-05-14 PROCEDURE — 93000 ELECTROCARDIOGRAM COMPLETE: CPT | Performed by: NURSE PRACTITIONER

## 2020-05-14 PROCEDURE — 86140 C-REACTIVE PROTEIN: CPT | Performed by: NURSE PRACTITIONER

## 2020-05-14 PROCEDURE — 85652 RBC SED RATE AUTOMATED: CPT | Performed by: NURSE PRACTITIONER

## 2020-05-14 PROCEDURE — 85025 COMPLETE CBC W/AUTO DIFF WBC: CPT | Performed by: NURSE PRACTITIONER

## 2020-05-14 PROCEDURE — 74177 CT ABD & PELVIS W/CONTRAST: CPT

## 2020-05-14 PROCEDURE — 84484 ASSAY OF TROPONIN QUANT: CPT | Performed by: PHYSICIAN ASSISTANT

## 2020-05-14 PROCEDURE — 81001 URINALYSIS AUTO W/SCOPE: CPT | Performed by: PHYSICIAN ASSISTANT

## 2020-05-14 PROCEDURE — 83690 ASSAY OF LIPASE: CPT | Performed by: PHYSICIAN ASSISTANT

## 2020-05-14 PROCEDURE — 99215 OFFICE O/P EST HI 40 MIN: CPT | Performed by: NURSE PRACTITIONER

## 2020-05-14 PROCEDURE — 80053 COMPREHEN METABOLIC PANEL: CPT | Performed by: NURSE PRACTITIONER

## 2020-05-14 RX ORDER — IOPAMIDOL 755 MG/ML
100 INJECTION, SOLUTION INTRAVASCULAR ONCE
Status: COMPLETED | OUTPATIENT
Start: 2020-05-14 | End: 2020-05-14

## 2020-05-14 RX ORDER — METOCLOPRAMIDE 5 MG/1
5 TABLET ORAL 4 TIMES DAILY PRN
Qty: 10 TABLET | Refills: 0 | Status: SHIPPED | OUTPATIENT
Start: 2020-05-14 | End: 2020-11-03

## 2020-05-14 RX ORDER — FAMOTIDINE 20 MG/1
20 TABLET, FILM COATED ORAL 2 TIMES DAILY
Qty: 14 TABLET | Refills: 0 | Status: SHIPPED | OUTPATIENT
Start: 2020-05-14 | End: 2020-05-21

## 2020-05-14 RX ORDER — ONDANSETRON 4 MG/1
4 TABLET, ORALLY DISINTEGRATING ORAL EVERY 8 HOURS PRN
Qty: 10 TABLET | Refills: 0 | Status: SHIPPED | OUTPATIENT
Start: 2020-05-14 | End: 2020-05-17

## 2020-05-14 RX ADMIN — IOPAMIDOL 100 ML: 755 INJECTION, SOLUTION INTRAVENOUS at 19:39

## 2020-05-14 ASSESSMENT — ENCOUNTER SYMPTOMS
HEADACHES: 1
CONSTIPATION: 0
VOMITING: 0
BLOOD IN STOOL: 0
CHILLS: 0
COUGH: 1
DIAPHORESIS: 0
CHEST TIGHTNESS: 0
DIARRHEA: 0
FEVER: 0
NUMBNESS: 0
SINUS PRESSURE: 1
WHEEZING: 0
NAUSEA: 1

## 2020-05-14 ASSESSMENT — MIFFLIN-ST. JEOR: SCORE: 1736.9

## 2020-05-14 NOTE — LETTER
[facility]  [address] [place] [address]  [phone]                    May 18, 2020    Dev Powell  [address] [place] [address]      Dear Dev,    Here is a summary of your recent test results:    -All of your labs are normal.     Continue with current plan of care with upcoming appointment with Dr. Beth.     Your test results are enclosed.      Please contact me if you have any questions.    In addition, here is a list of due or overdue Health Maintenance reminders.    Health Maintenance Due   Topic Date Due     Zoster (Shingles) Vaccine (1 of 2) 03/28/1995     PHQ-2  01/01/2020     Discuss Advance Care Planning  01/30/2020     Annual Wellness Visit  03/28/2020     FALL RISK ASSESSMENT  03/28/2020     Lung Cancer Screening (CT Scan)  04/06/2020       Please call us at [phone] (or use [employer]) to address the above recommendations.            Thank you very much for trusting [facility]..     Healthy regards,      Candace Collazo, [facility]-BC         Results for orders placed or performed during the hospital encounter of 05/14/20   XR Chest Port 1 View     Status: None    Narrative    EXAM: XR CHEST PORT 1 VW  LOCATION: [facility]  DATE/TIME: 5/14/2020 6:16 PM    INDICATION: Nausea. Not feeling well.  COMPARISON: None.      Impression    IMPRESSION: Heart size and pulmonary vascularity normal. The lungs are clear.   CT Abdomen Pelvis w Contrast     Status: None    Narrative    EXAM: CT ABDOMEN PELVIS W CONTRAST  LOCATION: [facility]  DATE/TIME: 5/14/2020 7:37 PM    INDICATION: Abd distension, Abd pain, acute, generalized  COMPARISON: None.  TECHNIQUE: CT scan of the abdomen and pelvis was performed following injection of IV contrast. Multiplanar reformats were obtained. Dose reduction techniques were used.  CONTRAST: 100mL Isovue-370    FINDINGS:   LOWER CHEST: Subsegmental atelectasis or scarring both  lower lobes. Coronary artery calcifications.    HEPATOBILIARY: Normal.    PANCREAS: Normal.    SPLEEN: Normal.    ADRENAL GLANDS: Normal.    KIDNEYS/BLADDER: 11 mm fat-containing lesion lower pole right kidney compatible with an angiomyolipoma. No renal calculi or hydronephrosis.    BOWEL: Sigmoid diverticulosis, without evidence for diverticulitis or bowel obstruction. Normal appendix.    LYMPH NODES: Normal.    VASCULATURE: Advanced atherosclerotic disease abdominal aorta and iliac arteries.    PELVIC ORGANS: Prostatic hypertrophy and calcifications.    MUSCULOSKELETAL: Degenerative disc disease L3 level.      Impression    IMPRESSION:   1.  No evidence for bowel obstruction.  2.  Small, incidental angiomyolipoma right kidney.  3.  Sigmoid diverticulosis.  4.  Advanced atherosclerotic disease.     UA reflex to Microscopic and Culture     Status: Abnormal    Specimen: Midstream Urine   Result Value Ref Range    Color Urine Yellow     Appearance Urine Clear     Glucose Urine Negative NEG^Negative mg/dL    Bilirubin Urine Negative NEG^Negative    Ketones Urine Negative NEG^Negative mg/dL    Specific Gravity Urine 1.033 1.003 - 1.035    Blood Urine Negative NEG^Negative    pH Urine 6.5 5.0 - 7.0 pH    Protein Albumin Urine 20 (A) NEG^Negative mg/dL    Urobilinogen mg/dL Normal 0.0 - 2.0 mg/dL    Nitrite Urine Negative NEG^Negative    Leukocyte Esterase Urine Negative NEG^Negative    Source Midstream Urine     RBC Urine 4 (H) 0 - 2 /HPF    WBC Urine 1 0 - 5 /HPF    Mucous Urine Present (A) NEG^Negative /LPF   Lipase     Status: None   Result Value Ref Range    Lipase 105 73 - 393 U/L   Troponin I     Status: None   Result Value Ref Range    Troponin I ES <0.015 0.000 - 0.045 ug/L   EKG 12-lead, tracing only     Status: None   Result Value Ref Range    Interpretation ECG Click View Image link to view waveform and result    Results for orders placed or performed in visit on 05/14/20   TSH with free T4 reflex     Status:  None   Result Value Ref Range    TSH 1.65 0.40 - 4.00 mU/L   CBC with platelets and differential     Status: None   Result Value Ref Range    WBC 7.7 4.0 - 11.0 10e9/L    RBC Count 4.97 4.4 - 5.9 10e12/L    Hemoglobin 15.2 13.3 - 17.7 g/dL    Hematocrit 45.7 40.0 - 53.0 %    MCV 92 78 - 100 fl    MCH 30.6 26.5 - 33.0 pg    MCHC 33.3 31.5 - 36.5 g/dL    RDW 13.6 10.0 - 15.0 %    Platelet Count 217 150 - 450 10e9/L    % Neutrophils 61.8 %    % Lymphocytes 26.0 %    % Monocytes 10.2 %    % Eosinophils 1.7 %    % Basophils 0.3 %    Absolute Neutrophil 4.7 1.6 - 8.3 10e9/L    Absolute Lymphocytes 2.0 0.8 - 5.3 10e9/L    Absolute Monocytes 0.8 0.0 - 1.3 10e9/L    Absolute Eosinophils 0.1 0.0 - 0.7 10e9/L    Absolute Basophils 0.0 0.0 - 0.2 10e9/L    Diff Method Automated Method    Comprehensive metabolic panel (BMP + Alb, Alk Phos, ALT, AST, Total. Bili, TP)     Status: None   Result Value Ref Range    Sodium 138 133 - 144 mmol/L    Potassium 4.2 3.4 - 5.3 mmol/L    Chloride 107 94 - 109 mmol/L    Carbon Dioxide 25 20 - 32 mmol/L    Anion Gap 6 3 - 14 mmol/L    Glucose 97 70 - 99 mg/dL    Urea Nitrogen 29 7 - 30 mg/dL    Creatinine 1.07 0.66 - 1.25 mg/dL    GFR Estimate 67 >60 mL/min/[1.73_m2]    GFR Estimate If Black 78 >60 mL/min/[1.73_m2]    Calcium 9.2 8.5 - 10.1 mg/dL    Bilirubin Total 0.8 0.2 - 1.3 mg/dL    Albumin 3.6 3.4 - 5.0 g/dL    Protein Total 7.8 6.8 - 8.8 g/dL    Alkaline Phosphatase 102 40 - 150 U/L    ALT 19 0 - 70 U/L    AST 12 0 - 45 U/L   CRP, inflammation     Status: None   Result Value Ref Range    CRP Inflammation <2.9 0.0 - 8.0 mg/L   ESR: Erythrocyte sedimentation rate     Status: None   Result Value Ref Range    Sed Rate 9 0 - 20 mm/h

## 2020-05-14 NOTE — PROGRESS NOTES
Subjective     Dev Powell is a 75 year old male who presents to clinic today for the following health issues:    HPI     RN TRIAGE  Pt reports nausea and headache symptoms. Pt described the headache a migraine like but has not been dx with migraines. Pt noted the head hurts mainly in the temples and sometimes causes aura's. Pt stated he has taken tylenol with minimal relief. Pt stated the headaches come and go.   Pt questioned on BP and if been taking medication as prescribed. Pt stated taking medication but unknown as to BP. Pt stated he will take BP reading after off telephone.    Pt stated his most concern would be the nausea. Pt stated he used to drink 7 or more cups of coffee per day. Pt stated he has decreased this and has not seemed to help. Pt declined CP or abdominal pains. Pt stated it just feels like he is going to vomit but doesn't. Pt stated he is taking Omeprazole 40 mg DR currently.     Pt does have the following risk factors:  CARDIAC RISK FACTORS  SEX:                Male  Tobacco:            YES  Hypertension:       YES      Patient biggest complaint is nausea.  Pt described the headache as what he thinks is migraine like but has not had any dx with migraines in the past. Pt noted the head hurts mainly in the temples and sometimes causes visual changes bilaterally worse on left-visual change described as tunnel vision this last happened 2 days ago. Tylenol with minimal relief. Pt stated the headaches come and go.  Patient reports to me that he actually had a cough with fever a week ago and was tested for COVID which was negative at another facility.  He has this result on his phone.  He has concern because he has a high risk wife at home.  He reports he is having some mild shortness of breath when he is active outdoors he denies chest pain.  He is having intermittent dizziness. He denies cough at this time states he has some postnasal drip questions a possible sinus infection causing the headache  and nausea.  Occasionally he takes Sudafed.  Is known hypothyroid-has been working on his thyroid medication dosing November TSH was noted to be low with a thyroid medication change last TSH in January WNL.  Is a smoker. Known hypertension blood pressure under good control with lisinopril.  CAD family history-with father's death of MI at 62.  His cholesterol at goal with Crestor.       TSH   Date Value Ref Range Status   01/10/2020 0.59 0.40 - 4.00 mU/L Final   11/21/2019 0.37 (L) 0.40 - 4.00 mU/L Final   03/28/2019 1.83 0.40 - 4.00 mU/L Final   02/05/2019 4.88 (H) 0.40 - 4.00 mU/L Final   11/21/2018 11.47 (H) 0.40 - 4.00 mU/L Final         BP Readings from Last 6 Encounters:   11/21/19 102/72   10/24/19 (!) 153/99   10/07/19 120/80   09/16/19 112/70   09/12/19 96/60   08/01/19 122/78    Family History:     yes  Exercise:           Minimal     DENIES: CP, SOB, Difficulty Breathing, Numbness/Tingling, Palpitations    Wt Readings from Last 5 Encounters:   05/14/20 94.8 kg (209 lb)   11/21/19 94.9 kg (209 lb 4.8 oz)   10/24/19 97.1 kg (214 lb)   10/07/19 98 kg (216 lb)   09/16/19 98 kg (216 lb)       Patient Active Problem List   Diagnosis     Gastroesophageal reflux disease without esophagitis = ran out of PPI- has been taking lots of TUMS     Hyperlipidemia LDL goal <130 - pravastatin = leg cramps; lipitor =calf pains     Snoring     Vasculogenic erectile dysfunction, unspecified vasculogenic erectile dysfunction type     Osteoarthritis     Lipoma of skin     Gastric ulcer     Malignant melanoma of left upper extremity including shoulder (H)     Cigarette smoker one half pack a day or less- for > 45 years      Enlarged prostate- has seen  Dr. Solomon in the past - no further PSA's needed as of 7/2019 - doesn't need to see him again per Dr. Solomon unless gross hematuria      Rectal pain     Hearing loss     Inguinal hernia- right      Pulmonary nodules- tiny per screening chest CT -  repeat CT chest low dose w/o  contrast 2017 = no change since  - no need for further CT's      Essential hypertension with goal blood pressure less than 140/90     Testosterone deficiency     Hypothyroidism, unspecified type     Chronic constipation     Mass of left chest wall - ? there for 18+ years -left breast 1cm mass at 7:30      Multiple pigmented nevi     Reducible right inguinal hernia     Undiagnosed cardiac murmurs     Elevated fasting glucose     Tubular adenoma 2019 - repeat 5 years     Past Surgical History:   Procedure Laterality Date     COLONOSCOPY  2007    repeat in      DAVINCI HERNIORRHAPHY INGUINAL Right 10/24/2019    Procedure: robotic assisted right inguinal hernia repair with mesh;  Surgeon: Rom Mccoy MD;  Location:  OR     ESOPHAGOSCOPY, GASTROSCOPY, DUODENOSCOPY (EGD), COMBINED N/A 2015    Procedure: COMBINED ESOPHAGOSCOPY, GASTROSCOPY, DUODENOSCOPY (EGD);  Surgeon: Ender Dixon MD;  Location:  GI     HC ESOPHAGOSCOPY, DIAGNOSTIC  2007    EGD - gastric ulcer w/ erosions/ LA grade B erosive esophagitis     wide excision      for malignant melanoma       Social History     Tobacco Use     Smoking status: Current Every Day Smoker     Packs/day: 0.50     Years: 60.00     Pack years: 30.00     Smokeless tobacco: Never Used     Tobacco comment: strongly encourage smoking cessation with every visit   Substance Use Topics     Alcohol use: Yes     Alcohol/week: 0.0 standard drinks     Comment: not regular - very occasional 1-2 martinis when out to dinner 1-2x/month, if that      Family History   Problem Relation Age of Onset     C.A.D. Father 62         at age 62 of MI     Diabetes Father         early type 2      Neurologic Disorder Mother         mild dementia - @ 92     C.A.D. Mother         angina      Thyroid Disease Mother         s/p thyroid      Colon Cancer No family hx of          Current Outpatient Medications   Medication Sig Dispense Refill      "Glucosamine-Chondroit-Vit C-Mn (GLUCOSAMINE CHONDROITIN 1500 COMPLEX) CAPS Take by mouth daily       levothyroxine (SYNTHROID/LEVOTHROID) 137 MCG tablet Take this tablet Monday, Weds, Friday and alternate the other days with 150 mcg 90 tablet 0     levothyroxine (SYNTHROID/LEVOTHROID) 150 MCG tablet Take 150 mcg table Tuesday, Thursday, Saturday and Sunday then alternate with 137 mcg the other days 90 tablet 1     lisinopril (ZESTRIL) 10 MG tablet TAKE 1 TABLET BY MOUTH EVERY DAY 90 tablet 0     Multiple Vitamins-Minerals (CENTRUM SILVER) per tablet Take 1 tablet by mouth daily 30 tablet      omeprazole (PRILOSEC) 40 MG DR capsule TAKE 1 CAPSULE BY MOUTH ONCE DAILY TAKE 30-60 MINUTES BEFORE A MEAL. 90 capsule 1     oxyCODONE (ROXICODONE) 5 MG tablet Take 1-2 tablets (5-10 mg) by mouth every 4 hours as needed for moderate to severe pain 10 tablet 0     rosuvastatin (CRESTOR) 10 MG tablet Take 1 tablet (10 mg) by mouth At Bedtime For cholesterol 90 tablet 1     sildenafil (REVATIO) 20 MG tablet Take 2-3 tablets (40-60 mg) by mouth twice a week 30 tablet 11     tamsulosin (FLOMAX) 0.4 MG capsule Take 1 tablet by mouth daily for 7 days-Begin 4 days before surgery, including morning of surgery with a sip of water 7 capsule 0     testosterone (ANDROGEL 1 % PUMP) 12.5 MG/ACT (1%) gel APPLY 2 PUMPS ONTO CLEAN DRY HAIRLESS SKIN OF SHOULDERS, UPPER ARMS OR ABDOMEN ONCE DAILY 75 g 0       Reviewed and updated as needed this visit by Provider  Tobacco  Allergies  Meds  Problems  Med Hx  Surg Hx  Fam Hx         Review of Systems   Constitutional, HEENT, cardiovascular, pulmonary, GI, , musculoskeletal, neuro, skin, endocrine and psych systems are negative, except as otherwise noted in the HPI.      Objective    /86 (BP Location: Right arm, Patient Position: Sitting, Cuff Size: Adult Regular)   Pulse 97   Temp 97.7  F (36.5  C) (Oral)   Ht 1.854 m (6' 1\")   Wt 94.8 kg (209 lb)   SpO2 97%   BMI 27.57 kg/m  "   Body mass index is 27.57 kg/m .  Physical Exam   GENERAL: healthy, alert and no distress  EYES: Eyes grossly normal to inspection, PERRL and conjunctivae and sclerae normal  HENT: ear canals and TM's normal, nose and mouth without ulcers or lesions  NECK: no adenopathy, no asymmetry, masses, or scars and thyroid normal to palpation  RESP: lungs clear to auscultation - no rales, rhonchi, scattered intermittent wheezes  CV: regular rate and rhythm, normal S1 S2, no S3 or S4, no murmur, click or rub, no peripheral edema and peripheral pulses strong  ABDOMEN: soft, nontender, no hepatosplenomegaly, no masses and bowel sounds normal  MS: no gross musculoskeletal defects noted, no edema  SKIN: no suspicious lesions or rashes  NEURO: Normal strength and tone, mentation intact and speech normal  PSYCH: mentation appears normal, affect normal/bright;CN 2-12 intact negative romberg  LYMPH: no cervical, supraclavicular, axillary, or inguinal adenopathy    Diagnostic Test Results:  EKG: EKG to appeared be in A- flutter although this could represent artifact.  This is different from previous EKG done in August 2019.    Labs reviewed in Epic  CBC within normal limits sed rate within normal limits other labs pending.      Assessment & Plan     Dev was seen today for headache.    Diagnoses and all orders for this visit:    Nonintractable headache, unspecified chronicity pattern, unspecified headache type  Vision changes  Nausea  Dizziness  Shortness of breath  Possibility of COVID illness patient had cough and fever however recently negative on testing.   Concerned with headache and visual changes which are intermittent and nausea is so profound.  Also having dizziness and shortness of breath.  Labs that are resulted in clinic are within normal limits.  EKG questionable A- flutter versus artifact.  This was however was repeated multiple times.  Will send patient to the emergency room for proper evaluation tonight given his  "symptoms to rule out concerning cause.  -     CBC with platelets and differential  -     Comprehensive metabolic panel (BMP + Alb, Alk Phos, ALT, AST, Total. Bili, TP)  -     CRP, inflammation  -     ESR: Erythrocyte sedimentation rate  -     EKG 12-lead complete w/read - Clinics    Hypothyroidism, unspecified type  Lab pending.     -     TSH with free T4 reflex    Atrial flutter, unspecified type (H)  Questionable as EKG could represent artifact. Reviewed with Dr. Morris he agrees. Given patient's other symptoms ER is appropriate to check on all of his symptoms with a better workup to rule out concerning causes.     Tobacco Cessation:   reports that he has been smoking. He has a 30.00 pack-year smoking history. He has never used smokeless tobacco.    BMI:   Estimated body mass index is 27.57 kg/m  as calculated from the following:    Height as of this encounter: 1.854 m (6' 1\").    Weight as of this encounter: 94.8 kg (209 lb).         Return in about 1 week (around 5/21/2020) for Recheck.        [facility] [place]    "

## 2020-05-14 NOTE — TELEPHONE ENCOUNTER
Message handled by Nurse Triage with [facility] - provider name: AL-CNP.  Pt ok to be seen today for cardiac workup, labs.       Covid testing done at [facility] in [place], May 5th. Pt has result on smart phone.  No current fever, cough or SOB (without exertion).  Pt stated he can be seen today, about 45 minutes out. Patient stated an understanding and agreed with plan.    Fred Lewis RN   [facility] Triage

## 2020-05-14 NOTE — ED AVS SNAPSHOT
[facility] Emergency Department  [address]  [facility] [address]  Phone:  [phone]  Fax:  [phone]                                    Dev Powell   MRN: [id number]    Department:  [facility] Emergency Department   Date of Visit:  5/14/2020           After Visit Summary Signature Page    I have received my discharge instructions, and my questions have been answered. I have discussed any challenges I see with this plan with the nurse or doctor.    ..........................................................................................................................................  Patient/Patient Representative Signature      ..........................................................................................................................................  Patient Representative Print Name and Relationship to Patient    ..................................................               ................................................  Date                                   Time    ..........................................................................................................................................  Reviewed by Signature/Title    ...................................................              ..............................................  Date                                               Time          22EPIC Rev 08/18

## 2020-05-14 NOTE — ED TRIAGE NOTES
Pt presents with headaches and nausea that has been ongoing for 2 weeks. Denies chest pain, states he has SOB intermittently. Pt alert, oriented x3 ABCs intact

## 2020-05-14 NOTE — ED PROVIDER NOTES
History     Chief Complaint:  Nausea    HPI   Dev Powell is a 75 year old male with a history of hypertension and malignant skin cancer who presents to the emergency department for evaluation of nausea. He notes for the last few weeks experiencing nausea. It is persistent throughout the day and worsens as the day goes on. He also voices having episodic migraine headaches, 1-2 times per week, associated aura, improve spontaneously, not present at this time.   Denies fever, chills, sweats, change in vision, facial numbness, chest tightness/pain, wheezing, abdominal pain or distension, vomiting, diarrhea, constipation, dark or bloody stool, rash.  Admits to nasal congestion, sinus pressure, post nasal drip, cough for a few weeks now. At times he does experience SOB sometimes with activity and other times independent of activity. Not present at this time.     Allergies:  No Known Drug Allergies    Medications:    Glucosamine Chondroitin   Synthroid  Zestril  Prilosec  Roxicodone  Crestor  Revatio  Flomax    Past Medical History:    Enlarged prostate  Erectile dysfunction  Gastric ulcer  Gastroesophageal reflux disease  Hyperlipidemia  Hypertension  Skin lipoma  Malignant melanoma  Osteoarthritis  Snoring   Thyroid disease  Chronic constipation  Hernia     Past Surgical History:    Colonoscopy  DaVinci Herniorrhaphy  Esophagoscopy, gastroscopy, duodenoscopy, combined  Esophagoscopy   Wide excision     Family History:    Father: coronary artery disease, diabetes, myocardial infarction  Mother: dementia, coronary artery disease,thyroid disease     Social History:  Smoking Status: Current everyday  Smokeless Tobacco: Never  Alcohol Use: Yes  Drug Use: No  Marital Status:       Review of Systems   Constitutional: Negative for chills, diaphoresis and fever.   HENT: Positive for congestion, postnasal drip and sinus pressure.    Eyes: Negative for visual disturbance.   Respiratory: Positive for cough. Negative for  chest tightness and wheezing.    Cardiovascular: Negative for chest pain.   Gastrointestinal: Positive for nausea. Negative for blood in stool, constipation, diarrhea and vomiting.   Skin: Negative for rash.   Neurological: Positive for headaches. Negative for numbness.   All other systems reviewed and are negative.    Physical Exam   Vitals:  Patient Vitals for the past 24 hrs:   BP Temp Temp src Pulse Heart Rate Resp SpO2   05/14/20 2015 -- -- -- -- 77 19 96 %   05/14/20 2000 (!) 131/91 -- -- -- -- -- --   05/14/20 1930 (!) 139/100 -- -- 79 76 23 96 %   05/14/20 1845 (!) 127/91 -- -- -- 78 20 96 %   05/14/20 1745 (!) 139/91 -- -- -- 105 22 96 %   05/14/20 1714 (!) 146/102 98.7  F (37.1  C) Temporal 91 91 16 97 %     Physical Exam  Vitals signs and nursing note reviewed.   Constitutional:       General: He is not in acute distress.     Appearance: He is not diaphoretic.   HENT:      Head: Normocephalic and atraumatic.   Eyes:      General: No scleral icterus.     Extraocular Movements: Extraocular movements intact.   Cardiovascular:      Rate and Rhythm: Normal rate and regular rhythm.      Pulses: Normal pulses.      Heart sounds: Normal heart sounds.   Pulmonary:      Effort: Pulmonary effort is normal. No respiratory distress.      Breath sounds: Normal breath sounds.   Abdominal:      Palpations: Abdomen is soft.      Tenderness: There is no abdominal tenderness.   Musculoskeletal:         General: No tenderness.      Right lower leg: No edema.      Left lower leg: No edema.   Skin:     General: Skin is warm.      Findings: No rash.   Neurological:      Mental Status: He is alert.       Emergency Department Course   ECG:  Indication: shortness of breath   Completed at 1739.  Read at 1805.   Rate 89 bpm. CO interval 212. QRS duration 90. QT/QTc 366/445. P-R-T axes 32 29 24.  Sinus rhythm with 1st degree A-V block  Otherwise normal ECG  When compared with ECG of 01-AUG-2019 16:06,  CO interval has  increased    Imaging:  Radiographic findings were communicated with the patient who voiced understanding of the findings.    XR Chest Port 1 View  Heart size and pulmonary vascularity normal. The lungs are clear.  Reading per radiology.    CT Abdomen Pelvis W Contrast   1.  No evidence for bowel obstruction.  2.  Small, incidental angiomyolipoma right kidney.  3.  Sigmoid diverticulosis.  4.  Advanced atherosclerotic disease.  Reading per radiology.    Laboratory:  Troponin (Collected 1739): <0.015     Lipase: 105   UA with micro: Protein Albumin 20, RBC 4 (H), Mucous present o/w negative    Emergency Department Course:  Past medical records, nursing notes, and vitals reviewed.    1720 I performed an exam of the patient as documented above.     EKG obtained in the ED, see results above.   IV was inserted and blood was drawn for laboratory testing, results above.  The patient provided a urine sample here in the emergency department. This was sent for laboratory testing, findings above.  The patient was sent for a chest XR while in the emergency department, results above.     2025 I rechecked the patient and discussed the results of his workup thus far.     Findings and plan explained to the Patient. Patient discharged home with instructions regarding supportive care, medications, and reasons to return. The importance of close follow-up was reviewed. The patient was prescribed Pepcid, Reglan and Zofran.    I personally reviewed the laboratory results with the Patient and answered all related questions prior to discharge.    Impression & Plan      Medical Decision Making:  He presents for evaluation of primarily belching and nausea. Here he is afebrile, has a benign abdominal examination, and reassuring vital signs aside from hypertension. His EKG demonstrates no dysrhythmia, STEMI. While cardiac monitored he is without dysrhythmia during his ED stay. His troponin was without elevation and given the duration of his  symptoms should be adequate for evaluation of NSTEMI or myocardial injury. His outpatient labs were reviewed and reassuring. Lipase was added without changes of pancreatitis. UA also obtained without findings to suggest UTI or kidney stone. CT imaging obtained to evaluate for mass effect, obstructive process, and this was unremarkable for any acute processes. Discussion regarding need for outpatient PCP follow up, potential upper endoscopy, and outpatient symptomatic management.     Diagnosis:    ICD-10-CM    1. Shortness of breath  R06.02    2. Belching  R14.2    3. Nausea  R11.0        Disposition:  discharged to home    Discharge Medications:  New Prescriptions    FAMOTIDINE (PEPCID) 20 MG TABLET    Take 1 tablet (20 mg) by mouth 2 times daily for 7 days    METOCLOPRAMIDE (REGLAN) 5 MG TABLET    Take 1 tablet (5 mg) by mouth 4 times daily as needed (belching, nausea)    ONDANSETRON (ZOFRAN ODT) 4 MG ODT TAB    Take 1 tablet (4 mg) by mouth every 8 hours as needed for nausea     Manuelito BLAKE, am serving as a scribe on 5/14/2020 at 5:20 PM to personally document services performed by Michael Gregorio PA-C based on my observations and the provider's statements to me.   Manuelito Bazzi  5/14/2020   [facility] EMERGENCY DEPARTMENT       Michael Gregorio PA-C  05/14/20 2038

## 2020-05-14 NOTE — TELEPHONE ENCOUNTER
Called # [phone]       Pt calling to report nausea and headache symptoms. Pt described the headache a migraine like but has not been dx with migraines. Pt noted the head hurts mainly in the temples and sometimes causes aura's. Pt stated he has taken tylenol with minimal relief. Pt stated the headaches come and go.   Pt questioned on BP and if been taking medication as prescribed. Pt stated taking medication but unknown as to BP. Pt stated he will take BP reading after off telephone.     Pt stated his most concern would be the nausea. Pt stated he used to drink 7 or more cups of coffee per day. Pt stated he has decreased this and has not seemed to help. Pt declined CP or abdominal pains. Pt stated it just feels like he is going to vomit but doesn't. Pt stated he is taking Omeprazole 40 mg DR currently.   Pt does have the following risk factors:  CARDIAC RISK FACTORS  SEX:                Male  Tobacco:            YES  Hypertension:       YES  BP Readings from Last 6 Encounters:   11/21/19 102/72   10/24/19 (!) 153/99   10/07/19 120/80   09/16/19 112/70   09/12/19 96/60   08/01/19 122/78     Diabetes:             Lab Results   Component Value Date    A1C 5.9 09/12/2019    A1C 6.0 03/28/2019    A1C 5.9 07/27/2018     Lipids:   Cholesterol   Date Value Ref Range Status   09/12/2019 176 <200 mg/dL Final   03/28/2019 187 <200 mg/dL Final     HDL Cholesterol   Date Value Ref Range Status   09/12/2019 46 >39 mg/dL Final   03/28/2019 60 >39 mg/dL Final     LDL Cholesterol Calculated   Date Value Ref Range Status   09/12/2019 110 (H) <100 mg/dL Final     Comment:     Above desirable:  100-129 mg/dl  Borderline High:  130-159 mg/dL  High:             160-189 mg/dL  Very high:       >189 mg/dl     03/28/2019 116 (H) <100 mg/dL Final     Comment:     Above desirable:  100-129 mg/dl  Borderline High:  130-159 mg/dL  High:             160-189 mg/dL  Very high:       >189 mg/dl       Triglycerides   Date Value Ref Range Status    09/12/2019 98 <150 mg/dL Final   03/28/2019 57 <150 mg/dL Final     Cholesterol/HDL Ratio   Date Value Ref Range Status   01/30/2015 5.2 (H) 0.0 - 5.0 Final   04/30/2014 5.8 (H) 0.0 - 5.0 Final     Family History:     yes  Exercise:           Minimal    DENIES: CP, SOB, Difficulty Breathing, Numbness/Tingling, Palpitations    Pt did report a mild fever several weeks ago, went in for Covid testing which came back negative.    Routing to PCP to review and advise if OV needed for further evaluation.    Additional Information    Negative: Shock suspected (e.g., cold/pale/clammy skin, too weak to stand, low BP, rapid pulse)    Negative: Sounds like a life-threatening emergency to the triager    Negative: Nausea or vomiting and pregnancy < 20 weeks    Negative: Menstrual Period - Missed or Late (i.e., pregnancy suspected)    Negative: Heat exhaustion suspected (i.e., dehydration from heat exposure)    Negative: Anxiety or stress suspected (i.e., nausea with anxiety attacks or stressful situations)    Negative: Traumatic Brain Injury (TBI) suspected    Negative: Vomiting occurs    Negative: Other symptom is present, see that guideline.  (e.g., chest pain, headache, dizziness, abdominal pain, colds, sore throat, etc.).    Negative: Unable to walk, or can only walk with assistance (e.g., requires support)    Negative: Difficulty breathing    Negative: Insulin-dependent diabetes (Type I) and glucose > 400 mg/dL (22 mmol/L)    Negative: Drinking very little and dehydration suspected (e.g., no urine > 12 hours, very dry mouth, very lightheaded)    Negative: Patient sounds very sick or weak to the triager    Negative: Fever > 104 F (40 C)    Negative: Fever > 101 F (38.3 C) and age > 60    Negative: Fever > 100.0 F (37.8 C) and bedridden (e.g., nursing home patient, CVA, chronic illness, recovering from surgery)    Negative: Fever > 100.0 F (37.8 C) and diabetes mellitus or weak immune system (e.g., HIV positive, cancer chemo,  splenectomy, chronic steroids)    Negative: Taking any of the following medications: digoxin (Lanoxin), lithium, theophylline, phenytoin (Dilantin)    Negative: Yellowish color of the skin or white of the eye (i.e., jaundice)    Negative: Fever present > 3 days (72 hours)    Negative: Patient wants to be seen    Negative: Receiving cancer chemotherapy medication    Negative: Taking prescription medication that could cause nausea (e.g., narcotics/opiates, antibiotics, OCPs, many others)    Nausea lasts > 1 week    Alcohol or drug abuse, known or suspected    Negative: Difficult to awaken or acting confused (e.g., disoriented, slurred speech)    Negative: Weakness of the face, arm or leg on one side of the body and new onset    Negative: Numbness of the face, arm or leg on one side of the body and new onset    Negative: Loss of speech or garbled speech and new onset    Negative: Passed out (i.e., fainted, collapsed and was not responding)    Negative: Sounds like a life-threatening emergency to the triager    Negative: Followed a head injury within last 3 days    Negative: Traumatic Brain Injury (TBI) is suspected    Negative: Sinus pain of forehead and yellow or green nasal discharge    Negative: Pregnant    Negative: Unable to walk without falling    Negative: Stiff neck (can't touch chin to chest)    Negative: Possibility of carbon monoxide exposure    Negative: SEVERE headache, states 'worst headache' of life    Negative: SEVERE headache, sudden onset (i.e., reaching maximum intensity within 30 seconds)    Negative: Severe pain in one eye    Negative: Loss of vision or double vision    Negative: Patient sounds very sick or weak to the triager    Negative: Fever > 103 F (39.4 C)    Negative: Fever > 100.0 F (37.8 C) and has diabetes mellitus or a weak immune system (e.g., HIV positive, cancer chemotherapy, organ transplant, splenectomy, chronic steroids)    Negative: SEVERE headache (e.g., excruciating) and has had  "severe headaches before    Negative: SEVERE headache and not relieved by pain meds    Negative: SEVERE headache and vomiting    Negative: SEVERE headache and fever    Negative: New headache and weak immune system (e.g., HIV positive, cancer chemotherapy, chronic steroid treatment)    Negative: Fever present > 3 days (72 hours)    Negative: Patient wants to be seen    Negative: Unexplained headache that is present > 24 hours    Negative: Headache started during sex    New headache and age > 50    Headache is a chronic symptom (recurrent or ongoing AND lasting > 4 weeks)    MILD-MODERATE headache    Negative: Similar to previously diagnosed migraine headaches    Negative: Similar to previously diagnosed muscle-tension headaches    Answer Assessment - Initial Assessment Questions  1. LOCATION: \"Where does it hurt?\"       Migraine type of headache, temples mostly  2. ONSET: \"When did the headache start?\" (Minutes, hours or days)       Couple weeks ago, comes and goes  3. PATTERN: \"Does the pain come and go, or has it been constant since it started?\"      Comes and goes   4. SEVERITY: \"How bad is the pain?\" and \"What does it keep you from doing?\"  (e.g., Scale 1-10; mild, moderate, or severe)    - MILD (1-3): doesn't interfere with normal activities     - MODERATE (4-7): interferes with normal activities or awakens from sleep     - SEVERE (8-10): excruciating pain, unable to do any normal activities         6/10 at worse  5. RECURRENT SYMPTOM: \"Have you ever had headaches before?\" If so, ask: \"When was the last time?\" and \"What happened that time?\"       Yes,  6. CAUSE: \"What do you think is causing the headache?\"      unknown  7. MIGRAINE: \"Have you been diagnosed with migraine headaches?\" If so, ask: \"Is this headache similar?\"       no  8. HEAD INJURY: \"Has there been any recent injury to the head?\"       no  9. OTHER SYMPTOMS: \"Do you have any other symptoms?\" (fever, stiff neck, eye pain, sore throat, cold " "symptoms)      Pt had a slight fever on the 5th, was tested for Covid (negative)  10. PREGNANCY: \"Is there any chance you are pregnant?\" \"When was your last menstrual period?\"        no    Answer Assessment - Initial Assessment Questions  1. NAUSEA SEVERITY: \"How bad is the nausea?\" (e.g., mild, moderate, severe; dehydration, weight loss)    - MILD: loss of appetite without change in eating habits    - MODERATE: decreased oral intake without significant weight loss, dehydration, or malnutrition    - SEVERE: inadequate caloric or fluid intake, significant weight loss, symptoms of dehydration      No weight loss actually   2. ONSET: \"When did the nausea begin?\"      Several weeks ago  3. VOMITING: \"Any vomiting?\" If so, ask: \"How many times today?\"      no  4. RECURRENT SYMPTOM: \"Have you had nausea before?\" If so, ask: \"When was the last time?\" \"What happened that time?\"      no  5. CAUSE: \"What do you think is causing the nausea?\"      unknown  6. PREGNANCY: \"Is there any chance you are pregnant?\" (e.g., unprotected intercourse, missed birth control pill, broken condom)      No    Protocols used: NAUSEA-A-OH, HEADACHE-A-OH    Fred Lewis RN   [facility] Triage  "

## 2020-05-14 NOTE — PATIENT INSTRUCTIONS
Patient Education     Understanding Atrial Flutter    An arrhythmia is any problem with the speed or pattern of the heartbeat. Atrial flutter is a type of arrhythmia. It causes the heart to beat faster than normal. Atrial flutter can increase the risk for certain serious problems, such as stroke. Your healthcare provider will need to monitor and manage it.  What happens during atrial flutter?  The heart has an electrical system that sends signals to control the heartbeat. As signals move through the heart, they tell the heart s upper chambers (atria) and lower chambers (ventricles) when to squeeze (contract) and relax. This lets blood move through the heart and out to the body and lungs.  With atrial flutter, an abnormal electrical loop (circuit) causes the atria to contract too quickly. This results in a fast, steady heartbeat. Because the atria are not dodie normally, blood may pool in the atria instead of moving into the ventricles. This can increase the risk for blood clots and stroke. The ventricles also contract too quickly. As a result, they may not pump blood to the body and lungs as well as they should. This can weaken the heart muscle over time and cause heart failure.   What causes of atrial flutter?  Many things can cause atrial flutter. They include:    Coronary artery disease    Heart valve disease    Heart attack    Heart surgery    High blood pressure    Thyroid disease    Diabetes    Lung disease    Sleep apnea    Heavy alcohol use  What are the symptoms of atrial flutter?  Atrial flutter may or may not cause symptoms. If symptoms do occur, they may include:    A fast, pounding heartbeat    Shortness of breath    Tiredness    Dizziness or fainting    Chest pain  How is atrial flutter treated?  Treatment for atrial flutter may include any of the options below.    Medicines. You may be prescribed:  ? Heart rate medicines to help slow down the heartbeat  ? Heart rhythm medicines to help the  heart beat more regularly  ? Anti-clotting medicines to help reduce the risk for blood clots and stroke    Electrical cardioversion. Your healthcare provider uses special pads or paddles to send one or more brief electrical shocks to the heart. This can help reset the heartbeat to normal.    Ablation. A long thin tube called a catheter is thread through a blood vessel to the heart. There, the catheter sends out hot or cold energy to the areas causing the abnormal signals. This energy destroys the problem tissue or cells and cures atrial flutter.  If the heart rate and rhythm can t be controlled, you may need ablation and a pacemaker. Together these help control the heart rate and regularity of the heartbeat.  What are the complications of atrial flutter?  These can include:    Blood clots    Stroke    Heart failure. This problem occurs when the heart muscle weakens so much that it no longer pumps blood well.  When should I call my healthcare provider?  Call your healthcare provider right away if you have any of these:    Symptoms that don t get better with treatment, or get worse    New symptoms  Date Last Reviewed: 5/1/2016 2000-2019 [employer]. [address] [place] [address]. All rights reserved. This information is not intended as a substitute for professional medical care. Always follow your healthcare professional's instructions.

## 2020-05-14 NOTE — TELEPHONE ENCOUNTER
Reason for Call:  Other call back    Detailed comments: pt called and stated that he is having headache and nausea for the last two weeks and he is wondering what he needs to do     pls call and advice pt     Phone Number Patient can be reached at: Cell number on file:    Telephone Information:   Mobile [phone]       Best Time: anytime     Can we leave a detailed message on this number? YES    Call taken on 5/14/2020 at 1:12 PM by BAILEE CHANG

## 2020-05-15 LAB
CRP SERPL-MCNC: <2.9 MG/L (ref 0–8)
INTERPRETATION ECG - MUSE: NORMAL

## 2020-05-15 NOTE — PROGRESS NOTES
Called # [phone]     Pt advised of follow up needed.   Next 5 appointments (look out 90 days)    May 26, 2020  3:00 PM CDT  [facility] with [facility]ee Beth [facility]  [facility] [address] [place] [id number]  [phone]   Jul 15, 2020 11:20 AM CDT  PHYSICAL with Vee Beth MD  [facility] [address] [place] [id number]  [phone]        Pt stated he is not feeling better today, still very nauseated. Pt is starting the medications prescribed in ER. Pt advised of follow up on HA with visual changes. Pt stated he will continue to monitor at this time. May discuss at follow up visit.      Fred Lewis RN   [facility] Triage

## 2020-05-15 NOTE — PROGRESS NOTES
Patient seen in ER.  EKG there was sinus rhythm with first-degree AV block no A- flutter noted.  I do notice he had a abdominal work-up in ER including CT scan with some incidental findings.  I do not however see any specific work-up for his neurologic symptoms.  Please call and check on patient.  I would be more than happy to order outpatient head imaging if his headaches and visual changes are persisting to ensure we have that checked.  I also encouraged him to have an appointment within the next few weeks with Dr. Beth either video or in person to address ER incidental findings.      Candace Collazo, RAKELP-BC

## 2020-05-15 NOTE — RESULT ENCOUNTER NOTE
Note to Staff: please send a result letter    -All of your labs are normal.     Continue with current plan of care with upcoming appointment with Dr. Beth.    Best regards,      WES Varela-BC (Covering for Dr. Beth)

## 2020-05-15 NOTE — RESULT ENCOUNTER NOTE
Results discussed directly with patient while patient was present. Any further details documented in the note.   Looked like Aflutter however on closer inspection looks to be artifact.  Reviewed with Dr. Morris and he agrees that its artifact reading as A- flutter.  KASSANDRA Varela CNP

## 2020-05-21 DIAGNOSIS — E03.9 HYPOTHYROIDISM, UNSPECIFIED TYPE: ICD-10-CM

## 2020-05-21 RX ORDER — LEVOTHYROXINE SODIUM 137 UG/1
TABLET ORAL
Qty: 36 TABLET | Refills: 2 | Status: SHIPPED | OUTPATIENT
Start: 2020-05-21 | End: 2020-07-15

## 2020-06-07 DIAGNOSIS — K21.9 GASTROESOPHAGEAL REFLUX DISEASE WITHOUT ESOPHAGITIS: ICD-10-CM

## 2020-06-08 RX ORDER — OMEPRAZOLE 40 MG/1
CAPSULE, DELAYED RELEASE ORAL
Qty: 90 CAPSULE | Refills: 2 | Status: SHIPPED | OUTPATIENT
Start: 2020-06-08 | End: 2020-12-22

## 2020-06-08 NOTE — TELEPHONE ENCOUNTER
Prescription approved per [facility] Refill Protocol.  Lucy Vazquez RN

## 2020-06-15 DIAGNOSIS — I10 ESSENTIAL HYPERTENSION WITH GOAL BLOOD PRESSURE LESS THAN 140/90: ICD-10-CM

## 2020-06-16 NOTE — TELEPHONE ENCOUNTER
Routing refill request to provider for review/approval because:  Failed BP protocol in the past 12 months.    Dawna JO RN  EP Triage

## 2020-06-17 RX ORDER — LISINOPRIL 20 MG/1
TABLET ORAL
Qty: 90 TABLET | Refills: 1 | Status: SHIPPED | OUTPATIENT
Start: 2020-06-17 | End: 2020-11-17

## 2020-06-17 NOTE — TELEPHONE ENCOUNTER
BP Readings from Last 3 Encounters:   05/14/20 (!) 144/101   05/14/20 128/86   11/21/19 102/72     Next 5 appointments (look out 90 days)    Jul 15, 2020 11:20 AM CDT  [facility] [address] [place] [id number] [phone]        Pt has appt for px with me as above.

## 2020-06-18 DIAGNOSIS — N52.9 VASCULOGENIC ERECTILE DYSFUNCTION, UNSPECIFIED VASCULOGENIC ERECTILE DYSFUNCTION TYPE: ICD-10-CM

## 2020-06-19 RX ORDER — SILDENAFIL CITRATE 20 MG/1
40-60 TABLET ORAL
Qty: 90 TABLET | Refills: 3 | Status: SHIPPED | OUTPATIENT
Start: 2020-06-22 | End: 2021-06-16

## 2020-06-19 NOTE — TELEPHONE ENCOUNTER
Routing refill request to provider for review/approval because:  Failed protocol, reason below:      Erectile Dysfuction Protocol [id number]/18/2020 12:36 AM   Absence of Alpha Blockers on Med list       Alesia Hickman RN, BSN  [facility]

## 2020-07-15 ENCOUNTER — OFFICE VISIT (OUTPATIENT)
Dept: FAMILY MEDICINE | Facility: CLINIC | Age: 75
End: 2020-07-15
Payer: COMMERCIAL

## 2020-07-15 VITALS
HEIGHT: 71 IN | OXYGEN SATURATION: 97 % | WEIGHT: 208 LBS | DIASTOLIC BLOOD PRESSURE: 82 MMHG | SYSTOLIC BLOOD PRESSURE: 134 MMHG | BODY MASS INDEX: 29.12 KG/M2 | HEART RATE: 94 BPM | TEMPERATURE: 97.6 F

## 2020-07-15 DIAGNOSIS — D36.9 TUBULAR ADENOMA: ICD-10-CM

## 2020-07-15 DIAGNOSIS — C43.62 MALIGNANT MELANOMA OF LEFT UPPER EXTREMITY INCLUDING SHOULDER (H): ICD-10-CM

## 2020-07-15 DIAGNOSIS — F17.210 CIGARETTE SMOKER ONE HALF PACK A DAY OR LESS: ICD-10-CM

## 2020-07-15 DIAGNOSIS — I10 ESSENTIAL HYPERTENSION WITH GOAL BLOOD PRESSURE LESS THAN 140/90: ICD-10-CM

## 2020-07-15 DIAGNOSIS — E34.9 TESTOSTERONE DEFICIENCY: ICD-10-CM

## 2020-07-15 DIAGNOSIS — E03.9 HYPOTHYROIDISM, UNSPECIFIED TYPE: ICD-10-CM

## 2020-07-15 DIAGNOSIS — I25.10 CORONARY ARTERY DISEASE INVOLVING NATIVE HEART, ANGINA PRESENCE UNSPECIFIED, UNSPECIFIED VESSEL OR LESION TYPE: ICD-10-CM

## 2020-07-15 DIAGNOSIS — R73.01 ELEVATED FASTING GLUCOSE: ICD-10-CM

## 2020-07-15 DIAGNOSIS — Z00.01 ENCOUNTER FOR ROUTINE ADULT MEDICAL EXAM WITH ABNORMAL FINDINGS: ICD-10-CM

## 2020-07-15 DIAGNOSIS — Z00.00 ENCOUNTER FOR MEDICARE ANNUAL WELLNESS EXAM: Primary | ICD-10-CM

## 2020-07-15 DIAGNOSIS — R42 DIZZINESS OF UNKNOWN CAUSE: ICD-10-CM

## 2020-07-15 DIAGNOSIS — E78.5 HYPERLIPIDEMIA LDL GOAL <130: ICD-10-CM

## 2020-07-15 DIAGNOSIS — N40.0 ENLARGED PROSTATE: ICD-10-CM

## 2020-07-15 DIAGNOSIS — R91.8 PULMONARY NODULES: Chronic | ICD-10-CM

## 2020-07-15 DIAGNOSIS — E29.1 HYPOGONADISM IN MALE: ICD-10-CM

## 2020-07-15 DIAGNOSIS — F10.929 ALCOHOLIC INTOXICATION WITH COMPLICATION (H): ICD-10-CM

## 2020-07-15 DIAGNOSIS — K21.9 GASTROESOPHAGEAL REFLUX DISEASE WITHOUT ESOPHAGITIS: ICD-10-CM

## 2020-07-15 DIAGNOSIS — Z23 NEED FOR SHINGLES VACCINE: ICD-10-CM

## 2020-07-15 DIAGNOSIS — E34.9 HYPOTESTOSTERONISM: ICD-10-CM

## 2020-07-15 LAB — HBA1C MFR BLD: 5.8 % (ref 0–5.6)

## 2020-07-15 PROCEDURE — 82043 UR ALBUMIN QUANTITATIVE: CPT | Performed by: FAMILY MEDICINE

## 2020-07-15 PROCEDURE — 36415 COLL VENOUS BLD VENIPUNCTURE: CPT | Performed by: FAMILY MEDICINE

## 2020-07-15 PROCEDURE — 99397 PER PM REEVAL EST PAT 65+ YR: CPT | Mod: 25 | Performed by: FAMILY MEDICINE

## 2020-07-15 PROCEDURE — 99214 OFFICE O/P EST MOD 30 MIN: CPT | Mod: 25 | Performed by: FAMILY MEDICINE

## 2020-07-15 PROCEDURE — 83735 ASSAY OF MAGNESIUM: CPT | Performed by: FAMILY MEDICINE

## 2020-07-15 PROCEDURE — 83036 HEMOGLOBIN GLYCOSYLATED A1C: CPT | Performed by: FAMILY MEDICINE

## 2020-07-15 PROCEDURE — 80061 LIPID PANEL: CPT | Performed by: FAMILY MEDICINE

## 2020-07-15 RX ORDER — LEVOTHYROXINE SODIUM 150 UG/1
TABLET ORAL
Qty: 90 TABLET | Refills: 1 | Status: SHIPPED | OUTPATIENT
Start: 2020-07-15 | End: 2020-09-09

## 2020-07-15 RX ORDER — LEVOTHYROXINE SODIUM 137 UG/1
TABLET ORAL
Qty: 36 TABLET | Refills: 2 | Status: SHIPPED | OUTPATIENT
Start: 2020-07-15 | End: 2021-04-09

## 2020-07-15 RX ORDER — TESTOSTERONE GEL, 1% 10 MG/G
GEL TRANSDERMAL
Qty: 75 G | Refills: 5 | Status: SHIPPED | OUTPATIENT
Start: 2020-07-15 | End: 2020-12-23

## 2020-07-15 ASSESSMENT — MIFFLIN-ST. JEOR: SCORE: 1700.61

## 2020-07-15 ASSESSMENT — ACTIVITIES OF DAILY LIVING (ADL): CURRENT_FUNCTION: NO ASSISTANCE NEEDED

## 2020-07-15 NOTE — LETTER
[facility]  [address] [place] [id number]  [phone]                    July 17, 2020    Dev Powell  [address] [place] [address]      Dear Dev,    Here is a summary of your recent test results:      -All of your labs are normal or well controlled. Please continue your current medications. And follow up as we discussed at your last office visit.     For additional lab test information, [email or web address] is an excellent reference.     Your test results are enclosed.      Please contact me if you have any questions.    In addition, here is a list of due or overdue Health Maintenance reminders.    Health Maintenance Due   Topic Date Due     Zoster (Shingles) Vaccine (1 of 2) 03/28/1995     Discuss Advance Care Planning  01/30/2020     FALL RISK ASSESSMENT  03/28/2020     Lung Cancer Screening (CT Scan)  04/06/2020       Please call us at [phone] (or use ComActivity) to address the above recommendations.            Thank you very much for trusting [facility]..     Healthy regards,         Vee Beth M.D.          Results for orders placed or performed in visit on 07/15/20   Albumin Random Urine Quantitative with Creat Ratio     Status: Abnormal   Result Value Ref Range    Creatinine Urine 111 mg/dL    Albumin Urine mg/L 52 mg/L    Albumin Urine mg/g Cr 46.94 (H) 0 - 17 mg/g Cr   Lipid panel reflex to direct LDL Fasting     Status: None   Result Value Ref Range    Cholesterol 169 <200 mg/dL    Triglycerides 79 <150 mg/dL    HDL Cholesterol 58 >39 mg/dL    LDL Cholesterol Calculated 95 <100 mg/dL    Non HDL Cholesterol 111 <130 mg/dL   Hemoglobin A1c     Status: Abnormal   Result Value Ref Range    Hemoglobin A1C 5.8 (H) 0 - 5.6 %   Magnesium     Status: None   Result Value Ref Range    Magnesium 2.3 1.6 - 2.3 mg/dL

## 2020-07-15 NOTE — PATIENT INSTRUCTIONS
Patient Education   Personalized Prevention Plan  You are due for the preventive services outlined below.  Your care team is available to assist you in scheduling these services.  If you have already completed any of these items, please share that information with your care team to update in your medical record.  Health Maintenance Due   Topic Date Due     Zoster (Shingles) Vaccine (1 of 2) 03/28/1995     PHQ-2  01/01/2020     Discuss Advance Care Planning  01/30/2020     Annual Wellness Visit  03/28/2020     FALL RISK ASSESSMENT  03/28/2020     Lung Cancer Screening (CT Scan)  04/06/2020        Patient Education   Personalized Prevention Plan  You are due for the preventive services outlined below.  Your care team is available to assist you in scheduling these services.  If you have already completed any of these items, please share that information with your care team to update in your medical record.  Health Maintenance Due   Topic Date Due     Zoster (Shingles) Vaccine (1 of 2) 03/28/1995     PHQ-2  01/01/2020     Discuss Advance Care Planning  01/30/2020     Annual Wellness Visit  03/28/2020     FALL RISK ASSESSMENT  03/28/2020     Lung Cancer Screening (CT Scan)  04/06/2020       Return in about 6 months (around 1/15/2021) for Annual Wellness Visit, BP Recheck, Lab Work, cholesterol recheck.     Thank you so much or choosing [facility]  for your Health Care. It was a pleasure seeing you at your visit today! Please contact us with any questions or concerns you may have.                   Vee Beth MD                              To reach your [facility] care team after hours call:   [phone]    PLEASE NOTE OUR HOURS HAVE CHANGED secondary to COVID-19 coronavirus pandemic, as we are trying to minimize patient exposure to the virus,  which is now widespread in the [facility].  These hours may change with very little notice.  We apologize for any  inconvenience.       Our current clinic hours are:    Monday- Friday  7:40am - 5:00pm  in person.                                          Saturday and Sunday : Closed to in person and virtual visits      We have telephone and virtual visit times available between 7:40am - 6pm on Mondays.                                                      And 7:40am - 5pm Tuesdays through Fridays.                  Phone:  [phone]    Our pharmacy hours:   Monday  9:00 am to 6:00 pm      Tuesday through Friday 9:00am to 5:00pm                        Saturday - 9:00 am to 12 noon       Sunday : Closed.              Phone:  [phone]    ###  Please note: at this time we are not accepting any walk-in visits. ###      There is also information available at our web site:  [email or web address]    If your provider ordered any lab tests and you do not receive the results within 10 business days, please call the clinic.    If you need a medication refill please contact your pharmacy.  Please allow 2 business days for your refill to be completed.    Our clinic offers telephone visits and e visits.  Please ask one of your team members to explain more.      Use Dittohart (secure email communication and access to your chart) to send your primary care provider a message or make an appointment. Ask someone on your Team how to sign up for [employer].

## 2020-07-15 NOTE — PROGRESS NOTES
"SUBJECTIVE:   Dev Powell is a 75 year old male who presents for Preventive Visit and follow up on the following:     Essential hypertension with goal blood pressure less than 140/90 - a bit labile - had a parrish conversation about the effects of alcohol on BP.    Hyperlipidemia LDL goal <130 - pravastatin = leg cramps; lipitor =calf pains - taking rosuvastatin 10mg nightly now with no problems.    Pulmonary nodules- tiny per screening chest CT -  repeat CT chest low dose w/o contrast 4/2017 = no change since 2015 - no need for further CT's  For the existing nodules, but needs CT chest for lung cancer screening as he has >30= pk yr hx and continues to smoke.    Dizziness of unknown cause- lightheaded - always has symptoms - from dehydration /heat/perspiring  usually - never has happened when he's been well hydrated. Had big work up in 5/2020 for suspected A flutter, but it was EKG machine artifact.      Enlarged prostate- has seen  Dr. Solomon in the past - no further PSA's needed as of 7/2019 - doesn't need to see him again per Dr. Solomon unless gross hematuria - has not had any gross hematuria.     Gastroesophageal reflux disease without esophagitis = ran out of PPI- has been taking lots of TUMS  - back on prilosec for a while. Needs magnesium level checked today.    Hypothyroidism, unspecified type - see below   Malignant melanoma of left upper extremity including shoulder (H) - hasn't been to dermatology or primary care skin clinic per patient \"for many moons\" - overdue for full body skin exam    Cigarette smoker one half pack a day or less- for > 45 years     Alcoholic intoxication with complication (H)- 1 year ago - now drinking gin &tonic & beer in the summer - 1-2 oz gin /drink - 2-3 drinks/day 2-3 days/week, if that  - signif. Decreased from previous.    Testosterone deficiency - needs level checked today.    Hypogonadism in male - on testosterone supplement.    Elevated fasting glucose - needs A1c " "rechecked today.    Hypotestosteronism- androgel working well, but not as well as previous formulation which was not covered by his Medicare co-insurance.       Are you in the first 12 months of your Medicare coverage?  No.     Healthy Habits:    In general, how would you rate your overall health?  Very good    Frequency of exercise:  6-7 days/week    Duration of exercise:  Greater than 60 minutes (stone work and remodeling)    Do you usually eat at least 4 servings of fruit and vegetables a day, include whole grains    & fiber and avoid regularly eating high fat or \"junk\" foods?  Yes    Taking medications regularly:  Yes    Barriers to taking medications:  None    Medication side effects:  None    Ability to successfully perform activities of daily living:  No assistance needed    Home Safety:  No safety concerns identified    Hearing Impairment:  No hearing concerns    In the past 6 months, have you been bothered by leaking of urine?  No    In general, how would you rate your overall mental or emotional health?  Excellent      PHQ-2 Total Score:    Additional concerns today:  No    Do you feel safe in your environment? Yes    Have you ever done Advance Care Planning? (For example, a Health Directive, POLST, or a discussion with a medical provider or your loved ones about your wishes): honoring choices given to patient    Fall risk       Cognitive Screening   1) Repeat 3 items (Leader, Season, Table)    2) Clock draw: NORMAL  3) 3 item recall: Recalls 3 objects  Results: 3 items recalled: COGNITIVE IMPAIRMENT LESS LIKELY    Mini-CogTM Copyright BINTA Galvez. Licensed by the author for use in [facility]; reprinted with permission (inés@.[facility]). All rights reserved.      Do you have sleep apnea, excessive snoring or daytime drowsiness?: no    Reviewed and updated as needed this visit by clinical staff  Tobacco  Allergies  Meds  Problems  Med Hx  Surg Hx  Fam Hx         Reviewed and updated as needed " this visit by Provider        Social History     Tobacco Use     Smoking status: Current Every Day Smoker     Packs/day: 0.50     Years: 60.00     Pack years: 30.00     Smokeless tobacco: Never Used     Tobacco comment: strongly encourage smoking cessation with every visit   Substance Use Topics     Alcohol use: Yes     Alcohol/week: 0.0 standard drinks     Comment: not regular - very occasional 1-2 martinis when out to dinner 1-2x/month, if that      If you drink alcohol do you typically have >3 drinks per day or >7 drinks per week? No    Alcohol Use 1/30/2015   Prescreen: >3 drinks/day or >7 drinks/week? The patient does not drink >3 drinks per day nor >7 drinks per week.           Hyperlipidemia Follow-Up:       Are you regularly taking any medication or supplement to lower your cholesterol?   Yes- crestor    Are you having muscle aches or other side effects that you think could be caused by your cholesterol lowering medication?  No    Recent Labs   Lab Test 09/12/19  1048 03/28/19  1604  01/30/15  1305 04/30/14  0802   CHOL 176 187   < > 237* 219*   HDL 46 60   < > 46 38*   * 116*   < > 149* 155*   TRIG 98 57   < > 208* 135   CHOLHDLRATIO  --   --   --  5.2* 5.8*    < > = values in this interval not displayed.      Hypertension Follow-up: see above re: alcohol and labile BP.       Do you check your blood pressure regularly outside of the clinic? Yes     Are you following a low salt diet? No    Are your blood pressures ever more than 140 on the top number (systolic) OR more   than 90 on the bottom number (diastolic), for example 140/90? Yes     BP Readings from Last 6 Encounters:   07/15/20 (!) 130/96   05/14/20 (!) 144/101   05/14/20 128/86   11/21/19 102/72   10/24/19 (!) 153/99   10/07/19 120/80         Hypothyroidism Follow-up:     Since last visit, patient describes the following symptoms: Weight stable, no hair loss, no skin changes, no constipation, no loose stools    TSH   Date Value Ref Range  Status   05/14/2020 1.65 0.40 - 4.00 mU/L Final   01/10/2020 0.59 0.40 - 4.00 mU/L Final   11/21/2019 0.37 (L) 0.40 - 4.00 mU/L Final   03/28/2019 1.83 0.40 - 4.00 mU/L Final   02/05/2019 4.88 (H) 0.40 - 4.00 mU/L Final     T4 Free   Date Value Ref Range Status   01/10/2020 1.39 0.76 - 1.46 ng/dL Final   11/21/2019 1.38 0.76 - 1.46 ng/dL Final   03/28/2019 1.20 0.76 - 1.46 ng/dL Final   02/05/2019 1.10 0.76 - 1.46 ng/dL Final   11/21/2018 0.75 (L) 0.76 - 1.46 ng/dL Final         Current providers sharing in care for this patient include:   Patient Care Team:  Vee Beth MD as PCP - General (Family Practice)  Vee Beth MD as MD (Family Practice)  Candace Collazo, KASSANDRA LAURENT as Assigned PCP    The following health maintenance items are reviewed in Epic and correct as of today:  Health Maintenance   Topic Date Due     ZOSTER IMMUNIZATION (1 of 2) 03/28/1995     PHQ-2  01/01/2020     ADVANCE CARE PLANNING  01/30/2020     MEDICARE ANNUAL WELLNESS VISIT  03/28/2020     FALL RISK ASSESSMENT  03/28/2020     LUNG CANCER SCREENING ANNUAL  04/06/2020     INFLUENZA VACCINE (1) 09/01/2020     MICROALBUMIN  09/12/2020     BMP  05/14/2021     TSH W/FREE T4 REFLEX  05/14/2021     DTAP/TDAP/TD IMMUNIZATION (3 - Td) 09/18/2023     COLORECTAL CANCER SCREENING  04/29/2024     LIPID  09/12/2024     HEPATITIS C SCREENING  Completed     PNEUMOCOCCAL IMMUNIZATION 65+ LOW/MEDIUM RISK  Completed     AORTIC ANEURYSM SCREENING (SYSTEM ASSIGNED)  Completed     IPV IMMUNIZATION  Aged Out     MENINGITIS IMMUNIZATION  Aged Out     HEPATITIS B IMMUNIZATION  Aged Out     Lab work is in process  Labs reviewed in EPIC  BP Readings from Last 3 Encounters:   07/15/20 134/82   05/14/20 (!) 144/101   05/14/20 128/86    Wt Readings from Last 3 Encounters:   07/15/20 94.3 kg (208 lb)   05/14/20 94.8 kg (209 lb)   11/21/19 94.9 kg (209 lb 4.8 oz)                  Patient Active Problem List   Diagnosis     Gastroesophageal  reflux disease without esophagitis = ran out of PPI- has been taking lots of TUMS     Hyperlipidemia LDL goal <130 - pravastatin = leg cramps; lipitor =calf pains     Snoring     Vasculogenic erectile dysfunction, unspecified vasculogenic erectile dysfunction type     Osteoarthritis     Lipoma of skin     Gastric ulcer     Malignant melanoma of left upper extremity including shoulder (H)     Cigarette smoker one half pack a day or less- for > 45 years      Enlarged prostate- has seen  Dr. Solomon in the past - no further PSA's needed as of 7/2019 - doesn't need to see him again per Dr. Solomon unless gross hematuria      Rectal pain     Hearing loss     Inguinal hernia- right      Pulmonary nodules- tiny per screening chest CT -  repeat CT chest low dose w/o contrast 4/2017 = no change since 2015 - no need for further CT's      Essential hypertension with goal blood pressure less than 140/90     Testosterone deficiency     Hypothyroidism, unspecified type     Chronic constipation     Mass of left chest wall - ? there for 18+ years -left breast 1cm mass at 7:30      Multiple pigmented nevi     Reducible right inguinal hernia     Undiagnosed cardiac murmurs     Elevated fasting glucose     Tubular adenoma 4/2019 - repeat 5 years     Alcoholic intoxication with complication (H)     Past Surgical History:   Procedure Laterality Date     COLONOSCOPY  9/25/2007    repeat in 2017     DAVINCI HERNIORRHAPHY INGUINAL Right 10/24/2019    Procedure: robotic assisted right inguinal hernia repair with mesh;  Surgeon: Rom Mccoy MD;  Location:  OR     ESOPHAGOSCOPY, GASTROSCOPY, DUODENOSCOPY (EGD), COMBINED N/A 2/5/2015    Procedure: COMBINED ESOPHAGOSCOPY, GASTROSCOPY, DUODENOSCOPY (EGD);  Surgeon: Ender Dixon MD;  Location:  GI     HC ESOPHAGOSCOPY, DIAGNOSTIC  9/25/2007    EGD - gastric ulcer w/ erosions/ LA grade B erosive esophagitis     wide excision  1995    for malignant melanoma       Social History      Tobacco Use     Smoking status: Current Every Day Smoker     Packs/day: 0.50     Years: 60.00     Pack years: 30.00     Smokeless tobacco: Never Used     Tobacco comment: strongly encourage smoking cessation with every visit   Substance Use Topics     Alcohol use: Yes     Alcohol/week: 0.0 standard drinks     Comment: not regular - very occasional 1-2 martinis when out to dinner 1-2x/month, if that      Family History   Problem Relation Age of Onset     C.A.D. Father 62         at age 62 of MI     Diabetes Father         early type 2      Neurologic Disorder Mother         mild dementia - @ 92     C.A.D. Mother         angina      Thyroid Disease Mother         s/p thyroid      Colon Cancer No family hx of          Current Outpatient Medications   Medication Sig Dispense Refill     Glucosamine-Chondroit-Vit C-Mn (GLUCOSAMINE CHONDROITIN 1500 COMPLEX) CAPS Take by mouth daily       levothyroxine (SYNTHROID/LEVOTHROID) 137 MCG tablet TAKE 1 TABLET BY MOUTH ON MONDAY,  AND  ALTERNATING WITH 150MCG TABS 36 tablet 2     levothyroxine (SYNTHROID/LEVOTHROID) 150 MCG tablet Take 150 mcg table Tuesday, Thursday, Saturday and  then alternate with 137 mcg the other days 90 tablet 1     lisinopril (ZESTRIL) 20 MG tablet TAKE 1 TABLET BY MOUTH EVERY DAY 90 tablet 1     metoclopramide (REGLAN) 5 MG tablet Take 1 tablet (5 mg) by mouth 4 times daily as needed (belching, nausea) 10 tablet 0     Multiple Vitamins-Minerals (CENTRUM SILVER) per tablet Take 1 tablet by mouth daily 30 tablet      omeprazole (PRILOSEC) 40 MG DR capsule TAKE 1 CAPSULE BY MOUTH ONCE DAILY TAKE 30-60 MINUTES BEFORE A MEAL. 90 capsule 2     oxyCODONE (ROXICODONE) 5 MG tablet Take 1-2 tablets (5-10 mg) by mouth every 4 hours as needed for moderate to severe pain 10 tablet 0     rosuvastatin (CRESTOR) 10 MG tablet TAKE 1 TABLET (10 MG) BY MOUTH AT BEDTIME FOR CHOLESTEROL 90 tablet 1     sildenafil (REVATIO) 20 MG tablet TAKE  "2-3 TABLETS (40-60 MG) BY MOUTH TWICE A WEEK 90 tablet 3     tamsulosin (FLOMAX) 0.4 MG capsule Take 1 tablet by mouth daily for 7 days-Begin 4 days before surgery, including morning of surgery with a sip of water 7 capsule 0     testosterone (ANDROGEL 1 % PUMP) 12.5 MG/ACT (1%) gel APPLY 2 PUMPS ONTO CLEAN DRY HAIRLESS SKIN OF SHOULDERS, UPPER ARMS OR ABDOMEN ONCE DAILY 75 g 5     No Known Allergies  Recent Labs   Lab Test 05/14/20  1528 01/10/20  0823  10/24/19  0708 09/12/19  1048 03/28/19  1604 02/05/19  0748 11/21/18  0826 07/27/18  0820   A1C  --   --   --   --  5.9* 6.0*  --   --  5.9*   LDL  --   --   --   --  110* 116*  --  99  --    HDL  --   --   --   --  46 60  --  59  --    TRIG  --   --   --   --  98 57  --  67  --    ALT 19  --   --   --  17  --  26 25  --    CR 1.07  --   --  1.00 1.03  --  1.11  --   --    GFRESTIMATED 67  --   --  74 71  --  65  --   --    GFRESTBLACK 78  --   --  86 82  --  75  --   --    POTASSIUM 4.2  --   --  3.9 4.5  --  4.2  --   --    TSH 1.65 0.59   < >  --   --  1.83 4.88* 11.47* 5.78*    < > = values in this interval not displayed.      Pneumonia Vaccine:Adults age 65+ who received Pneumovax (PPSV23) at 65 years or older: Should be given PCV13 > 1 year after their most recent PPSV23      Review of Systems  Constitutional, HEENT, cardiovascular, pulmonary, GI, , musculoskeletal, neuro, skin, endocrine and psych systems are negative, except as otherwise noted.    OBJECTIVE:   BP (!) 130/96   Pulse 94   Temp 97.6  F (36.4  C)   Ht 1.803 m (5' 11\")   Wt 94.3 kg (208 lb)   SpO2 97%   BMI 29.01 kg/m   Estimated body mass index is 29.01 kg/m  as calculated from the following:    Height as of this encounter: 1.803 m (5' 11\").    Weight as of this encounter: 94.3 kg (208 lb).  Physical Exam  GENERAL: healthy, alert and no distress  EYES: Eyes grossly normal to inspection, PERRL and conjunctivae and sclerae normal  HENT: ear canals and TM's normal, nose and mouth without " ulcers or lesions  NECK: no adenopathy, no asymmetry, masses, or scars and thyroid normal to palpation  RESP: lungs clear to auscultation - no rales, rhonchi or wheezes  BREAST: normal without masses, tenderness or nipple discharge and no palpable axillary masses or adenopathy  CV: regular rate and rhythm, normal S1 S2, no S3 or S4, no murmur, click or rub, no peripheral edema and peripheral pulses strong  ABDOMEN: soft, nontender, no hepatosplenomegaly, no masses and bowel sounds normal   (male): normal male genitalia without lesions or urethral discharge, no hernia  RECTAL: normal sphincter tone, no rectal masses, prostate slightly diffusely enlarged but symmetric size, smooth, nontender without nodules or masses  MS: no gross musculoskeletal defects noted, no edema  SKIN: no suspicious lesions or rashes  NEURO: Normal strength and tone, mentation intact and speech normal  PSYCH: mentation appears normal, affect normal/bright  LYMPH: no cervical, supraclavicular, axillary, or inguinal adenopathy    Diagnostic Test Results:  Labs reviewed in Epic    ASSESSMENT / PLAN:       ICD-10-CM    1. Encounter for Medicare annual wellness exam  Z00.00    2. Encounter for routine adult medical exam with abnormal findings  Z00.01    3. Essential hypertension with goal blood pressure less than 140/90  I10 OFFICE/OUTPT VISIT,EST,LEVL IV   4. Hyperlipidemia LDL goal <130 - pravastatin = leg cramps; lipitor =calf pains  E78.5 Albumin Random Urine Quantitative with Creat Ratio     Lipid panel reflex to direct LDL Fasting     OFFICE/OUTPT VISIT,EST,LEVL IV   5. Pulmonary nodules- tiny per screening chest CT -  repeat CT chest low dose w/o contrast 4/2017 = no change since 2015 - no need for further CT's   R91.8 OFFICE/OUTPT VISIT,EST,LEVL IV   6. Dizziness of unknown cause- lightheaded - always has symptoms - from dehydration /heat/perspiring  usually - never has happened when he's been well hydrated   R42 OFFICE/OUTPT  VISIT,EST,LEVL IV   7. Enlarged prostate- has seen  Dr. Solomon in the past - no further PSA's needed as of 7/2019 - doesn't need to see him again per Dr. Solomon unless gross hematuria   N40.0 OFFICE/OUTPT VISIT,EST,LEVL IV   8. Gastroesophageal reflux disease without esophagitis = ran out of PPI- has been taking lots of TUMS  K21.9 OFFICE/OUTPT VISIT,EST,LEVL IV     Magnesium   9. Hypothyroidism, unspecified type  E03.9 levothyroxine (SYNTHROID/LEVOTHROID) 137 MCG tablet     levothyroxine (SYNTHROID/LEVOTHROID) 150 MCG tablet     OFFICE/OUTPT VISIT,EST,LEVL IV   10. Malignant melanoma of left upper extremity including shoulder (H)  C43.62 DERMATOLOGY REFERRAL     OFFICE/OUTPT VISIT,EST,LEVL IV   11. Cigarette smoker one half pack a day or less- for > 45 years   F17.210 Prof fee: Shared Decisionmaking for Lung Cancer Screening     CT Chest Lung Cancer Scrn Low Dose wo     Okay for Smoking Cessation Study (PLUTO) to Contact Patient     OFFICE/OUTPT VISIT,EST,LEVL IV   12. Alcoholic intoxication with complication (H)- 1 year ago - now drinking gin &tonic & beer in the summer - 1-2 oz gin /drink - 2-3 drinks/day 2-3 days/week, if that   F10.929 OFFICE/OUTPT VISIT,EST,LEVL IV   13. Testosterone deficiency  E34.9 testosterone (ANDROGEL 1 % PUMP) 12.5 MG/ACT (1%) gel     **Testosterone Free and Total FUTURE anytime     OFFICE/OUTPT VISIT,EST,LEVL IV   14. Hypogonadism in male  E29.1 testosterone (ANDROGEL 1 % PUMP) 12.5 MG/ACT (1%) gel   15. Elevated fasting glucose  R73.01 Hemoglobin A1c     OFFICE/OUTPT VISIT,EST,LEVL IV   16. Hypotestosteronism- androgel working well   E34.9 OFFICE/OUTPT VISIT,EST,LEVL IV   17. Need for shingles vaccine  Z23 zoster vaccine recombinant adjuvanted (SHINGRIX) injection   18. Tubular adenoma 4/2019 - repeat 5 years  D36.9        COUNSELING:  Reviewed preventive health counseling, as reflected in patient instructions    Estimated body mass index is 29.01 kg/m  as calculated from the  "following:    Height as of this encounter: 1.803 m (5' 11\").    Weight as of this encounter: 94.3 kg (208 lb).    Weight management plan: Discussed healthy diet and exercise guidelines     reports that he has been smoking. He has a 30.00 pack-year smoking history. He has never used smokeless tobacco.  Tobacco Cessation Action Plan: Information offered: Patient not interested at this time    Appropriate preventive services were discussed with this patient, including applicable screening as appropriate for cardiovascular disease, diabetes, osteopenia/osteoporosis, and glaucoma.  As appropriate for age/gender, discussed screening for colorectal cancer, prostate cancer, breast cancer, and cervical cancer. Checklist reviewing preventive services available has been given to the patient.    Reviewed patients plan of care and provided an AVS. The Complex Care Plan (for patients with higher acuity and needing more deliberate coordination of services) for Dev meets the Care Plan requirement. This Care Plan has been established and reviewed with the Patient.    Return in about 6 months (around 1/15/2021) for Annual Wellness Visit, BP Recheck, Lab Work, cholesterol recheck.     Counseling Resources:  ATP IV Guidelines  Pooled Cohorts Equation Calculator  Breast Cancer Risk Calculator  FRAX Risk Assessment  ICSI Preventive Guidelines  Dietary Guidelines for Americans, 2010  USDA's MyPlate  ASA Prophylaxis  Lung CA Screening    Vee Beth MD  [facility]    Identified Health Risks:  "

## 2020-07-16 LAB
CHOLEST SERPL-MCNC: 169 MG/DL
CREAT UR-MCNC: 111 MG/DL
HDLC SERPL-MCNC: 58 MG/DL
LDLC SERPL CALC-MCNC: 95 MG/DL
MAGNESIUM SERPL-MCNC: 2.3 MG/DL (ref 1.6–2.3)
MICROALBUMIN UR-MCNC: 52 MG/L
MICROALBUMIN/CREAT UR: 46.94 MG/G CR (ref 0–17)
NONHDLC SERPL-MCNC: 111 MG/DL
TRIGL SERPL-MCNC: 79 MG/DL

## 2020-07-20 DIAGNOSIS — I10 ESSENTIAL HYPERTENSION WITH GOAL BLOOD PRESSURE LESS THAN 140/90: ICD-10-CM

## 2020-07-21 RX ORDER — LISINOPRIL 20 MG/1
20 TABLET ORAL DAILY
Qty: 90 TABLET | Refills: 1 | OUTPATIENT
Start: 2020-07-21

## 2020-07-21 NOTE — TELEPHONE ENCOUNTER
"lisinopril (ZESTRIL) 20 MG tablet  90 tablet  1  6/17/2020   No    Sig: TAKE 1 TABLET BY MOUTH EVERY DAY    Sent to pharmacy as: Lisinopril 20 MG Oral Tablet (ZESTRIL       Last office visit: 7/15/2020   Future Office Visit:      Requested Prescriptions   Pending Prescriptions Disp Refills     lisinopril (ZESTRIL) 20 MG tablet 90 tablet 1     Sig: Take 1 tablet (20 mg) by mouth daily       ACE Inhibitors (Including Combos) Protocol Passed - 7/20/2020  1:45 PM        Passed - Blood pressure under 140/90 in past 12 months     BP Readings from Last 3 Encounters:   07/15/20 134/82   05/14/20 (!) 144/101   05/14/20 128/86                 Passed - Recent (12 mo) or future (30 days) visit within the authorizing provider's specialty     Patient has had an office visit with the authorizing provider or a provider within the authorizing providers department within the previous 12 mos or has a future within next 30 days. See \"Patient Info\" tab in inbasket, or \"Choose Columns\" in Meds & Orders section of the refill encounter.              Passed - Medication is active on med list        Passed - Patient is age 18 or older        Passed - Normal serum creatinine on file in past 12 months     Recent Labs   Lab Test 05/14/20  1528   CR 1.07       Ok to refill medication if creatinine is low          Passed - Normal serum potassium on file in past 12 months     Recent Labs   Lab Test 05/14/20  1528   POTASSIUM 4.2                Should have refills left     Elsi Thomas RN, BSN  [facility]       "

## 2020-08-07 ENCOUNTER — HOSPITAL ENCOUNTER (OUTPATIENT)
Dept: CT IMAGING | Facility: CLINIC | Age: 75
Discharge: HOME | End: 2020-08-07
Attending: FAMILY MEDICINE | Admitting: FAMILY MEDICINE
Payer: COMMERCIAL

## 2020-08-07 ENCOUNTER — TELEPHONE (OUTPATIENT)
Dept: FAMILY MEDICINE | Facility: CLINIC | Age: 75
End: 2020-08-07

## 2020-08-07 DIAGNOSIS — F17.210 CIGARETTE SMOKER ONE HALF PACK A DAY OR LESS: ICD-10-CM

## 2020-08-07 PROCEDURE — G0297 LDCT FOR LUNG CA SCREEN: HCPCS

## 2020-08-07 NOTE — TELEPHONE ENCOUNTER
[facility] calling to let the physician know about the Coronary Calcium results from today show moderate to severe.  Report is in Epic.    Dawna JO RN  EP Triage

## 2020-08-10 DIAGNOSIS — R93.1 ELEVATED CORONARY ARTERY CALCIUM SCORE: ICD-10-CM

## 2020-08-10 DIAGNOSIS — I25.10 CORONARY ARTERY DISEASE INVOLVING NATIVE HEART, ANGINA PRESENCE UNSPECIFIED, UNSPECIFIED VESSEL OR LESION TYPE: Primary | ICD-10-CM

## 2020-08-25 ENCOUNTER — OFFICE VISIT (OUTPATIENT)
Dept: CARDIOLOGY | Facility: CLINIC | Age: 75
End: 2020-08-25
Attending: FAMILY MEDICINE
Payer: COMMERCIAL

## 2020-08-25 VITALS
DIASTOLIC BLOOD PRESSURE: 98 MMHG | HEIGHT: 70 IN | BODY MASS INDEX: 29.35 KG/M2 | WEIGHT: 205 LBS | SYSTOLIC BLOOD PRESSURE: 132 MMHG | HEART RATE: 97 BPM | OXYGEN SATURATION: 96 %

## 2020-08-25 DIAGNOSIS — I25.10 CORONARY ARTERY DISEASE INVOLVING NATIVE CORONARY ARTERY OF NATIVE HEART WITHOUT ANGINA PECTORIS: ICD-10-CM

## 2020-08-25 DIAGNOSIS — E78.5 HYPERLIPIDEMIA LDL GOAL <130: Primary | ICD-10-CM

## 2020-08-25 PROCEDURE — 99204 OFFICE O/P NEW MOD 45 MIN: CPT | Performed by: INTERNAL MEDICINE

## 2020-08-25 RX ORDER — ROSUVASTATIN CALCIUM 20 MG/1
20 TABLET, COATED ORAL DAILY
Qty: 90 TABLET | Refills: 3 | Status: SHIPPED | OUTPATIENT
Start: 2020-08-25 | End: 2020-10-22

## 2020-08-25 ASSESSMENT — MIFFLIN-ST. JEOR: SCORE: 1672.56

## 2020-08-25 NOTE — LETTER
8/25/2020    Vee Beth MD  [address] [address]    RE: Dev Powell       Dear Colleague,    I had the pleasure of seeing Dev Powell in the [facility].    HISTORY:    Dev Powell is a pleasant 75-year-old gentleman seen for the first time in cardiology clinic today.  He is still in mourning after the loss of his wife less than a month ago.  He recently underwent a screening chest CT and was noted to have heavy coronary calcification.  Cardiology consultation was requested to further evaluate this problem.    Dev reports that he is generally pretty healthy.  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  He denies any chest pain with heavy exertion although he does have problems with lightheadedness and dizziness very predictably if he does not drink enough fluids.  He also denies any symptoms of PND/orthopnea, palpitations, syncope or near syncope, strokelike symptoms, orthostasis, peripheral edema, or claudication.    Cardiac risk factors are largely positive.  He has a history of hypertension currently treated, hyperlipidemia currently treated, he is a smoker using about a half a pack per day for 60 years, and he has a positive family history with his father dying at age 62 of an MI.  His father was diabetic.  The patient himself does not have diabetes.    Dev checks his home blood pressure infrequently and usually finds his systolic pressure to be around 130 with diastolic pressures generally in the 70-80 range.  His blood pressure in clinic today is 132/98.  Reviewing past blood pressures done over the last year this is the first time he has had diastolic hypertension.  Systolic values have been mostly closer to 120-130.      ASSESSMENT/PLAN:    1.  Coronary artery disease as evidenced by heavy coronary calcification.   The patient has no symptoms of angina.  I will arrange a stress echo to make sure he does not have significant silent ischemia although he has a good exercise tolerance so I do not expect that study to be positive.  If the study is positive with a significant area of ischemia we will plan coronary angiography, otherwise we will continue conservative management with aggressive risk factor control.  2.  Hyperlipidemia.  The patient has been on Crestor 10 mg for several years and is tolerating it well.  He has had problems in the past with other statins.  I think it is worthwhile to try to increase his Crestor to 20 mg daily now that he has identified CAD his ideal goal would be an LDL of 70.  His latest LDL was 95 so I do not expect the increase in Crestor to be enough to get him to goal.  3.  Hypertension.  I have asked him to start checking his blood pressure more regularly and keep a list.  We may need to be more aggressive with his blood pressure management since ideal blood pressure would be 130/80 or less on most readings.  He would probably benefit from addition of a diuretic as the next step if this is necessary.  4.  Cigarette use.  The importance of smoking cessation was emphasized and although he seemed moderately interested he is not ready to quit at this point.    Thank you for inviting me to participate in your patient's care.  We will plan on seeing him back in about 3 months to review the issues we covered today.  Please do not need to call if I can be of further assistance.    Orders Placed This Encounter   Procedures     Lipid Profile     ALT     Follow-Up with Cardiologist     Exercise Stress Echocardiogram     Orders Placed This Encounter   Medications     rosuvastatin (CRESTOR) 20 MG tablet     Sig: Take 1 tablet (20 mg) by mouth daily     Dispense:  90 tablet     Refill:  3     There are no discontinued medications.    10 year ASCVD risk: The 10-year ASCVD risk score (Fei PRESLEY Jr., et al., 2013)  is: 29.1%    Values used to calculate the score:      Age: 75 years      Sex: Male      Is Non- : No      Diabetic: No      Tobacco smoker: Yes      Systolic Blood Pressure: 132 mmHg      Is BP treated: Yes      HDL Cholesterol: 58 mg/dL      Total Cholesterol: 169 mg/dL    Encounter Diagnoses   Name Primary?     Hyperlipidemia LDL goal <130 - pravastatin = leg cramps; lipitor =calf pains Yes     Coronary artery disease involving native coronary artery of native heart without angina pectoris        CURRENT MEDICATIONS:  Current Outpatient Medications   Medication Sig Dispense Refill     aspirin (ASA) 81 MG EC tablet Take 1 tablet (81 mg) by mouth daily       Glucosamine-Chondroit-Vit C-Mn (GLUCOSAMINE CHONDROITIN 1500 COMPLEX) CAPS Take by mouth daily       levothyroxine (SYNTHROID/LEVOTHROID) 137 MCG tablet TAKE 1 TABLET BY MOUTH ON MONDAY, WEDNESDAYS AND FRIDAYS ALTERNATING WITH 150MCG TABS 36 tablet 2     levothyroxine (SYNTHROID/LEVOTHROID) 150 MCG tablet Take 150 mcg table Tuesday, Thursday, Saturday and Sunday then alternate with 137 mcg the other days 90 tablet 1     lisinopril (ZESTRIL) 20 MG tablet TAKE 1 TABLET BY MOUTH EVERY DAY 90 tablet 1     Multiple Vitamins-Minerals (CENTRUM SILVER) per tablet Take 1 tablet by mouth daily 30 tablet      omeprazole (PRILOSEC) 40 MG DR capsule TAKE 1 CAPSULE BY MOUTH ONCE DAILY TAKE 30-60 MINUTES BEFORE A MEAL. 90 capsule 2     rosuvastatin (CRESTOR) 10 MG tablet TAKE 1 TABLET (10 MG) BY MOUTH AT BEDTIME FOR CHOLESTEROL 90 tablet 1     rosuvastatin (CRESTOR) 20 MG tablet Take 1 tablet (20 mg) by mouth daily 90 tablet 3     testosterone (ANDROGEL 1 % PUMP) 12.5 MG/ACT (1%) gel APPLY 2 PUMPS ONTO CLEAN DRY HAIRLESS SKIN OF SHOULDERS, UPPER ARMS OR ABDOMEN ONCE DAILY 75 g 5     metoclopramide (REGLAN) 5 MG tablet Take 1 tablet (5 mg) by mouth 4 times daily as needed (belching, nausea) (Patient not taking: Reported on 8/25/2020) 10 tablet 0      oxyCODONE (ROXICODONE) 5 MG tablet Take 1-2 tablets (5-10 mg) by mouth every 4 hours as needed for moderate to severe pain (Patient not taking: Reported on 8/25/2020) 10 tablet 0     sildenafil (REVATIO) 20 MG tablet TAKE 2-3 TABLETS (40-60 MG) BY MOUTH TWICE A WEEK (Patient not taking: Reported on 8/25/2020) 90 tablet 3     tamsulosin (FLOMAX) 0.4 MG capsule Take 1 tablet by mouth daily for 7 days-Begin 4 days before surgery, including morning of surgery with a sip of water (Patient not taking: Reported on 8/25/2020) 7 capsule 0       ALLERGIES     Allergies   Allergen Reactions     Atorvastatin Other (See Comments)     Calf pain     Pravastatin Other (See Comments)     Leg cramps       PAST MEDICAL HISTORY:  Past Medical History:   Diagnosis Date     Elevated blood pressure (not hypertension)      Enlarged prostate- has seen  Dr. Solomon in the past - no further PSA's needed as of 7/2019 - doesn't need to see him again per Dr. Solomon unless gross hematuria  9/18/2013     Erectile dysfunction      Gastric ulcer 9/25/2007    EGD @ [facility]GD - gastric ulcer w/ erosions/ LA grade B erosive esophagitis     GERD (gastroesophageal reflux disease)     worse lying down at night.      Hyperlipidemia LDL goal < 130     lipitor 40mg= calf pain-off since 2008     Hypertension      Lipoma of skin 2007    chest - biopsied at [facility] = benign      Malignant melanoma (H) 1995     Osteoarthritis     mostly hands and knees      Snoring      Thyroid disease        PAST SURGICAL HISTORY:  Past Surgical History:   Procedure Laterality Date     COLONOSCOPY  9/25/2007    repeat in 2017     DAVINCI HERNIORRHAPHY INGUINAL Right 10/24/2019    Procedure: robotic assisted right inguinal hernia repair with mesh;  Surgeon: Rom Mccoy MD;  Location:  OR     ESOPHAGOSCOPY, GASTROSCOPY, DUODENOSCOPY (EGD), COMBINED N/A 2/5/2015    Procedure: COMBINED ESOPHAGOSCOPY, GASTROSCOPY, DUODENOSCOPY (EGD);  Surgeon: Ender Dixon MD;  Location:   GI     HC ESOPHAGOSCOPY, DIAGNOSTIC  2007    EGD - gastric ulcer w/ erosions/ LA grade B erosive esophagitis     wide excision      for malignant melanoma       FAMILY HISTORY:  Family History   Problem Relation Age of Onset     C.A.D. Father 62         at age 62 of MI     Diabetes Father         early type 2      Neurologic Disorder Mother         mild dementia - @ 92     C.A.D. 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    Social Needs     Financial resource strain: None     Food insecurity     Worry: None     Inability: None     Transportation needs     Medical: None     Non-medical: None   Tobacco Use     Smoking status: Current Every Day Smoker     Packs/day: 0.50     Years: 60.00     Pack years: 30.00     Smokeless tobacco: Never Used     Tobacco comment: strongly encourage smoking cessation with every visit   Substance and Sexual Activity     Alcohol use: Yes     Alcohol/week: 0.0 standard drinks     Comment: not regular - very occasional 1-2 martinis when out to dinner 1-2x/month, if that      Drug use: No     Comment: no herbal meds      Sexual activity: Yes     Partners: Female     Comment:     Lifestyle     Physical activity     Days per week: None     Minutes per session: None     Stress: None   Relationships     Social connections     Talks on phone: None     Gets together: None     Attends Faith service: None     Active member of club or organization: None     Attends meetings of clubs or organizations: None     Relationship status: None     Intimate partner  "violence     Fear of current or ex partner: None     Emotionally abused: None     Physically abused: None     Forced sexual activity: None   Other Topics Concern     Parent/sibling w/ CABG, MI or angioplasty before 65F 55M? No      Service Yes     Comment: [employer] - May 9114-2565 - fitted glasses for other servicemen      Blood Transfusions Not Asked     Caffeine Concern Not Asked     Occupational Exposure Not Asked     Hobby Hazards Not Asked     Sleep Concern Not Asked     Stress Concern Not Asked     Weight Concern Yes     Special Diet Not Asked     Back Care Not Asked     Exercise Yes     Comment: stays very active      Bike Helmet Not Asked     Seat Belt Yes     Comment: always      Self-Exams Yes     Comment: RANJIT encouraged monthly    Social History Narrative    Has new [employer] puppy - copper and white - Jose - 11 months old - noted March 28, 2018 --Vee Beth MD         Wife doing dialysis 3x/week - has 7 stents in her body. Heart, kidneys, liver.  ---Vee Beth MD        Review of Systems:  Skin:  Negative     Eyes:  Positive for glasses  ENT:  Positive for hearing loss  Respiratory:  Negative    Cardiovascular:  Negative heaviness;Positive for  Gastroenterology: Negative    Genitourinary:  Negative    Musculoskeletal:  Negative    Neurologic:  Positive for headaches  Psychiatric:  Positive for anxiety;excessive stress  Heme/Lymph/Imm:  Negative    Endocrine:  Positive for thyroid disorder    Physical Exam:  Vitals: BP (!) 132/98 (BP Location: Right arm, Patient Position: Sitting, Cuff Size: Adult Large)   Pulse 97   Ht 1.78 m (5' 10.09\")   Wt 93 kg (205 lb)   SpO2 96%   BMI 29.34 kg/m      Constitutional:  cooperative, alert and oriented, well developed, well nourished, in no acute distress        Skin:  warm and dry to the touch, no apparent skin lesions or masses noted surgical scars well-healed      Head:  normocephalic, no masses or lesions        Eyes:  sclera " white;pupils equal and round;no xanthalasma        ENT:  no pallor or cyanosis   Masked    Neck:  carotid pulses are full and equal bilaterally, JVP normal, no carotid bruit        Chest:  normal breath sounds, clear to auscultation, normal A-P diameter, normal symmetry, normal respiratory excursion, no use of accessory muscles        Cardiac: regular rhythm, normal S1/S2, no S3 or S4, apical impulse not displaced, no murmurs, gallops or rubs                  Abdomen:  abdomen soft;BS normoactive        Vascular: pulses full and equal                                      Extremities and Back:           Neurological:  no gross motor deficits          Recent Lab Results:  LIPID RESULTS:  Lab Results   Component Value Date    CHOL 169 07/15/2020    HDL 58 07/15/2020    LDL 95 07/15/2020    TRIG 79 07/15/2020    CHOLHDLRATIO 5.2 (H) 01/30/2015       LIVER ENZYME RESULTS:  Lab Results   Component Value Date    AST 12 05/14/2020    ALT 19 05/14/2020       CBC RESULTS:  Lab Results   Component Value Date    WBC 7.7 05/14/2020    RBC 4.97 05/14/2020    HGB 15.2 05/14/2020    HCT 45.7 05/14/2020    MCV 92 05/14/2020    MCH 30.6 05/14/2020    MCHC 33.3 05/14/2020    RDW 13.6 05/14/2020     05/14/2020       BMP RESULTS:  Lab Results   Component Value Date     05/14/2020    POTASSIUM 4.2 05/14/2020    CHLORIDE 107 05/14/2020    CO2 25 05/14/2020    ANIONGAP 6 05/14/2020    GLC 97 05/14/2020    BUN 29 05/14/2020    CR 1.07 05/14/2020    GFRESTIMATED 67 05/14/2020    GFRESTBLACK 78 05/14/2020    HARI 9.2 05/14/2020        A1C RESULTS:  Lab Results   Component Value Date    A1C 5.8 (H) 07/15/2020       INR RESULTS:  No results found for: INR      Thank you for allowing me to participate in the care of your patient.    Sincerely,     Roderick Harp MD     [facility]     Yes

## 2020-08-25 NOTE — PROGRESS NOTES
HISTORY:    Dev Powell is a pleasant 75-year-old gentleman seen for the first time in cardiology clinic today.  He is still in mourning after the loss of his wife less than a month ago.  He recently underwent a screening chest CT and was noted to have heavy coronary calcification.  Cardiology consultation was requested to further evaluate this problem.    Dev reports that he is generally pretty healthy.  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  He denies any chest pain with heavy exertion although he does have problems with lightheadedness and dizziness very predictably if he does not drink enough fluids.  He also denies any symptoms of PND/orthopnea, palpitations, syncope or near syncope, strokelike symptoms, orthostasis, peripheral edema, or claudication.    Cardiac risk factors are largely positive.  He has a history of hypertension currently treated, hyperlipidemia currently treated, he is a smoker using about a half a pack per day for 60 years, and he has a positive family history with his father dying at age 62 of an MI.  His father was diabetic.  The patient himself does not have diabetes.    Dev checks his home blood pressure infrequently and usually finds his systolic pressure to be around 130 with diastolic pressures generally in the 70-80 range.  His blood pressure in clinic today is 132/98.  Reviewing past blood pressures done over the last year this is the first time he has had diastolic hypertension.  Systolic values have been mostly closer to 120-130.      ASSESSMENT/PLAN:    1.  Coronary artery disease as evidenced by heavy coronary calcification.  The patient has no symptoms of angina.  I will arrange a stress echo to make sure he does not have significant silent ischemia although he has a good exercise tolerance so I do not expect that study to be positive.  If the  study is positive with a significant area of ischemia we will plan coronary angiography, otherwise we will continue conservative management with aggressive risk factor control.  2.  Hyperlipidemia.  The patient has been on Crestor 10 mg for several years and is tolerating it well.  He has had problems in the past with other statins.  I think it is worthwhile to try to increase his Crestor to 20 mg daily now that he has identified CAD his ideal goal would be an LDL of 70.  His latest LDL was 95 so I do not expect the increase in Crestor to be enough to get him to goal.  3.  Hypertension.  I have asked him to start checking his blood pressure more regularly and keep a list.  We may need to be more aggressive with his blood pressure management since ideal blood pressure would be 130/80 or less on most readings.  He would probably benefit from addition of a diuretic as the next step if this is necessary.  4.  Cigarette use.  The importance of smoking cessation was emphasized and although he seemed moderately interested he is not ready to quit at this point.    Thank you for inviting me to participate in your patient's care.  We will plan on seeing him back in about 3 months to review the issues we covered today.  Please do not need to call if I can be of further assistance.    Orders Placed This Encounter   Procedures     Lipid Profile     ALT     Follow-Up with Cardiologist     Exercise Stress Echocardiogram     Orders Placed This Encounter   Medications     rosuvastatin (CRESTOR) 20 MG tablet     Sig: Take 1 tablet (20 mg) by mouth daily     Dispense:  90 tablet     Refill:  3     There are no discontinued medications.    10 year ASCVD risk: The 10-year ASCVD risk score (Feiconrado SHERWOOD Jr., et al., 2013) is: 29.1%    Values used to calculate the score:      Age: 75 years      Sex: Male      Is Non- : No      Diabetic: No      Tobacco smoker: Yes      Systolic Blood Pressure: 132 mmHg      Is BP  treated: Yes      HDL Cholesterol: 58 mg/dL      Total Cholesterol: 169 mg/dL    Encounter Diagnoses   Name Primary?     Hyperlipidemia LDL goal <130 - pravastatin = leg cramps; lipitor =calf pains Yes     Coronary artery disease involving native coronary artery of native heart without angina pectoris        CURRENT MEDICATIONS:  Current Outpatient Medications   Medication Sig Dispense Refill     aspirin (ASA) 81 MG EC tablet Take 1 tablet (81 mg) by mouth daily       Glucosamine-Chondroit-Vit C-Mn (GLUCOSAMINE CHONDROITIN 1500 COMPLEX) CAPS Take by mouth daily       levothyroxine (SYNTHROID/LEVOTHROID) 137 MCG tablet TAKE 1 TABLET BY MOUTH ON MONDAY, WEDNESDAYS AND FRIDAYS ALTERNATING WITH 150MCG TABS 36 tablet 2     levothyroxine (SYNTHROID/LEVOTHROID) 150 MCG tablet Take 150 mcg table Tuesday, Thursday, Saturday and Sunday then alternate with 137 mcg the other days 90 tablet 1     lisinopril (ZESTRIL) 20 MG tablet TAKE 1 TABLET BY MOUTH EVERY DAY 90 tablet 1     Multiple Vitamins-Minerals (CENTRUM SILVER) per tablet Take 1 tablet by mouth daily 30 tablet      omeprazole (PRILOSEC) 40 MG DR capsule TAKE 1 CAPSULE BY MOUTH ONCE DAILY TAKE 30-60 MINUTES BEFORE A MEAL. 90 capsule 2     rosuvastatin (CRESTOR) 10 MG tablet TAKE 1 TABLET (10 MG) BY MOUTH AT BEDTIME FOR CHOLESTEROL 90 tablet 1     rosuvastatin (CRESTOR) 20 MG tablet Take 1 tablet (20 mg) by mouth daily 90 tablet 3     testosterone (ANDROGEL 1 % PUMP) 12.5 MG/ACT (1%) gel APPLY 2 PUMPS ONTO CLEAN DRY HAIRLESS SKIN OF SHOULDERS, UPPER ARMS OR ABDOMEN ONCE DAILY 75 g 5     metoclopramide (REGLAN) 5 MG tablet Take 1 tablet (5 mg) by mouth 4 times daily as needed (belching, nausea) (Patient not taking: Reported on 8/25/2020) 10 tablet 0     oxyCODONE (ROXICODONE) 5 MG tablet Take 1-2 tablets (5-10 mg) by mouth every 4 hours as needed for moderate to severe pain (Patient not taking: Reported on 8/25/2020) 10 tablet 0     sildenafil (REVATIO) 20 MG tablet TAKE  2-3 TABLETS (40-60 MG) BY MOUTH TWICE A WEEK (Patient not taking: Reported on 8/25/2020) 90 tablet 3     tamsulosin (FLOMAX) 0.4 MG capsule Take 1 tablet by mouth daily for 7 days-Begin 4 days before surgery, including morning of surgery with a sip of water (Patient not taking: Reported on 8/25/2020) 7 capsule 0       ALLERGIES     Allergies   Allergen Reactions     Atorvastatin Other (See Comments)     Calf pain     Pravastatin Other (See Comments)     Leg cramps       PAST MEDICAL HISTORY:  Past Medical History:   Diagnosis Date     Elevated blood pressure (not hypertension)      Enlarged prostate- has seen  Dr. Solomon in the past - no further PSA's needed as of 7/2019 - doesn't need to see him again per Dr. Solomon unless gross hematuria  9/18/2013     Erectile dysfunction      Gastric ulcer 9/25/2007    EGD @ [facility]-EGD - gastric ulcer w/ erosions/ LA grade B erosive esophagitis     GERD (gastroesophageal reflux disease)     worse lying down at night.      Hyperlipidemia LDL goal < 130     lipitor 40mg= calf pain-off since 2008     Hypertension      Lipoma of skin 2007    chest - biopsied at [facility] = benign      Malignant melanoma (H) 1995     Osteoarthritis     mostly hands and knees      Snoring      Thyroid disease        PAST SURGICAL HISTORY:  Past Surgical History:   Procedure Laterality Date     COLONOSCOPY  9/25/2007    repeat in 2017     DAVINCI HERNIORRHAPHY INGUINAL Right 10/24/2019    Procedure: robotic assisted right inguinal hernia repair with mesh;  Surgeon: Rom Mccoy MD;  Location:  OR     ESOPHAGOSCOPY, GASTROSCOPY, DUODENOSCOPY (EGD), COMBINED N/A 2/5/2015    Procedure: COMBINED ESOPHAGOSCOPY, GASTROSCOPY, DUODENOSCOPY (EGD);  Surgeon: Ender Dixno MD;  Location:  GI     HC ESOPHAGOSCOPY, DIAGNOSTIC  9/25/2007    EGD - gastric ulcer w/ erosions/ LA grade B erosive esophagitis     wide excision  1995    for malignant melanoma       FAMILY HISTORY:  Family History   Problem Relation  Age of Onset     C.A.D. Father 62         at age 62 of MI     Diabetes Father         early type 2      Neurologic Disorder Mother         mild dementia - @ 92     C.A.D. 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    Social Needs     Financial resource strain: None     Food insecurity     Worry: None     Inability: None     Transportation needs     Medical: None     Non-medical: None   Tobacco Use     Smoking status: Current Every Day Smoker     Packs/day: 0.50     Years: 60.00     Pack years: 30.00     Smokeless tobacco: Never Used     Tobacco comment: strongly encourage smoking cessation with every visit   Substance and Sexual Activity     Alcohol use: Yes     Alcohol/week: 0.0 standard drinks     Comment: not regular - very occasional 1-2 martinis when out to dinner 1-2x/month, if that      Drug use: No     Comment: no herbal meds      Sexual activity: Yes     Partners: Female     Comment:     Lifestyle     Physical activity     Days per week: None     Minutes per session: None     Stress: None   Relationships     Social connections     Talks on phone: None     Gets together: None     Attends Oriental orthodox service: None     Active member of club or organization: None     Attends meetings of clubs or organizations: None     Relationship status: None     Intimate partner violence     Fear of current or ex partner: None     Emotionally abused: None     Physically abused: None     Forced sexual activity: None   Other Topics Concern     Parent/sibling w/ CABG, MI or angioplasty before 65F 55M? No      " Service Yes     Comment: [employer] - May [id number] - fitted glasses for other servicemen      Blood Transfusions Not Asked     Caffeine Concern Not Asked     Occupational Exposure Not Asked     Hobby Hazards Not Asked     Sleep Concern Not Asked     Stress Concern Not Asked     Weight Concern Yes     Special Diet Not Asked     Back Care Not Asked     Exercise Yes     Comment: stays very active      Bike Helmet Not Asked     Seat Belt Yes     Comment: always      Self-Exams Yes     Comment: RANJIT encouraged monthly    Social History Narrative    Has new [employer] puppy - copper and white - Jose - 11 months old - noted March 28, 2018 --Vee Beth MD         Wife doing dialysis 3x/week - has 7 stents in her body. Heart, kidneys, liver.  ---Vee Beth MD        Review of Systems:  Skin:  Negative     Eyes:  Positive for glasses  ENT:  Positive for hearing loss  Respiratory:  Negative    Cardiovascular:  Negative heaviness;Positive for  Gastroenterology: Negative    Genitourinary:  Negative    Musculoskeletal:  Negative    Neurologic:  Positive for headaches  Psychiatric:  Positive for anxiety;excessive stress  Heme/Lymph/Imm:  Negative    Endocrine:  Positive for thyroid disorder    Physical Exam:  Vitals: BP (!) 132/98 (BP Location: Right arm, Patient Position: Sitting, Cuff Size: Adult Large)   Pulse 97   Ht 1.78 m (5' 10.09\")   Wt 93 kg (205 lb)   SpO2 96%   BMI 29.34 kg/m      Constitutional:  cooperative, alert and oriented, well developed, well nourished, in no acute distress        Skin:  warm and dry to the touch, no apparent skin lesions or masses noted surgical scars well-healed      Head:  normocephalic, no masses or lesions        Eyes:  sclera white;pupils equal and round;no xanthalasma        ENT:  no pallor or cyanosis   Masked    Neck:  carotid pulses are full and equal bilaterally, JVP normal, no carotid bruit        Chest:  normal breath sounds, clear to auscultation, " normal A-P diameter, normal symmetry, normal respiratory excursion, no use of accessory muscles        Cardiac: regular rhythm, normal S1/S2, no S3 or S4, apical impulse not displaced, no murmurs, gallops or rubs                  Abdomen:  abdomen soft;BS normoactive        Vascular: pulses full and equal                                      Extremities and Back:           Neurological:  no gross motor deficits          Recent Lab Results:  LIPID RESULTS:  Lab Results   Component Value Date    CHOL 169 07/15/2020    HDL 58 07/15/2020    LDL 95 07/15/2020    TRIG 79 07/15/2020    CHOLHDLRATIO 5.2 (H) 01/30/2015       LIVER ENZYME RESULTS:  Lab Results   Component Value Date    AST 12 05/14/2020    ALT 19 05/14/2020       CBC RESULTS:  Lab Results   Component Value Date    WBC 7.7 05/14/2020    RBC 4.97 05/14/2020    HGB 15.2 05/14/2020    HCT 45.7 05/14/2020    MCV 92 05/14/2020    MCH 30.6 05/14/2020    MCHC 33.3 05/14/2020    RDW 13.6 05/14/2020     05/14/2020       BMP RESULTS:  Lab Results   Component Value Date     05/14/2020    POTASSIUM 4.2 05/14/2020    CHLORIDE 107 05/14/2020    CO2 25 05/14/2020    ANIONGAP 6 05/14/2020    GLC 97 05/14/2020    BUN 29 05/14/2020    CR 1.07 05/14/2020    GFRESTIMATED 67 05/14/2020    GFRESTBLACK 78 05/14/2020    HARI 9.2 05/14/2020        A1C RESULTS:  Lab Results   Component Value Date    A1C 5.8 (H) 07/15/2020       INR RESULTS:  No results found for: INR      Roderick Harp MD, FACC    CC  Vee Beth MD  [address] [place] [address]

## 2020-09-02 ENCOUNTER — TELEPHONE (OUTPATIENT)
Dept: CARDIOLOGY | Facility: CLINIC | Age: 75
End: 2020-09-02

## 2020-09-02 ENCOUNTER — HOSPITAL ENCOUNTER (OUTPATIENT)
Dept: CARDIOLOGY | Facility: CLINIC | Age: 75
Discharge: HOME | End: 2020-09-02
Attending: INTERNAL MEDICINE | Admitting: INTERNAL MEDICINE
Payer: COMMERCIAL

## 2020-09-02 DIAGNOSIS — R94.39 ABNORMAL STRESS ECHO: Primary | ICD-10-CM

## 2020-09-02 DIAGNOSIS — I25.10 CORONARY ARTERY DISEASE INVOLVING NATIVE CORONARY ARTERY OF NATIVE HEART WITHOUT ANGINA PECTORIS: ICD-10-CM

## 2020-09-02 DIAGNOSIS — E78.5 HYPERLIPIDEMIA LDL GOAL <130: ICD-10-CM

## 2020-09-02 PROCEDURE — 93325 DOPPLER ECHO COLOR FLOW MAPG: CPT | Mod: TC

## 2020-09-02 PROCEDURE — 93018 CV STRESS TEST I&R ONLY: CPT | Performed by: INTERNAL MEDICINE

## 2020-09-02 PROCEDURE — 93321 DOPPLER ECHO F-UP/LMTD STD: CPT | Mod: 26 | Performed by: INTERNAL MEDICINE

## 2020-09-02 PROCEDURE — 25500064 ZZH RX 255 OP 636: Performed by: INTERNAL MEDICINE

## 2020-09-02 PROCEDURE — 93325 DOPPLER ECHO COLOR FLOW MAPG: CPT | Mod: 26 | Performed by: INTERNAL MEDICINE

## 2020-09-02 PROCEDURE — 93016 CV STRESS TEST SUPVJ ONLY: CPT | Performed by: INTERNAL MEDICINE

## 2020-09-02 PROCEDURE — 93350 STRESS TTE ONLY: CPT | Mod: 26 | Performed by: INTERNAL MEDICINE

## 2020-09-02 RX ADMIN — HUMAN ALBUMIN MICROSPHERES AND PERFLUTREN 9 ML: 10; .22 INJECTION, SOLUTION INTRAVENOUS at 10:00

## 2020-09-02 NOTE — TELEPHONE ENCOUNTER
Stress echo completed today.        Interpretation Summary  Proximal septal thickening is noted.  Mild valvular aortic stenosis.  A low workload was achieved.  Target Heart Rate was achieved.  There was no chest pain or significant ST changes with exercise.  Arrhythmia induced during stress: occasional PVC's.  Arrhythmia noted in recovery: >3-beat run of ventricular tachycardia.  The apex (A4C view only) post exercise is not well seen but suspicious for  hypokinesis/ischemia. Given the PVC and NSVT and questionable apex ischemia,  additional cardiac testing (?angiogram, nuclear GXTetc) may be indicated      ----- Message from Roderick Harp MD sent at 9/2/2020  1:56 PM CDT -----  Only a small area of possible apical ischemia is seen on stress echo.  He does not have any anginal symptoms, but he did have some ventricular ectopy with exercise.  Best approach is to repeat a walking stress test with nuc imaging to better quantify the ischemia.    Ordered NM MPI Treadmill.   Patient informed.   Scheduling will contact patient.     Ashley SHAY, BSN  [facility] ~ [place], [place]  [phone]

## 2020-09-03 NOTE — TELEPHONE ENCOUNTER
Received a call from patient. Patient is planning to go on a fishing trip to [facility] next Monday and will be back on Wednesday. No concerning symptoms today. Patient states that he quit smoking. NUC scheduled on 09-16-20.     Patient was advised to take it easy and to go to the ER if he has chest pain.     Patient was informed that I would discuss this with Dr. Harp and call him back with Dr. Harp's recommendations.     Ashley SHAY, BSN  [facility] ~ [place], [place]    ----------------------------------------------------------------    Roderick Harp MD  You 46 minutes ago (1:47 PM)       Fishing is safe for him        ~ Patient informed.  ~ Patient had no other questions.      Ashley SHAY, DAYNAN  [facility] ~ [place], [place]

## 2020-09-14 ENCOUNTER — HOSPITAL ENCOUNTER (OUTPATIENT)
Dept: NUCLEAR MEDICINE | Facility: CLINIC | Age: 75
Setting detail: NUCLEAR MEDICINE
End: 2020-09-14
Attending: INTERNAL MEDICINE
Payer: COMMERCIAL

## 2020-09-14 ENCOUNTER — HOSPITAL ENCOUNTER (OUTPATIENT)
Dept: CARDIOLOGY | Facility: CLINIC | Age: 75
End: 2020-09-14
Attending: INTERNAL MEDICINE
Payer: COMMERCIAL

## 2020-09-14 DIAGNOSIS — R94.39 ABNORMAL STRESS ECHO: ICD-10-CM

## 2020-09-14 DIAGNOSIS — I25.10 CORONARY ARTERY DISEASE INVOLVING NATIVE CORONARY ARTERY OF NATIVE HEART WITHOUT ANGINA PECTORIS: ICD-10-CM

## 2020-09-14 LAB
CV STRESS MAX HR HE: 153
NUC STRESS EJECTION FRACTION: 75 %
RATE PRESSURE PRODUCT: NORMAL
STRESS ANGINA INDEX: 0
STRESS ECHO BASELINE DIASTOLIC HE: 88
STRESS ECHO BASELINE HR: 111
STRESS ECHO BASELINE SYSTOLIC BP: 122
STRESS ECHO CALCULATED PERCENT HR: 106 %
STRESS ECHO LAST STRESS DIASTOLIC BP: 88
STRESS ECHO LAST STRESS SYSTOLIC BP: 154
STRESS ECHO POST ESTIMATED WORKLOAD: 7 METS
STRESS ECHO POST EXERCISE DUR MIN: 6 MIN
STRESS ECHO POST EXERCISE DUR SEC: 0 SEC
STRESS ECHO TARGET HR: 145

## 2020-09-14 PROCEDURE — 93018 CV STRESS TEST I&R ONLY: CPT | Performed by: INTERNAL MEDICINE

## 2020-09-14 PROCEDURE — 78452 HT MUSCLE IMAGE SPECT MULT: CPT

## 2020-09-14 PROCEDURE — 93016 CV STRESS TEST SUPVJ ONLY: CPT | Performed by: INTERNAL MEDICINE

## 2020-09-14 PROCEDURE — 78452 HT MUSCLE IMAGE SPECT MULT: CPT | Mod: 26 | Performed by: INTERNAL MEDICINE

## 2020-09-14 PROCEDURE — 34300033 ZZH RX 343: Performed by: INTERNAL MEDICINE

## 2020-09-14 PROCEDURE — 93017 CV STRESS TEST TRACING ONLY: CPT

## 2020-09-14 PROCEDURE — A9502 TC99M TETROFOSMIN: HCPCS | Performed by: INTERNAL MEDICINE

## 2020-09-14 RX ADMIN — TETROFOSMIN 30 MCI.: 1.38 INJECTION, POWDER, LYOPHILIZED, FOR SOLUTION INTRAVENOUS at 12:38

## 2020-09-14 RX ADMIN — TETROFOSMIN 10 MCI.: 1.38 INJECTION, POWDER, LYOPHILIZED, FOR SOLUTION INTRAVENOUS at 11:06

## 2020-09-15 ENCOUNTER — TELEPHONE (OUTPATIENT)
Dept: CARDIOLOGY | Facility: CLINIC | Age: 75
End: 2020-09-15

## 2020-09-15 DIAGNOSIS — E78.5 HYPERLIPIDEMIA LDL GOAL <130: ICD-10-CM

## 2020-09-15 DIAGNOSIS — I10 ESSENTIAL HYPERTENSION WITH GOAL BLOOD PRESSURE LESS THAN 140/90: ICD-10-CM

## 2020-09-15 NOTE — TELEPHONE ENCOUNTER
Stress echo (09-02-20)      Interpretation Summary  Proximal septal thickening is noted.  Mild valvular aortic stenosis.  A low workload was achieved.  Target Heart Rate was achieved.  There was no chest pain or significant ST changes with exercise.  Arrhythmia induced during stress: occasional PVC's.  Arrhythmia noted in recovery: >3-beat run of ventricular tachycardia.  The apex (A4C view only) post exercise is not well seen but suspicious for  hypokinesis/ischemia. Given the PVC and NSVT and questionable apex ischemia,  additional cardiac testing (?angiogram, nuclear GXTetc) may be indicated        Message from Roderick Harp MD sent at 9/2/2020  1:56 PM CDT   Only a small area of possible apical ischemia is seen on stress echo.  He does not have any anginal symptoms, but he did have some ventricular ectopy with exercise.  Best approach is to repeat a walking stress test with nuc imaging to better quantify the ischemia.  -------------------------------------------------------------------------------------------------------------------     NM MPI Treadmill completed on 09-14-20            An exercise stress test was performed following a Keyur protocol with the patient exercising for 6 minutes and 0 seconds.  Blood pressure and heart rate demonstrated a normal response to exercise.     No symptoms were reported by the patient during the stress test.     The stress electrocardiogram is negative for inducible ischemic EKG changes.     The nuclear stress test is negative for inducible myocardial ischemia or infarction.     Left ventricular function is normal.     The left ventricular ejection fraction at stress is 75%.     There is no prior study for comparison.          ~ Forwarded exercise stress test results to Dr. Harp to review.   ~ Next OV with Dr. Harp:  (F/u as plan?)    Ashley SHAY, BSN  [facility] ~ [place], [place]

## 2020-09-16 NOTE — TELEPHONE ENCOUNTER
Loyd, Roderick Ledbetter MD  You 1 hour ago (1:22 PM)       No action needed, f/ up as planned     -----------------------------------------------------------------------------------------    FLP ALT (10-21-20)  F/u with Dr. Harp the end of Nov 2020.     Called patient. LVM. Awaiting call back.     Ashley RN, BSN  [facility] [place] [facility]

## 2020-09-21 NOTE — TELEPHONE ENCOUNTER
Received a call from patient. Reviewed NUC results. Patient had no questions. Patient was advised to f/u as planned, fasting labs in Oct (next month) f/u in 2 months (Nov). Scheduling number provided.     Ashley SHAY, BSN  [facility] [place] [facility] ~ [place], [place]

## 2020-10-14 DIAGNOSIS — E78.5 HYPERLIPIDEMIA LDL GOAL <130: ICD-10-CM

## 2020-10-14 DIAGNOSIS — I25.10 CORONARY ARTERY DISEASE INVOLVING NATIVE CORONARY ARTERY OF NATIVE HEART WITHOUT ANGINA PECTORIS: ICD-10-CM

## 2020-10-14 LAB
ALT SERPL W P-5'-P-CCNC: 26 U/L (ref 0–70)
CHOLEST SERPL-MCNC: 174 MG/DL
HDLC SERPL-MCNC: 71 MG/DL
LDLC SERPL CALC-MCNC: 80 MG/DL
NONHDLC SERPL-MCNC: 103 MG/DL
TRIGL SERPL-MCNC: 117 MG/DL

## 2020-10-14 PROCEDURE — 36415 COLL VENOUS BLD VENIPUNCTURE: CPT | Performed by: INTERNAL MEDICINE

## 2020-10-14 PROCEDURE — 80061 LIPID PANEL: CPT | Performed by: INTERNAL MEDICINE

## 2020-10-14 PROCEDURE — 84460 ALANINE AMINO (ALT) (SGPT): CPT | Performed by: INTERNAL MEDICINE

## 2020-10-22 ENCOUNTER — TELEPHONE (OUTPATIENT)
Dept: CARDIOLOGY | Facility: CLINIC | Age: 75
End: 2020-10-22

## 2020-10-22 DIAGNOSIS — E78.5 HYPERLIPIDEMIA LDL GOAL <130: ICD-10-CM

## 2020-10-22 DIAGNOSIS — I25.10 CORONARY ARTERY DISEASE INVOLVING NATIVE CORONARY ARTERY OF NATIVE HEART WITHOUT ANGINA PECTORIS: ICD-10-CM

## 2020-10-22 RX ORDER — ROSUVASTATIN CALCIUM 40 MG/1
20 TABLET, COATED ORAL DAILY
Qty: 90 TABLET | Refills: 3 | Status: SHIPPED | OUTPATIENT
Start: 2020-10-22 | End: 2020-10-22

## 2020-10-22 RX ORDER — ROSUVASTATIN CALCIUM 40 MG/1
40 TABLET, COATED ORAL DAILY
Qty: 90 TABLET | Refills: 3 | Status: SHIPPED | OUTPATIENT
Start: 2020-10-22 | End: 2021-11-08

## 2020-10-22 NOTE — TELEPHONE ENCOUNTER
Patient with CAD and hyperlipidemia.  Crestor was increased  to 20 mg daily on 08-25-20.     LDL improved to 80.    Component      Latest Ref Rng & Units 10/14/2020   Cholesterol      <200 mg/dL 174   Triglycerides      <150 mg/dL 117   HDL Cholesterol      >39 mg/dL 71   LDL Cholesterol Calculated      <100 mg/dL 80   Non HDL Cholesterol      <130 mg/dL 103   ALT      0 - 70 U/L 26       Message from Roderick Harp MD sent at 10/16/2020 10:08 AM CDT   Double dose of Crestor to 40 mg daily and recheck FLP in 6 to 8 weeks.    -------------------------------------------------------------------------------------------    Spoke with patient. Reviewed results and recommendations. Patient agreed to try Crestor 40 mg/day. Patient is going to take 2 -20 mg tablets a day until he runs out. Prescription for 40 mg tablets was sent. Placed order for FLP/ALT. Patient states he will schedule his lab appointment when he comes for his follow up appointment next month.     Ashley RN, BSN  [facility] [place] ~ [place], [place]

## 2020-11-03 ENCOUNTER — OFFICE VISIT (OUTPATIENT)
Dept: CARDIOLOGY | Facility: CLINIC | Age: 75
End: 2020-11-03
Payer: COMMERCIAL

## 2020-11-03 VITALS
HEIGHT: 71 IN | SYSTOLIC BLOOD PRESSURE: 142 MMHG | DIASTOLIC BLOOD PRESSURE: 94 MMHG | BODY MASS INDEX: 28.98 KG/M2 | HEART RATE: 100 BPM | WEIGHT: 207 LBS | OXYGEN SATURATION: 98 %

## 2020-11-03 DIAGNOSIS — I25.10 CORONARY ARTERY DISEASE INVOLVING NATIVE CORONARY ARTERY OF NATIVE HEART WITHOUT ANGINA PECTORIS: Primary | ICD-10-CM

## 2020-11-03 DIAGNOSIS — I10 ESSENTIAL HYPERTENSION WITH GOAL BLOOD PRESSURE LESS THAN 140/90: ICD-10-CM

## 2020-11-03 DIAGNOSIS — E78.5 HYPERLIPIDEMIA LDL GOAL <130: ICD-10-CM

## 2020-11-03 PROCEDURE — 99214 OFFICE O/P EST MOD 30 MIN: CPT | Performed by: INTERNAL MEDICINE

## 2020-11-03 ASSESSMENT — MIFFLIN-ST. JEOR: SCORE: 1696.08

## 2020-11-03 NOTE — PROGRESS NOTES
HISTORY:    Dev Powell is a 75-year-old male with a history of heavy coronary calcification noted on a chest CT.  He has a history of ongoing cigarette use, hypertension, hyperlipidemia, and a positive family history but no history of diabetes.  He was initially seen in mid August at which time a stress echo was ordered.  The stress echo showed some exercise-induced ventricular ectopy as well as a questionable apical defect so we moved onto a stress nuclear scan.  That showed normal ejection fraction and no inducible ischemia.    Dev has never had any symptoms of exertional chest pain, palpitations, syncope or near syncope, claudication, or peripheral edema.  Since our last discussion he has cut back considerably on his smoking but he continues to utilize cigarettes.  At our initial visit we increased his dose of Crestor from 10 mg to 20 mg/day and recheck his lipids.  His LDL was still 80 so we now have increased his dose to 40 mg/day.  He is tolerating this dose (although he states that in the past he had some muscle aches on high-dose Crestor).  He is scheduled to have a lipid profile repeated next month.      ASSESSMENT/PLAN:    1.  Coronary artery disease.  This is demonstrated by heavy calcium in the coronaries but he has no symptoms of angina and a negative stress test.  We discussed this in detail and I explained that our goal switch is now to preventing plaque rupture and plaque progression.  These concepts were discussed.  He is currently using aspirin, ACE inhibitor, and statin.  2.  Hyperlipidemia.  The patient is now on maximal statin dose and his lipid profile is pending.  I talked him about the possibility of going on Zetia, but will wait to see what his blood values are before making a decision.  3.  Ongoing cigarette use.  I had a long discussion once again with Dev today about the dangers of smoking and explained some of the pathophysiology to him.  He seems motivated to quit smoking and has  cut back his cigarette use considerably, planning on giving up entirely.  4.  Hypertension.  Dev brought in a list of home blood pressure readings.  There was 1 reading in the 150s and another systolic value of just 90 but most of his readings were in the 1 teens to 120s.  Blood pressure control is adequate and no further adjustments are needed.    Thank you for inviting me to participate in your patient's care.  1 year follow-up will be planned and we will contact him with results of his blood tests when they become available.    Chart documentation was completed, in part, with [employer] voice-recognition software. Even though reviewed, some grammatical, spelling, and word errors may remain.       No orders of the defined types were placed in this encounter.    No orders of the defined types were placed in this encounter.    Medications Discontinued During This Encounter   Medication Reason     tamsulosin (FLOMAX) 0.4 MG capsule Therapy completed     oxyCODONE (ROXICODONE) 5 MG tablet Medication Reconciliation Clean Up     metoclopramide (REGLAN) 5 MG tablet Medication Reconciliation Clean Up       10 year ASCVD risk: The 10-year ASCVD risk score ([place] PRESLEY Jr., et al., 2013) is: 30.9%    Values used to calculate the score:      Age: 75 years      Sex: Male      Is Non- : No      Diabetic: No      Tobacco smoker: Yes      Systolic Blood Pressure: 142 mmHg      Is BP treated: Yes      HDL Cholesterol: 71 mg/dL      Total Cholesterol: 174 mg/dL    Encounter Diagnoses   Name Primary?     Hyperlipidemia LDL goal <130 - pravastatin = leg cramps; lipitor =calf pains      Coronary artery disease involving native coronary artery of native heart without angina pectoris        CURRENT MEDICATIONS:  Current Outpatient Medications   Medication Sig Dispense Refill     aspirin (ASA) 81 MG EC tablet Take 1 tablet (81 mg) by mouth daily       Glucosamine-Chondroit-Vit C-Mn (GLUCOSAMINE CHONDROITIN 1500 COMPLEX)  CAPS Take by mouth daily       levothyroxine (SYNTHROID/LEVOTHROID) 137 MCG tablet TAKE 1 TABLET BY MOUTH ON MONDAY, WEDNESDAYS AND FRIDAYS ALTERNATING WITH 150MCG TABS 36 tablet 2     levothyroxine (SYNTHROID/LEVOTHROID) 150 MCG tablet TAKE 1 TAB TUESDAY, THURSDAY, SATURDAY AND SUNDAY THEN ALTERNATE WITH 137 MCG THE OTHER DAYS 52 tablet 1     lisinopril (ZESTRIL) 20 MG tablet TAKE 1 TABLET BY MOUTH EVERY DAY 90 tablet 1     Multiple Vitamins-Minerals (CENTRUM SILVER) per tablet Take 1 tablet by mouth daily 30 tablet      omeprazole (PRILOSEC) 40 MG DR capsule TAKE 1 CAPSULE BY MOUTH ONCE DAILY TAKE 30-60 MINUTES BEFORE A MEAL. 90 capsule 2     rosuvastatin (CRESTOR) 40 MG tablet Take 1 tablet (40 mg) by mouth daily 90 tablet 3     sildenafil (REVATIO) 20 MG tablet TAKE 2-3 TABLETS (40-60 MG) BY MOUTH TWICE A WEEK 90 tablet 3     testosterone (ANDROGEL 1 % PUMP) 12.5 MG/ACT (1%) gel APPLY 2 PUMPS ONTO CLEAN DRY HAIRLESS SKIN OF SHOULDERS, UPPER ARMS OR ABDOMEN ONCE DAILY 75 g 5       ALLERGIES     Allergies   Allergen Reactions     Atorvastatin Other (See Comments)     Calf pain     Pravastatin Other (See Comments)     Leg cramps       PAST MEDICAL HISTORY:  Past Medical History:   Diagnosis Date     Elevated blood pressure (not hypertension)      Enlarged prostate- has seen  Dr. Solomon in the past - no further PSA's needed as of 7/2019 - doesn't need to see him again per Dr. Solomon unless gross hematuria  9/18/2013     Erectile dysfunction      Gastric ulcer 9/25/2007    EGD @ [place]-[facility] - gastric ulcer w/ erosions/ LA grade B erosive esophagitis     GERD (gastroesophageal reflux disease)     worse lying down at night.      Hyperlipidemia LDL goal < 130     lipitor 40mg= calf pain-off since 2008     Hypertension      Lipoma of skin 2007    chest - biopsied at [place] = benign      Malignant melanoma (H) 1995     Osteoarthritis     mostly hands and knees      Snoring      Thyroid disease        PAST SURGICAL  HISTORY:  Past Surgical History:   Procedure Laterality Date     COLONOSCOPY  2007    repeat in 2017     DAVINCI HERNIORRHAPHY INGUINAL Right 10/24/2019    Procedure: robotic assisted right inguinal hernia repair with mesh;  Surgeon: Rom Mccoy MD;  Location: [facility]     ESOPHAGOSCOPY, GASTROSCOPY, DUODENOSCOPY (EGD), COMBINED N/A 2015    Procedure: COMBINED ESOPHAGOSCOPY, GASTROSCOPY, DUODENOSCOPY (EGD);  Surgeon: Ender Dixon MD;  Location: [facility]     HC ESOPHAGOSCOPY, DIAGNOSTIC  2007    EGD - gastric ulcer w/ erosions/ LA grade B erosive esophagitis     wide excision      for malignant melanoma       FAMILY HISTORY:  Family History   Problem Relation Age of Onset     C.A.D. Father 62         at age 62 of MI     Diabetes Father         early type 2      Neurologic Disorder Mother         mild dementia - @ 92     C.A.D. 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    Social Needs     Financial resource strain: None     Food insecurity     Worry: None     Inability: None     Transportation needs     Medical: None     Non-medical: None   Tobacco Use     Smoking status: Current Every Day Smoker     Packs/day: 0.50     Years: 60.00     Pack years: 30.00     Smokeless tobacco: Never Used     Tobacco comment: strongly encourage smoking cessation with every visit   Substance and Sexual Activity     Alcohol use: Yes     Alcohol/week: 0.0 standard drinks     Comment: not regular - very occasional 1-2 martinis when out to dinner 1-2x/month, if that      Drug use: No     Comment: no  herbal meds      Sexual activity: Yes     Partners: Female     Comment:     Lifestyle     Physical activity     Days per week: None     Minutes per session: None     Stress: None   Relationships     Social connections     Talks on phone: None     Gets together: None     Attends Hinduism service: None     Active member of club or organization: None     Attends meetings of clubs or organizations: None     Relationship status: None     Intimate partner violence     Fear of current or ex partner: None     Emotionally abused: None     Physically abused: None     Forced sexual activity: None   Other Topics Concern     Parent/sibling w/ CABG, MI or angioplasty before 65F 55M? No      Service Yes     Comment: [employer] - May 8771-5056 - fitted glasses for other servicemen      Blood Transfusions Not Asked     Caffeine Concern Not Asked     Occupational Exposure Not Asked     Hobby Hazards Not Asked     Sleep Concern Not Asked     Stress Concern Not Asked     Weight Concern Yes     Special Diet Not Asked     Back Care Not Asked     Exercise Yes     Comment: stays very active      Bike Helmet Not Asked     Seat Belt Yes     Comment: always      Self-Exams Yes     Comment: RANJIT encouraged monthly    Social History Narrative    Has new [employer] puppy - copper and white - Jose - 11 months old - noted March 28, 2018 --Vee Beth MD         Wife doing dialysis 3x/week - has 7 stents in her body. Heart, kidneys, liver.  ---Vee Beth MD        Review of Systems:  Skin:  Negative     Eyes:  Positive for glasses  ENT:  Negative    Respiratory:  Negative    Cardiovascular:    dizziness;Positive for  Gastroenterology: Positive for reflux  Genitourinary:  Positive for nocturia  Musculoskeletal:  Positive for back pain  Neurologic:  Negative    Psychiatric:  Positive for excessive stress  Heme/Lymph/Imm:  Negative    Endocrine:  Positive for thyroid disorder    Physical Exam:  Vitals: BP (!) 142/94  "(BP Location: Right arm, Patient Position: Sitting, Cuff Size: Adult Large)   Pulse 100   Ht 1.803 m (5' 11\")   Wt 93.9 kg (207 lb)   SpO2 98%   BMI 28.87 kg/m      Constitutional:           Skin:           Head:           Eyes:           ENT:           Neck:           Chest:           Cardiac:                    Abdomen:           Vascular:                                        Extremities and Back:           Neurological:             Recent Lab Results:  LIPID RESULTS:  Lab Results   Component Value Date    CHOL 174 10/14/2020    HDL 71 10/14/2020    LDL 80 10/14/2020    TRIG 117 10/14/2020    CHOLHDLRATIO 5.2 (H) 01/30/2015       LIVER ENZYME RESULTS:  Lab Results   Component Value Date    AST 12 05/14/2020    ALT 26 10/14/2020       CBC RESULTS:  Lab Results   Component Value Date    WBC 7.7 05/14/2020    RBC 4.97 05/14/2020    HGB 15.2 05/14/2020    HCT 45.7 05/14/2020    MCV 92 05/14/2020    MCH 30.6 05/14/2020    MCHC 33.3 05/14/2020    RDW 13.6 05/14/2020     05/14/2020       BMP RESULTS:  Lab Results   Component Value Date     05/14/2020    POTASSIUM 4.2 05/14/2020    CHLORIDE 107 05/14/2020    CO2 25 05/14/2020    ANIONGAP 6 05/14/2020    GLC 97 05/14/2020    BUN 29 05/14/2020    CR 1.07 05/14/2020    GFRESTIMATED 67 05/14/2020    GFRESTBLACK 78 05/14/2020    HARI 9.2 05/14/2020        A1C RESULTS:  Lab Results   Component Value Date    A1C 5.8 (H) 07/15/2020       INR RESULTS:  No results found for: INR      Roderick Harp MD, FACC    CC  Roderick Harp MD  [address] [place] [id number]                  "

## 2020-11-03 NOTE — LETTER
11/3/2020    Vee Beth MD  [address] [facility] [address]    RE: Dev Powell       Dear Colleague,    I had the pleasure of seeing Dev Powell in the [facility].    HISTORY:    Dev Powell is a 75-year-old male with a history of heavy coronary calcification noted on a chest CT.  He has a history of ongoing cigarette use, hypertension, hyperlipidemia, and a positive family history but no history of diabetes.  He was initially seen in mid August at which time a stress echo was ordered.  The stress echo showed some exercise-induced ventricular ectopy as well as a questionable apical defect so we moved onto a stress nuclear scan.  That showed normal ejection fraction and no inducible ischemia.    Dev has never had any symptoms of exertional chest pain, palpitations, syncope or near syncope, claudication, or peripheral edema.  Since our last discussion he has cut back considerably on his smoking but he continues to utilize cigarettes.  At our initial visit we increased his dose of Crestor from 10 mg to 20 mg/day and recheck his lipids.  His LDL was still 80 so we now have increased his dose to 40 mg/day.  He is tolerating this dose (although he states that in the past he had some muscle aches on high-dose Crestor).  He is scheduled to have a lipid profile repeated next month.      ASSESSMENT/PLAN:    1.  Coronary artery disease.  This is demonstrated by heavy calcium in the coronaries but he has no symptoms of angina and a negative stress test.  We discussed this in detail and I explained that our goal switch is now to preventing plaque rupture and plaque progression.  These concepts were discussed.  He is currently using aspirin, ACE inhibitor, and statin.  2.  Hyperlipidemia.  The patient is now on maximal statin dose and his lipid profile is pending.  I talked him about the possibility of going on Zetia, but will wait to see what his blood values are before  making a decision.  3.  Ongoing cigarette use.  I had a long discussion once again with Dev today about the dangers of smoking and explained some of the pathophysiology to him.  He seems motivated to quit smoking and has cut back his cigarette use considerably, planning on giving up entirely.  4.  Hypertension.  Dev brought in a list of home blood pressure readings.  There was 1 reading in the 150s and another systolic value of just 90 but most of his readings were in the 1 teens to 120s.  Blood pressure control is adequate and no further adjustments are needed.    Thank you for inviting me to participate in your patient's care.  1 year follow-up will be planned and we will contact him with results of his blood tests when they become available.    Chart documentation was completed, in part, with [employer] voice-recognition software. Even though reviewed, some grammatical, spelling, and word errors may remain.       No orders of the defined types were placed in this encounter.    No orders of the defined types were placed in this encounter.    Medications Discontinued During This Encounter   Medication Reason     tamsulosin (FLOMAX) 0.4 MG capsule Therapy completed     oxyCODONE (ROXICODONE) 5 MG tablet Medication Reconciliation Clean Up     metoclopramide (REGLAN) 5 MG tablet Medication Reconciliation Clean Up       10 year ASCVD risk: The 10-year ASCVD risk score ([place] PRESLEY Jr., et al., 2013) is: 30.9%    Values used to calculate the score:      Age: 75 years      Sex: Male      Is Non- : No      Diabetic: No      Tobacco smoker: Yes      Systolic Blood Pressure: 142 mmHg      Is BP treated: Yes      HDL Cholesterol: 71 mg/dL      Total Cholesterol: 174 mg/dL    Encounter Diagnoses   Name Primary?     Hyperlipidemia LDL goal <130 - pravastatin = leg cramps; lipitor =calf pains      Coronary artery disease involving native coronary artery of native heart without angina pectoris        CURRENT  MEDICATIONS:  Current Outpatient Medications   Medication Sig Dispense Refill     aspirin (ASA) 81 MG EC tablet Take 1 tablet (81 mg) by mouth daily       Glucosamine-Chondroit-Vit C-Mn (GLUCOSAMINE CHONDROITIN 1500 COMPLEX) CAPS Take by mouth daily       levothyroxine (SYNTHROID/LEVOTHROID) 137 MCG tablet TAKE 1 TABLET BY MOUTH ON MONDAY, WEDNESDAYS AND FRIDAYS ALTERNATING WITH 150MCG TABS 36 tablet 2     levothyroxine (SYNTHROID/LEVOTHROID) 150 MCG tablet TAKE 1 TAB TUESDAY, THURSDAY, SATURDAY AND SUNDAY THEN ALTERNATE WITH 137 MCG THE OTHER DAYS 52 tablet 1     lisinopril (ZESTRIL) 20 MG tablet TAKE 1 TABLET BY MOUTH EVERY DAY 90 tablet 1     Multiple Vitamins-Minerals (CENTRUM SILVER) per tablet Take 1 tablet by mouth daily 30 tablet      omeprazole (PRILOSEC) 40 MG DR capsule TAKE 1 CAPSULE BY MOUTH ONCE DAILY TAKE 30-60 MINUTES BEFORE A MEAL. 90 capsule 2     rosuvastatin (CRESTOR) 40 MG tablet Take 1 tablet (40 mg) by mouth daily 90 tablet 3     sildenafil (REVATIO) 20 MG tablet TAKE 2-3 TABLETS (40-60 MG) BY MOUTH TWICE A WEEK 90 tablet 3     testosterone (ANDROGEL 1 % PUMP) 12.5 MG/ACT (1%) gel APPLY 2 PUMPS ONTO CLEAN DRY HAIRLESS SKIN OF SHOULDERS, UPPER ARMS OR ABDOMEN ONCE DAILY 75 g 5       ALLERGIES     Allergies   Allergen Reactions     Atorvastatin Other (See Comments)     Calf pain     Pravastatin Other (See Comments)     Leg cramps       PAST MEDICAL HISTORY:  Past Medical History:   Diagnosis Date     Elevated blood pressure (not hypertension)      Enlarged prostate- has seen  Dr. Solomon in the past - no further PSA's needed as of 7/2019 - doesn't need to see him again per Dr. Solomon unless gross hematuria  9/18/2013     Erectile dysfunction      Gastric ulcer 9/25/2007    EGD @ [place]-[facility] - gastric ulcer w/ erosions/ LA grade B erosive esophagitis     GERD (gastroesophageal reflux disease)     worse lying down at night.      Hyperlipidemia LDL goal < 130     lipitor 40mg= calf pain-off since       Hypertension      Lipoma of skin     chest - biopsied at [place] = benign      Malignant melanoma (H)      Osteoarthritis     mostly hands and knees      Snoring      Thyroid disease        PAST SURGICAL HISTORY:  Past Surgical History:   Procedure Laterality Date     COLONOSCOPY  2007    repeat in 2017     DAVINCI HERNIORRHAPHY INGUINAL Right 10/24/2019    Procedure: robotic assisted right inguinal hernia repair with mesh;  Surgeon: Rom Mccoy MD;  Location: [facility]     ESOPHAGOSCOPY, GASTROSCOPY, DUODENOSCOPY (EGD), COMBINED N/A 2015    Procedure: COMBINED ESOPHAGOSCOPY, GASTROSCOPY, DUODENOSCOPY (EGD);  Surgeon: Ender Dixon MD;  Location: [facility]     HC ESOPHAGOSCOPY, DIAGNOSTIC  2007    EGD - gastric ulcer w/ erosions/ LA grade B erosive esophagitis     wide excision      for malignant melanoma       FAMILY HISTORY:  Family History   Problem Relation Age of Onset     C.A.D. Father 62         at age 62 of MI     Diabetes Father         early type 2      Neurologic Disorder Mother         mild dementia - @ 92     C.A.D. 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    Social Needs     Financial resource strain: None     Food insecurity     Worry: None     Inability: None     Transportation needs     Medical: None     Non-medical: None   Tobacco Use     Smoking status: Current Every Day Smoker     Packs/day: 0.50     Years: 60.00     Pack years: 30.00     Smokeless tobacco: Never Used     Tobacco comment: strongly encourage smoking cessation with every visit    Substance and Sexual Activity     Alcohol use: Yes     Alcohol/week: 0.0 standard drinks     Comment: not regular - very occasional 1-2 martinis when out to dinner 1-2x/month, if that      Drug use: No     Comment: no herbal meds      Sexual activity: Yes     Partners: Female     Comment:     Lifestyle     Physical activity     Days per week: None     Minutes per session: None     Stress: None   Relationships     Social connections     Talks on phone: None     Gets together: None     Attends Zoroastrianism service: None     Active member of club or organization: None     Attends meetings of clubs or organizations: None     Relationship status: None     Intimate partner violence     Fear of current or ex partner: None     Emotionally abused: None     Physically abused: None     Forced sexual activity: None   Other Topics Concern     Parent/sibling w/ CABG, MI or angioplasty before 65F 55M? No      Service Yes     Comment: [employer] - May 2184-4153 - fitted glasses for other servicemen      Blood Transfusions Not Asked     Caffeine Concern Not Asked     Occupational Exposure Not Asked     Hobby Hazards Not Asked     Sleep Concern Not Asked     Stress Concern Not Asked     Weight Concern Yes     Special Diet Not Asked     Back Care Not Asked     Exercise Yes     Comment: stays very active      Bike Helmet Not Asked     Seat Belt Yes     Comment: always      Self-Exams Yes     Comment: RANJIT encouraged monthly    Social History Narrative    Has new [employer]su puppy - copper and white - Jose - 11 months old - noted March 28, 2018 --Vee Beth MD         Wife doing dialysis 3x/week - has 7 stents in her body. Heart, kidneys, liver.  ---Vee Beth MD        Review of Systems:  Skin:  Negative     Eyes:  Positive for glasses  ENT:  Negative    Respiratory:  Negative    Cardiovascular:    dizziness;Positive for  Gastroenterology: Positive for reflux  Genitourinary:  Positive for  "nocturia  Musculoskeletal:  Positive for back pain  Neurologic:  Negative    Psychiatric:  Positive for excessive stress  Heme/Lymph/Imm:  Negative    Endocrine:  Positive for thyroid disorder    Physical Exam:  Vitals: BP (!) 142/94 (BP Location: Right arm, Patient Position: Sitting, Cuff Size: Adult Large)   Pulse 100   Ht 1.803 m (5' 11\")   Wt 93.9 kg (207 lb)   SpO2 98%   BMI 28.87 kg/m      Constitutional:           Skin:           Head:           Eyes:           ENT:           Neck:           Chest:           Cardiac:                    Abdomen:           Vascular:                                        Extremities and Back:           Neurological:             Recent Lab Results:  LIPID RESULTS:  Lab Results   Component Value Date    CHOL 174 10/14/2020    HDL 71 10/14/2020    LDL 80 10/14/2020    TRIG 117 10/14/2020    CHOLHDLRATIO 5.2 (H) 01/30/2015       LIVER ENZYME RESULTS:  Lab Results   Component Value Date    AST 12 05/14/2020    ALT 26 10/14/2020       CBC RESULTS:  Lab Results   Component Value Date    WBC 7.7 05/14/2020    RBC 4.97 05/14/2020    HGB 15.2 05/14/2020    HCT 45.7 05/14/2020    MCV 92 05/14/2020    MCH 30.6 05/14/2020    MCHC 33.3 05/14/2020    RDW 13.6 05/14/2020     05/14/2020       BMP RESULTS:  Lab Results   Component Value Date     05/14/2020    POTASSIUM 4.2 05/14/2020    CHLORIDE 107 05/14/2020    CO2 25 05/14/2020    ANIONGAP 6 05/14/2020    GLC 97 05/14/2020    BUN 29 05/14/2020    CR 1.07 05/14/2020    GFRESTIMATED 67 05/14/2020    GFRESTBLACK 78 05/14/2020    HARI 9.2 05/14/2020        A1C RESULTS:  Lab Results   Component Value Date    A1C 5.8 (H) 07/15/2020       INR RESULTS:  No results found for: INR        Thank you for allowing me to participate in the care of your patient.    Sincerely,     Roderick Harp MD     [facility]  "

## 2020-11-17 DIAGNOSIS — I10 ESSENTIAL HYPERTENSION WITH GOAL BLOOD PRESSURE LESS THAN 140/90: ICD-10-CM

## 2020-11-17 DIAGNOSIS — E78.5 HYPERLIPIDEMIA LDL GOAL <130: ICD-10-CM

## 2020-11-17 RX ORDER — LISINOPRIL 20 MG/1
30 TABLET ORAL DAILY
Qty: 135 TABLET | Refills: 1 | Status: SHIPPED | OUTPATIENT
Start: 2020-11-17 | End: 2020-12-21

## 2020-11-17 RX ORDER — LISINOPRIL 20 MG/1
TABLET ORAL
Qty: 90 TABLET | Refills: 1 | Status: SHIPPED | OUTPATIENT
Start: 2020-11-17 | End: 2020-11-17

## 2020-11-17 RX ORDER — ROSUVASTATIN CALCIUM 10 MG/1
TABLET, COATED ORAL
Qty: 90 TABLET | Refills: 1 | OUTPATIENT
Start: 2020-11-17

## 2020-11-17 NOTE — TELEPHONE ENCOUNTER
Routing refill request to provider for review/approval because:  Blood pressure above goal  Lucy Vazquez RN

## 2020-11-18 NOTE — TELEPHONE ENCOUNTER
BP Readings from Last 3 Encounters:   11/03/20 (!) 142/94   08/25/20 (!) 132/98   07/15/20 134/82     recommend   Creatinine   Date Value Ref Range Status   05/14/2020 1.07 0.66 - 1.25 mg/dL Final       Recommend increasing lisinopril to 30mg nightly   and Recheck your blood pressure as a nurse only visit appointment or walk in to our pharmacy  (only if you are already a member of our pharmacy blood pressure program) in 1 week or sooner if needed.  Have nursing or pharmacy send me their note.      Please inform patient.

## 2020-11-27 NOTE — TELEPHONE ENCOUNTER
I reached Dev and he was unaware of the increase in dose. I explained.    TC- he is not a part of the pharmacy program. Can you please call to schedule nurse BP visit. I am not sure how to schedule for your clinic    Zeina Hernandez RN- Triage [employer]

## 2020-12-02 ENCOUNTER — TELEPHONE (OUTPATIENT)
Dept: CARDIOLOGY | Facility: CLINIC | Age: 75
End: 2020-12-02

## 2020-12-02 NOTE — TELEPHONE ENCOUNTER
Called and spoke to Dev. He would like to get his BP check done at his lab appointment instead of traveling to 2 different clinics. I did speak with Dr. Beth and she is okay with Dev recording his BP checks that he has been taking by himself at home.     Dev stated his BP has been consistently running around 117/77. Stated the bump up one his medication is making him feel a bit dizzy.    Patient has med check appointment scheduled.    Annabelle CAIN

## 2020-12-02 NOTE — TELEPHONE ENCOUNTER
"Received call from PCP office RN. Pt is scheduled to have labs (FLP and ALT) on 12/14/20 per Dr. Harp. PCP would like pt to have BP check done at our clinic at that time.     Last visit w/ cardiology was 11/3/20 with Dr. Harp who noted re HTN \"Dev brought in a list of home blood pressure readings.  There was 1 reading in the 150s and another systolic value of just 90 but most of his readings were in the 1 teens to 120s.  Blood pressure control is adequate and no further adjustments are needed\".     However 11/17/20 PCP office refill request was made and PMD Dr. Beth noted \"Recommend increasing lisinopril to 30mg nightly and Recheck your blood pressure as a nurse only visit appointment or walk in to our pharmacy  (only if you are already a member of our pharmacy blood pressure program) in 1 week or sooner if needed.  Have nursing or pharmacy send me their note\". Does not look like anyone notified pt to increase the dose. RN noted 11/27 he was unaware of med increase.     Unclear if we are able to do nurse visit/bp check per PMD. Clarifying w/ clinic manager.   ERICKSON Jj RN  12/02/20 3:39 PM     ADDENDUM - per our clinic manager we cannot do a formal blood pressure check with assessment as we typically would ordered by one of our cardiology providers. A formal BP check  would need to be covered under a cardiologist in our clinic for the visit. Which in this case would not be.  (primary cardiologist) didn't make any changes or order the BP check.     We can take a machine blood pressure only, without assessment or other intake when pt comes in for labs and send a message to the PCP or PCP nurse.   Called [facility] PCP nurse back, no answer, no VM setup. Will callback tomorrow.   ERICKSON Jj RN  12/02/20 4:23 PM     ADDENDUM - Attempted to call PCP RN line again (twice), no answer. Unable to leave a vm.   ERICKSON Jj RN  12/03/20 2:41 PM   "

## 2020-12-14 DIAGNOSIS — E78.5 HYPERLIPIDEMIA LDL GOAL <130: ICD-10-CM

## 2020-12-14 DIAGNOSIS — I25.10 CORONARY ARTERY DISEASE INVOLVING NATIVE CORONARY ARTERY OF NATIVE HEART WITHOUT ANGINA PECTORIS: ICD-10-CM

## 2020-12-14 LAB
ALT SERPL W P-5'-P-CCNC: 18 U/L (ref 0–70)
CHOLEST SERPL-MCNC: 166 MG/DL
HDLC SERPL-MCNC: 63 MG/DL
LDLC SERPL CALC-MCNC: 88 MG/DL
NONHDLC SERPL-MCNC: 103 MG/DL
TRIGL SERPL-MCNC: 75 MG/DL

## 2020-12-14 PROCEDURE — 84460 ALANINE AMINO (ALT) (SGPT): CPT | Performed by: INTERNAL MEDICINE

## 2020-12-14 PROCEDURE — 36415 COLL VENOUS BLD VENIPUNCTURE: CPT | Performed by: INTERNAL MEDICINE

## 2020-12-14 PROCEDURE — 80061 LIPID PANEL: CPT | Performed by: INTERNAL MEDICINE

## 2020-12-15 ENCOUNTER — TELEPHONE (OUTPATIENT)
Dept: CARDIOLOGY | Facility: CLINIC | Age: 75
End: 2020-12-15

## 2020-12-15 DIAGNOSIS — E78.5 HYPERLIPIDEMIA LDL GOAL <130: Primary | ICD-10-CM

## 2020-12-15 DIAGNOSIS — E78.5 HYPERLIPIDEMIA LDL GOAL <70: ICD-10-CM

## 2020-12-15 NOTE — TELEPHONE ENCOUNTER
Dr. Harp reviewed recent labs.      Component      Latest Ref Rng & Units 10/14/2020 12/14/2020   Cholesterol      <200 mg/dL 174 166   Triglycerides      <150 mg/dL 117 75   HDL Cholesterol      >39 mg/dL 71 63   LDL Cholesterol Calculated      <100 mg/dL 80 88   Non HDL Cholesterol      <130 mg/dL 103 103   ALT      0 - 70 U/L 26 18          Message from Roderick Harp MD sent at 12/14/2020 10:47 AM CST    LDL still high.  He is 75, coronary calcification but no events, I think he still smokes.  Adding Zetia 10 mg/d would likely reduce risk of future events (but not nearly as much as smoking cessation would), so I would suggest he try it, but if he is adverse to taking another medication I would have no problem with just leaving his meds as they are. ESPINOZA Harp MD, [facility]    Will review with patient.     ERICKSON Ramirez RN  [place], [place]  December 15, 2020    ---------------------------------------------------------------------    Addendum    Called patient. Left a voicemail message. Awaiting call back.     ERICKSON Ramirez RN  [facility]  December 22, 2020 (1234 pm)      ----------------------------------------------------------------------------------------------------    Addendum    Spoke with patient. Reviewed results and recommendations. Patient agreed to try Zetia 10 mg/d. Rx sent. Placed orders for FLP (due March 2021).  Scheduling dept will call patient to set up his lab appt.     Messaged Dr. aHrp with update.     ERICKSON Ramirez RN     [facility] [place], [place]  January 5, 2021 (2755)

## 2020-12-20 DIAGNOSIS — E29.1 HYPOGONADISM IN MALE: ICD-10-CM

## 2020-12-20 DIAGNOSIS — K21.9 GASTROESOPHAGEAL REFLUX DISEASE WITHOUT ESOPHAGITIS: ICD-10-CM

## 2020-12-20 DIAGNOSIS — E34.9 TESTOSTERONE DEFICIENCY: ICD-10-CM

## 2020-12-21 ENCOUNTER — VIRTUAL VISIT (OUTPATIENT)
Dept: FAMILY MEDICINE | Facility: CLINIC | Age: 75
End: 2020-12-21
Payer: COMMERCIAL

## 2020-12-21 DIAGNOSIS — E34.9 TESTOSTERONE DEFICIENCY: ICD-10-CM

## 2020-12-21 DIAGNOSIS — J30.0 VASOMOTOR RHINITIS: ICD-10-CM

## 2020-12-21 DIAGNOSIS — F17.210 CIGARETTE SMOKER ONE HALF PACK A DAY OR LESS: ICD-10-CM

## 2020-12-21 DIAGNOSIS — Z12.5 SCREENING FOR PROSTATE CANCER: ICD-10-CM

## 2020-12-21 DIAGNOSIS — E03.9 HYPOTHYROIDISM, UNSPECIFIED TYPE: ICD-10-CM

## 2020-12-21 DIAGNOSIS — I10 ESSENTIAL HYPERTENSION WITH GOAL BLOOD PRESSURE LESS THAN 140/90: ICD-10-CM

## 2020-12-21 DIAGNOSIS — K21.9 GASTROESOPHAGEAL REFLUX DISEASE WITHOUT ESOPHAGITIS: ICD-10-CM

## 2020-12-21 DIAGNOSIS — J01.90 ACUTE SINUSITIS WITH SYMPTOMS > 10 DAYS: Primary | ICD-10-CM

## 2020-12-21 DIAGNOSIS — R73.01 ELEVATED FASTING GLUCOSE: ICD-10-CM

## 2020-12-21 PROCEDURE — 99442 PR PHYSICIAN TELEPHONE EVALUATION 11-20 MIN: CPT | Mod: 95 | Performed by: FAMILY MEDICINE

## 2020-12-21 RX ORDER — IPRATROPIUM BROMIDE 21 UG/1
2 SPRAY, METERED NASAL EVERY 12 HOURS
Qty: 30 ML | Refills: 11 | Status: SHIPPED | OUTPATIENT
Start: 2020-12-21 | End: 2022-02-04

## 2020-12-21 RX ORDER — LISINOPRIL 20 MG/1
20 TABLET ORAL DAILY
Qty: 135 TABLET | Refills: 1 | Status: SHIPPED | OUTPATIENT
Start: 2020-12-21 | End: 2021-07-07

## 2020-12-21 RX ORDER — LISINOPRIL 20 MG/1
20 TABLET ORAL DAILY
Qty: 135 TABLET | Refills: 1
Start: 2020-12-21 | End: 2020-12-21

## 2020-12-21 NOTE — PATIENT INSTRUCTIONS
[facility]  [address] [place] [id number]  Office: [phone]   Fax:    [phone]       Return in about 6 months (around 6/21/2021) for Physical/Preventative Visit, Lab Work, with Dr. Beth. , please call Larisa [phone] to schedule.     Try atrovent (ipratropium ) nasal spray to see if this helps your post-nasal drainage. Ok to add in claritin 10mg daily to see if that helps your sinuses as well.  Consider CT scan of sinuses if abx and the above doesn't help.       Follow up for a nonfasting lab only appointment already scheduled for 12/31/2020 at 0900am at our [facility].        Patient Education     Sinusitis (Antibiotic Treatment)    The sinuses are air-filled spaces within the bones of the face. They connect to the inside of the nose. Sinusitis is an inflammation of the tissue that lines the sinuses. Sinusitis can occur during a cold. It can also happen due to allergies to pollens and other particles in the air. Sinusitis can cause symptoms of sinus congestion and a feeling of fullness. A sinus infection causes fever, headache, and facial pain. There is often green or yellow fluid draining from the nose or into the back of the throat (post-nasal drip). You have been given antibiotics to treat this condition.  Home care    Take the full course of antibiotics as instructed. Do not stop taking them, even when you feel better.    Drink plenty of water, hot tea, and other liquids. This may help thin nasal mucus. It also may help your sinuses drain fluids.    Heat may help soothe painful areas of your face. Use a towel soaked in hot water. Or,  the shower and direct the warm spray onto your face. Using a vaporizer along with a menthol rub at night may also help soothe symptoms.     An expectorant with guaifenesin may help thin nasal mucus and help your sinuses drain fluids.    You can use an over-the-counter decongestant, unless a similar medicine was  prescribed to you. Nasal sprays work the fastest. Use one that contains phenylephrine or oxymetazoline. First blow your nose gently. Then use the spray. Do not use these medicines more often than directed on the label. If you do, your symptoms may get worse. You may also take pills that contain pseudoephedrine. Don t use products that combine multiple medicines. This is because side effects may be increased. Read labels. You can also ask the pharmacist for help. (People with high blood pressure should not use decongestants. They can raise blood pressure.)    Over-the-counter antihistamines may help if allergies contributed to your sinusitis.      Do not use nasal rinses or irrigation during an acute sinus infection, unless your healthcare provider tells you to. Rinsing may spread the infection to other areas in your sinuses.    Use acetaminophen or ibuprofen to control pain, unless another pain medicine was prescribed to you. If you have chronic liver or kidney disease or ever had a stomach ulcer, talk with your healthcare provider before using these medicines. (Aspirin should never be taken by anyone under age 18 who is ill with a fever. It may cause severe liver damage.)    Don't smoke. This can make symptoms worse.  Follow-up care  Follow up with your healthcare provider or our staff if you are better in 1 week.  When to seek medical advice  Call your healthcare provider if any of these occur:    Facial pain or headache that gets worse    Stiff neck    Unusual drowsiness or confusion    Swelling of your forehead or eyelids    Vision problems, such as blurred or double vision    Fever of 100.4 F (38 C) or higher, or as directed by your healthcare provider    Seizure    Breathing problems    Symptoms don't go away in 10 days  Prevention  Here are steps you can take to help prevent an infection:    Keep good hand washing habits.    Don t have close contact with people who have sore throats, colds, or other upper  respiratory infections.    Don t smoke, and stay away from secondhand smoke.    Stay up to date with of your vaccines.  Date Last Reviewed: 11/1/2017 2000-2018 [employer]. [address] [place] [address], [place] [address]. All rights reserved. This information is not intended as a substitute for professional medical care. Always follow your healthcare professional's instructions.           Thank you so much for doing a telephone visit with us today. And, again, thank you  for choosing our [facility].  Please contact us with any questions that you may have.   We appreciate the opportunity to serve you now and look forward to supporting your healthcare needs for a long time to come!    Our clinic for the foreseeable future and until further notice , will continue to offer virtual visits, including telephone visits, video visits,  and e-visits, especially during this period of COVID-19 coronavirus pandemic.  Please call to schedule another one if you need it!        Most Sincerely,     Vee Beth MD                                              To reach your [facility] care team after hours call:   [phone]    PLEASE NOTE OUR HOURS HAVE CHANGED secondary to COVID-19 coronavirus pandemic, as we are trying to minimize patient exposure to the virus,  which is now widespread in the [facility].  These hours may change with very little notice.  We apologize for any inconvenience.       Our current clinic hours are:    Our current clinic hours are:         Monday- Thursday   7:00am - 6:00pm  in person.      Friday  7:00am- 5:00pm                       Saturday and Sunday : Closed to in person and virtual visits        We have telephone and virtual visit times available between    7:00am - 6pm on Monday-Friday as well.                                                Phone:  [phone]      Our pharmacy hours:   Monday  9:00 am to 6:00 pm      Tuesday  through Friday 9:00am to 5:00pm                        Saturday - 9:00 am to 12 noon       Sunday : Closed.          ###  Please note: at this time we are not accepting any walk-in visits. ###      There is also information available at our web site:  [email or web address]    If your provider ordered any lab tests and you do not receive the results within 10 business days, please call the clinic.    If you need a medication refill please contact your pharmacy.  Please allow 2 business days for your refill to be completed.    Our clinic offers telephone visits and e visits.  Please ask one of your team members to explain more.      Use [employer] (secure email communication and access to your chart) to send your primary care provider a message or make an appointment. Ask someone on your Team how to sign up for [employer].     Pharmacy is drive-thru only at this time secondary to the COVID-19 coronavirus pandemic, as we are trying to minimize patient exposure to the virus,  which is now widespread in the state.        There is also information available at our web site:  [email or web address]    If your provider ordered any lab tests and you do not receive the results within 10 business days, please call the clinic.    If you need a medication refill please contact your pharmacy.  Please allow 2 business days for your refill to be completed.

## 2020-12-21 NOTE — PROGRESS NOTES
"  SUBJECTIVE:                                                    Dev Powell is a 75 year old male who is being evaluated via a telephone visit.      The patient has been notified of following:     \"This telephone visit will be conducted via a call between you and your physician/provider. We have found that certain health care needs can be provided without the need for a physical exam.  This service lets us provide the care you need with a short phone conversation.  If a prescription is necessary we can send it directly to your pharmacy.  If lab work is needed we can place an order for that and you can then stop by our lab to have the test done at a later time.    Telephone visits are billed at different rates depending on your insurance coverage. During this emergency period, for some insurers they may be billed the same as an in-person visit.  Please reach out to your insurance provider with any questions.    If during the course of the call the physician/provider feels a telephone visit is not appropriate, you will not be charged for this service.\"     Patient has given verbal consent for Telephone visit?  Yes    Dev Powell complains of need to follow up on his medical problems.       ALLERGIES  Patient has no known allergies.    Has a bad head cold right now.     Hyperlipidemia Follow-Up:       Are you regularly taking any medication or supplement to lower your cholesterol?   Yes- CRESTOR 40mg nightly - had some leg aches for 2 days.     Are you having muscle aches or other side effects that you think could be caused by your cholesterol lowering medication?  No     Recent Labs   Lab Test 12/14/20  0727 10/14/20  0736 01/30/15  1305 01/30/15  1305 04/30/14  0802   CHOL 166 174   < > 237* 219*   HDL 63 71   < > 46 38*   LDL 88 80   < > 149* 155*   TRIG 75 117   < > 208* 135   CHOLHDLRATIO  --   --   --  5.2* 5.8*    < > = values in this interval not displayed.        Hypertension Follow-up:       Do you check " your blood pressure regularly outside of the clinic? Yes - lowest  68/45- on  30mg lisinopril.= very symptomatic.      highest: 152/95 on 20mg lisinopril - had taken an oral decongestant that day as well.     Highest on 20mg lisinopril - 138/94     Is consistently high in the am's -  taking his medication at night right before bed . Afternoons and evenings are much better - 4pm 116/82,  8pm 119/80. 6pm 130/87     Overall average = 105/81 includes when he was on the 30mg dosage     Are you following a low salt diet? No    Are your blood pressures ever more than 140 on the top number (systolic) OR more   than 90 on the bottom number (diastolic), for example 140/90? Yes     BP Readings from Last 6 Encounters:   11/03/20 (!) 142/94   08/25/20 (!) 132/98   07/15/20 134/82   05/14/20 (!) 144/101   05/14/20 128/86   11/21/19 102/72         Hypothyroidism Follow-up      Since last visit, patient describes the following symptoms: Weight stable, no hair loss, no skin changes, no constipation, no loose stools    TSH   Date Value Ref Range Status   05/14/2020 1.65 0.40 - 4.00 mU/L Final   01/10/2020 0.59 0.40 - 4.00 mU/L Final   11/21/2019 0.37 (L) 0.40 - 4.00 mU/L Final   03/28/2019 1.83 0.40 - 4.00 mU/L Final   02/05/2019 4.88 (H) 0.40 - 4.00 mU/L Final     T4 Free   Date Value Ref Range Status   01/10/2020 1.39 0.76 - 1.46 ng/dL Final   11/21/2019 1.38 0.76 - 1.46 ng/dL Final   03/28/2019 1.20 0.76 - 1.46 ng/dL Final   02/05/2019 1.10 0.76 - 1.46 ng/dL Final   11/21/2018 0.75 (L) 0.76 - 1.46 ng/dL Final     Has been on same dosage of levothyroxine since 7/15/2020 - needs repeat labs.   Levothyroxine Sodium 137 MCG -1 TABLET BY MOUTH ON MONDAY, WEDNESDAYS AND FRIDAYS ALTERNATING WITH 150MCG TABS - 1 TAB on TUESDAY, THURSDAY, SATURDAY AND SUNDAY        Acute Sinusitis symptoms:   Patient Active Problem List   Diagnosis     Gastroesophageal reflux disease without esophagitis = ran out of PPI- has been taking lots of TUMS      Hyperlipidemia LDL goal <130 - pravastatin = leg cramps; lipitor =calf pains     Snoring     Vasculogenic erectile dysfunction, unspecified vasculogenic erectile dysfunction type     Osteoarthritis     Lipoma of skin     Gastric ulcer     Malignant melanoma of left upper extremity including shoulder (H)     Cigarette smoker one half pack a day or less- for > 45 years      Enlarged prostate- has seen  Dr. Solomon in the past - no further PSA's needed as of 7/2019 - doesn't need to see him again per Dr. Solomon unless gross hematuria      Rectal pain     Hearing loss     Inguinal hernia- right      Pulmonary nodules- tiny per screening chest CT -  repeat CT chest low dose w/o contrast 4/2017 = no change since 2015 - no need for further CT's      Essential hypertension with goal blood pressure less than 140/90     Testosterone deficiency     Hypothyroidism, unspecified type     Chronic constipation     Mass of left chest wall - ? there for 18+ years -left breast 1cm mass at 7:30      Multiple pigmented nevi     Reducible right inguinal hernia     Undiagnosed cardiac murmurs     Elevated fasting glucose     Tubular adenoma 4/2019 - repeat 5 years     Alcoholic intoxication with complication (H)       Current Outpatient Medications   Medication Sig Dispense Refill     aspirin (ASA) 81 MG EC tablet Take 1 tablet (81 mg) by mouth daily       Glucosamine-Chondroit-Vit C-Mn (GLUCOSAMINE CHONDROITIN 1500 COMPLEX) CAPS Take by mouth daily       levothyroxine (SYNTHROID/LEVOTHROID) 137 MCG tablet TAKE 1 TABLET BY MOUTH ON MONDAY, WEDNESDAYS AND FRIDAYS ALTERNATING WITH 150MCG TABS 36 tablet 2     levothyroxine (SYNTHROID/LEVOTHROID) 150 MCG tablet TAKE 1 TAB TUESDAY, THURSDAY, SATURDAY AND SUNDAY THEN ALTERNATE WITH 137 MCG THE OTHER DAYS 52 tablet 1     lisinopril (ZESTRIL) 20 MG tablet Take 1 tablet (20 mg) by mouth daily 135 tablet 1     Multiple Vitamins-Minerals (CENTRUM SILVER) per tablet Take 1 tablet by mouth daily 30  tablet      omeprazole (PRILOSEC) 40 MG DR capsule TAKE 1 CAPSULE BY MOUTH ONCE DAILY TAKE 30-60 MINUTES BEFORE A MEAL. 90 capsule 2     rosuvastatin (CRESTOR) 40 MG tablet Take 1 tablet (40 mg) by mouth daily 90 tablet 3     sildenafil (REVATIO) 20 MG tablet TAKE 2-3 TABLETS (40-60 MG) BY MOUTH TWICE A WEEK 90 tablet 3     testosterone (ANDROGEL 1 % PUMP) 12.5 MG/ACT (1%) gel APPLY 2 PUMPS ONTO CLEAN DRY HAIRLESS SKIN OF SHOULDERS, UPPER ARMS OR ABDOMEN ONCE DAILY 75 g 5          Allergies   Allergen Reactions     Atorvastatin Other (See Comments)     Calf pain     Pravastatin Other (See Comments)     Leg cramps           Patient Active Problem List   Diagnosis     Gastroesophageal reflux disease without esophagitis = ran out of PPI- has been taking lots of TUMS     Hyperlipidemia LDL goal <130 - pravastatin = leg cramps; lipitor =calf pains     Snoring     Vasculogenic erectile dysfunction, unspecified vasculogenic erectile dysfunction type     Osteoarthritis     Lipoma of skin     Gastric ulcer     Malignant melanoma of left upper extremity including shoulder (H)     Cigarette smoker one half pack a day or less- for > 45 years      Enlarged prostate- has seen  Dr. Solomon in the past - no further PSA's needed as of 7/2019 - doesn't need to see him again per Dr. Solomon unless gross hematuria      Rectal pain     Hearing loss     Inguinal hernia- right      Pulmonary nodules- tiny per screening chest CT -  repeat CT chest low dose w/o contrast 4/2017 = no change since 2015 - no need for further CT's      Essential hypertension with goal blood pressure less than 140/90     Testosterone deficiency     Hypothyroidism, unspecified type     Chronic constipation     Mass of left chest wall - ? there for 18+ years -left breast 1cm mass at 7:30      Multiple pigmented nevi     Reducible right inguinal hernia     Undiagnosed cardiac murmurs     Elevated fasting glucose     Tubular adenoma 4/2019 - repeat 5 years      Alcoholic intoxication with complication (H)     Past Surgical History:   Procedure Laterality Date     COLONOSCOPY  2007    repeat in 2017     DAVINCI HERNIORRHAPHY INGUINAL Right 10/24/2019    Procedure: robotic assisted right inguinal hernia repair with mesh;  Surgeon: Rom Mccoy MD;  Location:  OR     ESOPHAGOSCOPY, GASTROSCOPY, DUODENOSCOPY (EGD), COMBINED N/A 2015    Procedure: COMBINED ESOPHAGOSCOPY, GASTROSCOPY, DUODENOSCOPY (EGD);  Surgeon: Edner Dixon MD;  Location:  GI     HC ESOPHAGOSCOPY, DIAGNOSTIC  2007    EGD - gastric ulcer w/ erosions/ LA grade B erosive esophagitis     wide excision      for malignant melanoma       Social History     Tobacco Use     Smoking status: Current Every Day Smoker     Packs/day: 0.50     Years: 60.00     Pack years: 30.00     Smokeless tobacco: Never Used     Tobacco comment: strongly encourage smoking cessation with every visit   Substance Use Topics     Alcohol use: Yes     Alcohol/week: 0.0 standard drinks     Comment: not regular - very occasional 1-2 martinis when out to dinner 1-2x/month, if that      Family History   Problem Relation Age of Onset     C.A.D. Father 62         at age 62 of MI     Diabetes Father         early type 2      Neurologic Disorder Mother         mild dementia - @ 92     C.A.D. Mother         angina      Thyroid Disease Mother         s/p thyroid      Colon Cancer No family hx of          Current Outpatient Medications   Medication Sig Dispense Refill     amoxicillin-clavulanate (AUGMENTIN) 875-125 MG tablet Take 1 tablet by mouth 2 times daily for 14 days 28 tablet 0     aspirin (ASA) 81 MG EC tablet Take 1 tablet (81 mg) by mouth daily       Glucosamine-Chondroit-Vit C-Mn (GLUCOSAMINE CHONDROITIN 1500 COMPLEX) CAPS Take by mouth daily       ipratropium (ATROVENT) 0.03 % nasal spray Spray 2 sprays into both nostrils every 12 hours 30 mL 11     levothyroxine (SYNTHROID/LEVOTHROID) 137 MCG tablet  TAKE 1 TABLET BY MOUTH ON MONDAY, WEDNESDAYS AND FRIDAYS ALTERNATING WITH 150MCG TABS 36 tablet 2     levothyroxine (SYNTHROID/LEVOTHROID) 150 MCG tablet TAKE 1 TAB TUESDAY, THURSDAY, SATURDAY AND SUNDAY THEN ALTERNATE WITH 137 MCG THE OTHER DAYS 52 tablet 1     lisinopril (ZESTRIL) 20 MG tablet Take 1 tablet (20 mg) by mouth daily 135 tablet 1     Multiple Vitamins-Minerals (CENTRUM SILVER) per tablet Take 1 tablet by mouth daily 30 tablet      omeprazole (PRILOSEC) 40 MG DR capsule TAKE 1 CAPSULE BY MOUTH ONCE DAILY TAKE 30-60 MINUTES BEFORE A MEAL. 90 capsule 2     rosuvastatin (CRESTOR) 40 MG tablet Take 1 tablet (40 mg) by mouth daily 90 tablet 3     sildenafil (REVATIO) 20 MG tablet TAKE 2-3 TABLETS (40-60 MG) BY MOUTH TWICE A WEEK 90 tablet 3     testosterone (ANDROGEL 1 % PUMP) 12.5 MG/ACT (1%) gel APPLY 2 PUMPS ONTO CLEAN DRY HAIRLESS SKIN OF SHOULDERS, UPPER ARMS OR ABDOMEN ONCE DAILY 75 g 5     Allergies   Allergen Reactions     Atorvastatin Other (See Comments)     Calf pain     Pravastatin Other (See Comments)     Leg cramps     Recent Labs   Lab Test 12/14/20  0727 10/14/20  0736 07/15/20  1315 05/14/20  1528 01/10/20  0823 10/24/19  0708 10/24/19  0708 09/12/19  1048 03/28/19  1604   A1C  --   --  5.8*  --   --   --   --  5.9* 6.0*   LDL 88 80 95  --   --   --   --  110* 116*   HDL 63 71 58  --   --   --   --  46 60   TRIG 75 117 79  --   --   --   --  98 57   ALT 18 26  --  19  --   --   --  17  --    CR  --   --   --  1.07  --   --  1.00 1.03  --    GFRESTIMATED  --   --   --  67  --   --  74 71  --    GFRESTBLACK  --   --   --  78  --   --  86 82  --    POTASSIUM  --   --   --  4.2  --   --  3.9 4.5  --    TSH  --   --   --  1.65 0.59   < >  --   --  1.83    < > = values in this interval not displayed.      BP Readings from Last 3 Encounters:   11/03/20 (!) 142/94   08/25/20 (!) 132/98   07/15/20 134/82    Wt Readings from Last 3 Encounters:   11/03/20 93.9 kg (207 lb)   08/25/20 93 kg (205 lb)    07/15/20 94.3 kg (208 lb)                    Reviewed and updated as needed this visit by Provider         Review of Systems - awakens in the am feeling all congested.  Taking wal-fen-ate =   Chlorpheniramine- nightly.  Feels like the nasal congestion makes him feel lightheaded, but helps his postnasal drip.    Constitutional, HEENT, cardiovascular, pulmonary, gi and gu systems are negative, except as otherwise noted.       Objective   Reported vitals:  There were no vitals taken for this visit.   healthy, alert and no distress  Psych: Alert and oriented times 3; coherent speech, normal   rate and volume, able to articulate logical thoughts, able   to abstract reason, no tangential thoughts, no hallucinations   or delusions  His affect is normal and upbeat.       Diagnostic Test Results:  Labs reviewed in Epic        Assessment/Plan:    ICD-10-CM    1. Acute sinusitis with symptoms > 10 days  J01.90 amoxicillin-clavulanate (AUGMENTIN) 875-125 MG tablet   2. Essential hypertension with goal blood pressure less than 140/90  I10 lisinopril (ZESTRIL) 20 MG tablet     Albumin Random Urine Quantitative with Creat Ratio     CBC with platelets differential     Basic metabolic panel     DISCONTINUED: lisinopril (ZESTRIL) 20 MG tablet   3. Cigarette smoker one half pack a day or less- for > 45 years   F17.210 CBC with platelets differential   4. Elevated fasting glucose  R73.01 Basic metabolic panel   5. Hypothyroidism, unspecified type  E03.9 T3 total     TSH     T4 FREE   6. Testosterone deficiency  E34.9 **Testosterone Free and Total FUTURE anytime   7. Screening for prostate cancer  Z12.5 PSA, screen   8. Gastroesophageal reflux disease without esophagitis = ran out of PPI- has been taking lots of TUMS  K21.9 Magnesium   9. Vasomotor rhinitis  J30.0 ipratropium (ATROVENT) 0.03 % nasal spray      STRONGLY recommended again that pt quit smoking .  Pt declines any interventions again at this time.     See pt instructions.      Change to taking your lisinopril at dinner time, instead of bedtime.        Return in about 6 months (around 6/21/2021) for Physical/Preventative Visit, Lab Work, with Dr. Beth. , pt will please call [facility]isa [phone] to schedule    Phone call duration:  20 minutes              Vee Beth MD    [facility]

## 2020-12-22 RX ORDER — OMEPRAZOLE 40 MG/1
CAPSULE, DELAYED RELEASE ORAL
Qty: 90 CAPSULE | Refills: 1 | Status: SHIPPED | OUTPATIENT
Start: 2020-12-22 | End: 2021-09-22

## 2020-12-22 NOTE — TELEPHONE ENCOUNTER
Routing refill request to provider for review/approval because:  Serum testosterone  Serum K+  Refill for classification needs review.    Fred NOLEN RN   [facility]

## 2020-12-23 RX ORDER — TESTOSTERONE GEL, 1% 10 MG/G
GEL TRANSDERMAL
Qty: 150 G | Refills: 2 | Status: SHIPPED | OUTPATIENT
Start: 2020-12-23 | End: 2022-11-16

## 2021-01-05 RX ORDER — EZETIMIBE 10 MG/1
10 TABLET ORAL DAILY
Qty: 90 TABLET | Refills: 3 | Status: SHIPPED | OUTPATIENT
Start: 2021-01-05 | End: 2021-12-16

## 2021-01-20 DIAGNOSIS — E03.9 HYPOTHYROIDISM, UNSPECIFIED TYPE: ICD-10-CM

## 2021-01-21 RX ORDER — LEVOTHYROXINE SODIUM 150 UG/1
TABLET ORAL
Qty: 52 TABLET | Refills: 1 | OUTPATIENT
Start: 2021-01-21

## 2021-01-21 NOTE — TELEPHONE ENCOUNTER
#52 x 1 refill sent 10/14/20  No pharmacy change    Emily Wilson RN  [facility]

## 2021-02-19 ENCOUNTER — IMMUNIZATION (OUTPATIENT)
Dept: NURSING | Facility: CLINIC | Age: 76
End: 2021-02-19
Payer: COMMERCIAL

## 2021-02-19 PROCEDURE — 0001A PR COVID VAC PFIZER DIL RECON 30 MCG/0.3 ML IM: CPT

## 2021-02-19 PROCEDURE — 91300 PR COVID VAC PFIZER DIL RECON 30 MCG/0.3 ML IM: CPT

## 2021-03-05 DIAGNOSIS — E78.5 HYPERLIPIDEMIA LDL GOAL <70: ICD-10-CM

## 2021-03-05 LAB
CHOLEST SERPL-MCNC: 107 MG/DL
HDLC SERPL-MCNC: 58 MG/DL
LDLC SERPL CALC-MCNC: 34 MG/DL
NONHDLC SERPL-MCNC: 49 MG/DL
TRIGL SERPL-MCNC: 73 MG/DL

## 2021-03-05 PROCEDURE — 80061 LIPID PANEL: CPT | Performed by: INTERNAL MEDICINE

## 2021-03-05 PROCEDURE — 36415 COLL VENOUS BLD VENIPUNCTURE: CPT | Performed by: INTERNAL MEDICINE

## 2021-03-10 ENCOUNTER — TELEPHONE (OUTPATIENT)
Dept: CARDIOLOGY | Facility: CLINIC | Age: 76
End: 2021-03-10

## 2021-03-10 NOTE — TELEPHONE ENCOUNTER
Lipids completed.     Component      Latest Ref Rng & Units 12/14/2020 3/5/2021   Cholesterol      <200 mg/dL 166 107   Triglycerides      <150 mg/dL 75 73   HDL Cholesterol      >39 mg/dL 63 58   LDL Cholesterol Calculated      <100 mg/dL 88 34   Non HDL Cholesterol      <130 mg/dL 103 49          ----- Message -----  From: Rosie Shrestha RN  Sent: 3/5/2021   8:56 AM CST  To: Roderick Harp MD, [address] in December 2020, LDL was still high. Patient is 75, w/coronary calcification and was still smoking. Zetia 10 mg/d was added to help reduce risk of future events. LDL improved to 34. I will have patient continue Zetia along with his other medications. Patient is due to see an STANLEY in Nov 2021.Rosie      Message from Roderick Harp MD sent at 3/7/2021  9:56 PM CST  Wow! >50% reduction with Zetia.  Continue.      Spoke with patient. Reviewed results and recommendations. Patient was advised to continue his current medical regimen and to f/u with an STANLEY in November 2021 as planned. Patient had no questions or concerns.     Ashley SHAY, BSN     [facility] ~ [place], [place]  March 10, 2021 ([id number])

## 2021-03-12 ENCOUNTER — IMMUNIZATION (OUTPATIENT)
Dept: NURSING | Facility: CLINIC | Age: 76
End: 2021-03-12
Attending: INTERNAL MEDICINE
Payer: COMMERCIAL

## 2021-03-12 PROCEDURE — 91300 PR COVID VAC PFIZER DIL RECON 30 MCG/0.3 ML IM: CPT

## 2021-03-12 PROCEDURE — 0002A PR COVID VAC PFIZER DIL RECON 30 MCG/0.3 ML IM: CPT

## 2021-04-09 DIAGNOSIS — E03.9 HYPOTHYROIDISM, UNSPECIFIED TYPE: ICD-10-CM

## 2021-04-09 RX ORDER — LEVOTHYROXINE SODIUM 150 UG/1
TABLET ORAL
Qty: 52 TABLET | Refills: 0 | Status: SHIPPED | OUTPATIENT
Start: 2021-04-09 | End: 2021-07-05

## 2021-04-09 RX ORDER — LEVOTHYROXINE SODIUM 137 UG/1
TABLET ORAL
Qty: 36 TABLET | Refills: 0 | Status: SHIPPED | OUTPATIENT
Start: 2021-04-09 | End: 2021-06-18

## 2021-06-22 DIAGNOSIS — E03.9 HYPOTHYROIDISM, UNSPECIFIED TYPE: ICD-10-CM

## 2021-06-24 RX ORDER — LEVOTHYROXINE SODIUM 150 UG/1
TABLET ORAL
Qty: 52 TABLET | Refills: 0 | OUTPATIENT
Start: 2021-06-24

## 2021-07-06 DIAGNOSIS — I10 ESSENTIAL HYPERTENSION WITH GOAL BLOOD PRESSURE LESS THAN 140/90: ICD-10-CM

## 2021-07-06 NOTE — TELEPHONE ENCOUNTER
Routing refill request to provider for review/approval because:  Labs out of range:    BP Readings from Last 3 Encounters:   11/03/20 (!) 142/94   08/25/20 (!) 132/98   07/15/20 134/82     Roverto Jamil RN, BSN

## 2021-07-07 RX ORDER — LISINOPRIL 20 MG/1
TABLET ORAL
Qty: 135 TABLET | Refills: 0 | Status: SHIPPED | OUTPATIENT
Start: 2021-07-07 | End: 2021-08-12

## 2021-08-12 ENCOUNTER — OFFICE VISIT (OUTPATIENT)
Dept: FAMILY MEDICINE | Facility: CLINIC | Age: 76
End: 2021-08-12
Payer: COMMERCIAL

## 2021-08-12 VITALS
OXYGEN SATURATION: 97 % | TEMPERATURE: 97 F | SYSTOLIC BLOOD PRESSURE: 110 MMHG | DIASTOLIC BLOOD PRESSURE: 86 MMHG | HEIGHT: 71 IN | WEIGHT: 207 LBS | HEART RATE: 106 BPM | RESPIRATION RATE: 20 BRPM | BODY MASS INDEX: 28.98 KG/M2

## 2021-08-12 DIAGNOSIS — I10 ESSENTIAL HYPERTENSION WITH GOAL BLOOD PRESSURE LESS THAN 140/90: ICD-10-CM

## 2021-08-12 DIAGNOSIS — N52.9 VASCULOGENIC ERECTILE DYSFUNCTION, UNSPECIFIED VASCULOGENIC ERECTILE DYSFUNCTION TYPE: ICD-10-CM

## 2021-08-12 DIAGNOSIS — F17.210 CIGARETTE SMOKER ONE HALF PACK A DAY OR LESS: ICD-10-CM

## 2021-08-12 DIAGNOSIS — C43.62 MALIGNANT MELANOMA OF LEFT UPPER EXTREMITY INCLUDING SHOULDER (H): ICD-10-CM

## 2021-08-12 DIAGNOSIS — E03.9 HYPOTHYROIDISM, UNSPECIFIED TYPE: ICD-10-CM

## 2021-08-12 DIAGNOSIS — E78.5 HYPERLIPIDEMIA LDL GOAL <130: ICD-10-CM

## 2021-08-12 DIAGNOSIS — I48.92 ATRIAL FLUTTER, UNSPECIFIED TYPE (H): ICD-10-CM

## 2021-08-12 DIAGNOSIS — N28.9 DECREASED RENAL FUNCTION: ICD-10-CM

## 2021-08-12 DIAGNOSIS — F17.200 NICOTINE DEPENDENCE, UNCOMPLICATED, UNSPECIFIED NICOTINE PRODUCT TYPE: ICD-10-CM

## 2021-08-12 DIAGNOSIS — Z12.5 SCREENING FOR PROSTATE CANCER: ICD-10-CM

## 2021-08-12 DIAGNOSIS — Z00.00 ENCOUNTER FOR MEDICARE ANNUAL WELLNESS EXAM: Primary | ICD-10-CM

## 2021-08-12 DIAGNOSIS — Z72.0 TOBACCO ABUSE DISORDER: ICD-10-CM

## 2021-08-12 DIAGNOSIS — F10.929 ALCOHOLIC INTOXICATION WITH COMPLICATION (H): ICD-10-CM

## 2021-08-12 DIAGNOSIS — N40.0 ENLARGED PROSTATE: ICD-10-CM

## 2021-08-12 DIAGNOSIS — D22.9 MULTIPLE PIGMENTED NEVI: ICD-10-CM

## 2021-08-12 DIAGNOSIS — Z87.891 PERSONAL HISTORY OF TOBACCO USE: ICD-10-CM

## 2021-08-12 DIAGNOSIS — R35.1 NOCTURIA: ICD-10-CM

## 2021-08-12 DIAGNOSIS — R91.8 PULMONARY NODULES: Chronic | ICD-10-CM

## 2021-08-12 DIAGNOSIS — I35.0 MILD AORTIC STENOSIS BY PRIOR ECHOCARDIOGRAM: ICD-10-CM

## 2021-08-12 LAB
ERYTHROCYTE [DISTWIDTH] IN BLOOD BY AUTOMATED COUNT: 12.9 % (ref 10–15)
HCT VFR BLD AUTO: 43.1 % (ref 40–53)
HGB BLD-MCNC: 14.5 G/DL (ref 13.3–17.7)
MCH RBC QN AUTO: 31.5 PG (ref 26.5–33)
MCHC RBC AUTO-ENTMCNC: 33.6 G/DL (ref 31.5–36.5)
MCV RBC AUTO: 94 FL (ref 78–100)
PLATELET # BLD AUTO: 214 10E3/UL (ref 150–450)
RBC # BLD AUTO: 4.61 10E6/UL (ref 4.4–5.9)
T3 SERPL-MCNC: 111 NG/DL (ref 60–181)
WBC # BLD AUTO: 6.8 10E3/UL (ref 4–11)

## 2021-08-12 PROCEDURE — 80053 COMPREHEN METABOLIC PANEL: CPT

## 2021-08-12 PROCEDURE — 99214 OFFICE O/P EST MOD 30 MIN: CPT | Mod: 25 | Performed by: FAMILY MEDICINE

## 2021-08-12 PROCEDURE — G0296 VISIT TO DETERM LDCT ELIG: HCPCS | Performed by: FAMILY MEDICINE

## 2021-08-12 PROCEDURE — 85027 COMPLETE CBC AUTOMATED: CPT

## 2021-08-12 PROCEDURE — 36415 COLL VENOUS BLD VENIPUNCTURE: CPT

## 2021-08-12 PROCEDURE — 84439 ASSAY OF FREE THYROXINE: CPT

## 2021-08-12 PROCEDURE — 84480 ASSAY TRIIODOTHYRONINE (T3): CPT

## 2021-08-12 PROCEDURE — 84443 ASSAY THYROID STIM HORMONE: CPT

## 2021-08-12 PROCEDURE — 99397 PER PM REEVAL EST PAT 65+ YR: CPT | Performed by: FAMILY MEDICINE

## 2021-08-12 PROCEDURE — 82043 UR ALBUMIN QUANTITATIVE: CPT

## 2021-08-12 PROCEDURE — G0103 PSA SCREENING: HCPCS

## 2021-08-12 PROCEDURE — 80061 LIPID PANEL: CPT

## 2021-08-12 RX ORDER — SILDENAFIL CITRATE 20 MG/1
40-60 TABLET ORAL
Qty: 90 TABLET | Refills: 3 | Status: SHIPPED | OUTPATIENT
Start: 2021-08-12 | End: 2022-08-23

## 2021-08-12 RX ORDER — LISINOPRIL 20 MG/1
TABLET ORAL
Qty: 135 TABLET | Refills: 3 | Status: SHIPPED | OUTPATIENT
Start: 2021-08-12 | End: 2022-01-14 | Stop reason: DRUGHIGH

## 2021-08-12 RX ORDER — LEVOTHYROXINE SODIUM 137 UG/1
TABLET ORAL
Qty: 36 TABLET | Refills: 3 | Status: SHIPPED | OUTPATIENT
Start: 2021-08-12 | End: 2021-09-06

## 2021-08-12 RX ORDER — LEVOTHYROXINE SODIUM 150 UG/1
TABLET ORAL
Qty: 52 TABLET | Refills: 3 | Status: SHIPPED | OUTPATIENT
Start: 2021-08-12 | End: 2021-09-06

## 2021-08-12 ASSESSMENT — ENCOUNTER SYMPTOMS
DYSURIA: 0
ABDOMINAL PAIN: 0
NERVOUS/ANXIOUS: 0
ARTHRALGIAS: 0
NAUSEA: 0
HEARTBURN: 0
PALPITATIONS: 0
MYALGIAS: 0
EYE PAIN: 0
SORE THROAT: 0
HEADACHES: 1
FEVER: 0
HEMATOCHEZIA: 0
PARESTHESIAS: 0
CHILLS: 0
JOINT SWELLING: 0
DIARRHEA: 0
WEAKNESS: 0
CONSTIPATION: 0
SHORTNESS OF BREATH: 0
FREQUENCY: 0
DIZZINESS: 1
HEMATURIA: 0

## 2021-08-12 ASSESSMENT — MIFFLIN-ST. JEOR: SCORE: 1691.08

## 2021-08-12 ASSESSMENT — ACTIVITIES OF DAILY LIVING (ADL): CURRENT_FUNCTION: NO ASSISTANCE NEEDED

## 2021-08-12 NOTE — LETTER
September 7, 2021      Dev Powell  [address] [place] [address]        Dev BLAKE have reviewed your recent labs. Here are the results:     -Normal red blood cell (hgb) levels, normal white blood cell count and normal platelet levels.   -PSA (prostate specific antigen) test is normal.  This indicates a low likelihood of prostate cancer.  ADVISE: rechecking this in 1 year.   -Cholesterol levels (LDL,HDL, Triglycerides) are normal.  ADVISE: rechecking in 1 year.   -Liver and gallbladder tests are normal (ALT,AST, Alk phos, bilirubin), kidney function is mildly decreased from 1 year ago - avoid frequent use of NSAIDS (ibuprofen/motrin/aleve) and recheck in 3 months (Cr, GFR), sodium is normal, potassium is normal, calcium is normal, glucose is normal.   -TSH (thyroid stimulating hormone) is abnormal suggesting you are currently overreplaced.  ADVISE: changing your medication dose to 150 mcg on Mondays, Wednesdays and Fridays, and 137 mcg on Tuesday/Thursday/Sat/Sunday (essentially switching the doses from previous)  micrograms/day (updated prescriptions have been sent to your pharmacy) and rechecking your TSH with a lab appointment in 3 months with a lab only appointment.     For additional lab test information, [email or web address] is an excellent reference.       If you have any questions please do not hesitate to contact our office via phone ([phone]) or MyChart.     Estefania Eubanks, MS, PA-C (covering for Dr. Beth)   [facility]   Lipid panel reflex to direct LDL Fasting   Result Value Ref Range    Cholesterol 145 <200 mg/dL      Comment:      Age 0-19 years  Desirable: <170 mg/dL  Borderline high:  170-199 mg/dl  High:            >199 mg/dl    Age 20 years and older  Desirable: <200 mg/dL    Triglycerides 79 <150 mg/dL      Comment:      0-9 years:  Normal:    Less than 75 mg/dL  Borderline high:  75-99 mg/dL  High:             Greater than or  equal to 100 mg/dL    0-19 years:  Normal:    Less than 90 mg/dL  Borderline high:   mg/dL  High:             Greater than or equal to 130 mg/dL    20 years and older:  Normal:    Less than 150 mg/dL  Borderline high:  150-199 mg/dL  High:             200-499 mg/dL  Very high:   Greater than or equal to 500 mg/dL    Direct Measure HDL 63 >=40 mg/dL      Comment:      0-19 years:       Greater than or equal to 45 mg/dL   Low: Less than 40 mg/dL   Borderline low: 40-44 mg/dL     20 years and older:   Female: Greater than or equal to 50 mg/dL   Male:   Greater than or equal to 40 mg/dL         LDL Cholesterol Calculated 66 <=100 mg/dL      Comment:      Age 0-19 years:  Desirable: 0-110 mg/dL   Borderline high: 110-129 mg/dL   High: >= 130 mg/dL    Age 20 years and older:  Desirable: <100mg/dL  Above desirable: 100-129 mg/dL   Borderline high: 130-159 mg/dL   High: 160-189 mg/dL   Very high: >= 190 mg/dL    Non HDL Cholesterol 82 <130 mg/dL      Comment:      0-19 years:  Desirable:          Less than 120 mg/dL  Borderline high:   120-144 mg/dL  High:                   Greater than or equal to 145 mg/dL    20 years and older:  Desirable:          130 mg/dL  Above Desirable: 130-159 mg/dL  Borderline high:   160-189 mg/dL  High:               190-219 mg/dL  Very high:     Greater than or equal to 220 mg/dL    Patient Fasting > 8hrs? Yes    Prostate Specific Antigen Screen   Result Value Ref Range    Prostate Specific Antigen Screen 1.01 0.00 - 4.00 ug/L   TSH   Result Value Ref Range    TSH 0.14 (L) 0.40 - 4.00 mU/L   T4 free   Result Value Ref Range    Free T4 1.40 0.76 - 1.46 ng/dL   T3 total   Result Value Ref Range    T3 Total 111 60 - 181 ng/dL   Comprehensive metabolic panel   Result Value Ref Range    Sodium 136 133 - 144 mmol/L    Potassium 4.4 3.4 - 5.3 mmol/L    Chloride 107 94 - 109 mmol/L    Carbon Dioxide (CO2) 24 20 - 32 mmol/L    Anion Gap 5 3 - 14 mmol/L    Urea Nitrogen 27 7 - 30 mg/dL     Creatinine 1.21 0.66 - 1.25 mg/dL    Calcium 9.5 8.5 - 10.1 mg/dL    Glucose 99 70 - 99 mg/dL    Alkaline Phosphatase 99 40 - 150 U/L    AST 18 0 - 45 U/L    ALT 30 0 - 70 U/L    Protein Total 7.5 6.8 - 8.8 g/dL    Albumin 4.0 3.4 - 5.0 g/dL    Bilirubin Total 0.8 0.2 - 1.3 mg/dL    GFR Estimate 58 (L) >60 mL/min/1.73m2      Comment:      As of July 11, 2021, eGFR is calculated by the CKD-EPI creatinine equation, without race adjustment. eGFR can be influenced by muscle mass, exercise, and diet. The reported eGFR is an estimation only and is only applicable if the renal function is stable.   CBC with platelets   Result Value Ref Range    WBC Count 6.8 4.0 - 11.0 10e3/uL    RBC Count 4.61 4.40 - 5.90 10e6/uL    Hemoglobin 14.5 13.3 - 17.7 g/dL    Hematocrit 43.1 40.0 - 53.0 %    MCV 94 78 - 100 fL    MCH 31.5 26.5 - 33.0 pg    MCHC 33.6 31.5 - 36.5 g/dL    RDW 12.9 10.0 - 15.0 %    Platelet Count 214 150 - 450 10e3/uL   Albumin Random Urine Quantitative with Creat Ratio   Result Value Ref Range    Creatinine Urine mg/dL 68 mg/dL    Albumin Urine mg/L 15 mg/L    Albumin Urine mg/g Cr 22.06 (H) 0.00 - 17.00 mg/g Cr

## 2021-08-12 NOTE — LETTER
[facility]  [address] [place] [address]  [phone]                    September 7, 2021    Dev Powell  [address] [place] [address]      Dear Dev,    Here is a summary of your recent test results:    -Normal red blood cell (hgb) levels, normal white blood cell count and normal platelet levels.   -PSA (prostate specific antigen) test is normal.  This indicates a low likelihood of prostate cancer.  ADVISE: rechecking this in 1 year.   -Cholesterol levels (LDL,HDL, Triglycerides) are normal.  ADVISE: rechecking in 1 year.   -Liver and gallbladder tests are normal (ALT,AST, Alk phos, bilirubin), kidney function is mildly decreased from 1 year ago - avoid frequent use of NSAIDS (ibuprofen/motrin/aleve) and recheck in 3 months (Cr, GFR), sodium is normal, potassium is normal, calcium is normal, glucose is normal.   -TSH (thyroid stimulating hormone) is abnormal suggesting you are currently overreplaced.  ADVISE: changing your medication dose to 150 mcg on Mondays, Wednesdays and Fridays, and 137 mcg on Tuesday/Thursday/Sat/Sunday (essentially switching the doses from previous)  micrograms/day (updated prescriptions have been sent to your pharmacy) and rechecking your TSH with a lab appointment in 3 months with a lab only appointment.     For additional lab test information, [email or web address] is an excellent reference.        If you have any questions please do not hesitate to contact our office via phone ([phone]) or [facility]).     Estefania Eubanks, MS, PA-C (covering for Dr. Beth)   [facility]      Your test results are enclosed.          In addition, here is a list of due or overdue Health Maintenance reminders.    Health Maintenance Due   Topic Date Due     Flu Vaccine (1) 09/01/2021       Please call us at [phone] (or use [facility])) to address the above recommendations.            Thank you very much for trusting  Jammin Java  [facility].     Healthy regards,            Results for orders placed or performed in visit on 08/12/21   Lipid panel reflex to direct LDL Fasting     Status: None   Result Value Ref Range    Cholesterol 145 <200 mg/dL    Triglycerides 79 <150 mg/dL    Direct Measure HDL 63 >=40 mg/dL    LDL Cholesterol Calculated 66 <=100 mg/dL    Non HDL Cholesterol 82 <130 mg/dL    Patient Fasting > 8hrs? Yes    Prostate Specific Antigen Screen     Status: Normal   Result Value Ref Range    Prostate Specific Antigen Screen 1.01 0.00 - 4.00 ug/L   TSH     Status: Abnormal   Result Value Ref Range    TSH 0.14 (L) 0.40 - 4.00 mU/L   T4 free     Status: Normal   Result Value Ref Range    Free T4 1.40 0.76 - 1.46 ng/dL   T3 total     Status: Normal   Result Value Ref Range    T3 Total 111 60 - 181 ng/dL   Comprehensive metabolic panel     Status: Abnormal   Result Value Ref Range    Sodium 136 133 - 144 mmol/L    Potassium 4.4 3.4 - 5.3 mmol/L    Chloride 107 94 - 109 mmol/L    Carbon Dioxide (CO2) 24 20 - 32 mmol/L    Anion Gap 5 3 - 14 mmol/L    Urea Nitrogen 27 7 - 30 mg/dL    Creatinine 1.21 0.66 - 1.25 mg/dL    Calcium 9.5 8.5 - 10.1 mg/dL    Glucose 99 70 - 99 mg/dL    Alkaline Phosphatase 99 40 - 150 U/L    AST 18 0 - 45 U/L    ALT 30 0 - 70 U/L    Protein Total 7.5 6.8 - 8.8 g/dL    Albumin 4.0 3.4 - 5.0 g/dL    Bilirubin Total 0.8 0.2 - 1.3 mg/dL    GFR Estimate 58 (L) >60 mL/min/1.73m2   CBC with platelets     Status: Normal   Result Value Ref Range    WBC Count 6.8 4.0 - 11.0 10e3/uL    RBC Count 4.61 4.40 - 5.90 10e6/uL    Hemoglobin 14.5 13.3 - 17.7 g/dL    Hematocrit 43.1 40.0 - 53.0 %    MCV 94 78 - 100 fL    MCH 31.5 26.5 - 33.0 pg    MCHC 33.6 31.5 - 36.5 g/dL    RDW 12.9 10.0 - 15.0 %    Platelet Count 214 150 - 450 10e3/uL   Albumin Random Urine Quantitative with Creat Ratio     Status: Abnormal   Result Value Ref Range    Creatinine Urine mg/dL 68 mg/dL    Albumin Urine mg/L 15 mg/L    Albumin  Urine mg/g Cr 22.06 (H) 0.00 - 17.00 mg/g Cr

## 2021-08-12 NOTE — PATIENT INSTRUCTIONS
[facility]  [address] [place] [id number]  Office: [phone]   Fax:    [phone]       Look at odor-free Bully sticks for your dog  -- available on [employer].com.     Patient Education   Personalized Prevention Plan  You are due for the preventive services outlined below.  Your care team is available to assist you in scheduling these services.  If you have already completed any of these items, please share that information with your care team to update in your medical record.  Health Maintenance Due   Topic Date Due     ANNUAL REVIEW OF HM ORDERS  Never done     Discuss Advance Care Planning  2020     PHQ-2  2021     Basic Metabolic Panel  2021     Thyroid Function Lab  2021     Kidney Microalbumin Urine Test  07/15/2021     FALL RISK ASSESSMENT  07/15/2021     Lung Cancer Screening (CT Scan)  2021      for your allergies/sinus headaches - try claritin ( generic is called loratidine) 10mg daily or Allegra 180mg (Generic = fexofenadine) daily - both totally nonsedating.         The Benefits of Living Tobacco-Free  What do you want to improve in your life by quitting tobacco? Whether you smoke cigarettes, e-cigarettes, cigars, or a pipe, or use smokeless tobacco, there are many reasons to quit. Check off some reasons you want to be tobacco-free.  Health benefits  ___  I want to breathe without coughing or feeling short of breath.  ___  I want to reduce my risk for lung cancer, oral cancer, heart disease, bone problems such as osteoporosis and fractures, and chronic lung disease. Or reduce my risk for a serious lung injury called EVALI that may happen from vaping or using e-cigarettes.  ___  I want to have fewer wrinkles and softer skin.  ___  I want to improve my sense of taste and smell.  ___  For pregnant women: I want to reduce my risk for a miscarriage, stillbirth,  birth, or a low-birth-weight baby.  Personal benefits  ___  I want to be  able to walk, go up stairs, and exercise without becoming out of breath.  ___  I want to feel more in control of my life.  ___  I want to have better-smelling hair, clothes, home, and car.  ___  I want to save time by not having to take smoke breaks, buy tobacco products, or hunt for a light.  ___  I want to have whiter teeth and fresher breath.  Family benefits  ___  I want to reduce my child's respiratory tract infections.  ___  I want to set a healthy example for my child.  ___  I want to have the energy needed to play with my children and not become out-of-breath  ___  I want to reduce my family s cancer risk.  Financial benefits  ___  I want to save hundreds of dollars each year that would be spent on tobacco products.  ___  I want to save money on medical bills.  ___  I want to save money on life, health, and car insurance premiums.  How much do you spend?  A tobacco habit is expensive. Do you know how much you spend on tobacco each year? Fill in the blanks below to find out:  A. How much do you pay for 1 pack of cigarettes, 1 cigar, 1 pouch of pipe tobacco, or 1 can of smokeless tobacco? $________  B. How many packs, cigars, pouches, or cans do you use each day? _______________  C. Multiply answer A with answer B:___________________  D. Multiply answer C with 365: $___________________. This is your yearly cost of using tobacco.  Besides the cost of buying tobacco, there are other costs. These include:    Costs of cleaning clothing, furniture, and car    Replacement costs for clothing and furniture    Medical costs for tobacco-related illnesses    Missed days of work because of illness    Higher health, life, and car insurance premiums  Get help    QuitNow, www.cdc.gov/tobacco/quit_smoking/, 800-QUIT-NOW (026-315-2916).    QuitLine, www.smokefree.gov, 536-15U-QUIT (451-151-4762).    [employer]ICO last reviewed this educational content on 4/1/2020 2000-2021 [employer]. All rights reserved. This  information is not intended as a substitute for professional medical care. Always follow your healthcare professional's instructions.          Planning to Quit Smoking  Your healthcare provider may have told you that you need to give up tobacco. Only you can decide if and when you are ready to quit. Quitting is hard to do. But the benefits will be worth it. When you decide to quit, come up with a plan that s right for you. Discuss your plan with your healthcare provider. And talk with your provider about medicines to help you quit.    Line up support  To quit smoking, you ll need a plan and some help. Pick a date in the next 2 to 4 weeks to quit. Use the time between now and that date to arrange for support.    Classes and counselors. Quit-smoking classes  people like you through the process. Get to know others in a class. And support each other beyond the class. Phone counseling also helps you keep on track. Ask your healthcare provider, local hospital, or public health department to put you in touch with a class and a phone counselor.    Family and friends. Tell your family and friends about your quit date. Ask them to support your change. If they smoke, only see them in smoke-free places. Don't allow smoking in your home and car.  Be careful with these products  Finding something to replace cigarettes may be hard to do. Some things may be as harmful as cigarettes. These include:    Smokeless (chewing) tobacco. This is just as harmful as regular tobacco. Tobacco should not be used as a substitute for cigarettes.    Herbal medicines or teas. These may affect how your body handles nicotine. Talk with your healthcare provider before using these products.    E-cigarettes. E-cigarettes are not approved by the FDA as a quit-smoking aid. So far, the research shows there is limited evidence that e-cigarettes are effective for helping smokers quit. They may also have substances that can cause cancer or  life-threatening lung conditions. Experts advise not to use these products.  Quit-smoking products  Many products can help you quit smoking. Some are prescription medicines that help curb your cravings and withdrawal symptoms. Other products slowly lessen the level of nicotine your body absorbs. Nicotine is the highly addictive substance found in cigarettes, cigars, and chewing tobacco. Nicotine replacement products can help get your body used to slowly decreasing amounts of nicotine after you quit smoking. These products include a nicotine patch, gum, lozenge, nasal spray, and inhaler. Always follow the directions for your medicine or product carefully. Your healthcare provider may tell you to start taking the prescription medicine a week before you plan to quit. Don't smoke while you use nicotine products. Doing so can harm your health.  To learn more    www.cdc.gov/tobacco/quit_smoking/ 800-QUIT-NOW (079-316-3152)    www.smokefree.gov 877-44U-QUIT (417-026-2481)    www.lung.org/stop-smoking/ 800-LUNGUSA (415-023-5095)    [employer] last reviewed this educational content on 1/1/2019 2000-2021 [employer]. All rights reserved. This information is not intended as a substitute for professional medical care. Always follow your healthcare professional's instructions.        Lung Cancer Screening   Frequently Asked Questions  If you are at high-risk for lung cancer, getting screened with low-dose computed tomography (LDCT) every year can help save your life. This handout offers answers to some of the most common questions about lung cancer screening. If you have other questions, please call 2-215-2-PCancer (1-319.909.2126).     What is it?  Lung cancer screening uses special X-ray technology to create an image of your lung tissue. The exam is quick and easy and takes less than 10 seconds. We don t give you any medicine or use any needles. You can eat before and after the exam. You don t need to change  your clothes as long as the clothing on your chest doesn t contain metal. But, you do need to be able to hold your breath for at least 6 seconds during the exam.    What is the goal of lung cancer screening?  The goal of lung cancer screening is to save lives. Many times, lung cancer is not found until a person starts having physical symptoms. Lung cancer screening can help detect lung cancer in the earliest stages when it may be easier to treat.    Who should be screened for lung cancer?  We suggest lung cancer screening for anyone who is at high-risk for lung cancer. You are in the high-risk group if you:      are between the ages of 55 and 79, and    have smoked at least 1 pack of cigarettes a day for 30 or more years, and    still smoke or have quit within the past 15 years.    However, if you have a new cough or shortness of breath, you should talk to your doctor before being screened.    Some national lung health advocacy groups also recommend screening for people ages 50 to 79 who have smoked an average of 1 pack of cigarettes a day for 20 years. They must also have at least 1 other risk factor for lung cancer, not including exposure to secondhand smoke. Other risk factors are having had cancer in the past, emphysema, pulmonary fibrosis, COPD, a family history of lung cancer, or exposure to certain materials such as arsenic, asbestos, beryllium, cadmium, chromium, diesel fumes, nickel, radon or silica. Your care team can help you know if you have one of these risk factors.     Why does it matter if I have symptoms?  Certain symptoms can be a sign that you have a condition in your lungs that should be checked and treated by your doctor. These symptoms include fever, chest pain, a new or changing cough, shortness of breath that you have never felt before, coughing up blood or unexplained weight loss. Having any of these symptoms can greatly affect the results of lung cancer screening.       Should all smokers  get an LDCT lung cancer screening exam?  It depends. Lung cancer screening is for a very specific group of men and women who have a history of heavy smoking over a long period of time (see  Who should be screened for lung cancer  above).  I am in the high-risk group, but have been diagnosed with cancer in the past. Is LDCT lung cancer screening right for me?  In some cases, you should not have LDCT lung screening, such as when your doctor is already following your cancer with CT scan studies. Your doctor will help you decide if LDCT lung screening is right for you.  Do I need to have a screening exam every year?  Yes. If you are in the high-risk group described earlier, you should get an LDCT lung cancer screening exam every year until you are 79, or are no longer willing or able to undergo screening and possible procedures to diagnose and treat lung cancer.  How effective is LDCT at preventing death from lung cancer?  Studies have shown that LDCT lung cancer screening can lower the risk of death from lung cancer by 20 percent in people who are at high-risk.  What are the risks?  There are some risks and limitations of LDCT lung cancer screening. We want to make sure you understand the risks and benefits, so please let us know if you have any questions. Your doctor may want to talk with you more about these risks.    Radiation exposure: As with any exam that uses radiation, there is a very small increased risk of cancer. The amount of radiation in LDCT is small--about the same amount a person would get from a mammogram. Your doctor orders the exam when he or she feels the potential benefits outweigh the risks.    False negatives: No test is perfect, including LDCT. It is possible that you may have a medical condition, including lung cancer, that is not found during your exam. This is called a false negative result.    False positives and more testing: LDCT very often finds something in the lung that could be cancer,  but in fact is not. This is called a false positive result. False positive tests often cause anxiety. To make sure these findings are not cancer, you may need to have more tests. These tests will be done only if you give us permission. Sometimes patients need a treatment that can have side effects, such as a biopsy. For more information on false positives, see  What can I expect from the results?     Findings not related to lung cancer: Your LDCT exam also takes pictures of areas of your body next to your lungs. In a very small number of cases, the CT scan will show an abnormal finding in one of these areas, such as your kidneys, adrenal glands, liver or thyroid. This finding may not be serious, but you may need more tests. Your doctor can help you decide what other tests you may need, if any.  What can I expect from the results?  About 1 out of 4 LDCT exams will find something that may need more tests. Most of the time, these findings are lung nodules. Lung nodules are very small collections of tissue in the lung. These nodules are very common, and the vast majority--more than 97 percent--are not cancer (benign). Most are normal lymph nodes or small areas of scarring from past infections.  But, if a small lung nodule is found to be cancer, the cancer can be cured more than 90 percent of the time. To know if the nodule is cancer, we may need to get more images before your next yearly screening exam. If the nodule has suspicious features (for example, it is large, has an odd shape or grows over time), we will refer you to a specialist for further testing.  Will my doctor also get the results?  Yes. Your doctor will get a copy of your results.  Is it okay to keep smoking now that there s a cancer screening exam?  No. Tobacco is one of the strongest cancer-causing agents. It causes not only lung cancer, but other cancers and cardiovascular (heart) diseases as well. The damage caused by smoking builds over time. This  means that the longer you smoke, the higher your risk of disease. While it is never too late to quit, the sooner you quit, the better.  Where can I find help to quit smoking?  The best way to prevent lung cancer is to stop smoking. If you have already quit smoking, congratulations and keep it up! For help on quitting smoking, please call [employer] at 1-789-106-DJKG (0993) or the American Cancer Society at 1-214.230.5529 to find local resources near you.  One-on-one health coaching:  If you d prefer to work individually with a health care provider on tobacco cessation, we offer:      Medication Therapy Management:  Our specially trained pharmacists work closely with you and your doctor to help you quit smoking.  Call [phone] or [phone] (toll free).     Can Do: Health coaching offered by [facility].  [email or web address]    Thank you so much or choosing [facility]  for your Health Care. It was a pleasure seeing you at your visit today! Please contact us with any questions or concerns you may have.                   Vee Beth MD                              To reach your [facility] care team after hours call:   [phone]    PLEASE NOTE OUR HOURS HAVE CHANGED secondary to COVID-19 coronavirus pandemic, as we are trying to minimize patient exposure to the virus,  which is now widespread in the [facility].  These hours may change with very little notice.  We apologize for any inconvenience.       Our current clinic hours are:          Monday- Thursday   7:00am - 6:00pm  in person.      Friday  7:00am- 5:00pm                       Saturday and Sunday : Closed to in person and virtual visits        We have telephone and virtual visit times available between    7:00am - 6pm on Monday-Friday as well.                                                Phone:  [phone]      Our pharmacy hours: Monday through Friday 9:00am to  5:00pm                        Saturday - 9:00 am to 12 noon       Sunday : Closed.              Phone:  [phone]              ###  Please note: at this time we are not accepting any walk-in visits. ###      There is also information available at our web site:  [email or web address]    If your provider ordered any lab tests and you do not receive the results within 10 business days, please call the clinic.    If you need a medication refill please contact your pharmacy.  Please allow 2 business days for your refill to be completed.    Our clinic offers telephone visits and e visits.  Please ask one of your team members to explain more.      Use [employer] (secure email communication and access to your chart) to send your primary care provider a message or make an appointment. Ask someone on your Team how to sign up for [employer].

## 2021-08-12 NOTE — PROGRESS NOTES
"  Lung Cancer Screening Shared Decision Making Visit     Dev Powell is eligible for lung cancer screening on the basis of the information provided in my signed lung cancer screening order.     I have discussed with patient the risks and benefits of screening for lung cancer with low-dose CT.     The risks include:  radiation exposure: one low dose chest CT has as much ionizing radiation as about 15 chest x-rays or 6 months of background radiation living in [place]    false positives: 96% of positive findings/nodules are NOT cancer, but some might still require additional diagnostic evaluation, including biopsy  over-diagnosis: some slow growing cancers that might never have been clinically significant will be detected and treated unnecessarily     The benefit of early detection of lung cancer is contingent upon adherence to annual screening or more frequent follow up if indicated.     Furthermore, reaping the benefits of screening requires Dev Powell to be willing and physically able to undergo diagnostic procedures, if indicated. Although no specific guide is available for determining severity of comorbidities, it is reasonable to withhold screening in patients who have greater mortality risk from other diseases.     We did discuss that the only way to prevent lung cancer is to not smoke. Smoking cessation counseling was given, duration < 3 minutes.      I did offer risk estimation using a calculator such as this one:    ShouldIScreen    ---     Vee Beth MD      Answers for HPI/ROS submitted by the patient on 8/12/2021  In general, how would you rate your overall physical health?: good  Frequency of exercise:: 4-5 days/week  Do you usually eat at least 4 servings of fruit and vegetables a day, include whole grains & fiber, and avoid regularly eating high fat or \"junk\" foods? : No  Taking medications regularly:: Yes  Medication side effects:: None, No muscle aches, No significant flushing, " Muscle aches, Significant flushing, Lightheadedness  Activities of Daily Living: no assistance needed  Home safety: no safety concerns identified  Hearing Impairment:: difficulty understanding soft or whispered speech  In the past 6 months, have you been bothered by leaking of urine?: No  abdominal pain: No  Blood in stool: No  Blood in urine: No  chest pain: No  chills: No  congestion: Yes  constipation: No  diarrhea: No  dizziness: Yes  ear pain: No  eye pain: No  nervous/anxious: No  fever: No  frequency: No  genital sores: No  headaches: Yes  hearing loss: Yes  heartburn: No  arthralgias: No  joint swelling: No  peripheral edema: No  mood changes: No  myalgias: No  nausea: No  dysuria: No  palpitations: No  Skin sensation changes: No  sore throat: No  urgency: Yes  rash: No  shortness of breath: No  visual disturbance: No  weakness: No  impotence: Yes  penile discharge: No  In general, how would you rate your overall mental or emotional health?: good  Additional concerns today:: No  Duration of exercise:: Less than 15 minutes

## 2021-08-12 NOTE — PROGRESS NOTES
"SUBJECTIVE:   Dev Powell is a 76 year old male who presents for Preventive Visit and the following other medical problems:      1. Encounter for Medicare annual wellness exam    2. Cigarette smoker one half pack a day or less- for > 45 years     3. Essential hypertension with goal blood pressure less than 140/90    4. Hyperlipidemia LDL goal <130 - pravastatin = leg cramps; lipitor =calf pains    5. Pulmonary nodules- tiny per screening chest CT -  repeat CT chest low dose w/o contrast 2017 = no change since  - no need for further CT's for these nodules but ok for lung ca screening     6. Atrial flutter, unspecified type (H)    7. Alcoholic intoxication with complication (H)    8. Malignant melanoma of left upper extremity including shoulder (H)    9. Enlarged prostate- has seen  Dr. Solomon in the past - annual  PSA's only  needed as of 2019 - doesn't need to see him again per Dr. Solomon unless gross hematuria     10. Nocturia- up 1-3x/night     11. Hypothyroidism, unspecified type    12. Tobacco abuse disorder    13. Nicotine dependence, uncomplicated, unspecified nicotine product type    14. Personal history of tobacco use    15. Screening for prostate cancer    16. Vasculogenic erectile dysfunction, unspecified vasculogenic erectile dysfunction type    17. Multiple pigmented nevi    18. Mild aortic stenosis by prior echocardiogram- 3/6 systolic mid pitched murmur at RUSB- unchanged from previous          Wife, Mikayla,  2020 .    Patient has been advised of split billing requirements and indicates understanding: No   Are you in the first 12 months of your Medicare coverage?  No    Healthy Habits:     In general, how would you rate your overall health?  Good    Frequency of exercise:  4-5 days/week    Duration of exercise:  Less than 15 minutes    Do you usually eat at least 4 servings of fruit and vegetables a day, include whole grains    & fiber and avoid regularly eating high fat or \"junk\" " foods?  No    Taking medications regularly:  Yes    Medication side effects:  None, No muscle aches, No significant flushing, Muscle aches, Significant flushing and Lightheadedness    Ability to successfully perform activities of daily living:  No assistance needed    Home Safety:  No safety concerns identified    Hearing Impairment:  Difficulty understanding soft or whispered speech    In the past 6 months, have you been bothered by leaking of urine?  No    In general, how would you rate your overall mental or emotional health?  Good      PHQ-2 Total Score: 1    Additional concerns today:  No    Do you feel safe in your environment? Yes    Have you ever done Advance Care Planning? (For example, a Health Directive, POLST, or a discussion with a medical provider or your loved ones about your wishes): No, advance care planning information given to patient to review.  Patient declined advance care planning discussion at this time.        Fall risk:   Fallen 2 or more times in the past year?: No  Any fall with injury in the past year?: No    Cognitive Screening :   1) Repeat 3 items (Leader, Season, Table)      2) Clock draw:   NORMAL  3) 3 item recall: Recalls 2 objects   Results: NORMAL clock, 1-2 items recalled: COGNITIVE IMPAIRMENT LESS LIKELY    Mini-CogTM Copyright S Leonor. Licensed by the author for use in [facility]; reprinted with permission (soob@.[email or web address]). All rights reserved.      Do you have sleep apnea, excessive snoring or daytime drowsiness?: no    Reviewed and updated as needed this visit by clinical staff               Reviewed and updated as needed this visit by Provider                Social History     Tobacco Use     Smoking status: Current Every Day Smoker     Packs/day: 0.50     Years: 60.00     Pack years: 30.00     Smokeless tobacco: Never Used     Tobacco comment: strongly encourage smoking cessation with every visit   Substance Use Topics     Alcohol use: Yes     Alcohol/week:  0.0 standard drinks     Comment: not regular - very occasional 1-2 martinis when out to dinner 1-2x/month, if that          Alcohol Use 7/15/2020   Prescreen: >3 drinks/day or >7 drinks/week? No           Hyperlipidemia Follow-Up:       Are you regularly taking any medication or supplement to lower your cholesterol?   Yes- crestor and zetia- saw Cardiology re: this - tried higher dose of crestor - muscle aches.    Are you having muscle aches or other side effects that you think could be caused by your cholesterol lowering medication?  No     Recent Labs   Lab Test 03/05/21  0808 12/14/20  0727 01/30/15  1305 04/30/14  0802   CHOL 107 166 237* 219*   HDL 58 63 46 38*   LDL 34 88 149* 155*   TRIG 73 75 208* 135   CHOLHDLRATIO  --   --  5.2* 5.8*          Hypertension Follow-up:       Do you check your blood pressure regularly outside of the clinic? Yes     Are you following a low salt diet? Yes    Are your blood pressures ever more than 140 on the top number (systolic) OR more   than 90 on the bottom number (diastolic), for example 140/90? No     BP Readings from Last 6 Encounters:   08/12/21 110/86   11/03/20 (!) 142/94   08/25/20 (!) 132/98   07/15/20 134/82   05/14/20 (!) 144/101   05/14/20 128/86         Hypothyroidism Follow-up      Since last visit, patient describes the following symptoms: Weight stable, no hair loss, no skin changes, no constipation, no loose stools    TSH   Date Value Ref Range Status   05/14/2020 1.65 0.40 - 4.00 mU/L Final   01/10/2020 0.59 0.40 - 4.00 mU/L Final   11/21/2019 0.37 (L) 0.40 - 4.00 mU/L Final   03/28/2019 1.83 0.40 - 4.00 mU/L Final   02/05/2019 4.88 (H) 0.40 - 4.00 mU/L Final     T4 Free   Date Value Ref Range Status   01/10/2020 1.39 0.76 - 1.46 ng/dL Final   11/21/2019 1.38 0.76 - 1.46 ng/dL Final   03/28/2019 1.20 0.76 - 1.46 ng/dL Final   02/05/2019 1.10 0.76 - 1.46 ng/dL Final   11/21/2018 0.75 (L) 0.76 - 1.46 ng/dL Final       Current providers sharing in care for this  patient include:   Patient Care Team:  Vee Beth MD as PCP - General (Family Practice)  Vee Beth MD as MD (Family Practice)  Vee Beth MD as Assigned PCP  Roderick Harp MD as Assigned Heart and Vascular Provider    The following health maintenance items are reviewed in Epic and correct as of today:  Health Maintenance Due   Topic Date Due     ANNUAL REVIEW OF HM ORDERS  Never done     ADVANCE CARE PLANNING  01/30/2020     PHQ-2  01/01/2021     BMP  05/14/2021     TSH W/FREE T4 REFLEX  05/14/2021     MICROALBUMIN  07/15/2021     FALL RISK ASSESSMENT  07/15/2021     LUNG CANCER SCREENING ANNUAL  08/07/2021     MEDICARE ANNUAL WELLNESS VISIT  07/15/2021     Lab work is in process  Labs reviewed in EPIC  BP Readings from Last 3 Encounters:   08/12/21 110/86   11/03/20 (!) 142/94   08/25/20 (!) 132/98    Wt Readings from Last 3 Encounters:   08/12/21 93.9 kg (207 lb)   11/03/20 93.9 kg (207 lb)   08/25/20 93 kg (205 lb)                  Patient Active Problem List   Diagnosis     Gastroesophageal reflux disease without esophagitis = ran out of PPI- has been taking lots of TUMS     Hyperlipidemia LDL goal <130 - pravastatin = leg cramps; lipitor =calf pains     Snoring     Vasculogenic erectile dysfunction, unspecified vasculogenic erectile dysfunction type     Osteoarthritis     Lipoma of skin     Gastric ulcer     Malignant melanoma of left upper extremity including shoulder (H)     Cigarette smoker one half pack a day or less- for > 45 years      Enlarged prostate- has seen  Dr. Solomon in the past - no further PSA's needed as of 7/2019 - doesn't need to see him again per Dr. Solomon unless gross hematuria      Rectal pain     Hearing loss     Inguinal hernia- right      Pulmonary nodules- tiny per screening chest CT -  repeat CT chest low dose w/o contrast 4/2017 = no change since 2015 - no need for further CT's      Essential hypertension with goal blood pressure  less than 140/90     Testosterone deficiency     Hypothyroidism, unspecified type     Chronic constipation     Mass of left chest wall - ? there for 18+ years -left breast 1cm mass at 7:30      Multiple pigmented nevi     Reducible right inguinal hernia     Undiagnosed cardiac murmurs     Elevated fasting glucose     Tubular adenoma 2019 - repeat 5 years     Alcoholic intoxication with complication (H)     Atrial flutter, unspecified type (H)     Nocturia- up 1-3x/night      Past Surgical History:   Procedure Laterality Date     COLONOSCOPY  2007    repeat in 2017     DAVINCI HERNIORRHAPHY INGUINAL Right 10/24/2019    Procedure: robotic assisted right inguinal hernia repair with mesh;  Surgeon: Rom Mccoy MD;  Location: [facility] OR     ESOPHAGOSCOPY, GASTROSCOPY, DUODENOSCOPY (EGD), COMBINED N/A 2015    Procedure: COMBINED ESOPHAGOSCOPY, GASTROSCOPY, DUODENOSCOPY (EGD);  Surgeon: Ender Dixon MD;  Location:  GI     HC ESOPHAGOSCOPY, DIAGNOSTIC  2007    EGD - gastric ulcer w/ erosions/ LA grade B erosive esophagitis     wide excision      for malignant melanoma       Social History     Tobacco Use     Smoking status: Current Every Day Smoker     Packs/day: 0.50     Years: 60.00     Pack years: 30.00     Smokeless tobacco: Never Used     Tobacco comment: strongly encourage smoking cessation with every visit   Substance Use Topics     Alcohol use: Yes     Alcohol/week: 0.0 standard drinks     Comment: not regular - very occasional 1-2 martinis when out to dinner 1-2x/month, if that      Family History   Problem Relation Age of Onset     C.A.D. Father 62         at age 62 of MI     Diabetes Father         early type 2      Neurologic Disorder Mother         mild dementia - @ 92     C.A.D. Mother         angina      Thyroid Disease Mother         s/p thyroid      Colon Cancer No family hx of          Current Outpatient Medications   Medication Sig Dispense Refill     aspirin (ASA) 81  MG EC tablet Take 1 tablet (81 mg) by mouth daily       ezetimibe (ZETIA) 10 MG tablet Take 1 tablet (10 mg) by mouth daily 90 tablet 3     Glucosamine-Chondroit-Vit C-Mn (GLUCOSAMINE CHONDROITIN 1500 COMPLEX) CAPS Take by mouth daily       ipratropium (ATROVENT) 0.03 % nasal spray Spray 2 sprays into both nostrils every 12 hours 30 mL 11     levothyroxine (SYNTHROID/LEVOTHROID) 137 MCG tablet TAKE 1 TABLET BY MOUTH ON MONDAY, WEDNESDAYS AND FRIDAYS ALTERNATING WITH 150MCG TABS 36 tablet 0     levothyroxine (SYNTHROID/LEVOTHROID) 150 MCG tablet TAKE 1 TABLET ORALLY ON TUES/THURS/SAT/SUNDAY. ALTERNATE WITH  MCG TABLET ON THE OTHER DAYS 52 tablet 0     lisinopril (ZESTRIL) 20 MG tablet TAKE 1 AND 1/2 TABLETS (30 MG) BY MOUTH DAILY 135 tablet 0     Multiple Vitamins-Minerals (CENTRUM SILVER) per tablet Take 1 tablet by mouth daily 30 tablet      omeprazole (PRILOSEC) 40 MG DR capsule TAKE 1 CAPSULE BY MOUTH ONCE DAILY TAKE 30-60 MINUTES BEFORE A MEAL. 90 capsule 1     rosuvastatin (CRESTOR) 40 MG tablet Take 1 tablet (40 mg) by mouth daily 90 tablet 3     sildenafil (REVATIO) 20 MG tablet TAKE 2-3 TABLETS (40-60 MG) BY MOUTH TWICE A WEEK 90 tablet 0     testosterone (ANDROGEL 1 % PUMP) 12.5 MG/ACT (1%) gel APPLY 2 PUMPS ONTO CLEAN DRY HAIRLESS SKIN OF SHOULDERS, UPPER ARMS OR ABDOMEN ONCE DAILY 150 g 2     Allergies   Allergen Reactions     Atorvastatin Other (See Comments)     Calf pain     Pravastatin Other (See Comments)     Leg cramps     Recent Labs   Lab Test 03/05/21  0808 12/14/20  0727 10/14/20  0736 07/15/20  1315 05/14/20  1528 01/10/20  0823 10/24/19  0708 09/12/19  1048 09/12/19  1048 03/28/19  1604   A1C  --   --   --  5.8*  --   --   --   --  5.9* 6.0*   LDL 34 88 80 95  --   --   --   --  110* 116*   HDL 58 63 71 58  --   --   --   --  46 60   TRIG 73 75 117 79  --   --   --   --  98 57   ALT  --  18 26  --  19  --   --   --  17  --    CR  --   --   --   --  1.07  --  1.00  --  1.03  --   "  GFRESTIMATED  --   --   --   --  67  --  74  --  71  --    GFRESTBLACK  --   --   --   --  78  --  86  --  82  --    POTASSIUM  --   --   --   --  4.2  --  3.9  --  4.5  --    TSH  --   --   --   --  1.65 0.59  --    < >  --  1.83    < > = values in this interval not displayed.      Pneumonia Vaccine:Adults age 65+ who received Pneumovax (PPSV23) at 65 years or older: Should be given PCV13 > 1 year after their most recent PPSV23        Review of Systems   Constitutional: Negative for chills and fever.   HENT: Positive for congestion and hearing loss. Negative for ear pain and sore throat.    Eyes: Negative for pain and visual disturbance.   Respiratory: Negative for shortness of breath.    Cardiovascular: Negative for chest pain, palpitations and peripheral edema.   Gastrointestinal: Negative for abdominal pain, constipation, diarrhea, heartburn, hematochezia and nausea.   Genitourinary: Positive for impotence and urgency. Negative for discharge, dysuria, frequency, genital sores and hematuria.   Musculoskeletal: Negative for arthralgias, joint swelling and myalgias.   Skin: Negative for rash.   Neurological: Positive for dizziness and headaches. Negative for weakness and paresthesias.   Psychiatric/Behavioral: Negative for mood changes. The patient is not nervous/anxious.          OBJECTIVE:   /86   Pulse 106   Temp 97  F (36.1  C)   Resp 20   Ht 1.803 m (5' 11\")   Wt 93.9 kg (207 lb)   SpO2 97%   BMI 28.87 kg/m   Estimated body mass index is 28.87 kg/m  as calculated from the following:    Height as of 11/3/20: 1.803 m (5' 11\").    Weight as of 11/3/20: 93.9 kg (207 lb).  Physical Exam  GENERAL: healthy, alert and no distress  EYES: Eyes grossly normal to inspection, PERRL and conjunctivae and sclerae normal  HENT: ear canals and TM's normal, nose and mouth without ulcers or lesions  NECK: no adenopathy, no asymmetry, masses, or scars and thyroid normal to palpation  RESP: lungs clear to " auscultation - no rales, rhonchi or wheezes  BREAST: normal without masses, tenderness or nipple discharge and no palpable axillary masses or adenopathy  CV: regular rate and rhythm, normal S1 S2, no S3 or S4, 3/6 systolic mid pitched murmur at RUSB- unchanged from previous , click or rub, no peripheral edema and peripheral pulses strong  ABDOMEN: soft, nontender, no hepatosplenomegaly, no masses and bowel sounds normal   (male): normal male genitalia without lesions or urethral discharge, no hernia  RECTAL: slightly decreased external  sphincter tone, no rectal masses, prostate slightly enlarged in size , smooth, nontender without nodules or masses  MS: no gross musculoskeletal defects noted, no edema  SKIN: no suspicious lesions or rashes, many pigmented nevi noted.   NEURO: Normal strength and tone, mentation intact and speech normal  PSYCH: mentation appears normal, affect normal/bright    Note:pt declined a chaperone for the genital and rectal exams. --Vee Beth MD       Diagnostic Test Results:  Labs reviewed in Epic    ASSESSMENT / PLAN:       ICD-10-CM    1. Encounter for Medicare annual wellness exam  Z00.00    2. Cigarette smoker one half pack a day or less- for > 45 years   F17.210 CT Chest Lung Cancer Scrn Low Dose wo     Prof fee: Shared Decisionmaking for Lung Cancer Screening     OFFICE/OUTPT VISIT,EST,LEVL IV   3. Essential hypertension with goal blood pressure less than 140/90  I10 REVIEW OF HEALTH MAINTENANCE PROTOCOL ORDERS     Albumin Random Urine Quantitative with Creat Ratio     CBC with platelets     Comprehensive metabolic panel     lisinopril (ZESTRIL) 20 MG tablet     OFFICE/OUTPT VISIT,EST,LEVL IV   4. Hyperlipidemia LDL goal <130 - pravastatin = leg cramps; lipitor =calf pains  E78.5 REVIEW OF HEALTH MAINTENANCE PROTOCOL ORDERS     Albumin Random Urine Quantitative with Creat Ratio     Comprehensive metabolic panel     Lipid panel reflex to direct LDL Fasting      OFFICE/OUTPT VISIT,EST,LEVL IV   5. Pulmonary nodules- tiny per screening chest CT -  repeat CT chest low dose w/o contrast 4/2017 = no change since 2015 - no need for further CT's for these nodules but ok for lung ca screening   R91.8 OFFICE/OUTPT VISIT,EST,LEVL IV   6. Atrial flutter, unspecified type (H)  I48.92 OFFICE/OUTPT VISIT,EST,LEVL IV   7. Alcoholic intoxication with complication (H)  F10.929    8. Malignant melanoma of left upper extremity including shoulder (H)  C43.62 SKIN CARE REFERRAL   9. Enlarged prostate- has seen  Dr. Solomon in the past - annual  PSA's only  needed as of 7/2019 - doesn't need to see him again per Dr. Solomon unless gross hematuria   N40.0 OFFICE/OUTPT VISIT,EST,LEVL IV   10. Nocturia- up 1-3x/night   R35.1    11. Hypothyroidism, unspecified type  E03.9 REVIEW OF HEALTH MAINTENANCE PROTOCOL ORDERS     T3 total     T4 free     TSH     levothyroxine (SYNTHROID/LEVOTHROID) 137 MCG tablet     levothyroxine (SYNTHROID/LEVOTHROID) 150 MCG tablet     OFFICE/OUTPT VISIT,EST,LEVL IV   12. Tobacco abuse disorder  Z72.0 Prof fee: Shared Decisionmaking for Lung Cancer Screening   13. Nicotine dependence, uncomplicated, unspecified nicotine product type  F17.200 Prof fee: Shared Decisionmaking for Lung Cancer Screening   14. Personal history of tobacco use  Z87.891 Prof fee: Shared Decisionmaking for Lung Cancer Screening   15. Screening for prostate cancer  Z12.5 Prostate Specific Antigen Screen   16. Vasculogenic erectile dysfunction, unspecified vasculogenic erectile dysfunction type  N52.9 sildenafil (REVATIO) 20 MG tablet     OFFICE/OUTPT VISIT,EST,LEVL IV   17. Multiple pigmented nevi  D22.9 SKIN CARE REFERRAL       Patient has been advised of split billing requirements and indicates understanding: Yes  COUNSELING:  Reviewed preventive health counseling, as reflected in patient instructions    Estimated body mass index is 28.87 kg/m  as calculated from the following:    Height as of  "11/3/20: 1.803 m (5' 11\").    Weight as of 11/3/20: 93.9 kg (207 lb).    Wt Readings from Last 5 Encounters:   21 93.9 kg (207 lb)   20 93.9 kg (207 lb)   20 93 kg (205 lb)   07/15/20 94.3 kg (208 lb)   20 94.8 kg (209 lb)     When  his wife  he was about 230lbs , and went down to 205lbs.         He reports that he has been smoking. He has a 30.00 pack-year smoking history. He has never used smokeless tobacco.  Tobacco Cessation Action Plan:   Information offered: Patient not interested at this time      Appropriate preventive services were discussed with this patient, including applicable screening as appropriate for cardiovascular disease, diabetes, osteopenia/osteoporosis, and glaucoma.  As appropriate for age/gender, discussed screening for colorectal cancer, prostate cancer, breast cancer, and cervical cancer. Checklist reviewing preventive services available has been given to the patient.    Reviewed patients plan of care and provided an AVS. The Complex Care Plan (for patients with higher acuity and needing more deliberate coordination of services) for Dev meets the Care Plan requirement. This Care Plan has been established and reviewed with the Patient.    Counseling Resources:  ATP IV Guidelines  Pooled Cohorts Equation Calculator  Breast Cancer Risk Calculator  Breast Cancer: Medication to Reduce Risk  FRAX Risk Assessment  ICSI Preventive Guidelines  Dietary Guidelines for Americans,   RNDOMN's MyPlate  ASA Prophylaxis  Lung CA Screening         Vee Beth MD  [facility]    Identified Health Risks:  "

## 2021-08-13 ENCOUNTER — HOSPITAL ENCOUNTER (OUTPATIENT)
Dept: CT IMAGING | Facility: CLINIC | Age: 76
Discharge: HOME | End: 2021-08-13
Attending: FAMILY MEDICINE | Admitting: FAMILY MEDICINE
Payer: COMMERCIAL

## 2021-08-13 DIAGNOSIS — F17.210 CIGARETTE SMOKER ONE HALF PACK A DAY OR LESS: ICD-10-CM

## 2021-08-13 LAB
ALBUMIN SERPL-MCNC: 4 G/DL (ref 3.4–5)
ALP SERPL-CCNC: 99 U/L (ref 40–150)
ALT SERPL W P-5'-P-CCNC: 30 U/L (ref 0–70)
ANION GAP SERPL CALCULATED.3IONS-SCNC: 5 MMOL/L (ref 3–14)
AST SERPL W P-5'-P-CCNC: 18 U/L (ref 0–45)
BILIRUB SERPL-MCNC: 0.8 MG/DL (ref 0.2–1.3)
BUN SERPL-MCNC: 27 MG/DL (ref 7–30)
CALCIUM SERPL-MCNC: 9.5 MG/DL (ref 8.5–10.1)
CHLORIDE BLD-SCNC: 107 MMOL/L (ref 94–109)
CHOLEST SERPL-MCNC: 145 MG/DL
CO2 SERPL-SCNC: 24 MMOL/L (ref 20–32)
CREAT SERPL-MCNC: 1.21 MG/DL (ref 0.66–1.25)
CREAT UR-MCNC: 68 MG/DL
FASTING STATUS PATIENT QL REPORTED: YES
GFR SERPL CREATININE-BSD FRML MDRD: 58 ML/MIN/1.73M2
GLUCOSE BLD-MCNC: 99 MG/DL (ref 70–99)
HDLC SERPL-MCNC: 63 MG/DL
LDLC SERPL CALC-MCNC: 66 MG/DL
MICROALBUMIN UR-MCNC: 15 MG/L
MICROALBUMIN/CREAT UR: 22.06 MG/G CR (ref 0–17)
NONHDLC SERPL-MCNC: 82 MG/DL
POTASSIUM BLD-SCNC: 4.4 MMOL/L (ref 3.4–5.3)
PROT SERPL-MCNC: 7.5 G/DL (ref 6.8–8.8)
PSA SERPL-MCNC: 1.01 UG/L (ref 0–4)
SODIUM SERPL-SCNC: 136 MMOL/L (ref 133–144)
T4 FREE SERPL-MCNC: 1.4 NG/DL (ref 0.76–1.46)
TRIGL SERPL-MCNC: 79 MG/DL
TSH SERPL DL<=0.005 MIU/L-ACNC: 0.14 MU/L (ref 0.4–4)

## 2021-08-13 PROCEDURE — 71271 CT THORAX LUNG CANCER SCR C-: CPT

## 2021-08-13 NOTE — LETTER
[facility]  [address] [place] [address]  [phone]                    August 23, 2021    Dev Powell  [address]   [place] [address]      Dear Dev,    Here is a summary of your recent test results:    Lungs: The right major fissure lymph node on series 6, image 89 is   stable. Additional right and left major fissure lymph nodes are   present. Right lower lobe lung nodule on series 6, image 244 measures   0.3 cm, unchanged in retrospect. Subpleural nodule left lower lobe   measures 0.3 cm which is stable on image 191. Additional subpleural   nodule in the lingula is also stable measuring 0.3 cm. No new or   enlarging lung lesions. No pleural effusions.       Additional findings: Heart is normal in size. Severe coronary artery   calcification is present. Scattered calcified plaque thoracic aorta is   also noted without aneurysm. The esophagus is unremarkable. No   enlarged lymph nodes. Upper abdomen where imaged is unremarkable. No   destructive bone lesions.                                                                        IMPRESSION:   Lungs: Benign appearance so far - no change in nodule behavior. Recommendation: Continue annual screening, if clinically   relevant (please order exam code IMG 2290).   2. Significant Incidental Finding(s):  Category S: Yes. coronary   artery calcium moderate or severe  .  This is consistent with your stress echocardiogram and nuclear stress treadmill findings from last year.       Please quit smoking!!  You are at high risk for a heart attack or stroke.       Your test results are enclosed.      Please contact me if you have any questions.             Thank you very much for trusting [facility].     Healthy regards,         Vee Beth M.D.          Results for orders placed or performed during the hospital encounter of 08/13/21   CT Chest Lung Cancer Scrn Low Dose wo     Status:  "None    Narrative    CT CHEST LUNG CANCER SCREEN LOW DOSE WITHOUT  8/13/2021 12:31 PM    HISTORY:  Lung cancer screening, >= 20-pack-year smoking history, risk  factor(s) (Age >= 50y). Cigarette smoker one-half pack a day or less.    TECHNIQUE:  Scans obtained from the apices through the diaphragm  without IV contrast. Low dose CT chest technique was utilized.  Radiation dose for this scan was reduced using automated exposure  control, adjustment of the mA and/or kV according to patient size, or  iterative reconstruction technique.    COMPARISON:  Low-dose screening chest CT 8/7/2020.    FINDINGS:  Lungs: The right major fissure lymph node on series 6, image 89 is  stable. Additional right and left major fissure lymph nodes are  present. Right lower lobe lung nodule on series 6, image 244 measures  0.3 cm, unchanged in retrospect. Subpleural nodule left lower lobe  measures 0.3 cm which is stable on image 191. Additional subpleural  nodule in the lingula is also stable measuring 0.3 cm. No new or  enlarging lung lesions. No pleural effusions.    Additional findings: Heart is normal in size. Severe coronary artery  calcification is present. Scattered calcified plaque thoracic aorta is  also noted without aneurysm. The esophagus is unremarkable. No  enlarged lymph nodes. Upper abdomen where imaged is unremarkable. No  destructive bone lesions.      Impression    IMPRESSION:   1. ACR Assessment Category:  Lung-RADS Category 2. Benign appearance  or behavior. Recommendation: Continue annual screening, if clinically  relevant (please order exam code IMG 2290).   2. Significant Incidental Finding(s):  Category S: Yes. coronary  artery calcium moderate or severe      Download the \"LungRADS Assessment Categories\" table at this site:   http://www.acr.org/Quality-Safety/Resources/LungRADS    JEANINE ALICIA MD         SYSTEM ID:  [id number]     "

## 2021-08-18 NOTE — RESULT ENCOUNTER NOTE
Please call pt with results below:     CT lung cancer screening showed stable nodules and lymph nodes from previous. No signs of lung cancer. However, Additional findings: Heart is normal in size. Severe coronary artery calcification is present. Scattered calcified plaque thoracic aorta is also noted without aneurysm. The esophagus is unremarkable. No enlarged lymph nodes. Upper abdomen where imaged is unremarkable. No destructive bone lesions.    Recommend CT of coronary arteries, if pt desires.  Also strongly recommend quitting smoking ASAP.  Pt is at high risk for a heart attack and/or stroke with the above calcifications noted.        For additional lab test information, labtestsonline.org is an excellent reference.

## 2021-09-06 RX ORDER — LEVOTHYROXINE SODIUM 137 UG/1
TABLET ORAL
Qty: 48 TABLET | Refills: 3 | Status: SHIPPED | OUTPATIENT
Start: 2021-09-06 | End: 2022-08-19

## 2021-09-06 RX ORDER — LEVOTHYROXINE SODIUM 150 UG/1
150 TABLET ORAL
Qty: 36 TABLET | Refills: 1 | Status: SHIPPED | OUTPATIENT
Start: 2021-09-06 | End: 2022-03-07

## 2021-09-06 NOTE — RESULT ENCOUNTER NOTE
Triage: please advise of results/recommendations & medication adjustments.  Also, help schedule for nonfasting lab appt in 3 months.    Team: please mail result letter:    Dev  I have reviewed your recent labs. Here are the results:    -Normal red blood cell (hgb) levels, normal white blood cell count and normal platelet levels.  -PSA (prostate specific antigen) test is normal.  This indicates a low likelihood of prostate cancer.  ADVISE: rechecking this in 1 year.  -Cholesterol levels (LDL,HDL, Triglycerides) are normal.  ADVISE: rechecking in 1 year.   -Liver and gallbladder tests are normal (ALT,AST, Alk phos, bilirubin), kidney function is mildly decreased from 1 year ago - avoid frequent use of NSAIDS (ibuprofen/motrin/aleve) and recheck in 3 months (Cr, GFR), sodium is normal, potassium is normal, calcium is normal, glucose is normal.  -TSH (thyroid stimulating hormone) is abnormal suggesting you are currently overreplaced.  ADVISE: changing your medication dose to 150 mcg on Mondays, Wednesdays and Fridays, and 137 mcg on Tuesday/Thursday/Sat/Sunday (essentially switching the doses from previous)  micrograms/day (updated prescriptions have been sent to your pharmacy) and rechecking your TSH with a lab appointment in 3 months with a lab only appointment.    For additional lab test information, [email or web address] is an excellent reference.       If you have any questions please do not hesitate to contact our office via phone ([phone]) or MyChart.    Estefania Eubanks, MS, PA-C (covering for Dr. Beth)  [facility]

## 2021-11-08 DIAGNOSIS — I25.10 CORONARY ARTERY DISEASE INVOLVING NATIVE CORONARY ARTERY OF NATIVE HEART WITHOUT ANGINA PECTORIS: ICD-10-CM

## 2021-11-08 DIAGNOSIS — E78.5 HYPERLIPIDEMIA LDL GOAL <130: ICD-10-CM

## 2021-11-08 RX ORDER — ROSUVASTATIN CALCIUM 40 MG/1
40 TABLET, COATED ORAL DAILY
Qty: 90 TABLET | Refills: 0 | Status: SHIPPED | OUTPATIENT
Start: 2021-11-08 | End: 2022-02-01

## 2021-12-07 ENCOUNTER — OFFICE VISIT (OUTPATIENT)
Dept: CARDIOLOGY | Facility: CLINIC | Age: 76
End: 2021-12-07
Payer: COMMERCIAL

## 2021-12-07 VITALS
DIASTOLIC BLOOD PRESSURE: 90 MMHG | SYSTOLIC BLOOD PRESSURE: 130 MMHG | HEIGHT: 73 IN | BODY MASS INDEX: 27.66 KG/M2 | HEART RATE: 98 BPM | WEIGHT: 208.7 LBS | OXYGEN SATURATION: 98 %

## 2021-12-07 DIAGNOSIS — I25.10 CORONARY ARTERY DISEASE INVOLVING NATIVE CORONARY ARTERY OF NATIVE HEART WITHOUT ANGINA PECTORIS: Primary | ICD-10-CM

## 2021-12-07 DIAGNOSIS — I10 ESSENTIAL HYPERTENSION WITH GOAL BLOOD PRESSURE LESS THAN 140/90: ICD-10-CM

## 2021-12-07 DIAGNOSIS — E78.5 HYPERLIPIDEMIA LDL GOAL <70: ICD-10-CM

## 2021-12-07 PROCEDURE — 99214 OFFICE O/P EST MOD 30 MIN: CPT | Performed by: NURSE PRACTITIONER

## 2021-12-07 ASSESSMENT — MIFFLIN-ST. JEOR: SCORE: 1730.54

## 2021-12-07 NOTE — LETTER
12/7/2021    Vee Beth MD  [address] [facility] [address]    RE: Dev Powell       Dear Colleague,     I had the pleasure of seeing Dev Powell in the [facility].    Cardiology Clinic Progress Note    Service Date: December 7, 2021    Primary Cardiologist: Dr. Harp       Reason for Visit: Annual follow up for CAD    HPI:   I had the pleasure of meeting Mr. Dev Powell in the clinic today. He is a very nice 76 year old gentleman who has been following with Dr. Harp. He has a past medical history notable for coronary artery disease on the basis of heavy coronary calcification noted on a chest CT. He also has a history of ongoing cigarette use, hypertension, hyperlipidemia, and a positive family history of CAD. He underwent a stress echocardiogram in 09/2020 showing some exercise-induced ventricular ectopy as well as a questionable apical defect so a nuclear stress test was also completed which showed normal ejection fraction and no inducible ischemia.    Mr. Horta has been on a regimen of aspirin 81 mg daily, rosuvastatin 40 mg daily, ezetimibe 10 mg daily, and lisinopril 20 mg daily.  His cholesterol levels are optimally controlled with a most recent lipid profile in August 2021 showing a total cholesterol of 145, HDL of 63, LDL of 66 and triglycerides at 79.    Today, Dev presents to the clinic for a routine annual follow-up visit. He tells me that he has been feeling generally well over the past year. He has not had symptoms of chest pain, shortness of breath, or dyspnea on exertion. He has not had palpitations, lightheadedness, presyncope, syncope, or lower extremity edema. He notes that he has had some issues with nasal and sinus congestion in the mornings. He sometimes feels mild dizziness associated with this. He unfortunately continues to smoke so we talked about trying to quite to help reduce risk from a heart standpoint going forward.    ASSESSMENT AND  PLAN:  1. Coronary artery disease  - On the basis of heavy calcium in the coronaries noted CT chest. He has no symptoms of angina and a negative stress test in 09/2020.   - Will continue current regimen including aspirin, ACE inhibitor, and high intensity statin.  - Counseled on lifestyle modifications including quitting smoking, trying to get regular exercise, and trying to stick to a heart healthy Mediterranean style diet.    2. Hyperlipidemia  -Treated and at target LDL of under 70. Continue with rosuvastatin 40 mg daily and ezetimibe 10 mg daily.    3. Ongoing cigarette use  - Currently smoking 1/2 pack per day. Counseled on complete cessation.    4. Hypertension  - Reasonably well-controlled but has been fairly frequently in the 130s/90s in his checks at home. He notes that he has been taking lisinopril 20 mg once daily, although it looks like he was instructed to increase this to 30 mg once daily this past summer. Will increase this to 30 mg (1.5 tablets) once daily for better BP control and repeat a BMP in 1-2 weeks.    Thank you for the opportunity to participate in this pleasant patient's care. I will plan to have him see Dr. Harp for a routine annual follow-up visit around this time next year. We would be happy to see him sooner if needed for any concerns in the meantime.     30 total minutes was spent today including chart review, precharting, history and exam, post visit documentation, and reviewing studies as outlined above.     KASSANDRA Silverio, CNP   Nurse Practitioner  [facility]  Pager: [phone]  Text Page  (8am - 5pm, M-F)    Orders this Visit:  Orders Placed This Encounter   Procedures     Follow-Up with Cardiologist     No orders of the defined types were placed in this encounter.    There are no discontinued medications.  Encounter Diagnoses   Name Primary?     Coronary artery disease involving native coronary artery of native heart without angina pectoris Yes      Essential hypertension with goal blood pressure less than 140/90      Hyperlipidemia LDL goal <70      CURRENT MEDICATIONS:  Current Outpatient Medications   Medication Sig Dispense Refill     aspirin (ASA) 81 MG EC tablet Take 1 tablet (81 mg) by mouth daily       ezetimibe (ZETIA) 10 MG tablet Take 1 tablet (10 mg) by mouth daily 90 tablet 3     Glucosamine-Chondroit-Vit C-Mn (GLUCOSAMINE CHONDROITIN 1500 COMPLEX) CAPS Take by mouth daily       ipratropium (ATROVENT) 0.03 % nasal spray Spray 2 sprays into both nostrils every 12 hours 30 mL 11     levothyroxine (SYNTHROID/LEVOTHROID) 137 MCG tablet (Tues/Thurs/Sat/Sun) ALTERNATING WITH 150MCG TABS 48 tablet 3     levothyroxine (SYNTHROID/LEVOTHROID) 150 MCG tablet Take 1 tablet (150 mcg) by mouth three times a week (Mon/Wed/Fri) ALTERNATE WITH  MCG TABLET ON THE OTHER DAYS 36 tablet 1     lisinopril (ZESTRIL) 20 MG tablet TAKE 1 AND 1/2 TABLETS (30 MG) BY MOUTH DAILY 135 tablet 3     Multiple Vitamins-Minerals (CENTRUM SILVER) per tablet Take 1 tablet by mouth daily 30 tablet      omeprazole (PRILOSEC) 40 MG DR capsule TAKE 1 CAPSULE BY MOUTH ONCE DAILY TAKE 30-60 MINUTES BEFORE A MEAL. 90 capsule 1     rosuvastatin (CRESTOR) 40 MG tablet Take 1 tablet (40 mg) by mouth daily Please call for an appointment for further refills. [phone] 90 tablet 0     sildenafil (REVATIO) 20 MG tablet Take 2-3 tablets (40-60 mg) by mouth twice a week 90 tablet 3     testosterone (ANDROGEL 1 % PUMP) 12.5 MG/ACT (1%) gel APPLY 2 PUMPS ONTO CLEAN DRY HAIRLESS SKIN OF SHOULDERS, UPPER ARMS OR ABDOMEN ONCE DAILY 150 g 2     ALLERGIES  Allergies   Allergen Reactions     Atorvastatin Other (See Comments)     Calf pain     Pravastatin Other (See Comments)     Leg cramps     PAST MEDICAL, SURGICAL, FAMILY HISTORY:  History was reviewed and updated as needed, see medical record.    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    Tobacco Use     Smoking status: Current Every Day Smoker     Packs/day: 0.50     Years: 60.00     Pack years: 30.00     Smokeless tobacco: Never Used     Tobacco comment: strongly encourage smoking cessation with every visit   Substance and Sexual Activity     Alcohol use: Yes     Alcohol/week: 0.0 standard drinks     Comment: not regular - very occasional 1-2 martinis when out to dinner 1-2x/month, if that      Drug use: No     Comment: no herbal meds      Sexual activity: Yes     Partners: Female     Comment:     Other Topics Concern     Parent/sibling w/ CABG, MI or angioplasty before 65F 55M? No      Service Yes     Comment: [employer] - May 7424-9331 - fitted glasses for other servicemen      Blood Transfusions Not Asked     Caffeine Concern Not Asked     Occupational Exposure Not Asked     Hobby Hazards Not Asked     Sleep Concern Not Asked     Stress Concern Not Asked     Weight Concern Yes     Special Diet Not Asked     Back Care Not Asked     Exercise Yes     Comment: stays very active      Bike Helmet Not Asked     Seat Belt Yes     Comment: always      Self-Exams Yes     Comment: RANJIT encouraged monthly    Social History Narrative    Has new [employer] puppy - copper and white - Jose - 11 months old - noted 2018 --Vee Beth MD         Wife , Mikayla, doing dialysis 3x/week - has 7 stents in her body. Heart, kidneys, liver.  ---Vee Beth MD         Wife, Mikayla,  2021 - from complications from her severe chronic kidney disease and cardiovascular disease with diabetes.  --Vee Beth MD           Social Determinants of Health     Financial Resource Strain: Not on file   Food Insecurity:  "Not on file   Transportation Needs: Not on file   Physical Activity: Not on file   Stress: Not on file   Social Connections: Not on file   Intimate Partner Violence: Not on file   Housing Stability: Not on file     Review of Systems:  Skin:  Negative     Eyes:  Positive for glasses  ENT:  Negative    Respiratory:  Negative    Cardiovascular:    dizziness;Positive for  Gastroenterology: Positive for reflux  Genitourinary:  Positive for nocturia  Musculoskeletal:  Positive for back pain  Neurologic:  Negative    Psychiatric:  Positive for excessive stress  Heme/Lymph/Imm:  Negative    Endocrine:  Positive for thyroid disorder     Physical Exam:  Vitals: BP (!) 130/90 (BP Location: Right arm, Patient Position: Sitting, Cuff Size: Adult Regular)   Pulse 98   Ht 1.854 m (6' 1\")   Wt 94.7 kg (208 lb 11.2 oz)   SpO2 98%   BMI 27.53 kg/m     Wt Readings from Last 4 Encounters:   12/07/21 94.7 kg (208 lb 11.2 oz)   08/12/21 93.9 kg (207 lb)   11/03/20 93.9 kg (207 lb)   08/25/20 93 kg (205 lb)     CONSTITUTIONAL: Appears his stated age, well nourished, and in no acute distress.  HEENT: Pupils equal, round. Sclerae nonicteric.    NECK: Supple, no masses or JVD appreciated.   C/V: Regular rate and rhythm, normal S1 and S2, no S3 or S4, no murmur, rub or gallop.  RESP: Respirations are unlabored. Lungs are clear to auscultation bilaterally without wheezing, rales, or rhonchi.  EXTREM: No clubbing, cyanosis, or lower extremity edema bilaterally.   NEURO: Alert and oriented, cooperative. Gait steady. No gross focal deficits.   PSYCH: Affect appropriate. Mentation normal. Responds to questions appropriately.  SKIN: Warm and dry. No apparent rashes or bruising.     Recent Lab Results:  LIPID RESULTS:  Lab Results   Component Value Date    CHOL 145 08/12/2021    CHOL 107 03/05/2021    HDL 63 08/12/2021    HDL 58 03/05/2021    LDL 66 08/12/2021    LDL 34 03/05/2021    TRIG 79 08/12/2021    TRIG 73 03/05/2021    CHOLHDLRATIO 5.2 " (H) 01/30/2015     LIVER ENZYME RESULTS:  Lab Results   Component Value Date    AST 18 08/12/2021    AST 12 05/14/2020    ALT 30 08/12/2021    ALT 18 12/14/2020     CBC RESULTS:  Lab Results   Component Value Date    WBC 6.8 08/12/2021    WBC 7.7 05/14/2020    RBC 4.61 08/12/2021    RBC 4.97 05/14/2020    HGB 14.5 08/12/2021    HGB 15.2 05/14/2020    HCT 43.1 08/12/2021    HCT 45.7 05/14/2020    MCV 94 08/12/2021    MCV 92 05/14/2020    MCH 31.5 08/12/2021    MCH 30.6 05/14/2020    MCHC 33.6 08/12/2021    MCHC 33.3 05/14/2020    RDW 12.9 08/12/2021    RDW 13.6 05/14/2020     08/12/2021     05/14/2020     BMP RESULTS:  Lab Results   Component Value Date     08/12/2021     05/14/2020    POTASSIUM 4.4 08/12/2021    POTASSIUM 4.2 05/14/2020    CHLORIDE 107 08/12/2021    CHLORIDE 107 05/14/2020    CO2 24 08/12/2021    CO2 25 05/14/2020    ANIONGAP 5 08/12/2021    ANIONGAP 6 05/14/2020    GLC 99 08/12/2021    GLC 97 05/14/2020    BUN 27 08/12/2021    BUN 29 05/14/2020    CR 1.21 08/12/2021    CR 1.07 05/14/2020    GFRESTIMATED 58 (L) 08/12/2021    GFRESTIMATED 67 05/14/2020    GFRESTBLACK 78 05/14/2020    HARI 9.5 08/12/2021    HARI 9.2 05/14/2020      A1C RESULTS:  Lab Results   Component Value Date    A1C 5.8 (H) 07/15/2020     CC  Roderick Harp MD   [id number] [id number]     This note was completed in part using Dragon voice recognition software. Although reviewed after completion, some word and grammatical errors may occur.      Julius Smith NP   [facility]

## 2021-12-07 NOTE — PROGRESS NOTES
Cardiology Clinic Progress Note    Service Date: December 7, 2021    Primary Cardiologist: Dr. Harp       Reason for Visit: Annual follow up for CAD    HPI:   I had the pleasure of meeting Mr. Dev Powell in the clinic today. He is a very nice 76 year old gentleman who has been following with Dr. Harp. He has a past medical history notable for coronary artery disease on the basis of heavy coronary calcification noted on a chest CT. He also has a history of ongoing cigarette use, hypertension, hyperlipidemia, and a positive family history of CAD. He underwent a stress echocardiogram in 09/2020 showing some exercise-induced ventricular ectopy as well as a questionable apical defect so a nuclear stress test was also completed which showed normal ejection fraction and no inducible ischemia.    Mr. Horta has been on a regimen of aspirin 81 mg daily, rosuvastatin 40 mg daily, ezetimibe 10 mg daily, and lisinopril 20 mg daily.  His cholesterol levels are optimally controlled with a most recent lipid profile in August 2021 showing a total cholesterol of 145, HDL of 63, LDL of 66 and triglycerides at 79.    Today, Dev presents to the clinic for a routine annual follow-up visit. He tells me that he has been feeling generally well over the past year. He has not had symptoms of chest pain, shortness of breath, or dyspnea on exertion. He has not had palpitations, lightheadedness, presyncope, syncope, or lower extremity edema. He notes that he has had some issues with nasal and sinus congestion in the mornings. He sometimes feels mild dizziness associated with this. He unfortunately continues to smoke so we talked about trying to quite to help reduce risk from a heart standpoint going forward.    ASSESSMENT AND PLAN:  1. Coronary artery disease  - On the basis of heavy calcium in the coronaries noted CT chest. He has no symptoms of angina and a negative stress test in 09/2020.   - Will continue current regimen  including aspirin, ACE inhibitor, and high intensity statin.  - Counseled on lifestyle modifications including quitting smoking, trying to get regular exercise, and trying to stick to a heart healthy Mediterranean style diet.    2. Hyperlipidemia  -Treated and at target LDL of under 70. Continue with rosuvastatin 40 mg daily and ezetimibe 10 mg daily.    3. Ongoing cigarette use  - Currently smoking 1/2 pack per day. Counseled on complete cessation.    4. Hypertension  - Reasonably well-controlled but has been fairly frequently in the 130s/90s in his checks at home. He notes that he has been taking lisinopril 20 mg once daily, although it looks like he was instructed to increase this to 30 mg once daily this past summer. Will increase this to 30 mg (1.5 tablets) once daily for better BP control and repeat a BMP in 1-2 weeks.    Thank you for the opportunity to participate in this pleasant patient's care. I will plan to have him see Dr. Harp for a routine annual follow-up visit around this time next year. We would be happy to see him sooner if needed for any concerns in the meantime.     30 total minutes was spent today including chart review, precharting, history and exam, post visit documentation, and reviewing studies as outlined above.     KASSANDRA Silverio, CNP   Nurse Practitioner  [facility]  Pager: [phone]  Text Page  (8am - 5pm, M-F)    Orders this Visit:  Orders Placed This Encounter   Procedures     Follow-Up with Cardiologist     No orders of the defined types were placed in this encounter.    There are no discontinued medications.  Encounter Diagnoses   Name Primary?     Coronary artery disease involving native coronary artery of native heart without angina pectoris Yes     Essential hypertension with goal blood pressure less than 140/90      Hyperlipidemia LDL goal <70      CURRENT MEDICATIONS:  Current Outpatient Medications   Medication Sig Dispense Refill     aspirin (ASA)  81 MG EC tablet Take 1 tablet (81 mg) by mouth daily       ezetimibe (ZETIA) 10 MG tablet Take 1 tablet (10 mg) by mouth daily 90 tablet 3     Glucosamine-Chondroit-Vit C-Mn (GLUCOSAMINE CHONDROITIN 1500 COMPLEX) CAPS Take by mouth daily       ipratropium (ATROVENT) 0.03 % nasal spray Spray 2 sprays into both nostrils every 12 hours 30 mL 11     levothyroxine (SYNTHROID/LEVOTHROID) 137 MCG tablet (Tues/Thurs/Sat/Sun) ALTERNATING WITH 150MCG TABS 48 tablet 3     levothyroxine (SYNTHROID/LEVOTHROID) 150 MCG tablet Take 1 tablet (150 mcg) by mouth three times a week (Mon/Wed/Fri) ALTERNATE WITH  MCG TABLET ON THE OTHER DAYS 36 tablet 1     lisinopril (ZESTRIL) 20 MG tablet TAKE 1 AND 1/2 TABLETS (30 MG) BY MOUTH DAILY 135 tablet 3     Multiple Vitamins-Minerals (CENTRUM SILVER) per tablet Take 1 tablet by mouth daily 30 tablet      omeprazole (PRILOSEC) 40 MG DR capsule TAKE 1 CAPSULE BY MOUTH ONCE DAILY TAKE 30-60 MINUTES BEFORE A MEAL. 90 capsule 1     rosuvastatin (CRESTOR) 40 MG tablet Take 1 tablet (40 mg) by mouth daily Please call for an appointment for further refills. [phone] 90 tablet 0     sildenafil (REVATIO) 20 MG tablet Take 2-3 tablets (40-60 mg) by mouth twice a week 90 tablet 3     testosterone (ANDROGEL 1 % PUMP) 12.5 MG/ACT (1%) gel APPLY 2 PUMPS ONTO CLEAN DRY HAIRLESS SKIN OF SHOULDERS, UPPER ARMS OR ABDOMEN ONCE DAILY 150 g 2     ALLERGIES  Allergies   Allergen Reactions     Atorvastatin Other (See Comments)     Calf pain     Pravastatin Other (See Comments)     Leg cramps     PAST MEDICAL, SURGICAL, FAMILY HISTORY:  History was reviewed and updated as needed, see medical record.    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    Tobacco Use     Smoking status: Current Every Day Smoker     Packs/day: 0.50     Years: 60.00     Pack years: 30.00     Smokeless tobacco: Never Used     Tobacco comment: strongly encourage smoking cessation with every visit   Substance and Sexual Activity     Alcohol use: Yes     Alcohol/week: 0.0 standard drinks     Comment: not regular - very occasional 1-2 martinis when out to dinner 1-2x/month, if that      Drug use: No     Comment: no herbal meds      Sexual activity: Yes     Partners: Female     Comment:     Other Topics Concern     Parent/sibling w/ CABG, MI or angioplasty before 65F 55M? No      Service Yes     Comment: [employer] - May 8612-3243 - fitted glasses for other servicemen      Blood Transfusions Not Asked     Caffeine Concern Not Asked     Occupational Exposure Not Asked     Hobby Hazards Not Asked     Sleep Concern Not Asked     Stress Concern Not Asked     Weight Concern Yes     Special Diet Not Asked     Back Care Not Asked     Exercise Yes     Comment: stays very active      Bike Helmet Not Asked     Seat Belt Yes     Comment: always      Self-Exams Yes     Comment: RANJIT encouraged monthly    Social History Narrative    Has new [employer] puppy - copper and white - Jose - 11 months old - noted 2018 --Vee Beth MD         Wife , Mikayla, doing dialysis 3x/week - has 7 stents in her body. Heart, kidneys, liver.  ---Vee Beth MD         Wife, Mikayla,  2021 - from complications from her severe chronic kidney disease and cardiovascular disease with diabetes.  --Vee Beth MD           Social Determinants of Health     Financial Resource Strain: Not on file   Food Insecurity: Not on file   Transportation Needs: Not on file   Physical Activity: Not on file   Stress: Not on file   Social Connections: Not on file   Intimate Partner Violence: Not on file   Housing Stability: Not on  "file     Review of Systems:  Skin:  Negative     Eyes:  Positive for glasses  ENT:  Negative    Respiratory:  Negative    Cardiovascular:    dizziness;Positive for  Gastroenterology: Positive for reflux  Genitourinary:  Positive for nocturia  Musculoskeletal:  Positive for back pain  Neurologic:  Negative    Psychiatric:  Positive for excessive stress  Heme/Lymph/Imm:  Negative    Endocrine:  Positive for thyroid disorder     Physical Exam:  Vitals: BP (!) 130/90 (BP Location: Right arm, Patient Position: Sitting, Cuff Size: Adult Regular)   Pulse 98   Ht 1.854 m (6' 1\")   Wt 94.7 kg (208 lb 11.2 oz)   SpO2 98%   BMI 27.53 kg/m     Wt Readings from Last 4 Encounters:   12/07/21 94.7 kg (208 lb 11.2 oz)   08/12/21 93.9 kg (207 lb)   11/03/20 93.9 kg (207 lb)   08/25/20 93 kg (205 lb)     CONSTITUTIONAL: Appears his stated age, well nourished, and in no acute distress.  HEENT: Pupils equal, round. Sclerae nonicteric.    NECK: Supple, no masses or JVD appreciated.   C/V: Regular rate and rhythm, normal S1 and S2, no S3 or S4, no murmur, rub or gallop.  RESP: Respirations are unlabored. Lungs are clear to auscultation bilaterally without wheezing, rales, or rhonchi.  EXTREM: No clubbing, cyanosis, or lower extremity edema bilaterally.   NEURO: Alert and oriented, cooperative. Gait steady. No gross focal deficits.   PSYCH: Affect appropriate. Mentation normal. Responds to questions appropriately.  SKIN: Warm and dry. No apparent rashes or bruising.     Recent Lab Results:  LIPID RESULTS:  Lab Results   Component Value Date    CHOL 145 08/12/2021    CHOL 107 03/05/2021    HDL 63 08/12/2021    HDL 58 03/05/2021    LDL 66 08/12/2021    LDL 34 03/05/2021    TRIG 79 08/12/2021    TRIG 73 03/05/2021    CHOLHDLRATIO 5.2 (H) 01/30/2015     LIVER ENZYME RESULTS:  Lab Results   Component Value Date    AST 18 08/12/2021    AST 12 05/14/2020    ALT 30 08/12/2021    ALT 18 12/14/2020     CBC RESULTS:  Lab Results   Component " Value Date    WBC 6.8 08/12/2021    WBC 7.7 05/14/2020    RBC 4.61 08/12/2021    RBC 4.97 05/14/2020    HGB 14.5 08/12/2021    HGB 15.2 05/14/2020    HCT 43.1 08/12/2021    HCT 45.7 05/14/2020    MCV 94 08/12/2021    MCV 92 05/14/2020    MCH 31.5 08/12/2021    MCH 30.6 05/14/2020    MCHC 33.6 08/12/2021    MCHC 33.3 05/14/2020    RDW 12.9 08/12/2021    RDW 13.6 05/14/2020     08/12/2021     05/14/2020     BMP RESULTS:  Lab Results   Component Value Date     08/12/2021     05/14/2020    POTASSIUM 4.4 08/12/2021    POTASSIUM 4.2 05/14/2020    CHLORIDE 107 08/12/2021    CHLORIDE 107 05/14/2020    CO2 24 08/12/2021    CO2 25 05/14/2020    ANIONGAP 5 08/12/2021    ANIONGAP 6 05/14/2020    GLC 99 08/12/2021    GLC 97 05/14/2020    BUN 27 08/12/2021    BUN 29 05/14/2020    CR 1.21 08/12/2021    CR 1.07 05/14/2020    GFRESTIMATED 58 (L) 08/12/2021    GFRESTIMATED 67 05/14/2020    GFRESTBLACK 78 05/14/2020    HARI 9.5 08/12/2021    HARI 9.2 05/14/2020      A1C RESULTS:  Lab Results   Component Value Date    A1C 5.8 (H) 07/15/2020     CC  Roderick Harp MD   [address] [id number]     This note was completed in part using Dragon voice recognition software. Although reviewed after completion, some word and grammatical errors may occur.

## 2021-12-07 NOTE — PATIENT INSTRUCTIONS
Thank you for your visit with the [facility] today.    Today's plan:   1. Increase the dose of lisinopril from 20 mg to 30 mg (1.5 tablets) once daily for better blood pressure control. We're shooting for mostly under 130/85 for the blood pressure.  2. Check labs in 1-2 weeks to check your kidney function.  3. Continue to try to get regular exercise and stick to a heart healthy Mediterranean style diet.  4. Continue to work on quitting smoking.  5. Follow up with Dr. Harp for a routine annual visit around this time next year.    If you have questions or concerns in the meantime, please do not hesitate to call my nurse, Darling, at [phone].     Scheduling phone number: [phone]    It was a pleasure seeing you today!     KASSANDRA Silverio, CNP  Nurse Practitioner  [facility]  December 7, 2021  ________________________________________________________

## 2021-12-12 ENCOUNTER — HOSPITAL ENCOUNTER (EMERGENCY)
Facility: CLINIC | Age: 76
Discharge: HOME | End: 2021-12-12
Attending: EMERGENCY MEDICINE | Admitting: EMERGENCY MEDICINE
Payer: COMMERCIAL

## 2021-12-12 VITALS
OXYGEN SATURATION: 98 % | HEART RATE: 85 BPM | SYSTOLIC BLOOD PRESSURE: 145 MMHG | RESPIRATION RATE: 20 BRPM | DIASTOLIC BLOOD PRESSURE: 100 MMHG | TEMPERATURE: 98.3 F

## 2021-12-12 DIAGNOSIS — S01.21XA LACERATION OF NOSE, INITIAL ENCOUNTER: ICD-10-CM

## 2021-12-12 PROCEDURE — 12013 RPR F/E/E/N/L/M 2.6-5.0 CM: CPT

## 2021-12-12 PROCEDURE — 99283 EMERGENCY DEPT VISIT LOW MDM: CPT

## 2021-12-12 RX ORDER — LIDOCAINE HYDROCHLORIDE 10 MG/ML
INJECTION, SOLUTION EPIDURAL; INFILTRATION; INTRACAUDAL; PERINEURAL
Status: DISCONTINUED
Start: 2021-12-12 | End: 2021-12-12 | Stop reason: HOSPADM

## 2021-12-12 ASSESSMENT — ENCOUNTER SYMPTOMS
NECK PAIN: 0
HEADACHES: 0
WOUND: 1

## 2021-12-12 NOTE — ED TRIAGE NOTES
Pt had an icicle fall from his roof and hit his head. Denies LOC, no other injuries then the one below.  It hit on his forehead and then hit his glasses, causing them to slid down his nose causing a  Gouge on the bridge of his nose and a V shaped laceration down both sides of his nose. This occurred last night.

## 2021-12-14 ASSESSMENT — ENCOUNTER SYMPTOMS
SHORTNESS OF BREATH: 0
CONFUSION: 0

## 2021-12-16 DIAGNOSIS — E78.5 HYPERLIPIDEMIA LDL GOAL <70: ICD-10-CM

## 2021-12-16 RX ORDER — EZETIMIBE 10 MG/1
10 TABLET ORAL DAILY
Qty: 90 TABLET | Refills: 3 | Status: SHIPPED | OUTPATIENT
Start: 2021-12-16 | End: 2023-01-02

## 2021-12-17 ENCOUNTER — ALLIED HEALTH/NURSE VISIT (OUTPATIENT)
Dept: NURSING | Facility: CLINIC | Age: 76
End: 2021-12-17
Payer: COMMERCIAL

## 2021-12-17 DIAGNOSIS — Z48.02 ENCOUNTER FOR REMOVAL OF SUTURES: Primary | ICD-10-CM

## 2021-12-17 PROCEDURE — 99207 PR NO CHARGE NURSE ONLY: CPT

## 2021-12-17 NOTE — PROGRESS NOTES
Suture removal:     Date sutures applied: 12/12/21         Where (setting) in which they applied:ER visit    Description:  Type: sutures  Location: bridge of nose    History:    Cause of laceration: icicle fell onto face    Accompanying Signs & Symptoms: (staff: if yes-describe)  Redness: no  Warmth: no  Drainage: YES- slight serous exudate  Still bleeding: no  Fevers: no    Last tetanus shot: last tetanus booster within 10 years    All 7 sutures located and removed, Area cleansed with chlorhexidine wound was, area also dabbed with peroxide 3% to help break down some scabbed areas to remove sutures. Pt advised to cleanse are once or more daily using mild soap, dabbing not scrubbing or rubbing. Advised to monitor for signs of infection. Patient stated an understanding and agreed with plan.     Fred NOLEN RN   [facility]

## 2022-01-13 ENCOUNTER — LAB (OUTPATIENT)
Dept: LAB | Facility: CLINIC | Age: 77
End: 2022-01-13
Payer: COMMERCIAL

## 2022-01-13 DIAGNOSIS — E78.5 HYPERLIPIDEMIA LDL GOAL <70: ICD-10-CM

## 2022-01-13 DIAGNOSIS — I10 ESSENTIAL HYPERTENSION WITH GOAL BLOOD PRESSURE LESS THAN 140/90: ICD-10-CM

## 2022-01-13 DIAGNOSIS — I25.10 CORONARY ARTERY DISEASE INVOLVING NATIVE CORONARY ARTERY OF NATIVE HEART WITHOUT ANGINA PECTORIS: ICD-10-CM

## 2022-01-13 LAB
ANION GAP SERPL CALCULATED.3IONS-SCNC: 3 MMOL/L (ref 3–14)
BUN SERPL-MCNC: 18 MG/DL (ref 7–30)
CALCIUM SERPL-MCNC: 9.1 MG/DL (ref 8.5–10.1)
CHLORIDE BLD-SCNC: 109 MMOL/L (ref 94–109)
CO2 SERPL-SCNC: 26 MMOL/L (ref 20–32)
CREAT SERPL-MCNC: 0.88 MG/DL (ref 0.66–1.25)
GFR SERPL CREATININE-BSD FRML MDRD: 89 ML/MIN/1.73M2
GLUCOSE BLD-MCNC: 111 MG/DL (ref 70–99)
POTASSIUM BLD-SCNC: 3.8 MMOL/L (ref 3.4–5.3)
SODIUM SERPL-SCNC: 138 MMOL/L (ref 133–144)

## 2022-01-13 PROCEDURE — 80048 BASIC METABOLIC PNL TOTAL CA: CPT | Performed by: NURSE PRACTITIONER

## 2022-01-13 PROCEDURE — 36415 COLL VENOUS BLD VENIPUNCTURE: CPT | Performed by: NURSE PRACTITIONER

## 2022-01-14 ENCOUNTER — TELEPHONE (OUTPATIENT)
Dept: CARDIOLOGY | Facility: CLINIC | Age: 77
End: 2022-01-14
Payer: COMMERCIAL

## 2022-01-14 DIAGNOSIS — I10 ESSENTIAL HYPERTENSION WITH GOAL BLOOD PRESSURE LESS THAN 140/90: Primary | ICD-10-CM

## 2022-01-14 RX ORDER — LISINOPRIL 40 MG/1
40 TABLET ORAL DAILY
Qty: 90 TABLET | Refills: 0 | Status: SHIPPED | OUTPATIENT
Start: 2022-01-14 | End: 2022-01-28 | Stop reason: DRUGHIGH

## 2022-01-14 NOTE — TELEPHONE ENCOUNTER
Call placed to pt to review results / recommendations.     Component      Latest Ref Rng & Units 1/13/2022   Sodium      133 - 144 mmol/L 138   Potassium      3.4 - 5.3 mmol/L 3.8   Chloride      94 - 109 mmol/L 109   Carbon Dioxide      20 - 32 mmol/L 26   Anion Gap      3 - 14 mmol/L 3   Urea Nitrogen      7 - 30 mg/dL 18   Creatinine      0.66 - 1.25 mg/dL 0.88   Calcium      8.5 - 10.1 mg/dL 9.1   Glucose      70 - 99 mg/dL 111 (H)   GFR Estimate      >60 mL/min/1.73m2 89       ----- Message from Julius Smith NP sent at 1/13/2022 12:26 PM CST -----  Please update the patient that his lab results look stable after increasing the dose of lisinopril. He can continue on this without changes and follow-up for an annual visit next winter as previously planned. He should continue to keep an eye on his blood pressure and call if it begins to run consistently over 130/85. Thank you!    Pt reports he had his BP checked yesterday. Pt reports that   His BP has continued to be 140's - 150's / 80's - 90's. Today 140/95, HR 76.     Pt declines any sxs. Is concerned about continued elevated readings. Pt did have his BP checked by the nurse today as his home BP cuff has not been working well as it is too difficult to tighten the cuff on his own. Pt did purchase a wrist cuff and is waiting for family to assist with set up.     Will route to Terry Smith CNP to review if increased lisinopril dose recommended.   EVELIN Perez RN, BSN.

## 2022-01-14 NOTE — TELEPHONE ENCOUNTER
Thank you for the update. He can try increasing lisinopril further to 40 mg once daily for better blood pressure control. We should repeat a BMP in 1 to 2 weeks after increasing dose. KASSANDRA Silvreio, CNP

## 2022-01-14 NOTE — TELEPHONE ENCOUNTER
Call placed to pt to review recommendations. Pt agreeable to increased dose. Pt scheduled for lab recheck pt will continue to monitor BP and call if SBP >140 or <90.   Orders updated per provider.   EVELIN Perez RN, BSN.

## 2022-01-26 ENCOUNTER — LAB (OUTPATIENT)
Dept: LAB | Facility: CLINIC | Age: 77
End: 2022-01-26
Payer: COMMERCIAL

## 2022-01-26 DIAGNOSIS — I10 ESSENTIAL HYPERTENSION WITH GOAL BLOOD PRESSURE LESS THAN 140/90: ICD-10-CM

## 2022-01-26 LAB
ANION GAP SERPL CALCULATED.3IONS-SCNC: 4 MMOL/L (ref 3–14)
BUN SERPL-MCNC: 24 MG/DL (ref 7–30)
CALCIUM SERPL-MCNC: 9.2 MG/DL (ref 8.5–10.1)
CHLORIDE BLD-SCNC: 105 MMOL/L (ref 94–109)
CO2 SERPL-SCNC: 28 MMOL/L (ref 20–32)
CREAT SERPL-MCNC: 1.36 MG/DL (ref 0.66–1.25)
GFR SERPL CREATININE-BSD FRML MDRD: 54 ML/MIN/1.73M2
GLUCOSE BLD-MCNC: 101 MG/DL (ref 70–99)
POTASSIUM BLD-SCNC: 4.7 MMOL/L (ref 3.4–5.3)
SODIUM SERPL-SCNC: 137 MMOL/L (ref 133–144)

## 2022-01-26 PROCEDURE — 80048 BASIC METABOLIC PNL TOTAL CA: CPT | Performed by: NURSE PRACTITIONER

## 2022-01-26 PROCEDURE — 36415 COLL VENOUS BLD VENIPUNCTURE: CPT | Performed by: NURSE PRACTITIONER

## 2022-01-27 ENCOUNTER — TELEPHONE (OUTPATIENT)
Dept: CARDIOLOGY | Facility: CLINIC | Age: 77
End: 2022-01-27
Payer: COMMERCIAL

## 2022-01-27 DIAGNOSIS — I10 ESSENTIAL HYPERTENSION WITH GOAL BLOOD PRESSURE LESS THAN 140/90: Primary | ICD-10-CM

## 2022-01-28 RX ORDER — LISINOPRIL 30 MG/1
30 TABLET ORAL DAILY
Qty: 90 TABLET | Refills: 0
Start: 2022-01-28 | End: 2022-02-18 | Stop reason: DRUGHIGH

## 2022-02-01 DIAGNOSIS — I25.10 CORONARY ARTERY DISEASE INVOLVING NATIVE CORONARY ARTERY OF NATIVE HEART WITHOUT ANGINA PECTORIS: ICD-10-CM

## 2022-02-01 DIAGNOSIS — E78.5 HYPERLIPIDEMIA LDL GOAL <130: ICD-10-CM

## 2022-02-01 RX ORDER — ROSUVASTATIN CALCIUM 40 MG/1
40 TABLET, COATED ORAL DAILY
Qty: 90 TABLET | Refills: 3 | Status: SHIPPED | OUTPATIENT
Start: 2022-02-01 | End: 2023-05-17

## 2022-02-04 DIAGNOSIS — J30.0 VASOMOTOR RHINITIS: ICD-10-CM

## 2022-02-06 RX ORDER — IPRATROPIUM BROMIDE 21 UG/1
SPRAY, METERED NASAL
Qty: 90 ML | Refills: 3 | Status: SHIPPED | OUTPATIENT
Start: 2022-02-06 | End: 2022-11-16

## 2022-02-18 RX ORDER — LISINOPRIL 20 MG/1
20 TABLET ORAL DAILY
Qty: 90 TABLET | Refills: 1 | Status: SHIPPED | OUTPATIENT
Start: 2022-02-18 | End: 2022-07-27

## 2022-07-01 ENCOUNTER — NURSE TRIAGE (OUTPATIENT)
Dept: NURSING | Facility: CLINIC | Age: 77
End: 2022-07-01

## 2022-07-01 DIAGNOSIS — R53.83 OTHER FATIGUE: ICD-10-CM

## 2022-07-01 DIAGNOSIS — J34.89 FRONTAL SINUS PAIN: Primary | ICD-10-CM

## 2022-07-01 DIAGNOSIS — R41.89 BRAIN FOG: ICD-10-CM

## 2022-07-01 NOTE — TELEPHONE ENCOUNTER
Unfortunately, I am the only provider here in clinic after 3pm today and it is now 2:21pm. We don't have any availability for rapid labs or imaging either after 3:45pm and I am fully booked.  I just called the [facility] and they are unable to accept pt in care as they are full this afternoon as well.     Called pt at 2:25pm -his symptoms = frontal sinus headache,  brain fog and dizziness and fatigue were the last 2 days - started  6/29/2022 am.  Last night 6/30/2022 had abdominal pain and gassiness feeling that passed during the night- was quite bad and was in bed for 15 hours the pain and bloated feeling was so bad.   Today the abdominal pain is nearly fully gone. Has been burping a little bit today. No nausea/vomiting or diarrhea today. Had A little nausea last night.  No melena or hematochezia. Is a bit constipated today, but is passing gas.   Today overall feels much better today - not as dizzy, not as brain fogged, can focus much better. Did take home covid-19 test just now and that is negative.  He'll take another one tomorrow and the next day as well.      We discussed in detail that this could still be COVID-19.  I highly recommended that he go for a formal COVID-19 PCR test.  He does not have MyChart.  Nor does he want to get signed up for it.  I said we could have one of our schedulers call him to schedule a test which he does want to do.  Orders entered for this.  Patient is fully vaccinated and has had 1 booster shot, see immunization history for dates.  Is due for fourth booster.  If he test negative I did recommend that when he is feeling better in the next several days to call and make an appointment for his fourth shot to do as a nurse only visit for a pharmacy only appointment.  He is very open to doing so. Please, call our clinic or go to the ER immediately if signs or symptoms significantly worsen or fail to improve as anticipated.     If he is only mildly worse again.  He will go to urgent care [address]  in [place] in [place].  He is aware that the [facility] urgent care in [place] is closed until after 4 July and until further notice.      Patient voiced understanding of the above.  Also he voiced appreciation for the call.

## 2022-07-01 NOTE — TELEPHONE ENCOUNTER
A couple of days ago patient started having brain fog and dizziness and fatigue.  Patient has pain above pain and naval and pain wrapped around to back.  Pain comes and goes.  Today brain fog is less.  Today is having dizziness and then he lays down and elevates his feet.  Patient states that laying down helps but dizziness is not gone.  Patient denies fever cough and shortness of breath.  Patient denies weakness of face arm or leg on same side of body.  Denies rapid heart rate.  Denies severe headache.  Denies loss of vision or double vision.  Patient is hydrated.  Patient is requesting a message be sent to Vee Beth MD to see if he can be seen in clinic today.  Clinic please phone patient.     Nurse Triage SBAR    Is this a 2nd Level Triage? YES, LICENSED PRACTITIONER REVIEW IS REQUIRED    Situation:   Dizziness/abdominal pain    Background:   Patient states that a couple of days ago fog and dizziness is present.  Abdominal pain that wrapped around to back.    Assessment:   Currently denies abdominal pain.  Dizziness is still present.  Patient is hydrated.  Patient is able to walk alright.  Patient has tried lying down and it seems to help.    Protocol Recommended Disposition:   Go To Office Now    Recommendation:   Clinic please phone patient.     Routed to provider    Does the patient meet one of the following criteria for [facility] visit consideration? 16+ years old, with an [facility] PCP     TIP  Providers, please consider if this condition is appropriate for management at one of our Acute and Diagnostic Services sites.     If patient is a good candidate, please use dotphrase <dot>triageresponse and select Refer to [facility] to document.    COVID 19 Nurse Triage Plan/Patient Instructions    Please be aware that novel coronavirus (COVID-19) may be circulating in the community. If you develop symptoms such as fever, cough, or SOB or if you have concerns about the presence of another infection including coronavirus  (COVID-19), please contact your health care provider or visit [email or web address].     Disposition/Instructions    In-Person Visit with provider recommended. Reference Visit Selection Guide.    Thank you for taking steps to prevent the spread of this virus.  o Limit your contact with others.  o Wear a simple mask to cover your cough.  o Wash your hands well and often.    Resources    [facility]: About COVID-19: [email or web address]    CDC: What to Do If You're Sick: www.cdc.gov/coronavirus/2019-ncov/about/steps-when-sick.html    CDC: Ending Home Isolation: www.cdc.gov/coronavirus/2019-ncov/hcp/disposition-in-home-patients.html     CDC: Caring for Someone: www.cdc.gov/coronavirus/2019-ncov/if-you-are-sick/care-for-someone.html     [facility]: Interim Guidance for Hospital Discharge to Home: www.[email or web address]./[email or web address]/[email or web address]/hcp/hospdischarge.pdf    [facility] clinical trials (COVID-19 research studies): [email or web address]-trials     Below are the COVID-19 hotlines at the [place] Department of [employer] ([facility]). Interpreters are available.   o For health questions: Call 424-063-2811 or 1-150.434.1975 (7 a.m. to 7 p.m.)  o For questions about schools and childcare: Call 040-967-6188 or 1-309.496.7776 (7 a.m. to 7 p.m.)                       Reason for Disposition    Lightheadedness (dizziness) present now, after 2 hours of rest and fluids    Additional Information    Negative: Shock suspected (e.g., cold/pale/clammy skin, too weak to stand, low BP, rapid pulse)    Negative: Difficult to awaken or acting confused (e.g., disoriented, slurred speech)    Negative: Fainted, and still feels dizzy afterwards    Negative: Severe difficulty breathing (e.g., struggling for each breath, speaks in single words)    Negative: Overdose (accidental or intentional) of medications    Negative: New neurologic deficit that is present now: * Weakness of the face, arm, or leg on one  side of the body * Numbness of the face, arm, or leg on one side of the body * Loss of speech or garbled speech    Negative: Heart beating < 50 beats per minute OR > 140 beats per minute    Negative: Sounds like a life-threatening emergency to the triager    Negative: SEVERE dizziness (e.g., unable to stand, requires support to walk, feels like passing out now)    Negative: SEVERE headache or neck pain    Negative: Spinning or tilting sensation (vertigo) present now and one or more stroke risk factors (i.e., hypertension, diabetes, prior stroke/TIA, heart attack, age over 60) (Exception: prior physician evaluation for this AND no different/worse than usual)    Negative: Loss of vision or double vision    Negative: Extra heart beats OR irregular heart beating (i.e., 'palpitations')    Negative: Difficulty breathing    Negative: Drinking very little and has signs of dehydration (e.g., no urine > 12 hours, very dry mouth, very lightheaded)    Negative: Follows bleeding (e.g., stomach, rectum, vagina) (Exception: became dizzy from sight of small amount blood)    Negative: Patient sounds very sick or weak to the triager    Protocols used: DIZZINESS-A-OH

## 2022-07-02 ENCOUNTER — LAB (OUTPATIENT)
Dept: URGENT CARE | Facility: URGENT CARE | Age: 77
End: 2022-07-02
Attending: FAMILY MEDICINE
Payer: COMMERCIAL

## 2022-07-02 DIAGNOSIS — J34.89 FRONTAL SINUS PAIN: ICD-10-CM

## 2022-07-02 DIAGNOSIS — R53.83 OTHER FATIGUE: ICD-10-CM

## 2022-07-02 DIAGNOSIS — R41.89 BRAIN FOG: ICD-10-CM

## 2022-07-02 LAB — SARS-COV-2 RNA RESP QL NAA+PROBE: NEGATIVE

## 2022-07-02 PROCEDURE — U0005 INFEC AGEN DETEC AMPLI PROBE: HCPCS

## 2022-07-02 PROCEDURE — U0003 INFECTIOUS AGENT DETECTION BY NUCLEIC ACID (DNA OR RNA); SEVERE ACUTE RESPIRATORY SYNDROME CORONAVIRUS 2 (SARS-COV-2) (CORONAVIRUS DISEASE [COVID-19]), AMPLIFIED PROBE TECHNIQUE, MAKING USE OF HIGH THROUGHPUT TECHNOLOGIES AS DESCRIBED BY CMS-2020-01-R: HCPCS

## 2022-07-12 ENCOUNTER — TELEPHONE (OUTPATIENT)
Dept: FAMILY MEDICINE | Facility: CLINIC | Age: 77
End: 2022-07-12

## 2022-07-12 NOTE — TELEPHONE ENCOUNTER
Patient calls, he has been trying to get appointment with Dr. Beth, but he was not able to get an appointment for is Wellness exam with her until November     Patient states he spoke to Dr. Beth about a week ago and had mentioned concerns about continued dizziness, fatigue and sinus concerns. He did have dried drainage over his eyes this morning, he was able to wipe the discharge away and it has not come back. He is requesting to be worked in for appointment with Dr. Beth to address his symptoms.    Patient does add that he stopped smoking as of 2 weeks ago.     Nichole Cook RN  [facility]

## 2022-07-17 NOTE — TELEPHONE ENCOUNTER
Unfortunately I'm out of office.  This coming week on vacation.   The Next week after  is packed.   The following week is on hold secondary to [employer] Jury Duty.    Please make appt with another provider to be seen next week - ? Sinus infection vs. Other?   Ok for 40 minutes same day overlap or virtual overlap spots with me end 8/2022 or 9/2022.    Congratulations on quitting smoking!!!

## 2022-07-18 ENCOUNTER — NURSE TRIAGE (OUTPATIENT)
Dept: NURSING | Facility: CLINIC | Age: 77
End: 2022-07-18

## 2022-07-18 ENCOUNTER — OFFICE VISIT (OUTPATIENT)
Dept: URGENT CARE | Facility: URGENT CARE | Age: 77
End: 2022-07-18
Payer: COMMERCIAL

## 2022-07-18 ENCOUNTER — HOSPITAL ENCOUNTER (EMERGENCY)
Facility: CLINIC | Age: 77
Discharge: HOME | End: 2022-07-18
Attending: EMERGENCY MEDICINE | Admitting: EMERGENCY MEDICINE
Payer: COMMERCIAL

## 2022-07-18 ENCOUNTER — APPOINTMENT (OUTPATIENT)
Dept: CT IMAGING | Facility: CLINIC | Age: 77
End: 2022-07-18
Attending: EMERGENCY MEDICINE
Payer: COMMERCIAL

## 2022-07-18 VITALS
TEMPERATURE: 98.3 F | OXYGEN SATURATION: 97 % | RESPIRATION RATE: 16 BRPM | WEIGHT: 199.74 LBS | HEART RATE: 98 BPM | BODY MASS INDEX: 26.35 KG/M2 | SYSTOLIC BLOOD PRESSURE: 143 MMHG | DIASTOLIC BLOOD PRESSURE: 103 MMHG

## 2022-07-18 VITALS
RESPIRATION RATE: 20 BRPM | DIASTOLIC BLOOD PRESSURE: 73 MMHG | OXYGEN SATURATION: 98 % | TEMPERATURE: 98.7 F | HEART RATE: 86 BPM | SYSTOLIC BLOOD PRESSURE: 106 MMHG | BODY MASS INDEX: 27.44 KG/M2 | WEIGHT: 208 LBS

## 2022-07-18 DIAGNOSIS — R11.0 NAUSEA: ICD-10-CM

## 2022-07-18 DIAGNOSIS — R42 DIZZINESS: Primary | ICD-10-CM

## 2022-07-18 DIAGNOSIS — R55 NEAR SYNCOPE: ICD-10-CM

## 2022-07-18 DIAGNOSIS — R51.9 NONINTRACTABLE HEADACHE, UNSPECIFIED CHRONICITY PATTERN, UNSPECIFIED HEADACHE TYPE: ICD-10-CM

## 2022-07-18 DIAGNOSIS — Z20.822 SUSPECTED 2019 NOVEL CORONAVIRUS INFECTION: ICD-10-CM

## 2022-07-18 DIAGNOSIS — H81.399 PERIPHERAL VERTIGO, UNSPECIFIED LATERALITY: ICD-10-CM

## 2022-07-18 LAB
ALBUMIN UR-MCNC: NEGATIVE MG/DL
ANION GAP SERPL CALCULATED.3IONS-SCNC: 6 MMOL/L (ref 3–14)
APPEARANCE UR: CLEAR
BASOPHILS # BLD AUTO: 0 10E3/UL (ref 0–0.2)
BASOPHILS NFR BLD AUTO: 1 %
BILIRUB UR QL STRIP: NEGATIVE
BUN SERPL-MCNC: 35 MG/DL (ref 7–30)
CALCIUM SERPL-MCNC: 8.8 MG/DL (ref 8.5–10.1)
CHLORIDE BLD-SCNC: 105 MMOL/L (ref 94–109)
CO2 SERPL-SCNC: 25 MMOL/L (ref 20–32)
COLOR UR AUTO: ABNORMAL
CREAT SERPL-MCNC: 1.18 MG/DL (ref 0.66–1.25)
EOSINOPHIL # BLD AUTO: 0.2 10E3/UL (ref 0–0.7)
EOSINOPHIL NFR BLD AUTO: 2 %
ERYTHROCYTE [DISTWIDTH] IN BLOOD BY AUTOMATED COUNT: 13.1 % (ref 10–15)
FLUAV RNA SPEC QL NAA+PROBE: NEGATIVE
FLUBV RNA RESP QL NAA+PROBE: NEGATIVE
GFR SERPL CREATININE-BSD FRML MDRD: 64 ML/MIN/1.73M2
GLUCOSE BLD-MCNC: 91 MG/DL (ref 70–99)
GLUCOSE UR STRIP-MCNC: NEGATIVE MG/DL
HCT VFR BLD AUTO: 43.1 % (ref 40–53)
HGB BLD-MCNC: 13.9 G/DL (ref 13.3–17.7)
HGB UR QL STRIP: ABNORMAL
HOLD SPECIMEN: NORMAL
HOLD SPECIMEN: NORMAL
IMM GRANULOCYTES # BLD: 0 10E3/UL
IMM GRANULOCYTES NFR BLD: 0 %
KETONES UR STRIP-MCNC: NEGATIVE MG/DL
LEUKOCYTE ESTERASE UR QL STRIP: NEGATIVE
LYMPHOCYTES # BLD AUTO: 1.7 10E3/UL (ref 0.8–5.3)
LYMPHOCYTES NFR BLD AUTO: 26 %
MCH RBC QN AUTO: 31.2 PG (ref 26.5–33)
MCHC RBC AUTO-ENTMCNC: 32.3 G/DL (ref 31.5–36.5)
MCV RBC AUTO: 97 FL (ref 78–100)
MONOCYTES # BLD AUTO: 0.6 10E3/UL (ref 0–1.3)
MONOCYTES NFR BLD AUTO: 9 %
NEUTROPHILS # BLD AUTO: 4 10E3/UL (ref 1.6–8.3)
NEUTROPHILS NFR BLD AUTO: 62 %
NITRATE UR QL: NEGATIVE
NRBC # BLD AUTO: 0 10E3/UL
NRBC BLD AUTO-RTO: 0 /100
PH UR STRIP: 5 [PH] (ref 5–7)
PLATELET # BLD AUTO: 207 10E3/UL (ref 150–450)
POTASSIUM BLD-SCNC: 4.4 MMOL/L (ref 3.4–5.3)
RBC # BLD AUTO: 4.45 10E6/UL (ref 4.4–5.9)
RBC URINE: <1 /HPF
RSV RNA SPEC NAA+PROBE: NEGATIVE
SARS-COV-2 RNA RESP QL NAA+PROBE: NEGATIVE
SARS-COV-2 RNA RESP QL NAA+PROBE: NEGATIVE
SODIUM SERPL-SCNC: 136 MMOL/L (ref 133–144)
SP GR UR STRIP: 1.01 (ref 1–1.03)
T4 FREE SERPL-MCNC: 0.82 NG/DL (ref 0.76–1.46)
TROPONIN I SERPL HS-MCNC: 5 NG/L
TSH SERPL DL<=0.005 MIU/L-ACNC: 4.43 MU/L (ref 0.4–4)
UROBILINOGEN UR STRIP-MCNC: NORMAL MG/DL
WBC # BLD AUTO: 6.5 10E3/UL (ref 4–11)
WBC URINE: <1 /HPF

## 2022-07-18 PROCEDURE — 81001 URINALYSIS AUTO W/SCOPE: CPT | Performed by: EMERGENCY MEDICINE

## 2022-07-18 PROCEDURE — 99214 OFFICE O/P EST MOD 30 MIN: CPT | Mod: CS | Performed by: FAMILY MEDICINE

## 2022-07-18 PROCEDURE — 93005 ELECTROCARDIOGRAM TRACING: CPT | Mod: 76

## 2022-07-18 PROCEDURE — 84439 ASSAY OF FREE THYROXINE: CPT | Performed by: EMERGENCY MEDICINE

## 2022-07-18 PROCEDURE — U0003 INFECTIOUS AGENT DETECTION BY NUCLEIC ACID (DNA OR RNA); SEVERE ACUTE RESPIRATORY SYNDROME CORONAVIRUS 2 (SARS-COV-2) (CORONAVIRUS DISEASE [COVID-19]), AMPLIFIED PROBE TECHNIQUE, MAKING USE OF HIGH THROUGHPUT TECHNOLOGIES AS DESCRIBED BY CMS-2020-01-R: HCPCS | Performed by: FAMILY MEDICINE

## 2022-07-18 PROCEDURE — 70450 CT HEAD/BRAIN W/O DYE: CPT

## 2022-07-18 PROCEDURE — 93000 ELECTROCARDIOGRAM COMPLETE: CPT | Performed by: FAMILY MEDICINE

## 2022-07-18 PROCEDURE — 87637 SARSCOV2&INF A&B&RSV AMP PRB: CPT | Performed by: EMERGENCY MEDICINE

## 2022-07-18 PROCEDURE — 36415 COLL VENOUS BLD VENIPUNCTURE: CPT | Performed by: EMERGENCY MEDICINE

## 2022-07-18 PROCEDURE — U0005 INFEC AGEN DETEC AMPLI PROBE: HCPCS | Performed by: FAMILY MEDICINE

## 2022-07-18 PROCEDURE — C9803 HOPD COVID-19 SPEC COLLECT: HCPCS

## 2022-07-18 PROCEDURE — 250N000013 HC RX MED GY IP 250 OP 250 PS 637: Performed by: EMERGENCY MEDICINE

## 2022-07-18 PROCEDURE — 258N000003 HC RX IP 258 OP 636: Performed by: EMERGENCY MEDICINE

## 2022-07-18 PROCEDURE — 85025 COMPLETE CBC W/AUTO DIFF WBC: CPT | Performed by: EMERGENCY MEDICINE

## 2022-07-18 PROCEDURE — 80048 BASIC METABOLIC PNL TOTAL CA: CPT | Performed by: EMERGENCY MEDICINE

## 2022-07-18 PROCEDURE — 93005 ELECTROCARDIOGRAM TRACING: CPT

## 2022-07-18 PROCEDURE — 99285 EMERGENCY DEPT VISIT HI MDM: CPT | Mod: CS,25

## 2022-07-18 PROCEDURE — 84484 ASSAY OF TROPONIN QUANT: CPT | Performed by: EMERGENCY MEDICINE

## 2022-07-18 PROCEDURE — 84443 ASSAY THYROID STIM HORMONE: CPT | Performed by: EMERGENCY MEDICINE

## 2022-07-18 RX ORDER — MECLIZINE HYDROCHLORIDE 25 MG/1
25 TABLET ORAL ONCE
Status: COMPLETED | OUTPATIENT
Start: 2022-07-18 | End: 2022-07-18

## 2022-07-18 RX ORDER — MECLIZINE HYDROCHLORIDE 25 MG/1
25 TABLET ORAL EVERY 6 HOURS PRN
Qty: 30 TABLET | Refills: 1 | Status: SHIPPED | OUTPATIENT
Start: 2022-07-18

## 2022-07-18 RX ADMIN — SODIUM CHLORIDE 1000 ML: 9 INJECTION, SOLUTION INTRAVENOUS at 14:33

## 2022-07-18 RX ADMIN — MECLIZINE HYDROCHLORIDE 25 MG: 25 TABLET ORAL at 17:24

## 2022-07-18 ASSESSMENT — ENCOUNTER SYMPTOMS
VOMITING: 0
SHORTNESS OF BREATH: 0
DIARRHEA: 0
SORE THROAT: 0
RHINORRHEA: 1
LIGHT-HEADEDNESS: 1
NAUSEA: 1
APPETITE CHANGE: 0
HEADACHES: 1
COUGH: 1

## 2022-07-18 NOTE — TELEPHONE ENCOUNTER
Patient went to [facility] urgent care and they advised him to go to the ED.  Patient currently at [facility]       Larisa Mei

## 2022-07-18 NOTE — DISCHARGE INSTRUCTIONS
Discharge Instructions  Vertigo  You have been diagnosed with vertigo.  This is a dizzy feeling often described as spinning or that the room is moving around you. You will often have nausea (sick to your stomach), vomiting (throwing up), and balance problems with it.  Vertigo is usually caused by a problem in the inner ear which helps control your balance.  Many things can cause vertigo, including calcium collections in the inner ear, a virus infection of the inner ear, concussion, migraine, and some medicines.  Luckily, these causes are not life threatening and will eventually go away.  However, sometimes there is a serious problem that does not show up right away.  Generally, every Emergency Department visit should have a follow-up clinic visit with either a primary or a specialty clinic/provider. Please follow-up as instructed by your emergency provider today.  Return to the Emergency Department if you have:  New or severe headache.  Double vision (seeing two of things).  Trouble speaking or hearing.  Weakness or trouble moving/using one side of your body.  Passing out.  Numbness or tingling.  Chest pain.  Vomiting that will not stop.    Treatment:  There are several commonly prescribed medications:  Antihistamines such as meclizine (Antivert ), dimenhydrinate (Dramamine ), or diphenhydramine (Benadryl ).  Prescription anti-nausea medicines, such as promethazine (Phenergan ), metoclopramide (Reglan ), or ondansetron (Zofran ).  Prescription sedative medicines, such as diazepam (Valium ), lorazepam (Ativan ), or clonazepam (Klonopin ).  Most of these medicines make you sleepy, and you should not take them before you work or drive. You should only take prescription medicines to treat severe vertigo symptoms, and you should stop the medicine when your symptoms improve.    Follow Up:  If you have vertigo longer than three days, it is important that you follow up either with your primary provider or an Ear, Nose,  and Throat (ENT) specialist.  You may need further testing to evaluate your vertigo and you may also need  vestibular  therapy which is a special form of physical therapy to make the vertigo go away.    If you were given a prescription for medicine here today, be sure to read all of the information (including the package insert) that comes with your prescription.  This will include important information about the medicine, its side effects, and any warnings that you need to know about.  The pharmacist who fills the prescription can provide more information and answer questions you may have about the medicine.  If you have questions or concerns that the pharmacist cannot address, please call or return to the Emergency Department.     Remember that you can always come back to the Emergency Department if you are not able to see your regular provider in the amount of time listed above, if you get any new symptoms, or if there is anything that worries you.

## 2022-07-18 NOTE — ED PROVIDER NOTES
History   Chief Complaint:  Light Headedness      HPI   Dev Powell is a 77 year old male who presents with light headedness. Per the patient, since 7/1/22 he has had short episodes of light-headedness. These episodes are brought on by movement, especially bending and standing. He has not fallen yet but feels like he could lose his balance. He also reports nausea, cough, rhinorrhea, congestion and a headache which he describes as a sinus headache. He has been taking Tylenol at home for his headache. He had black stool a couple of days ago but had been taking Pepto Bismol, he is unsure about his more recent bowel movements. He denies chest pain, shortness of breath, vomiting, diarrhea, sore throat, leg edema or appetite change. He denies any recent medication changes, falls or head trauma. He denies history of heart problems or failure. He does report dehydration issues in the past. He has had multiple negative Covid tests in the last couple of weeks. He lives alone and reports alcohol but denies recent tobacco use since 7/1/22.      Review of Systems   Constitutional: Negative for appetite change.   HENT: Positive for congestion and rhinorrhea. Negative for sore throat.    Respiratory: Positive for cough. Negative for shortness of breath.    Cardiovascular: Negative for chest pain and leg swelling.   Gastrointestinal: Positive for nausea. Negative for diarrhea and vomiting.   Neurological: Positive for light-headedness and headaches.   All other systems reviewed and are negative.    Allergies:  Atorvastatin   Pravastatin     Medications:  Ezetimibe  Glucosamine Chondroitin   Ipratropium  Levothyroxine  Lisinopril  Omeprazole  Rosuvastatin   Sildenafil    Past Medical History:   Atrial Flutter  Chronic Constipation    ED  Elevated Fasting Glucose  Enlarged Prostate  Essential Hypertension  Gastric Ulcer  GERD  Hearing Loss  Hyperlipidemia  Hypothyroidism   Inguinal Hernia, Right  Lipoma of Skin  Mass of Chest  Wall  Multiple Pigmented Nevi  Osteoarthritis  Pulmonary Nodules  Testosterone Deficiency  Tobacco Abuse Disorder  Tubular Adenoma    Past Surgical History:    Herniorrhaphy, Right Inguinal  Wide Excision for Malignant Melanoma, Left Arm    Family History:    CAD, Diabetes Type 2 - Father  Neurologic Disorder, CAD, Thyroid Disease - Mother    Social History:  Patient lives alone.  Reports alcohol but denies recent tobacco use.     Physical Exam     Patient Vitals for the past 24 hrs:   BP Temp Temp src Pulse Resp SpO2 Weight   07/18/22 1445 (!) 143/94 -- -- 69 -- 98 % --   07/18/22 1430 (!) 150/91 -- -- 70 -- -- --   07/18/22 1330 (!) 145/97 -- -- 70 -- 100 % --   07/18/22 1315 (!) 154/100 -- -- 74 -- 100 % --   07/18/22 1305 (!) 155/96 -- -- 69 -- 100 % --   07/18/22 1059 (!) 143/90 98.3  F (36.8  C) Oral 87 16 98 % 90.6 kg (199 lb 11.8 oz)       Physical Exam  Constitutional: Well developed, nontox appearance  Head: Atraumatic.   Mouth/Throat: Oropharynx is clear and moist.   Neck:  no stridor  Eyes: no scleral icterus  Cardiovascular: RRR, 2+ bilat radial pulses  Pulmonary/Chest: nml resp effort  Abdominal: ND, soft, NT, no rebound or guarding   Ext: Warm, well perfused, no edema  Neurological: A&O, symmetric facies, moves ext x4, steady gait  Skin: Skin is warm and dry.   Psychiatric: Behavior is normal. Thought content normal.   Nursing note and vitals reviewed.    Emergency Department Course   ECG  ECG #1 (11:19:42):  Indication: Screening for cardiovascular disease.   Rate 78 bpm. PA interval 226. QRS duration 88. QT/QTc 390/444. P-R-T axes 36 27 26.   Interpretation:    Sinus rhythm with 1st degree AV block   Otherwise normal ECG  Agree with computer interpretation.   No significant change compared to EKG dated 5/14/20.   Interpreted at 1400 by myself.     ECG #2 (13:51:07):  Indication: Screening for cardiovascular disease.   Rate 73 bpm. PA interval 230. QRS duration 90. QT/QTc 420/462. P-R-T axes 33 13  10.   Interpretation:    Sinus rhythm with 1st degree block.   Otherwise normal ECG.  Agree with computer interpretation.   No significant change compared to EKG dated 7/18/22.   Interpreted at 1402 by myself.       Imaging:  Head CT w/o contrast   Final Result   IMPRESSION:   No intracranial hemorrhage, mass, or definite CT evidence of recent   ischemia.      SANDRO MEDEIROS MD            SYSTEM ID:  [id number]        Report per radiology    Laboratory:  Labs Ordered and Resulted from Time of ED Arrival to Time of ED Departure   BASIC METABOLIC PANEL - Abnormal       Result Value    Sodium 136      Potassium 4.4      Chloride 105      Carbon Dioxide (CO2) 25      Anion Gap 6      Urea Nitrogen 35 (*)     Creatinine 1.18      Calcium 8.8      Glucose 91      GFR Estimate 64     TSH WITH FREE T4 REFLEX - Abnormal    TSH 4.43 (*)    ROUTINE UA WITH MICROSCOPIC REFLEX TO CULTURE - Abnormal    Color Urine Straw      Appearance Urine Clear      Glucose Urine Negative      Bilirubin Urine Negative      Ketones Urine Negative      Specific Gravity Urine 1.012      Blood Urine Trace (*)     pH Urine 5.0      Protein Albumin Urine Negative      Urobilinogen Urine Normal      Nitrite Urine Negative      Leukocyte Esterase Urine Negative      RBC Urine <1      WBC Urine <1     TROPONIN I - Normal    Troponin I High Sensitivity 5     INFLUENZA A/B & SARS-COV2 PCR MULTIPLEX - Normal    Influenza A PCR Negative      Influenza B PCR Negative      RSV PCR Negative      SARS CoV2 PCR Negative     T4 FREE - Normal    Free T4 0.82     CBC WITH PLATELETS AND DIFFERENTIAL    WBC Count 6.5      RBC Count 4.45      Hemoglobin 13.9      Hematocrit 43.1      MCV 97      MCH 31.2      MCHC 32.3      RDW 13.1      Platelet Count 207      % Neutrophils 62      % Lymphocytes 26      % Monocytes 9      % Eosinophils 2      % Basophils 1      % Immature Granulocytes 0      NRBCs per 100 WBC 0      Absolute Neutrophils 4.0      Absolute  Lymphocytes 1.7      Absolute Monocytes 0.6      Absolute Eosinophils 0.2      Absolute Basophils 0.0      Absolute Immature Granulocytes 0.0      Absolute NRBCs 0.0        Emergency Department Course:    Reviewed:  I reviewed nursing notes, vitals and past medical history    Assessments:  1401 I obtained history and examined the patient as noted above.   1702 I rechecked the patient and explained findings.   1719 I rechecked the patient.     Interventions:  1650 0.9% sodium chloride BOLUS  1724 meclizine (ANTIVERT) tablet 25 mg    Disposition:  The patient was discharged to home.     Impression & Plan     Medical Decision Makin year old male presenting w/ lightheadedness w/ position changes     DDx includes peripheral vertigo, central vertigo, vasovagal syncope, orthostatic syncope, electrolyte abnormality, anemia, syncope NOS, dysrhythmia.  EKG interp as noted above.  Doubt seizure, CVA given history and physical exam.  Labs significant for no remarkable abnormality.  Imaging no evidence of recent CVA which I expect will be demonstrated given symptom onset nearly 3 weeks ago.  Interventions as noted above.  I think would be reasonable to trial the patient on meclizine with first dose given in the emergency department.  Presentation seems most consistent with peripheral vertigo although his initial symptoms prior to further discussion seem more consistent with orthostatic lightheadedness or near syncope..  Recommendations given regarding follow up with PCP and return to the emergency department as needed for new or worsening symptoms.  Pt counseled on all results, disposition and diagnosis.  They are understanding and agreeable to plan. Patient discharged in stable condition.      Diagnosis:    ICD-10-CM    1. Peripheral vertigo, unspecified laterality  H81.399        Discharge Medications:  New Prescriptions    MECLIZINE (ANTIVERT) 25 MG TABLET    Take 1 tablet (25 mg) by mouth every 6 hours as needed for  dizziness       Scribe Disclosure:  I, Kip Michaelconrado, am serving as a scribe at 12:56 PM on 7/18/2022 to document services personally performed by Ranulfo Troy MD based on my observations and the provider's statements to me.        Ranulfo Troy MD  07/18/22 1902

## 2022-07-18 NOTE — PROGRESS NOTES
SUBJECTIVE: Dev Powell is a 77 year old male presenting with a chief complaint of ha, near passing out constant dizziness.  Onset of symptoms was earlier this month ago.  Quit tobacco earlier this month    Past Medical History:   Diagnosis Date     Elevated blood pressure (not hypertension)      Enlarged prostate- has seen  Dr. Solomon in the past - no further PSA's needed as of 7/2019 - doesn't need to see him again per Dr. Solomon unless gross hematuria  9/18/2013     Erectile dysfunction      Gastric ulcer 9/25/2007    EGD @ [place]-EGD - gastric ulcer w/ erosions/ LA grade B erosive esophagitis     GERD (gastroesophageal reflux disease)     worse lying down at night.      Hyperlipidemia LDL goal < 130     lipitor 40mg= calf pain-off since 2008     Hypertension      Lipoma of skin 2007    chest - biopsied at [place] = benign      Malignant melanoma (H) 1995     Osteoarthritis     mostly hands and knees      Snoring      Thyroid disease      Allergies   Allergen Reactions     Atorvastatin Other (See Comments)     Calf pain     Pravastatin Other (See Comments)     Leg cramps     Social History     Tobacco Use     Smoking status: Current Every Day Smoker     Packs/day: 0.50     Years: 60.00     Pack years: 30.00     Smokeless tobacco: Never Used     Tobacco comment: strongly encourage smoking cessation with every visit   Substance Use Topics     Alcohol use: Yes     Alcohol/week: 0.0 standard drinks     Comment: not regular - very occasional 1-2 martinis when out to dinner 1-2x/month, if that        ROS:  SKIN: no rash  GI: no vomiting    OBJECTIVE:  /73   Pulse 86   Temp 98.7  F (37.1  C)   Resp 20   Wt 94.3 kg (208 lb)   SpO2 98%   BMI 27.44 kg/m  GENERAL APPEARANCE: healthy, alert and no distress  EYES: EOMI,  PERRL, conjunctiva clear  HENT: ear canals and TM's normal.  Nose and mouth without ulcers, erythema or lesions  RESP: lungs clear to auscultation - no rales, rhonchi or wheezes  CV: regular rates  and rhythm, normal S1 S2, no murmur noted  NEURO: Normal strength and tone, sensory exam grossly normal,  normal speech and mentation  SKIN: no suspicious lesions or rashes      ICD-10-CM    1. Dizziness  R42 EKG 12-lead complete w/read - Clinics   2. Near syncope  R55    3. Nonintractable headache, unspecified chronicity pattern, unspecified headache type  R51.9    4. Nausea  R11.0    5. Suspected 2019 novel coronavirus infection  Z20.822 Symptomatic; Unknown COVID-19 Virus (Coronavirus) by PCR Nose     Pt will go through ED for cont w/u, likely needs advanced imaging

## 2022-07-18 NOTE — TELEPHONE ENCOUNTER
"Pt calls to report ongoing:  - sinus pressure  - dizziness  - nausea  - frontal headache  - \"Head feels clogged.\"  - \"Ears feel clogged\"    Had several covid tests (at-home and in-lab), all negative.  Still feeling \"lightheaded, like just before fainting.\"  Occurs intermittently.  Denies lightheadedness currently.    Tried pepto bismol for the nausea.  Then had a black stool.  Agrees to discontinue pepto bismol.    O2 sats 96-to-97%.  Heart rate 90.  Typical BP readings:  141/76.  137/97.    Since pt already had virtual eval for above symptoms, advised in-clinic eval.  Pt agrees to plan.  No open appt slots at any feasibly located [facility] per checking with a .  Pt therefore agrees to go to [facility] ([place] location).    Kajal DORAN Health Nurse Advisor     Reason for Disposition    [1] MODERATE dizziness (e.g., interferes with normal activities) AND [2] has NOT been evaluated by physician for this  (Exception: dizziness caused by heat exposure, sudden standing, or poor fluid intake)    Taking a medicine that could cause dizziness (e.g., blood pressure medications, diuretics)    Additional Information    Negative: Severe difficulty breathing (e.g., struggling for each breath, speaks in single words)    Negative: [1] Difficulty breathing or swallowing AND [2] started suddenly after medicine, an allergic food or bee sting    Negative: Shock suspected (e.g., cold/pale/clammy skin, too weak to stand, low BP, rapid pulse)    Negative: Difficult to awaken or acting confused (e.g., disoriented, slurred speech)    Negative: [1] Weakness (i.e., paralysis, loss of muscle strength) of the face, arm or leg on one side of the body AND [2] sudden onset AND [3] present now    Negative: [1] Numbness (i.e., loss of sensation) of the face, arm or leg on one side of the body AND [2] sudden onset AND [3] present now    Negative: [1] Loss of speech or garbled speech AND [2] sudden onset AND [3] present " "now    Negative: Overdose (accidental or intentional) of medications    Negative: [1] Fainted > 15 minutes ago AND [2] still feels too weak or dizzy to stand    Negative: Heart beating < 50 beats per minute OR > 140 beats per minute    Negative: Sounds like a life-threatening emergency to the triager    Negative: Chest pain    Negative: Rectal bleeding, bloody stool, or tarry-black stool    Negative: [1] Vomiting AND [2] contains red blood or black (\"coffee ground\") material    Negative: Vomiting is main symptom    Negative: Diarrhea is main symptom    Negative: Headache is main symptom    Negative: Patient states that he/she is having an anxiety/panic attack    Negative: Dizziness from low blood sugar (i.e., < 60 mg/dl or 3.5 mmol/l)    Negative: Dizziness is described as a spinning sensation (i.e., vertigo)    Negative: Heat exhaustion suspected (i.e., dehydration from heat exposure)    Negative: Difficulty breathing    Negative: SEVERE dizziness (e.g., unable to stand, requires support to walk, feels like passing out now)    Negative: Extra heart beats OR irregular heart beating  (i.e., \"palpitations\")    Negative: [1] Drinking very little AND [2] dehydration suspected (e.g., no urine > 12 hours, very dry mouth, very lightheaded)    Negative: Patient sounds very sick or weak to the triager    Negative: [1] Dizziness caused by heat exposure, sudden standing, or poor fluid intake AND [2] no improvement after 2 hours of rest and fluids    Negative: [1] Fever > 103 F (39.4 C) AND [2] not able to get the fever down using Fever Care Advice    Negative: [1] Fever > 101 F (38.3 C) AND [2] age > 60    Negative: [1] Fever > 100.0 F (37.8 C) AND [2] bedridden (e.g., nursing home patient, CVA, chronic illness, recovering from surgery)    Negative: [1] Fever > 100.0 F (37.8 C) AND [2] diabetes mellitus or weak immune system (e.g., HIV positive, cancer chemo, splenectomy, organ transplant, chronic steroids)    Protocols used: " "DIZZINESS - [id number]    _______________________    COVID 19 Nurse Triage Plan/Patient Instructions    Please be aware that novel coronavirus (COVID-19) may be circulating in the community. If you develop symptoms such as fever, cough, or SOB or if you have concerns about the presence of another infection including coronavirus (COVID-19), please contact your health care provider or visit [email or web address].     Disposition/Instructions    Additional COVID19 information to add for patients.   How can I protect others?  If you have symptoms (fever, cough, body aches or trouble breathing): Stay home and away from others (self-isolate) until:    At least 10 days have passed since your symptoms started, And     You ve had no fever--and no medicine that reduces fever--for 1 full day (24 hours), And      Your other symptoms have resolved (gotten better).     If you don t have symptoms, but a test showed that you have COVID-19 (you tested positive):    Stay home and away from others (self-isolate). Follow the tips under \"How do I self-isolate?\" below for 10 days (20 days if you have a weak immune system).    You don't need to be retested for COVID-19 before going back to school or work. As long as you're fever-free and feeling better, you can go back to school, work and other activities after waiting the 10 or 20 days.     How do I self-isolate?    Stay in your own room, even for meals. Use your own bathroom if you can.     Stay away from others in your home. No hugging, kissing or shaking hands. No visitors.    Don t go to work, school or anywhere else.     Clean  high touch  surfaces often (doorknobs, counters, handles, etc.). Use a household cleaning spray or wipes. You ll find a full list on the EPA website:  www.epa.gov/pesticide-registration/list-n-disinfectants-use-against-sars-cov-2.    Cover your mouth and nose with a mask, tissue or washcloth to avoid spreading germs.    Wash your hands and " face often. Use soap and water.    Caregivers in these groups are at risk for severe illness due to COVID-19:  o People 65 years and older  o People who live in a nursing home or long-term care facility  o People with chronic disease (lung, heart, cancer, diabetes, kidney, liver, immunologic)  o People who have a weakened immune system, including those who:  - Are in cancer treatment  - Take medicine that weakens the immune system, such as corticosteroids  - Had a bone marrow or organ transplant  - Have an immune deficiency  - Have poorly controlled HIV or AIDS  - Are obese (body mass index of 40 or higher)  - Smoke regularly    Caregivers should wear gloves while washing dishes, handling laundry and cleaning bedrooms and bathrooms.    Use caution when washing and drying laundry: Don t shake dirty laundry, and use the warmest water setting that you can.    For more tips, go to www.cdc.gov/coronavirus/2019-ncov/downloads/10Things.pdf.    How can I take care of myself?  1. Get lots of rest. Drink extra fluids (unless a doctor has told you not to).     2. Take Tylenol (acetaminophen) for fever or pain. If you have liver or kidney problems, ask your family doctor if it s okay to take Tylenol.     Adults can take either:     650 mg (two 325 mg pills) every 4 to 6 hours, or     1,000 mg (two 500 mg pills) every 8 hours as needed.     Note: Don t take more than 3,000 mg in one day.   Acetaminophen is found in many medicines (both prescribed and over-the-counter medicines). Read all labels to be sure you don t take too much.     For children, check the Tylenol bottle for the right dose. The dose is based on the child s age or weight.    3. If you have other health problems (like cancer, heart failure, an organ transplant or severe kidney disease): Call your specialty clinic if you don t feel better in the next 2 days.    4. Know when to call 911: Emergency warning signs include:    Trouble breathing or shortness of  breath    Pain or pressure in the chest that doesn t go away    Feeling confused like you haven t felt before, or not being able to wake up    Bluish-colored lips or face    What are the symptoms of COVID-19?     The most common symptoms are cough, fever and trouble breathing.     Less common symptoms include body aches, chills, diarrhea (loose, watery poops), fatigue (feeling very tired), headache, runny nose, sore throat and loss of smell.    COVID-19 can cause severe coughing (bronchitis) and lung infection (pneumonia).    How does it spread?     The virus may spread when a person coughs or sneezes into the air. The virus can travel about 6 feet this way, and it can live on surfaces.      Common  (household disinfectants) will kill the virus.    Who is at risk?  Anyone can catch COVID-19 if they re around someone who has the virus.    How can others protect themselves?     Stay away from people who have COVID-19 (or symptoms of COVID-19).    Wash hands often with soap and water. Or, use hand  with at least 60% alcohol.    Avoid touching the eyes, nose or mouth.     Wear a face mask when you go out in public, when sick or when caring for a sick person.    Where can I get more information?    [facility]: About COVID-19: [email or web address]    CDC: What to Do If You re Sick: www.cdc.gov/coronavirus/2019-ncov/about/steps-when-sick.html    CDC: Ending Home Isolation: www.cdc.gov/coronavirus/2019-ncov/hcp/disposition-in-home-patients.html     CDC: Caring for Someone: www.cdc.gov/coronavirus/2019-ncov/if-you-are-sick/care-for-someone.html     [facility]: Interim Guidance for Hospital Discharge to Home: www.health.[facility].[place]/diseases/coronavirus/hcp/hospdischarge.pdf    [facility] clinical trials (COVID-19 research studies): clinicalaffairs.[facility]-clinical-trials     Below are the COVID-19 hotlines at the [place] Department of Health ([facility]). Interpreters are available.    o For health questions: Call 475-079-7883 or 1-128.590.6794 (7 a.m. to 7 p.m.)  o For questions about schools and childcare: Call 116-102-0506 or 1-895.562.3187 (7 a.m. to 7 p.m.)          Thank you for taking steps to prevent the spread of this virus.  o Limit your contact with others.  o Wear a simple mask to cover your cough.  o Wash your hands well and often.    Resources    [facility]: About COVID-19: [email or web address]    CDC: What to Do If You're Sick: www.cdc.gov/coronavirus/2019-ncov/about/steps-when-sick.html    CDC: Ending Home Isolation: www.cdc.gov/coronavirus/2019-ncov/hcp/disposition-in-home-patients.html     CDC: Caring for Someone: www.cdc.gov/coronavirus/2019-ncov/if-you-are-sick/care-for-someone.html     [facility]: Interim Guidance for Hospital Discharge to Home: www.[facility].[place]/diseases/coronavirus/hcp/hospdischarge.pdf    [facility] clinical trials (COVID-19 research studies): clinicalaffairs.[facility]-clinical-trials     Below are the COVID-19 hotlines at the [place] Department [facility] ([facility]). Interpreters are available.   o For health questions: Call 039-431-5794 or 1-651.603.5527 (7 a.m. to 7 p.m.)  o For questions about schools and childcare: Call 732-465-1278 or 1-437.505.1669 (7 a.m. to 7 p.m.)

## 2022-07-18 NOTE — ED TRIAGE NOTES
Patient presents with dizziness, headache and fatigue. Onset July 1st. Was seen at urgent care today for this where they did an EKG, which showed NSR with 1st degree AVB. Has come close to passing out, but has not. Able to tell when symptoms are worse and sits down it improves. Endorsing intermittent nausea, no emesis.

## 2022-07-19 LAB
ATRIAL RATE - MUSE: 73 BPM
ATRIAL RATE - MUSE: 78 BPM
DIASTOLIC BLOOD PRESSURE - MUSE: NORMAL MMHG
DIASTOLIC BLOOD PRESSURE - MUSE: NORMAL MMHG
INTERPRETATION ECG - MUSE: NORMAL
INTERPRETATION ECG - MUSE: NORMAL
P AXIS - MUSE: 33 DEGREES
P AXIS - MUSE: 36 DEGREES
PR INTERVAL - MUSE: 226 MS
PR INTERVAL - MUSE: 230 MS
QRS DURATION - MUSE: 88 MS
QRS DURATION - MUSE: 90 MS
QT - MUSE: 390 MS
QT - MUSE: 420 MS
QTC - MUSE: 444 MS
QTC - MUSE: 462 MS
R AXIS - MUSE: 13 DEGREES
R AXIS - MUSE: 27 DEGREES
SYSTOLIC BLOOD PRESSURE - MUSE: NORMAL MMHG
SYSTOLIC BLOOD PRESSURE - MUSE: NORMAL MMHG
T AXIS - MUSE: 10 DEGREES
T AXIS - MUSE: 26 DEGREES
VENTRICULAR RATE- MUSE: 73 BPM
VENTRICULAR RATE- MUSE: 78 BPM

## 2022-07-22 ENCOUNTER — OFFICE VISIT (OUTPATIENT)
Dept: FAMILY MEDICINE | Facility: CLINIC | Age: 77
End: 2022-07-22
Payer: COMMERCIAL

## 2022-07-22 VITALS
SYSTOLIC BLOOD PRESSURE: 107 MMHG | HEIGHT: 73 IN | HEART RATE: 94 BPM | WEIGHT: 205 LBS | OXYGEN SATURATION: 97 % | DIASTOLIC BLOOD PRESSURE: 74 MMHG | TEMPERATURE: 97.5 F | BODY MASS INDEX: 27.17 KG/M2

## 2022-07-22 DIAGNOSIS — E03.9 HYPOTHYROIDISM, UNSPECIFIED TYPE: ICD-10-CM

## 2022-07-22 DIAGNOSIS — R42 DIZZINESS: Primary | ICD-10-CM

## 2022-07-22 PROCEDURE — 99214 OFFICE O/P EST MOD 30 MIN: CPT | Performed by: NURSE PRACTITIONER

## 2022-07-22 NOTE — PROGRESS NOTES
"  Assessment & Plan     Dev was seen today for er f/u.    Diagnoses and all orders for this visit:    Dizziness  Presented to urgent care and ED on 7/18/22.  Diagnosed with peripheral vertigo. Feels like he was dehydration, low sodium contributed to dizziness. Felt better after fluids in the ED.   Patient with continued dizziness that is improved with use of Meclizine.  Plan evaluation and treatment with vestibular physical therapist.  Consider decrease in blood pressure as BP's in clinic are slightly low.  Patient to continue to monitor blood pressure at home and bring in values to follow-up appointment.  Normal orthostatic BP's.    Close follow-up in clinic as needed.    -     Physical Therapy Referral; Future    Hypothyroidism, unspecified type  TSH   Date Value Ref Range Status   07/18/2022 4.43 (H) 0.40 - 4.00 mU/L Final   08/12/2021 0.14 (L) 0.40 - 4.00 mU/L Final   05/14/2020 1.65 0.40 - 4.00 mU/L Final   01/10/2020 0.59 0.40 - 4.00 mU/L Final   11/21/2019 0.37 (L) 0.40 - 4.00 mU/L Final   03/28/2019 1.83 0.40 - 4.00 mU/L Final   02/05/2019 4.88 (H) 0.40 - 4.00 mU/L Final   Recommended repeat TSH in 4-6 weeks.   Currently on levothyroxine 137 mcg and 150 mcg (alternating days).    -     TSH; Future  -     T4, free; Future      30 minutes spent on the date of the encounter doing chart review, history and exam, documentation and further activities per the note    BMI:   Estimated body mass index is 27.05 kg/m  as calculated from the following:    Height as of this encounter: 1.854 m (6' 1\").    Weight as of this encounter: 93 kg (205 lb).       Return in about 4 weeks (around 8/19/2022) for Med check.    Fani Ortega, KASSANDRA CNP  [facility] [place]          Subjective   Dev is a 77 year old, presenting for the following health issues:  ER F/U      HPI     ED/UC Followup:    Facility:  [facility]    Date of visit: 7/18/2022  Reason for visit: Dizziness  Current " "Status: a little better - has been taking the medications he was given and drinking pedialyte    Dizziness started on the Thursday prior to 4th of July.   Possible COVID-19 symptoms - negative test results.      Presented to urgent care and ED on 7/18/22.  Diagnosed with peripheral vertigo. Feels like he was dehydration, low sodium contributed to dizziness. Felt better after fluids in the ED.    Has cut down on coffee consumption.    Is working on increasing water intake.    Dizziness has continued, but much less severe.  Difficulty with fogginess.  Feels like head is full, worse with movement of head.  Worse with standing.    Has been taking Meclizine as needed.      Cough, nausea, headaches resolved.        Was taking Lisinopril 20 mg daily prior to onset of dizziness.    Wasn't previously taking on a regular basis.   7 days - has been taking consistently.        TSH   Date Value Ref Range Status   07/18/2022 4.43 (H) 0.40 - 4.00 mU/L Final   08/12/2021 0.14 (L) 0.40 - 4.00 mU/L Final   05/14/2020 1.65 0.40 - 4.00 mU/L Final   01/10/2020 0.59 0.40 - 4.00 mU/L Final   11/21/2019 0.37 (L) 0.40 - 4.00 mU/L Final   03/28/2019 1.83 0.40 - 4.00 mU/L Final   02/05/2019 4.88 (H) 0.40 - 4.00 mU/L Final     Hasn't smoked since 1st of the month.    Was motivated because he started coughing.   No nicotine replacement.      Review of Systems   Constitutional, HEENT, cardiovascular, pulmonary, gi and gu systems are negative, except as otherwise noted.      Objective    /74 (BP Location: Left arm, Patient Position: Prone)   Pulse 94   Temp 97.5  F (36.4  C) (Tympanic)   Ht 1.854 m (6' 1\")   Wt 93 kg (205 lb)   SpO2 97%   BMI 27.05 kg/m    Body mass index is 27.05 kg/m .     Repeat Blood Pressure:  BP Pulse Site Cuff Size Time Date   98/62 104 Left arm Adult Regular  2:21 PM 7/22/2022   102/71 93 Left arm ---  2:48 PM 7/22/2022   95/66 90 Left arm ---  2:49 PM 7/22/2022   107/74 94 Left arm ---  2:50 PM 7/22/2022   No " orthostatic vitals data filed.  No peak flow data filed.  No pain information filed.      Physical Exam     GENERAL: healthy, alert and no distress  EYES: Eyes grossly normal to inspection, PERRL and conjunctivae and sclerae normal  HENT: ear canals and TM's normal, nose and mouth without ulcers or lesions  RESP: lungs clear to auscultation - no rales, rhonchi or wheezes  CV: regular rate and rhythm, normal S1 S2, no S3 or S4, no murmur, click or rub, no peripheral edema   ABDOMEN: soft, nontender, no hepatosplenomegaly, no masses and bowel sounds normal  NEURO: CN II-XII intact, Normal strength and tone, mentation intact and speech normal  PSYCH: mentation appears normal, affect normal/bright              .  ..

## 2022-07-25 ENCOUNTER — HOSPITAL ENCOUNTER (OUTPATIENT)
Dept: PHYSICAL THERAPY | Facility: CLINIC | Age: 77
Setting detail: THERAPIES SERIES
Discharge: HOME | End: 2022-07-25
Attending: NURSE PRACTITIONER
Payer: COMMERCIAL

## 2022-07-25 DIAGNOSIS — R42 DIZZINESS: ICD-10-CM

## 2022-07-25 PROCEDURE — 97161 PT EVAL LOW COMPLEX 20 MIN: CPT | Mod: GP | Performed by: PHYSICAL THERAPIST

## 2022-07-26 ENCOUNTER — TELEPHONE (OUTPATIENT)
Dept: FAMILY MEDICINE | Facility: CLINIC | Age: 77
End: 2022-07-26

## 2022-07-26 DIAGNOSIS — I10 ESSENTIAL HYPERTENSION WITH GOAL BLOOD PRESSURE LESS THAN 140/90: ICD-10-CM

## 2022-07-26 NOTE — TELEPHONE ENCOUNTER
Patient calls.     Saw Fani for ED f/u on 7/22.     Referred to PT for vertigo. Patient saw them yesterday and PT said he does not have an inner ear issue. They thought maybe cardiovascular related (per patient).     Routing to provider to review and advise.     Yolanda Mcneal RN  [place] [facility]

## 2022-07-27 RX ORDER — LISINOPRIL 10 MG/1
10 TABLET ORAL DAILY
Qty: 90 TABLET | Refills: 0
Start: 2022-07-27 | End: 2022-08-23

## 2022-07-27 NOTE — TELEPHONE ENCOUNTER
Called #   Telephone Information:   Mobile [phone]       Advised pt on the information below     Patient stated an understanding and agreed with plan.    Med list updated     Elsi Thomas RN, BSN  [facility]

## 2022-07-27 NOTE — TELEPHONE ENCOUNTER
I am happy to hear that he was able to be evaluated by vestibular physical therapist.   Recommend trial of decrease in Lisinopril to 10 mg daily (take 1/2 of 20 mg tablet) and continue to closely monitor blood pressure.   Follow-up sooner than preventative visit on 8/23/22 with no improvement or worsening of dizziness.     - JMeixl, CNP

## 2022-08-02 NOTE — ED PROVIDER NOTES
History   Chief Complaint:  Laceration     The history is provided by the patient.      Dev Powell is a 76 year old male who presents for evaluation of a laceration. Yesterday evening the patient was knocking down icicles when one of them fell striking his glasses and causing a V-shaped laceration to the bridge of his nose. He did not lose consciousness from the injury. He presents to the ED this morning with concern that the laceration will require repair. He denies any headache, neck pain, or other injury. He believes that his tetanus status is up to date. He is not anticoagulated.      Review of Systems   Respiratory: Negative for shortness of breath.    Musculoskeletal: Negative for neck pain.   Skin: Positive for wound.   Neurological: Negative for syncope and headaches.   Psychiatric/Behavioral: Negative for confusion.   All other systems reviewed and are negative.      Allergies:  Atorvastatin  Pravastatin     Medications:  Aspirin  Zetia  Levothyroxine   Lisinopril  Omeprazole  Rosuvastatin    Sildenafil     Past Medical History:     Hypertension   Hyperlipidemia  GERD   Malignant melanoma  Osteoarthritis  Thyroid disease  ED   Testosterone deficiency   Atrial flutter     Past Surgical History:    Colonoscopy  DaVinci herniorrhaphy inguinal   EGD, combined  Esophagoscopy  Wide excision malignant melanoma      Family History:    CAD - Father and mother  Diabetes mellitus, type II - Father  Dementia - Mother   Thyroid disease - Mother     Social History:  The patient presents to the ED alone.     Physical Exam     Patient Vitals for the past 24 hrs:   BP Temp Temp src Pulse Resp SpO2   12/12/21 1030 (!) 144/99 -- -- 89 -- 98 %   12/12/21 0834 (!) 166/110 98.3  F (36.8  C) Temporal 106 20 99 %       Physical Exam  Constitutional: Well appearing.  HEENT: 4.5cm laceration upside down V-shaped with the button V beginning at the bridge of the nose extending inferiorly.  There is an abrasion from the bridge of  the nose down to the bottom of the V that is superficial.  No active bleeding.  No deep injury noted.  Sclera are normal bilaterally PERRL.  EOMI.  Moist mucous membranes.  Neck: Soft.  Supple.  No tenderness to palpation  Cardiac: Regular rate and rhythm.  No murmur or rub.  Respiratory: Clear to auscultation bilaterally.  No respiratory distress.    Abdomen: Soft and nontender.   Nondistended.  Musculoskeletal: No edema.  Normal range of motion.  Neurologic: Alert and oriented x3.  Normal tone and bulk. Normal gait.  Skin: No rashes.  No edema.  Psych: Normal affect.  Normal behavior.     Emergency Department Course     Procedures    Laceration Repair        LACERATION:  A simple clean 4.5 cm laceration.      LOCATION:  Bridge of the nose       ANESTHESIA:  Local using 1.0% lidocaine       PREPARATION:  Irrigation with Normal Saline and Shur Clens      DEBRIDEMENT:  no debridement      CLOSURE:  Wound was closed with One Layer.  Skin closed with 7 x 6.0 Prolene using interrupted sutures.    Emergency Department Course:  Reviewed:  I reviewed nursing notes, vitals and past medical history    Assessments:  0940: I obtained history and examined the patient as noted above.     1055:  A laceration repair was performed as outlined in the procedure note above.  The patient tolerated well and there were no complications.     Disposition:  The patient was discharged to home.     Impression & Plan   Medical Decision Making:  Dev Powell is a 76-year-old man who is afebrile and hemodynamically stable.  He is neurologically intact with no focal deficits.  He has a laceration in the shape of the left side down V on his anterior nose.  There is also a portion of an abrasion and missing skin from the bridge of the nose down to the bottom of the V of the laceration.  Wound was anesthetized and thoroughly irrigated.  There was a portion of the flap that could be pulled down but no visible bone underlying deeper injury.  I  discussed tacking down the flap laceration and he is in agreement.  This was performed as above.  I discussed the need to have the sutures removed by his primary care physician and have the wound reevaluated.  I also discussed if he would like to go see plastic surgery clinic, that is reasonable option as well.  We discussed wound care management and laceration care.  Supportive care at home was discussed return precautions were given.  No there is no indication for imaging of the nose or head at this time.  Has no other injuries.  He reports his tetanus is up-to-date at least 2 or 3 years ago.  In the [facility], it appears that he had his Tdap in 2013.  I did state that if that was a case I recommend we updated today, however, he feels that it has been within the last couple years.  Return precautions were given and he was in no distress at time of discharge.    Diagnosis:    ICD-10-CM   1. Laceration of nose, initial encounter  S01.21XA           Scribe Disclosure:  I, Lorenzo Thacker, am serving as a scribe at 9:38 AM on 12/12/2021 to document services personally performed by Simone Gorman MD based on my observations and the provider's statements to me.         Simone Gorman MD  12/14/21 3222     Quality 155 (Denominator): Falls Plan Of Care: Plan of Care not Documented, Reason not Otherwise Specified Quality 226: Preventive Care And Screening: Tobacco Use: Screening And Cessation Intervention: Patient screened for tobacco use and is an ex/non-smoker Quality 154 Part A: Falls: Risk Assessment (Should Be Reported With Measure 155.): Falls risk assessment completed and documented in the past 12 months. Quality 110: Preventive Care And Screening: Influenza Immunization: Influenza Immunization not Administered for Documented Reasons. Quality 47: Advance Care Plan: Advance care planning not documented, reason not otherwise specified. Quality 154 Part B: Falls: Risk Screening (Should Be Reported With Measure 155.): Patient screened for future fall risk; documentation of no falls in the past year or only one fall without injury in the past year Quality 431: Preventive Care And Screening: Unhealthy Alcohol Use - Screening: Patient not identified as an unhealthy alcohol user when screened for unhealthy alcohol use using a systematic screening method Detail Level: Detailed Quality 111:Pneumonia Vaccination Status For Older Adults: Pneumococcal vaccine was not administered on or after patient’s 60th birthday and before the end of the measurement period, reason not otherwise specified

## 2022-08-05 NOTE — PROGRESS NOTES
07/25/22 1700   Quick Adds   Quick Adds Vestibular Eval;Certification   Type of Visit Initial OP PT Evaluation   General Information   Start of Care Date 07/25/22   Referring Physician Fani Ortega APRN CNP   Orders Evaluate and Treat as Indicated   Order Date 07/22/22   Medical Diagnosis Dizziness (R42)   Onset of illness/injury or Date of Surgery 06/30/22  (date of dizziness onset per chart)   Precautions/Limitations no known precautions/limitations   Surgical/Medical history reviewed Yes   Pertinent history of current problem (include personal factors and/or comorbidities that impact the POC) Dev is a 77 year old, presenting for vestibular PT evaluation of dizziness referred after f/u with CNP following ED visit on 7/18/22 with dizziness. PER CHART REVIEW: Presented to urgent care and ED on 7/18/22.  Diagnosed with peripheral vertigo. Feels like he was dehydration, low sodium contributed to dizziness. Felt better after fluids in the ED. Patient with continued dizziness that is improved with use of Meclizine.  Plan evaluation and treatment with vestibular physical therapist.  Consider decrease in blood pressure as BP's in clinic are slightly low.  Patient to continue to monitor blood pressure at home and bring in values to follow-up appointment.  Normal orthostatic BP's.  PATIENT REPORT TODAY: Reports his sxs of dizziness was a gradual build up (no sudden onset). Describes dizziness as lightheaded 'like just before fainting' and brain fog ' like liquid in my head'. Denies room-spinning dizziness. Reports he has been trying to stay hydrated - that helps his sxs.  Sxs come and go with transitions to standing from sitting. No sxs when sitting or laying down. Stopped smoking in July after this started and BP has decreased - reports his lisipril may be too high per discussion with NP recommending decrease lisipril - he is waiting for further follow-up first. Has some blood tests coming up. Has an annual exam  with oncologist and cardiologist to schedule. Denies sensation changes. Reports gradual hearing loss.   Fall Risk Screen   Fall screen completed by PT   Have you fallen 2 or more times in the past year? No   Have you fallen and had an injury in the past year? No   Is patient a fall risk? Yes;Department fall risk interventions implemented   Fall screen comments No falls or near falls. walks without AD. see assessment below   Abuse Screen (yes response referral indicated)   Physical Signs of Abuse Present no   Functional Scales   Functional Scales and Outcomes DHI: 62/100   Pain   Patient currently in pain No   Vitals Signs   Vital Signs Comments in seated rest HR: 93 bpm, O2 96, takes lisinopril without change - changes recommended by NP (pt wants things further assesed first before changing  his meds), BP in seated rest: 106/74; transition to stand: reports a little lightheded, BP 94/70; pt reports he doesn't have a BP cuff at home because too difficulty to don (going to get a wrist cuff), supine: /88, transition to sitting BP: 112/82; interpretation: pt demonstrates lower blood pressure with transitions and associated lightheadedness but not considered orthostatic   Cognitive Status Examination   Orientation orientation to person, place and time   Level of Consciousness alert   Follows Commands and Answers Questions 100% of the time   Personal Safety and Judgment intact   Observation   Observation reprots after walking + head movements 6-7/10 lightheadedness - feels like if he keeps going senses LOC. repeated VOR testing - holding 14 pt font target arms length x240 bpm -reports he is able to read 14 pt font without blurred/doubled vision, feeling same/better after head movements in sitting   Strength   Strength Comments WFL   Bed Mobility   Bed Mobility Comments independent   Transfer Skills   Transfer Comments independent   Gait   Gait Comments walks indoors without AD: slightly slower speed, no path  deviations or instability, pt feels a bit woozy with head movements and turns   Gait Special Tests   Gait Special Tests DYNAMIC GAIT INDEX;25 FOOT TIMED WALK   Gait Special Tests 25 Foot Timed Walk   Comments .83 m/s no AD (<1 m/s = inc fall risk)   Gait Special Tests Dynamic Gait Index   Comments 4 item DGI: 9/12 (< 10/12 = inc fall risk)   Balance Special Tests   Balance Special Tests Modified CTSIB Conditions   Balance Special Tests Modified CTSIB Conditions   Condition 1, seconds 30 Seconds   Condition 2, seconds 30 Seconds   Condition 4, seconds 30 Seconds   Condition 5, seconds 30 Seconds   Modified CTSIB Comments feet apart, mild inc sway without LOB with inc vestibular utilization   Cervicogenic Screen   Neck ROM sufficient for positional testing, limited c-sp rotation R   Oculomotor Exam   Smooth Pursuit Normal   VOR Other   VOR Comments see DVA assessment below for details   Infrared Goggle Exam or Frenzel Lense Exam   Vestibular Suppressant in Last 24 Hours? Yes  (took Meclizine today, feels it is helpful, takes every 6 hrs.)   Exam completed with Infrared Goggles   Spontaneous Nystagmus Negative   Gaze Evoked Nystagmus Horizontal L   Gaze Evoked Nystagmus comments in L gaze (end range?)   Head Shake Horizontal Nystagmus Horizontal R   Head Shake Horizontal Nystagmus comments 2 beats only (considered WNL) reports sxs of HA/some lightheadedness during this test and a little nauseated   Gunnar-Hallpike (right) Negative   Gunnar-Hallpike (right) comments (-) nystagmus/dizziness or vertigo in laying position; return to sitting reports lightheaded, no nyatgmus observed   [place]ont-Hallpike (Left) Negative   [place]-Hallpike (left) comments (-) nystagmus/dizziness or vertigo in laying position; return to sitting reports lightheaded, no nyatgmus observed   [facility] Supine Roll Test (Right) Negative   [facility] Supine Roll Test (Right) Comments (-) nystagmus/dizziness or vertigo in laying position; return to sitting reports lightheaded,  no nyatgmus observed   [facility] Supine Roll Test (Left) Negative   Dynamic Visual Acuity (DVA)   Static Acuity (LogMar) 20/30   Horizontal Head Movement at 2 Hz (LogMar) 20/100   DVA Comments wears glasses, reports he doesnt see as well out of them as he used to and needs eyes examined. also has floaters in eyes, reports only brief dizziness.   Planned Therapy Interventions   Planned Therapy Interventions neuromuscular re-education   Clinical Impression   Criteria for Skilled Therapeutic Interventions Met yes, treatment indicated   PT Diagnosis dizziness with transitions to standing and ambulation   Influenced by the following impairments Pt's vestibular assessment: (-) s/s BPPV. IR goggle exam: questionable end-gaze nystagmus that is considered WNL, and 2-beat headshake nystagmus considered WNL. VOR impaired per DVA assesment, although only reports mild/brief dizziness considered WNL. When repeated testing holding target feels better following head movements in sitting. His lightheaded dizziness provoked today with transitions from supine > sit with low BP observed but not considered orthostatic. His lightheaded dizziness provoked while walking and exacerbated with head turns although without instability or path deviations. His postural stability with inc vestibular utilization is WNL.   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 BP medications. Return to PT following f/u with MD for continued treatment of functional gait stability and  tolerance to head turns prn.   GOALS   PT Eval Goals 1;2;3   Goal 1   Goal Identifier DHI   Goal Description Pt to report significant decline in dizziness per DHI scoring </= 39/100 to be considered low perception of handicap.   Goal Progress at [facility] 7/25/22: 62/100   Target Date 08/25/22   Goal 2   Goal Identifier 4 item DGI   Goal Description Pt to complete 4 item DGI scoring 12/12, maintaining good gait speed and stability with functional head movements   Goal Progress at [facility] 7/25/22: 9/12 (< 10/12 = inc fall risk)  pt feels a bit woozy with head movements and turns   Target Date 08/25/22   Goal 3   Goal Identifier Transfers   Goal Description Pt to report he is able to work with his son and participate in transitions without dizziness or instability   Goal Progress at [facility] 7/25/22: pt reports unable to participate in helping his son with construction work d/t dizziness with bending over > return to standing   Target Date 08/25/22   Total Evaluation Time   PT Eval, Low Complexity Minutes (50680) 60   Therapy Certification   Certification date from 07/25/22   Certification date to 08/25/22   Medical Diagnosis Dizziness (R42)

## 2022-08-05 NOTE — PROGRESS NOTES
[facility]                                                                                   OUTPATIENT PHYSICAL THERAPY FUNCTIONAL EVALUATION  PLAN OF TREATMENT FOR OUTPATIENT REHABILITATION  (COMPLETE FOR INITIAL CLAIMS ONLY)  Patient's Last Name, First Name, M.I.  YOB: 1945  Dev Powell     Provider's Name   [facility]   Medical Record No.  [id number]     Start of Care Date:  07/25/22   Onset Date:  06/30/22 (date of dizziness onset per chart)   Type:     _X__PT   ____OT  ____SLP Medical Diagnosis:  Dizziness (R42)     PT Diagnosis:  dizziness with transitions to standing and ambulation Visits from SOC:  1                              __________________________________________________________________________________    Pt's vestibular assessment: (-) s/s BPPV. IR goggle exam: questionable end-gaze nystagmus that is considered WNL, and 2-beat headshake nystagmus considered WNL. VOR impaired per DVA assesment, although only reports mild/brief dizziness considered WNL. When repeated testing holding target, pt actually feels better following head movements in sitting. His lightheaded dizziness provoked today with transitions from supine > sit with low BP observed but not considered orthostatic. His lightheaded dizziness provoked while walking and exacerbated with head turns although without instability or path deviations. His postural stability with inc vestibular utilization is WNL.     PT recommended follow-up: follow-up with MD re. BP medications. Return to PT following f/u with MD for continued treatment of functional gait stability and tolerance to head turns prn.      Plan of Treatment/Functional Goals:  neuromuscular re-education           GOALS  DHI  Pt to report significant decline in dizziness per DHI scoring </= 39/100 to be considered low perception of  handicap.  08/25/22    4 item DGI  Pt to complete 4 item DGI scoring 12/12, maintaining good gait speed and stability with functional head movements  08/25/22    Transfers  Pt to report he is able to work with his son and participate in transitions without dizziness or instability  08/25/22             Therapy Frequency:  1 time/week   Predicted Duration of Therapy Intervention:  4 weeks    Kristin Curiel, PT                                    I CERTIFY THE NEED FOR THESE SERVICES FURNISHED UNDER        THIS PLAN OF TREATMENT AND WHILE UNDER MY CARE     (Physician co-signature of this document indicates review and certification of the therapy plan).                Certification Date From:  07/25/22   Certification Date To:  08/25/22    Referring Provider:  Fani Ortega APRN CNP    Initial Assessment  See Epic Evaluation- Start of Care Date: 07/25/22

## 2022-08-23 ENCOUNTER — OFFICE VISIT (OUTPATIENT)
Dept: FAMILY MEDICINE | Facility: CLINIC | Age: 77
End: 2022-08-23
Payer: COMMERCIAL

## 2022-08-23 VITALS
SYSTOLIC BLOOD PRESSURE: 124 MMHG | HEART RATE: 87 BPM | OXYGEN SATURATION: 96 % | RESPIRATION RATE: 14 BRPM | DIASTOLIC BLOOD PRESSURE: 84 MMHG | HEIGHT: 73 IN | WEIGHT: 209 LBS | BODY MASS INDEX: 27.7 KG/M2 | TEMPERATURE: 97.8 F

## 2022-08-23 DIAGNOSIS — F10.929 ALCOHOLIC INTOXICATION WITH COMPLICATION (H): ICD-10-CM

## 2022-08-23 DIAGNOSIS — C43.62 MALIGNANT MELANOMA OF LEFT UPPER EXTREMITY INCLUDING SHOULDER (H): ICD-10-CM

## 2022-08-23 DIAGNOSIS — I10 ESSENTIAL HYPERTENSION WITH GOAL BLOOD PRESSURE LESS THAN 140/90: Primary | ICD-10-CM

## 2022-08-23 DIAGNOSIS — E78.5 HYPERLIPIDEMIA LDL GOAL <130: ICD-10-CM

## 2022-08-23 DIAGNOSIS — E03.9 HYPOTHYROIDISM, UNSPECIFIED TYPE: ICD-10-CM

## 2022-08-23 DIAGNOSIS — N52.9 VASCULOGENIC ERECTILE DYSFUNCTION, UNSPECIFIED VASCULOGENIC ERECTILE DYSFUNCTION TYPE: ICD-10-CM

## 2022-08-23 PROCEDURE — 84443 ASSAY THYROID STIM HORMONE: CPT | Performed by: NURSE PRACTITIONER

## 2022-08-23 PROCEDURE — 80061 LIPID PANEL: CPT | Performed by: NURSE PRACTITIONER

## 2022-08-23 PROCEDURE — 84439 ASSAY OF FREE THYROXINE: CPT | Performed by: NURSE PRACTITIONER

## 2022-08-23 PROCEDURE — 99214 OFFICE O/P EST MOD 30 MIN: CPT | Performed by: NURSE PRACTITIONER

## 2022-08-23 PROCEDURE — 80053 COMPREHEN METABOLIC PANEL: CPT | Performed by: NURSE PRACTITIONER

## 2022-08-23 PROCEDURE — 36415 COLL VENOUS BLD VENIPUNCTURE: CPT | Performed by: NURSE PRACTITIONER

## 2022-08-23 RX ORDER — SILDENAFIL CITRATE 20 MG/1
40-60 TABLET ORAL
Qty: 90 TABLET | Refills: 3 | Status: SHIPPED | OUTPATIENT
Start: 2022-08-25 | End: 2022-08-25

## 2022-08-23 RX ORDER — LISINOPRIL 20 MG/1
20 TABLET ORAL DAILY
Qty: 90 TABLET | Refills: 3 | Status: SHIPPED | OUTPATIENT
Start: 2022-08-23 | End: 2023-01-11

## 2022-08-23 ASSESSMENT — PAIN SCALES - GENERAL: PAINLEVEL: NO PAIN (0)

## 2022-08-23 NOTE — PROGRESS NOTES
Assessment & Plan       Dev was seen today for recheck medication.    Diagnoses and all orders for this visit:    Essential hypertension with goal blood pressure less than 140/90  Stable on Lisinopril 20 mg daily.    -     lisinopril (ZESTRIL) 20 MG tablet; Take 1 tablet (20 mg) by mouth daily  -     Comprehensive metabolic panel (BMP + Alb, Alk Phos, ALT, AST, Total. Bili, TP)    Hyperlipidemia LDL goal <130  Recent Labs   Lab Test 08/12/21  1246 03/05/21  0808 01/29/16  0842 01/30/15  1305   CHOL 145 107   < > 237*   HDL 63 58   < > 46   LDL 66 34   < > 149*   TRIG 79 73   < > 208*   CHOLHDLRATIO  --   --   --  5.2*    < > = values in this interval not displayed.     Currently stable on Rosuvastatin 40 mg and Ezetimibe 10 mg daily.  Due for repeat fasting lipid panel.    -     Lipid panel reflex to direct LDL Fasting    Hypothyroidism, unspecified type  TSH   Date Value Ref Range Status   07/18/2022 4.43 (H) 0.40 - 4.00 mU/L Final   08/12/2021 0.14 (L) 0.40 - 4.00 mU/L Final   05/14/2020 1.65 0.40 - 4.00 mU/L Final   01/10/2020 0.59 0.40 - 4.00 mU/L Final   11/21/2019 0.37 (L) 0.40 - 4.00 mU/L Final   03/28/2019 1.83 0.40 - 4.00 mU/L Final   02/05/2019 4.88 (H) 0.40 - 4.00 mU/L Final   Currently on Levothyroxine 137 mcg (4 days per week) and Levothyroxine 150 mcg (3 days per week).   Recheck of TSH level today, elevated value on 7/18/22.    -     TSH  -     T4, free    Vasculogenic erectile dysfunction, unspecified vasculogenic erectile dysfunction type  -     sildenafil (REVATIO) 20 MG tablet; Take 2-3 tablets (40-60 mg) by mouth twice a week      Malignant melanoma of left upper extremity including shoulder (H)  Follows with dermatology.      Alcoholic intoxication with complication (H)  ED visit in 2019.  No recent concerns.        35 minutes spent on the date of the encounter doing chart review, history and exam, documentation and further activities per the note       BMI:   Estimated body mass index is  "27.57 kg/m  as calculated from the following:    Height as of this encounter: 1.854 m (6' 1\").    Weight as of this encounter: 94.8 kg (209 lb).     Return in about 3 months (around 11/23/2022) for Preventative Visit with Dr. Beth.      Fani Ortega, [facility] BRIGETTE Méndez is a 77 year old, presenting for the following health issues:  Recheck Medication    Px scheduled with PCP for 11/16/22       History of Present Illness     Patient reports an improvement in his symptoms.    Noted improvement in focus/concentration.   Dizziness is resolved most of the time, only intermittent.    Increased water intake - which has helped.      Quit smoking July 1st.  +Cold turkey.    Has noticed an increase in appetite.      Hyperlipidemia:  He presents for follow up of hyperlipidemia.  He is taking medication to lower cholesterol. He is not having myalgia or other side effects to statin medications.  Recent Labs   Lab Test 08/12/21  1246 03/05/21  0808 01/29/16  0842 01/30/15  1305   CHOL 145 107   < > 237*   HDL 63 58   < > 46   LDL 66 34   < > 149*   TRIG 79 73   < > 208*   CHOLHDLRATIO  --   --   --  5.2*    < > = values in this interval not displayed.       Hypertension: He presents for follow up of hypertension.  He does check blood pressure  regularly outside of the clinic. Outside blood pressures have been over 140/90. He follows a low salt diet.     Hypothyroidism:   TSH   Date Value Ref Range Status   07/18/2022 4.43 (H) 0.40 - 4.00 mU/L Final   08/12/2021 0.14 (L) 0.40 - 4.00 mU/L Final   05/14/2020 1.65 0.40 - 4.00 mU/L Final   01/10/2020 0.59 0.40 - 4.00 mU/L Final   11/21/2019 0.37 (L) 0.40 - 4.00 mU/L Final   03/28/2019 1.83 0.40 - 4.00 mU/L Final   02/05/2019 4.88 (H) 0.40 - 4.00 mU/L Final     137 mcg (4 days) and 150 mcg (3 days).        Since last visit, patient describes the following symptoms::  None    Headaches:   Since the patient's last clinic " "visit, headaches are: improved  The patient is getting headaches:  Twice per wk  He is able to do normal daily activities when he has a migraine.  The patient is taking the following rescue/relief medications:  Tylenol   Patient states \"I get total relief\" from the rescue/relief medications.   The patient is taking the following medications to prevent migraines:  No medications to prevent migraines  In the past 4 weeks, the patient has gone to an Urgent Care or Emergency Room 0 times times due to headaches.       Review of Systems   Constitutional, HEENT, cardiovascular, pulmonary, gi and gu systems are negative, except as otherwise noted.      Objective    /84   Pulse 87   Temp 97.8  F (36.6  C) (Tympanic)   Resp 14   Ht 1.854 m (6' 1\")   Wt 94.8 kg (209 lb)   SpO2 96%   BMI 27.57 kg/m    Body mass index is 27.57 kg/m .  Physical Exam     GENERAL: healthy, alert and no distress  EYES: Eyes grossly normal to inspection  RESP: lungs clear to auscultation - no rales, rhonchi or wheezes  CV: regular rate and rhythm, normal S1 S2, no S3 or S4, no murmur  PSYCH: mentation appears normal, affect normal/bright            .  ..  "

## 2022-08-23 NOTE — LETTER
August 26, 2022      Dev TAPIA Sherry  [address] [place] [address]        Dear ,    We are writing to inform you of your test results.    -Cholesterol levels are at your goal levels.  ADVISE: continuing your medication, a regular exercise program with at least 150 minutes of aerobic exercise per week, and eating a low saturated fat/low carbohydrate diet.  Also, you should recheck this fasting cholesterol panel in 12 months.   -Liver and gallbladder tests are normal (ALT,AST, Alk phos, bilirubin), kidney function is normal (Cr, GFR), sodium is normal, potassium is normal, calcium is normal, glucose is normal.   -TSH (thyroid stimulating hormone) level is normal which indicates appropriate thyroid replacement dosing.  ADVISE: continuing same replacement dose and rechecking this in 12 months.     If you have further questions about the interpretation of your labs, [email or web address] is a good website to check out for further information.       Resulted Orders   Comprehensive metabolic panel (BMP + Alb, Alk Phos, ALT, AST, Total. Bili, TP)   Result Value Ref Range    Sodium 137 133 - 144 mmol/L    Potassium 4.3 3.4 - 5.3 mmol/L    Chloride 107 94 - 109 mmol/L    Carbon Dioxide (CO2) 24 20 - 32 mmol/L    Anion Gap 6 3 - 14 mmol/L    Urea Nitrogen 24 7 - 30 mg/dL    Creatinine 1.14 0.66 - 1.25 mg/dL    Calcium 9.1 8.5 - 10.1 mg/dL    Glucose 95 70 - 99 mg/dL    Alkaline Phosphatase 84 40 - 150 U/L    AST 21 0 - 45 U/L    ALT 34 0 - 70 U/L    Protein Total 7.2 6.8 - 8.8 g/dL    Albumin 3.7 3.4 - 5.0 g/dL    Bilirubin Total 1.2 0.2 - 1.3 mg/dL    GFR Estimate 66 >60 mL/min/1.73m2      Comment:      Effective December 21, 2021 eGFRcr in adults is calculated using the 2021 CKD-EPI creatinine equation which includes age and gender (Ladan friend al., NEJ, DOI: 10.1056/MNQNha9981724)   Lipid panel reflex to direct LDL Fasting   Result Value Ref Range    Cholesterol 133 <200 mg/dL    Triglycerides 82 <150  mg/dL    Direct Measure HDL 59 >=40 mg/dL    LDL Cholesterol Calculated 58 <=100 mg/dL    Non HDL Cholesterol 74 <130 mg/dL    Patient Fasting > 8hrs? Yes     Narrative    Cholesterol  Desirable:  <200 mg/dL    Triglycerides  Normal:  Less than 150 mg/dL  Borderline High:  150-199 mg/dL  High:  200-499 mg/dL  Very High:  Greater than or equal to 500 mg/dL    Direct Measure HDL  Female:  Greater than or equal to 50 mg/dL   Male:  Greater than or equal to 40 mg/dL    LDL Cholesterol  Desirable:  <100mg/dL  Above Desirable:  100-129 mg/dL   Borderline High:  130-159 mg/dL   High:  160-189 mg/dL   Very High:  >= 190 mg/dL    Non HDL Cholesterol  Desirable:  130 mg/dL  Above Desirable:  130-159 mg/dL  Borderline High:  160-189 mg/dL  High:  190-219 mg/dL  Very High:  Greater than or equal to 220 mg/dL   TSH   Result Value Ref Range    TSH 3.74 0.40 - 4.00 mU/L   T4, free   Result Value Ref Range    Free T4 1.06 0.76 - 1.46 ng/dL       If you have any questions or concerns, please call the clinic at the number listed above.       Sincerely,      Fani Ortega, KASSANDRA CNP

## 2022-08-24 ENCOUNTER — TELEPHONE (OUTPATIENT)
Dept: FAMILY MEDICINE | Facility: CLINIC | Age: 77
End: 2022-08-24

## 2022-08-24 DIAGNOSIS — N52.9 VASCULOGENIC ERECTILE DYSFUNCTION, UNSPECIFIED VASCULOGENIC ERECTILE DYSFUNCTION TYPE: ICD-10-CM

## 2022-08-24 LAB
ALBUMIN SERPL-MCNC: 3.7 G/DL (ref 3.4–5)
ALP SERPL-CCNC: 84 U/L (ref 40–150)
ALT SERPL W P-5'-P-CCNC: 34 U/L (ref 0–70)
ANION GAP SERPL CALCULATED.3IONS-SCNC: 6 MMOL/L (ref 3–14)
AST SERPL W P-5'-P-CCNC: 21 U/L (ref 0–45)
BILIRUB SERPL-MCNC: 1.2 MG/DL (ref 0.2–1.3)
BUN SERPL-MCNC: 24 MG/DL (ref 7–30)
CALCIUM SERPL-MCNC: 9.1 MG/DL (ref 8.5–10.1)
CHLORIDE BLD-SCNC: 107 MMOL/L (ref 94–109)
CHOLEST SERPL-MCNC: 133 MG/DL
CO2 SERPL-SCNC: 24 MMOL/L (ref 20–32)
CREAT SERPL-MCNC: 1.14 MG/DL (ref 0.66–1.25)
FASTING STATUS PATIENT QL REPORTED: YES
GFR SERPL CREATININE-BSD FRML MDRD: 66 ML/MIN/1.73M2
GLUCOSE BLD-MCNC: 95 MG/DL (ref 70–99)
HDLC SERPL-MCNC: 59 MG/DL
LDLC SERPL CALC-MCNC: 58 MG/DL
NONHDLC SERPL-MCNC: 74 MG/DL
POTASSIUM BLD-SCNC: 4.3 MMOL/L (ref 3.4–5.3)
PROT SERPL-MCNC: 7.2 G/DL (ref 6.8–8.8)
SODIUM SERPL-SCNC: 137 MMOL/L (ref 133–144)
T4 FREE SERPL-MCNC: 1.06 NG/DL (ref 0.76–1.46)
TRIGL SERPL-MCNC: 82 MG/DL
TSH SERPL DL<=0.005 MIU/L-ACNC: 3.74 MU/L (ref 0.4–4)

## 2022-08-24 NOTE — TELEPHONE ENCOUNTER
Central Prior Authorization Team   Phone: [phone]      PRIOR AUTHORIZATION DENIED    Medication: sildenafil (REVATIO) 20 MG tablet - EPA DENIED    Denial Date: 8/23/2022    Denial Rational:       Appeal Information: If the Provider/Patient would like to appeal this denial, please provide a letter of medical necessity explaining why Preferred Formulary medications are not appropriate in the treatment of the patient's condition; along with any previous therapies tried/failed.    Once completed, please route back to me directly: Mo Philip

## 2022-08-24 NOTE — TELEPHONE ENCOUNTER
Patient notified not covered, please send to pharmacy, he will use a good rx coupon       Larisa Mei

## 2022-08-24 NOTE — TELEPHONE ENCOUNTER
Called patient to let him know it is not covered by his insurance and also how to print off a good rx coupon      Larisa Mei

## 2022-08-25 RX ORDER — SILDENAFIL CITRATE 20 MG/1
TABLET ORAL
Qty: 90 TABLET | Refills: 3 | Status: SHIPPED | OUTPATIENT
Start: 2022-08-25 | End: 2023-12-04

## 2022-11-03 NOTE — NURSING NOTE
Chief Complaint   Patient presents with     Clinic Care Coordination - Follow-up     Pt here for prostate nodules     Latonya Piedra    
0

## 2022-11-09 DIAGNOSIS — E78.5 HYPERLIPIDEMIA LDL GOAL <130: Primary | ICD-10-CM

## 2022-11-11 RX ORDER — ROSUVASTATIN CALCIUM 10 MG/1
TABLET, COATED ORAL
Qty: 90 TABLET | Refills: 3 | Status: SHIPPED | OUTPATIENT
Start: 2022-11-11 | End: 2023-11-07

## 2022-11-11 NOTE — TELEPHONE ENCOUNTER
LDL Cholesterol Calculated   Date Value Ref Range Status   08/23/2022 58 <=100 mg/dL Final   03/05/2021 34 <100 mg/dL Final     Comment:     Desirable:       <100 mg/dl     Allergy/Caontraindication.    Lab outdated.  Appointment in 6 days.  Please refill as appropriate.  Thank you,    Abbie Da Silva RN  [facility]

## 2022-11-16 ENCOUNTER — OFFICE VISIT (OUTPATIENT)
Dept: FAMILY MEDICINE | Facility: CLINIC | Age: 77
End: 2022-11-16
Payer: COMMERCIAL

## 2022-11-16 VITALS
TEMPERATURE: 97 F | HEART RATE: 106 BPM | BODY MASS INDEX: 28.23 KG/M2 | RESPIRATION RATE: 18 BRPM | SYSTOLIC BLOOD PRESSURE: 128 MMHG | WEIGHT: 213 LBS | HEIGHT: 73 IN | OXYGEN SATURATION: 97 % | DIASTOLIC BLOOD PRESSURE: 84 MMHG

## 2022-11-16 DIAGNOSIS — E34.9 TESTOSTERONE DEFICIENCY: ICD-10-CM

## 2022-11-16 DIAGNOSIS — Z00.00 ENCOUNTER FOR ANNUAL WELLNESS EXAM IN MEDICARE PATIENT: Primary | ICD-10-CM

## 2022-11-16 DIAGNOSIS — Z87.891 PERSONAL HISTORY OF TOBACCO USE: ICD-10-CM

## 2022-11-16 DIAGNOSIS — E78.5 HYPERLIPIDEMIA LDL GOAL <130: ICD-10-CM

## 2022-11-16 DIAGNOSIS — I10 ESSENTIAL HYPERTENSION WITH GOAL BLOOD PRESSURE LESS THAN 140/90: ICD-10-CM

## 2022-11-16 DIAGNOSIS — F17.201 TOBACCO USE DISORDER, MODERATE, IN EARLY REMISSION: ICD-10-CM

## 2022-11-16 DIAGNOSIS — I48.92 ATRIAL FLUTTER, UNSPECIFIED TYPE (H): ICD-10-CM

## 2022-11-16 DIAGNOSIS — J30.0 VASOMOTOR RHINITIS: ICD-10-CM

## 2022-11-16 DIAGNOSIS — R73.01 ELEVATED FASTING GLUCOSE: ICD-10-CM

## 2022-11-16 DIAGNOSIS — E29.1 HYPOGONADISM IN MALE: ICD-10-CM

## 2022-11-16 DIAGNOSIS — I25.10 CORONARY ARTERY CALCIFICATION SEEN ON CT SCAN: ICD-10-CM

## 2022-11-16 DIAGNOSIS — E03.9 HYPOTHYROIDISM, UNSPECIFIED TYPE: ICD-10-CM

## 2022-11-16 DIAGNOSIS — D36.9 TUBULAR ADENOMA: ICD-10-CM

## 2022-11-16 DIAGNOSIS — N40.0 ENLARGED PROSTATE: ICD-10-CM

## 2022-11-16 DIAGNOSIS — F10.929 ALCOHOLIC INTOXICATION WITH COMPLICATION (H): ICD-10-CM

## 2022-11-16 DIAGNOSIS — L57.8 SUN-DAMAGED SKIN: ICD-10-CM

## 2022-11-16 DIAGNOSIS — K21.9 GASTROESOPHAGEAL REFLUX DISEASE WITHOUT ESOPHAGITIS: ICD-10-CM

## 2022-11-16 DIAGNOSIS — K40.90 NON-RECURRENT UNILATERAL INGUINAL HERNIA WITHOUT OBSTRUCTION OR GANGRENE: ICD-10-CM

## 2022-11-16 DIAGNOSIS — I95.1 ORTHOSTATIC HYPOTENSION: ICD-10-CM

## 2022-11-16 DIAGNOSIS — Z85.820 HISTORY OF MALIGNANT MELANOMA: ICD-10-CM

## 2022-11-16 DIAGNOSIS — H91.93 BILATERAL HEARING LOSS, UNSPECIFIED HEARING LOSS TYPE: ICD-10-CM

## 2022-11-16 DIAGNOSIS — R01.1 UNDIAGNOSED CARDIAC MURMURS: ICD-10-CM

## 2022-11-16 DIAGNOSIS — Z72.0 TOBACCO ABUSE DISORDER: ICD-10-CM

## 2022-11-16 LAB
ALBUMIN SERPL BCG-MCNC: 4.5 G/DL (ref 3.5–5.2)
ALP SERPL-CCNC: 94 U/L (ref 40–129)
ALT SERPL W P-5'-P-CCNC: 14 U/L (ref 10–50)
ANION GAP SERPL CALCULATED.3IONS-SCNC: 9 MMOL/L (ref 7–15)
AST SERPL W P-5'-P-CCNC: 19 U/L (ref 10–50)
BILIRUB SERPL-MCNC: 0.8 MG/DL
BUN SERPL-MCNC: 22.2 MG/DL (ref 8–23)
CALCIUM SERPL-MCNC: 9.2 MG/DL (ref 8.8–10.2)
CHLORIDE SERPL-SCNC: 103 MMOL/L (ref 98–107)
CHOLEST SERPL-MCNC: 154 MG/DL
CREAT SERPL-MCNC: 1 MG/DL (ref 0.67–1.17)
CREAT UR-MCNC: 156 MG/DL
DEPRECATED HCO3 PLAS-SCNC: 26 MMOL/L (ref 22–29)
ERYTHROCYTE [DISTWIDTH] IN BLOOD BY AUTOMATED COUNT: 13.2 % (ref 10–15)
GFR SERPL CREATININE-BSD FRML MDRD: 78 ML/MIN/1.73M2
GLUCOSE SERPL-MCNC: 98 MG/DL (ref 70–99)
HBA1C MFR BLD: 5.6 % (ref 0–5.6)
HCT VFR BLD AUTO: 44.1 % (ref 40–53)
HDLC SERPL-MCNC: 63 MG/DL
HGB BLD-MCNC: 14.8 G/DL (ref 13.3–17.7)
LDLC SERPL CALC-MCNC: 71 MG/DL
MCH RBC QN AUTO: 31.3 PG (ref 26.5–33)
MCHC RBC AUTO-ENTMCNC: 33.6 G/DL (ref 31.5–36.5)
MCV RBC AUTO: 93 FL (ref 78–100)
MICROALBUMIN UR-MCNC: 114 MG/L
MICROALBUMIN/CREAT UR: 73.08 MG/G CR (ref 0–17)
NONHDLC SERPL-MCNC: 91 MG/DL
PLATELET # BLD AUTO: 236 10E3/UL (ref 150–450)
POTASSIUM SERPL-SCNC: 4.2 MMOL/L (ref 3.4–5.3)
PROT SERPL-MCNC: 7.3 G/DL (ref 6.4–8.3)
RBC # BLD AUTO: 4.73 10E6/UL (ref 4.4–5.9)
SODIUM SERPL-SCNC: 138 MMOL/L (ref 136–145)
T3 SERPL-MCNC: 86 NG/DL (ref 85–202)
T4 FREE SERPL-MCNC: 1.47 NG/DL (ref 0.9–1.7)
TRIGL SERPL-MCNC: 100 MG/DL
TSH SERPL DL<=0.005 MIU/L-ACNC: 6.49 UIU/ML (ref 0.3–4.2)
WBC # BLD AUTO: 5.6 10E3/UL (ref 4–11)

## 2022-11-16 PROCEDURE — 80053 COMPREHEN METABOLIC PANEL: CPT | Performed by: FAMILY MEDICINE

## 2022-11-16 PROCEDURE — 84403 ASSAY OF TOTAL TESTOSTERONE: CPT | Performed by: FAMILY MEDICINE

## 2022-11-16 PROCEDURE — G0439 PPPS, SUBSEQ VISIT: HCPCS | Performed by: FAMILY MEDICINE

## 2022-11-16 PROCEDURE — 83036 HEMOGLOBIN GLYCOSYLATED A1C: CPT | Performed by: FAMILY MEDICINE

## 2022-11-16 PROCEDURE — 91312 COVID-19,PF,PFIZER BOOSTER BIVALENT: CPT | Performed by: FAMILY MEDICINE

## 2022-11-16 PROCEDURE — 85027 COMPLETE CBC AUTOMATED: CPT | Performed by: FAMILY MEDICINE

## 2022-11-16 PROCEDURE — 84443 ASSAY THYROID STIM HORMONE: CPT | Performed by: FAMILY MEDICINE

## 2022-11-16 PROCEDURE — 80061 LIPID PANEL: CPT | Performed by: FAMILY MEDICINE

## 2022-11-16 PROCEDURE — 84480 ASSAY TRIIODOTHYRONINE (T3): CPT | Performed by: FAMILY MEDICINE

## 2022-11-16 PROCEDURE — 99214 OFFICE O/P EST MOD 30 MIN: CPT | Mod: 25 | Performed by: FAMILY MEDICINE

## 2022-11-16 PROCEDURE — 0124A COVID-19,PF,PFIZER BOOSTER BIVALENT: CPT | Performed by: FAMILY MEDICINE

## 2022-11-16 PROCEDURE — 84439 ASSAY OF FREE THYROXINE: CPT | Performed by: FAMILY MEDICINE

## 2022-11-16 PROCEDURE — G0296 VISIT TO DETERM LDCT ELIG: HCPCS | Performed by: FAMILY MEDICINE

## 2022-11-16 PROCEDURE — 82043 UR ALBUMIN QUANTITATIVE: CPT | Performed by: FAMILY MEDICINE

## 2022-11-16 PROCEDURE — 36415 COLL VENOUS BLD VENIPUNCTURE: CPT | Performed by: FAMILY MEDICINE

## 2022-11-16 PROCEDURE — 84270 ASSAY OF SEX HORMONE GLOBUL: CPT | Performed by: FAMILY MEDICINE

## 2022-11-16 RX ORDER — LEVOTHYROXINE SODIUM 150 UG/1
TABLET ORAL
Qty: 45 TABLET | Refills: 3 | Status: SHIPPED | OUTPATIENT
Start: 2022-11-16 | End: 2022-11-27 | Stop reason: DRUGHIGH

## 2022-11-16 RX ORDER — LEVOTHYROXINE SODIUM 137 UG/1
TABLET ORAL
Qty: 60 TABLET | Refills: 3 | Status: SHIPPED | OUTPATIENT
Start: 2022-11-16 | End: 2022-11-27 | Stop reason: ALTCHOICE

## 2022-11-16 RX ORDER — IPRATROPIUM BROMIDE 21 UG/1
SPRAY, METERED NASAL
Qty: 90 ML | Refills: 3 | Status: SHIPPED | OUTPATIENT
Start: 2022-11-16 | End: 2023-12-04

## 2022-11-16 RX ORDER — TESTOSTERONE GEL, 1% 10 MG/G
GEL TRANSDERMAL
Qty: 150 G | Refills: 2 | Status: SHIPPED | OUTPATIENT
Start: 2022-11-16 | End: 2023-06-10

## 2022-11-16 ASSESSMENT — ACTIVITIES OF DAILY LIVING (ADL): CURRENT_FUNCTION: NO ASSISTANCE NEEDED

## 2022-11-16 ASSESSMENT — ENCOUNTER SYMPTOMS
COUGH: 1
HEMATOCHEZIA: 0
DIZZINESS: 1
SORE THROAT: 0
ABDOMINAL PAIN: 0
NERVOUS/ANXIOUS: 0
WEAKNESS: 0
HEARTBURN: 0
HEMATURIA: 0
CHILLS: 0
EYE PAIN: 0
JOINT SWELLING: 0
CONSTIPATION: 1
DYSURIA: 0
PARESTHESIAS: 0
FREQUENCY: 0
DIARRHEA: 0
FEVER: 0
PALPITATIONS: 0
MYALGIAS: 0
NAUSEA: 0
ARTHRALGIAS: 0
SHORTNESS OF BREATH: 0

## 2022-11-16 NOTE — PATIENT INSTRUCTIONS
[facility]  [address] [place] [address]  Office: [phone]   Fax:    [phone]       Preventive Health Recommendations:     See your health care provider every year to  Review health changes.   Discuss preventive care.    Review your medicines if your doctor has prescribed any.    Talk with your health care provider about whether you should have a test to screen for prostate cancer (PSA).  Every 3 years, have a diabetes test (fasting glucose). If you are at risk for diabetes, you should have this test more often.  Every 5 years, have a cholesterol test. Have this test more often if you are at risk for high cholesterol or heart disease.   Every 10 years, have a colonoscopy. Or, have a yearly FIT test (stool test). These exams will check for colon cancer.  Talk to with your health care provider about screening for Abdominal Aortic Aneurysm if you have a family history of AAA or have a history of smoking.    Shots:   Get a flu shot each year.   Get a tetanus shot every 10 years.   Talk to your doctor about your pneumonia vaccines. There are now two you should receive - Pneumovax (PPSV 23) and Prevnar (PCV 13).   Talk to your pharmacist about a shingles vaccine.   Talk to your doctor about the hepatitis B vaccine.  Nutrition:   Eat at least 5 servings of fruits and vegetables each day.   Eat whole-grain bread, whole-wheat pasta and brown rice instead of white grains and rice.   Get adequate Calcium and Vitamin D.   Lifestyle  Exercise for at least 150 minutes a week (30 minutes a day, 5 days a week). This will help you control your weight and prevent disease.   Limit alcohol to one drink per day.   No smoking.   Wear sunscreen to prevent skin cancer.  See your dentist every six months for an exam and cleaning.  See your eye doctor every 1 to 2 years to screen for conditions such as glaucoma, macular degeneration, cataracts, etc.    Personalized Prevention Plan  You are due  for the preventive services outlined below.  Your care team is available to assist you in scheduling these services.  If you have already completed any of these items, please share that information with your care team to update in your medical record.  Health Maintenance Due   Topic Date Due    COVID-19 Vaccine (4 - Booster for Pfizer series) 11/29/2021    Annual Wellness Visit  08/12/2022    Kidney Microalbumin Urine Test  08/12/2022    ANNUAL REVIEW OF HM ORDERS  08/12/2022    FALL RISK ASSESSMENT  08/12/2022    LUNG CANCER SCREENING  08/13/2022     Lung Cancer Screening   Frequently Asked Questions  If you are at high-risk for lung cancer, getting screened with low-dose computed tomography (LDCT) every year can help save your life. This handout offers answers to some of the most common questions about lung cancer screening. If you have other questions, please call [phone] ([phone]).     What is it?  Lung cancer screening uses special X-ray technology to create an image of your lung tissue. The exam is quick and easy and takes less than 10 seconds. We don t give you any medicine or use any needles. You can eat before and after the exam. You don t need to change your clothes as long as the clothing on your chest doesn t contain metal. But, you do need to be able to hold your breath for at least 6 seconds during the exam.    What is the goal of lung cancer screening?  The goal of lung cancer screening is to save lives. Many times, lung cancer is not found until a person starts having physical symptoms. Lung cancer screening can help detect lung cancer in the earliest stages when it may be easier to treat.    Who should be screened for lung cancer?  We suggest lung cancer screening for anyone who is at high-risk for lung cancer. You are in the high-risk group if you:     are between the ages of 55 and 79, and   have smoked at least 1 pack of cigarettes a day for 20 or more years, and   still smoke or  have quit within the past 15 years.    However, if you have a new cough or shortness of breath, you should talk to your doctor before being screened.    Why does it matter if I have symptoms?  Certain symptoms can be a sign that you have a condition in your lungs that should be checked and treated by your doctor. These symptoms include fever, chest pain, a new or changing cough, shortness of breath that you have never felt before, coughing up blood or unexplained weight loss. Having any of these symptoms can greatly affect the results of lung cancer screening.       Should all smokers get an LDCT lung cancer screening exam?  It depends. Lung cancer screening is for a very specific group of men and women who have a history of heavy smoking over a long period of time (see  Who should be screened for lung cancer  above).  I am in the high-risk group, but have been diagnosed with cancer in the past. Is LDCT lung cancer screening right for me?  In some cases, you should not have LDCT lung screening, such as when your doctor is already following your cancer with CT scan studies. Your doctor will help you decide if LDCT lung screening is right for you.  Do I need to have a screening exam every year?  Yes. If you are in the high-risk group described earlier, you should get an LDCT lung cancer screening exam every year until you are 79, or are no longer willing or able to undergo screening and possible procedures to diagnose and treat lung cancer.  How effective is LDCT at preventing death from lung cancer?  Studies have shown that LDCT lung cancer screening can lower the risk of death from lung cancer by 20 percent in people who are at high-risk.  What are the risks?  There are some risks and limitations of LDCT lung cancer screening. We want to make sure you understand the risks and benefits, so please let us know if you have any questions. Your doctor may want to talk with you more about these risks.   Radiation exposure:  As with any exam that uses radiation, there is a very small increased risk of cancer. The amount of radiation in LDCT is small--about the same amount a person would get from a mammogram. Your doctor orders the exam when he or she feels the potential benefits outweigh the risks.   False negatives: No test is perfect, including LDCT. It is possible that you may have a medical condition, including lung cancer, that is not found during your exam. This is called a false negative result.   False positives and more testing: LDCT very often finds something in the lung that could be cancer, but in fact is not. This is called a false positive result. False positive tests often cause anxiety. To make sure these findings are not cancer, you may need to have more tests. These tests will be done only if you give us permission. Sometimes patients need a treatment that can have side effects, such as a biopsy. For more information on false positives, see  What can I expect from the results?    Findings not related to lung cancer: Your LDCT exam also takes pictures of areas of your body next to your lungs. In a very small number of cases, the CT scan will show an abnormal finding in one of these areas, such as your kidneys, adrenal glands, liver or thyroid. This finding may not be serious, but you may need more tests. Your doctor can help you decide what other tests you may need, if any.  What can I expect from the results?  About 1 out of 4 LDCT exams will find something that may need more tests. Most of the time, these findings are lung nodules. Lung nodules are very small collections of tissue in the lung. These nodules are very common, and the vast majority--more than 97 percent--are not cancer (benign). Most are normal lymph nodes or small areas of scarring from past infections.  But, if a small lung nodule is found to be cancer, the cancer can be cured more than 90 percent of the time. To know if the nodule is cancer, we may  "need to get more images before your next yearly screening exam. If the nodule has suspicious features (for example, it is large, has an odd shape or grows over time), we will refer you to a specialist for further testing.  Will my doctor also get the results?  Yes. Your doctor will get a copy of your results.  Is it okay to keep smoking now that there s a cancer screening exam?  No. Tobacco is one of the strongest cancer-causing agents. It causes not only lung cancer, but other cancers and cardiovascular (heart) diseases as well. The damage caused by smoking builds over time. This means that the longer you smoke, the higher your risk of disease. While it is never too late to quit, the sooner you quit, the better.  Where can I find help to quit smoking?  The best way to prevent lung cancer is to stop smoking. If you have already quit smoking, congratulations and keep it up! For help on quitting smoking, please call [employer] at 8-565-QUITNOW (1-214.851.5739) or the American [employer] Society at 1-476.275.8655 to find local resources near you.  One-on-one health coaching:  If you d prefer to work individually with a health care provider on tobacco cessation, we offer:     Medication Therapy Management:  Our specially trained pharmacists work closely with you and your doctor to help you quit smoking.  Call [phone] or [phone] (toll free).      Thank you so much or choosing [facility]  for your Health Care. It was a pleasure seeing you at your visit today! Please contact us with any questions or concerns you may have.                   Vee Beth MD                              To reach your [facility] care team after hours call:   [phone] press #2 \"to speak with your care team\".  This will get you to our clinic instead of routing to central [facility]  scheduling.     PLEASE NOTE OUR HOURS HAVE CHANGED secondary to COVID-19 " coronavirus pandemic, as we are trying to minimize patient exposure to the virus,  which is now widespread in the state.  These hours may change with very little notice.  We apologize for any inconvenience.       Our current clinic hours are:          Monday- Thursday   7:00am - 6:00pm  in person.      Friday  7:00am- 5:00pm                       Saturday and Sunday : Closed to in person and virtual visits        We have telephone and virtual visit times available between    7:00am - 6pm on Monday-Friday as well.                                                Phone:  [phone]      Our pharmacy hours: Monday through Friday 8:00am to 5:00pm                        Saturday - 9:00 am to 12 noon       Sunday : Closed.              Phone:  [phone]              ###  Please note: at this time we are not accepting any walk-in visits. ###      There is also information available at our web site:  [email or web address]    If your provider ordered any lab tests and you do not receive the results within 10 business days, please call the clinic.    If you need a medication refill please contact your pharmacy.  Please allow 3 business days for your refill to be completed.    Our clinic offers telephone visits and e visits.  Please ask one of your team members to explain more.      Use [employer] (secure email communication and access to your chart) to send your primary care provider a message or make an appointment. Ask someone on your Team how to sign up for [employer].

## 2022-11-16 NOTE — PROGRESS NOTES
"SUBJECTIVE:   Dev is a 77 year old who presents for Preventive Visit.  {Split Bill scripting  The purpose of this visit is to discuss your medical history and prevent health problems before you are sick. You may be responsible for a co-pay, coinsurance, or deductible if your visit today includes services such as checking on a sore throat, having an x-ray or lab test, or treating and evaluating a new or existing condition :527527}  {Patient advised of split billing (Optional):864104}  Are you in the first 12 months of your Medicare coverage?  { :951873::\"No\"}    HPI    Have you ever done Advance Care Planning? (For example, a Health Directive, POLST, or a discussion with a medical provider or your loved ones about your wishes): { :853110}    {Hearing Test Done (Optional):593218}   Fall risk  { :840230}  {If any of the above assessments are answered yes, consider ordering appropriate referrals (Optional):889544::\"click delete button to remove this line now\"}  Cognitive Screening { :798557}    {Do you have sleep apnea, excessive snoring or daytime drowsiness? (Optional):978304}    Reviewed and updated as needed this visit by clinical staff                  Reviewed and updated as needed this visit by Provider                 Social History     Tobacco Use     Smoking status: Former     Packs/day: 0.50     Years: 60.00     Pack years: 30.00     Types: Cigarettes     Quit date: 2022     Years since quittin.3     Smokeless tobacco: Never     Tobacco comments:     strongly encourage smoking cessation with every visit   Substance Use Topics     Alcohol use: Yes     Alcohol/week: 0.0 standard drinks     Comment: not regular - very occasional 1-2 martinis when out to dinner 1-2x/month, if that      {Rooming Staff- Complete this question if Prescreen response is not shown below for today's visit. If you drink alcohol do you typically have >3 drinks per day or >7 drinks per week? (Optional):987478}    Alcohol Use " "11/16/2022   Prescreen: >3 drinks/day or >7 drinks/week? No   Prescreen: >3 drinks/day or >7 drinks/week? -   {add AUDIT responses (Optional) (A score of 7 for adult men is an indication of hazardous drinking; a score of 8 or more is an indication of an alcohol use disorder.  A score of 7 or more for adult women is an indication of hazardous drinking or an alchohol use disorder):830292}    {Outside tests to abstract? :790673}    Hyperlipidemia Follow-Up      Are you regularly taking any medication or supplement to lower your cholesterol?   { :122525}    Are you having muscle aches or other side effects that you think could be caused by your cholesterol lowering medication?  { :001402}    Hypertension Follow-up      Do you check your blood pressure regularly outside of the clinic? { :507412}     Are you following a low salt diet? { :096193}    Are your blood pressures ever more than 140 on the top number (systolic) OR more   than 90 on the bottom number (diastolic), for example 140/90? { :482437}    Hypothyroidism Follow-up      Since last visit, patient describes the following symptoms: { :341430::\"Weight stable, no hair loss, no skin changes, no constipation, no loose stools\"}      Current providers sharing in care for this patient include: {Rooming staff:  Please update Care Team from storyboard, refresh this note and then delete this statement}  Patient Care Team:  Vee Beth MD as PCP - General (Family Practice)  Vee Beth MD as MD (Family Practice)  Vee Beth MD as Assigned PCP  Julius Smith NP as Assigned Heart and Vascular Provider    The following health maintenance items are reviewed in Epic and correct as of today:  Health Maintenance   Topic Date Due     COVID-19 Vaccine (4 - Booster for Pfizer series) 11/29/2021     MEDICARE ANNUAL WELLNESS VISIT  08/12/2022     MICROALBUMIN  08/12/2022     ANNUAL REVIEW OF HM ORDERS  08/12/2022     LUNG CANCER SCREENING  " "2022     BMP  2023     TSH W/FREE T4 REFLEX  2023     DTAP/TDAP/TD IMMUNIZATION (3 - Td or Tdap) 2023     FALL RISK ASSESSMENT  2023     COLORECTAL CANCER SCREENING  2024     ADVANCE CARE PLANNING  2026     LIPID  2027     HEPATITIS C SCREENING  Completed     PHQ-2 (once per calendar year)  Completed     INFLUENZA VACCINE  Completed     Pneumococcal Vaccine: 65+ Years  Completed     ZOSTER IMMUNIZATION  Completed     IPV IMMUNIZATION  Aged Out     MENINGITIS IMMUNIZATION  Aged Out     {Chronicprobdata (optional):383366}  {Decision Support (Optional):051036}        Review of Systems  {ROS COMP (Optional):622071}    OBJECTIVE:   There were no vitals taken for this visit. Estimated body mass index is 27.57 kg/m  as calculated from the following:    Height as of 22: 1.854 m (6' 1\").    Weight as of 22: 94.8 kg (209 lb).  Physical Exam  {Exam (Optional) :541263}    {Diagnostic Test Results (Optional):662889::\"Diagnostic Test Results:\",\"Labs reviewed in Epic\"}    ASSESSMENT / PLAN:   {Diag Picklist:389713}    {Patient advised of split billing (Optional):642557}      COUNSELING:  {Medicare Counselin}      BMI:   Estimated body mass index is 27.57 kg/m  as calculated from the following:    Height as of 22: 1.854 m (6' 1\").    Weight as of 22: 94.8 kg (209 lb).   {Weight Management Plan needed for ACO:495229}      He reports that he quit smoking about 4 months ago. He has a 30.00 pack-year smoking history. He has never used smokeless tobacco.      Appropriate preventive services were discussed with this patient, including applicable screening as appropriate for cardiovascular disease, diabetes, osteopenia/osteoporosis, and glaucoma.  As appropriate for age/gender, discussed screening for colorectal cancer, prostate cancer, breast cancer, and cervical cancer. Checklist reviewing preventive services available has been given to the patient.    Reviewed " patients plan of care and provided an AVS. The {CarePlan:620160} for Dev meets the Care Plan requirement. This Care Plan has been established and reviewed with the {PATIENT, FAMILY MEMBER, CAREGIVER:645574}.    {Counseling Resources  [place] Preventive Services Task Force  Cholesterol Screening  Health diet/nutrition  Pooled Cohorts Equation Calculator  USDA's MyPlate  ASA Prophylaxis  Lung CA Screening  Osteoporosis prevention/bone health :568130}  {Prostate Cancer Screening  Consider for men 55-69 per guidance from USPSTF :005820}    Vee Beth MD  [facility]    Identified Health Risks:

## 2022-11-16 NOTE — LETTER
[facility]  [address] [place] [address]  [phone]                    November 28, 2022    Dev Powell  [address] [place] [address]      Dear Dev,    Here is a summary of your recent test results:    TSH (thyroid stimulating hormone) is abnormal suggesting you are currently underreplaced.  ADVISE: changing your medication dose to flipping your 150mcg to 4x/week and 137mcg to 3x/week  (new prescriptions  have  been sent to your pharmacy) and rechecking your TSH , free t4 and total t3 with a lab appointment in 3 months.     otherwise All of your other labs are normal, improved, or pretty stable from previous.     Please,  continue your current medications and/or supplements and follow up as we discussed at your last visit.                Thank you very much for trusting [facility].     Healthy regards,         Vee Beth M.D.          Results for orders placed or performed in visit on 11/16/22   Albumin Random Urine Quantitative with Creat Ratio     Status: Abnormal   Result Value Ref Range    Albumin Urine mg/L 114.0 mg/L    Albumin Urine mg/g Cr 73.08 (H) 0.00 - 17.00 mg/g Cr    Creatinine Urine mg/dL 156.0 mg/dL   CBC with platelets     Status: Normal   Result Value Ref Range    WBC Count 5.6 4.0 - 11.0 10e3/uL    RBC Count 4.73 4.40 - 5.90 10e6/uL    Hemoglobin 14.8 13.3 - 17.7 g/dL    Hematocrit 44.1 40.0 - 53.0 %    MCV 93 78 - 100 fL    MCH 31.3 26.5 - 33.0 pg    MCHC 33.6 31.5 - 36.5 g/dL    RDW 13.2 10.0 - 15.0 %    Platelet Count 236 150 - 450 10e3/uL   Comprehensive metabolic panel     Status: Normal   Result Value Ref Range    Sodium 138 136 - 145 mmol/L    Potassium 4.2 3.4 - 5.3 mmol/L    Chloride 103 98 - 107 mmol/L    Carbon Dioxide (CO2) 26 22 - 29 mmol/L    Anion Gap 9 7 - 15 mmol/L    Urea Nitrogen 22.2 8.0 - 23.0 mg/dL    Creatinine 1.00 0.67 - 1.17 mg/dL    Calcium 9.2 8.8 - 10.2 mg/dL     Glucose 98 70 - 99 mg/dL    Alkaline Phosphatase 94 40 - 129 U/L    AST 19 10 - 50 U/L    ALT 14 10 - 50 U/L    Protein Total 7.3 6.4 - 8.3 g/dL    Albumin 4.5 3.5 - 5.2 g/dL    Bilirubin Total 0.8 <=1.2 mg/dL    GFR Estimate 78 >60 mL/min/1.73m2   Hemoglobin A1c     Status: Normal   Result Value Ref Range    Hemoglobin A1C 5.6 0.0 - 5.6 %   Lipid panel reflex to direct LDL Fasting     Status: Normal   Result Value Ref Range    Cholesterol 154 <200 mg/dL    Triglycerides 100 <150 mg/dL    Direct Measure HDL 63 >=40 mg/dL    LDL Cholesterol Calculated 71 <=100 mg/dL    Non HDL Cholesterol 91 <130 mg/dL    Narrative    Cholesterol  Desirable:  <200 mg/dL    Triglycerides  Normal:  Less than 150 mg/dL  Borderline High:  150-199 mg/dL  High:  200-499 mg/dL  Very High:  Greater than or equal to 500 mg/dL    Direct Measure HDL  Female:  Greater than or equal to 50 mg/dL   Male:  Greater than or equal to 40 mg/dL    LDL Cholesterol  Desirable:  <100mg/dL  Above Desirable:  100-129 mg/dL   Borderline High:  130-159 mg/dL   High:  160-189 mg/dL   Very High:  >= 190 mg/dL    Non HDL Cholesterol  Desirable:  130 mg/dL  Above Desirable:  130-159 mg/dL  Borderline High:  160-189 mg/dL  High:  190-219 mg/dL  Very High:  Greater than or equal to 220 mg/dL   TSH     Status: Abnormal   Result Value Ref Range    TSH 6.49 (H) 0.30 - 4.20 uIU/mL   T4 free     Status: Normal   Result Value Ref Range    Free T4 1.47 0.90 - 1.70 ng/dL   T3 total     Status: Normal   Result Value Ref Range    T3 Total 86 85 - 202 ng/dL   Sex Hormone Binding Globulin     Status: Normal   Result Value Ref Range    Sex Hormone Binding Globulin 30 11 - 80 nmol/L   Testosterone Free and Total     Status: None   Result Value Ref Range    Free Testosterone Calculated 5.81 ng/dL    Testosterone Total 281 240 - 950 ng/dL    Narrative    This test was developed and its performance characteristics determined by the [facility]. It has not been cleared or approved by the FDA. The laboratory is regulated under CLIA as qualified to perform high-complexity testing. This test is used for clinical purposes. It should not be regarded as investigational or for research.   Testosterone Free and Total     Status: None    Narrative    The following orders were created for panel order Testosterone Free and Total.  Procedure                               Abnormality         Status                     ---------                               -----------         ------                     Sex Hormone Binding Glob...[[id number]]  Normal              Final result               Testosterone Free and Total[[id number]]                      Final result                 Please view results for these tests on the individual orders.

## 2022-11-16 NOTE — PROGRESS NOTES
SUBJECTIVE:   Dev is a 77 year old who presents for Preventive Visit and the following other medical problems:      1. Encounter for annual wellness exam in Medicare patient    2. Atrial flutter, unspecified type (H)- currently asymptomatic - needs cardiology follow up     3. Tubular adenoma 4/2019 - repeat 5 years    4. Bilateral hearing loss, unspecified hearing loss type    5. 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 -  quit 7/2022    6. Personal history of tobacco use- quit 7/2022     7. Tobacco use disorder, moderate, in early remission- quit 7/2022     8. Essential hypertension with goal blood pressure less than 140/90- well controlled today - needs labs and med refills     9. Elevated fasting glucose- needs lab followup     10. Enlarged prostate- has seen  Dr. Solomon in the past - no further PSA's needed as of 7/2019 - doesn't need to see him again per Dr. Solomon unless gross hematuria     11. Gastroesophageal reflux disease without esophagitis = ran out of PPI- has been taking lots of TUMS    12. Hyperlipidemia LDL goal <130 - pravastatin = leg cramps; lipitor =calf pains    13. Hypothyroidism, unspecified type- needs lab follow up and med refills today     14. Non-recurrent unilateral inguinal hernia without obstruction or gangrene- not evident on exam today 11/16/2022     15. Alcoholic intoxication with complication (H)- history of - now 4-5 drinks/week max 2 drinks at a time - gin & tonics - about 1-2 oz of alcohol per drink     16. Orthostatic hypotension - got lightheaded  Dizziness and a bit hypotensive  with 30mg lisinopril - now back down to 20mg= improved      17. Hypothyroidism, unspecified type    18. Testosterone deficiency    19. Hypogonadism in male    20. Vasomotor rhinitis    21. Undiagnosed cardiac murmurs - 2/6 low pitched systolic murmur     22. History of malignant melanoma    23. Sun-damaged skin          Patient has been advised of split billing requirements and indicates  "understanding: Yes  Are you in the first 12 months of your Medicare coverage?  No    Healthy Habits:     In general, how would you rate your overall health?  Good    Frequency of exercise:  None    Do you usually eat at least 4 servings of fruit and vegetables a day, include whole grains    & fiber and avoid regularly eating high fat or \"junk\" foods?  No    Taking medications regularly:  Yes    Medication side effects:  None    Ability to successfully perform activities of daily living:  No assistance needed    Home Safety:  No safety concerns identified    Hearing Impairment:  Difficulty following a conversation in a noisy restaurant or crowded room and difficulty understanding soft or whispered speech    In the past 6 months, have you been bothered by leaking of urine?  No    In general, how would you rate your overall mental or emotional health?  Good      PHQ-2 Total Score: 2    Additional concerns today:  No      Have you ever done Advance Care Planning? (For example, a Health Directive, POLST, or a discussion with a medical provider or your loved ones about your wishes): No, advance care planning information given to patient to review.  Advanced care planning was discussed at today's visit.       Fall risk  Fallen 2 or more times in the past year?: No  Any fall with injury in the past year?: No    Cognitive Screening   1) Repeat 3 items (Leader, Season, Table)    2) Clock draw: NORMAL  3) 3 item recall: Recalls 3 objects  Results: 3 items recalled: COGNITIVE IMPAIRMENT LESS LIKELY    Mini-CogTM Copyright S Leonor. Licensed by the author for use in [facility]; reprinted with permission (inés@.[facility]). All rights reserved.      Do you have sleep apnea, excessive snoring or daytime drowsiness?: no    Reviewed and updated as needed this visit by clinical staff   Tobacco  Allergies  Meds  Problems  Med Hx  Surg Hx  Fam Hx          Reviewed and updated as needed this visit by Provider.    Tobacco "  Allergies  Meds  Problems  Med Hx  Surg Hx  Fam Hx         Social History     Tobacco Use     Smoking status: Former     Packs/day: 0.50     Years: 60.00     Pack years: 30.00     Types: Cigarettes     Quit date: 2022     Years since quittin.3     Smokeless tobacco: Never     Tobacco comments:     strongly encourage smoking cessation with every visit   Substance Use Topics     Alcohol use: Yes     Alcohol/week: 0.0 standard drinks     Comment: not regular - very occasional 1-2 martinis when out to dinner 1-2x/month, if that          Alcohol Use 2022   Prescreen: >3 drinks/day or >7 drinks/week? No   Prescreen: >3 drinks/day or >7 drinks/week? -         Hyperlipidemia Follow-Up:     Are you regularly taking any medication or supplement to lower your cholesterol?   Yes- rosuvastatin    Are you having muscle aches or other side effects that you think could be caused by your cholesterol lowering medication?  No    Recent Labs   Lab Test 22  1008 21  1246 16  0842 01/30/15  1305   CHOL 133 145   < > 237*   HDL 59 63   < > 46   LDL 58 66   < > 149*   TRIG 82 79   < > 208*   CHOLHDLRATIO  --   --   --  5.2*    < > = values in this interval not displayed.      Hypertension Follow-up:       Do you check your blood pressure regularly outside of the clinic? Yes - tends to run 130/70's.      Are you following a low salt diet? No    Are your blood pressures ever more than 140 on the top number (systolic) OR more   than 90 on the bottom number (diastolic), for example 140/90? No     Was getting lightheaded  Dizziness and a bit hypotensive  with 30mg lisinopril - now back down to 20mg.     BP Readings from Last 6 Encounters:   22 128/84   22 124/84   22 107/74   22 (!) 143/103   22 106/73   21 (!) 145/100        Hypothyroidism Follow-up:       Since last visit, patient describes the following symptoms: Weight stable, no hair loss, no skin changes, no  constipation, no loose stools    TSH   Date Value Ref Range Status   08/23/2022 3.74 0.40 - 4.00 mU/L Final   05/14/2020 1.65 0.40 - 4.00 mU/L Final          Current providers sharing in care for this patient include:   Patient Care Team:  Vee Beth MD as PCP - General (Family Practice)  Vee Beth MD as MD (Family Practice)  Vee Beth MD as Assigned PCP  Julius Smith NP as Assigned Heart and Vascular Provider    The following health maintenance items are reviewed in Epic and correct as of today:  Health Maintenance   Topic Date Due     MICROALBUMIN  08/12/2022     LUNG CANCER SCREENING  08/13/2022     BMP  08/23/2023     TSH W/FREE T4 REFLEX  08/23/2023     DTAP/TDAP/TD IMMUNIZATION (3 - Td or Tdap) 09/18/2023     MEDICARE ANNUAL WELLNESS VISIT  11/16/2023     ANNUAL REVIEW OF HM ORDERS  11/16/2023     FALL RISK ASSESSMENT  11/16/2023     COLORECTAL CANCER SCREENING  04/29/2024     LIPID  08/23/2027     ADVANCE CARE PLANNING  11/16/2027     HEPATITIS C SCREENING  Completed     PHQ-2 (once per calendar year)  Completed     INFLUENZA VACCINE  Completed     Pneumococcal Vaccine: 65+ Years  Completed     ZOSTER IMMUNIZATION  Completed     COVID-19 Vaccine  Completed     IPV IMMUNIZATION  Aged Out     MENINGITIS IMMUNIZATION  Aged Out     Lab work is in process  Labs reviewed in EPIC  BP Readings from Last 3 Encounters:   11/16/22 128/84   08/23/22 124/84   07/22/22 107/74    Wt Readings from Last 3 Encounters:   11/16/22 96.6 kg (213 lb)   08/23/22 94.8 kg (209 lb)   07/22/22 93 kg (205 lb)                  Patient Active Problem List   Diagnosis     Gastroesophageal reflux disease without esophagitis = ran out of PPI- has been taking lots of TUMS     Hyperlipidemia LDL goal <130 - pravastatin = leg cramps; lipitor =calf pains     Snoring     Vasculogenic erectile dysfunction, unspecified vasculogenic erectile dysfunction type     Osteoarthritis     Lipoma of skin      Gastric ulcer     Malignant melanoma of left upper extremity including shoulder (H)     Tobacco use disorder, moderate, in early remission     Enlarged prostate- has seen  Dr. Solomon in the past - no further PSA's needed as of 7/2019 - doesn't need to see him again per Dr. Solomon unless gross hematuria      Rectal pain     Hearing loss     Inguinal hernia- right      Pulmonary nodules- tiny per screening chest CT -  repeat CT chest low dose w/o contrast 4/2017 = no change since 2015 - no need for further CT's for these nodules but ok for lung ca screening      Essential hypertension with goal blood pressure less than 140/90     Testosterone deficiency     Hypothyroidism, unspecified type     Chronic constipation     Mass of left chest wall - ? there for 18+ years -left breast 1cm mass at 7:30      Multiple pigmented nevi     Reducible right inguinal hernia     Undiagnosed cardiac murmurs     Elevated fasting glucose     Tubular adenoma 4/2019 - repeat 5 years     Alcoholic intoxication with complication (H)- history of - now 4-5 drinks/week max 2 drinks at a time - gin & tonics - about 1-2 oz of alcohol per drink      Atrial flutter, unspecified type (H)- has seen Dr. Harp - [facility] in the past      Nocturia- up 1-3x/night      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 -  quit 7/2022     Nicotine dependence, uncomplicated, unspecified nicotine product type     Orthostatic hypotension - got lightheaded  Dizziness and a bit hypotensive  with 30mg lisinopril - now back down to 20mg= improved       Personal history of tobacco use- quit 7/2022      Past Surgical History:   Procedure Laterality Date     COLONOSCOPY  9/25/2007    repeat in 2017     DAVINCI HERNIORRHAPHY INGUINAL Right 10/24/2019    Procedure: robotic assisted right inguinal hernia repair with mesh;  Surgeon: Rom Mccoy MD;  Location: [facility] OR     ESOPHAGOSCOPY, GASTROSCOPY, DUODENOSCOPY (EGD), COMBINED N/A 2/5/2015    Procedure: COMBINED  ESOPHAGOSCOPY, GASTROSCOPY, DUODENOSCOPY (EGD);  Surgeon: Ender Dixon MD;  Location:  GI      ESOPHAGOSCOPY, DIAGNOSTIC  2007    EGD - gastric ulcer w/ erosions/ LA grade B erosive esophagitis     wide excision      for malignant melanoma       Social History     Tobacco Use     Smoking status: Former     Packs/day: 0.50     Years: 60.00     Pack years: 30.00     Types: Cigarettes     Quit date: 2022     Years since quittin.3     Smokeless tobacco: Never     Tobacco comments:     strongly encourage smoking cessation with every visit   Substance Use Topics     Alcohol use: Yes     Alcohol/week: 0.0 standard drinks     Comment: not regular - very occasional 1-2 martinis when out to dinner 1-2x/month, if that      Family History   Problem Relation Age of Onset     C.A.D. Father 62         at age 62 of MI     Diabetes Father         early type 2      Neurologic Disorder Mother         mild dementia - @ 92     C.A.D. Mother         angina      Thyroid Disease Mother         s/p thyroid      Colon Cancer No family hx of          Current Outpatient Medications   Medication Sig Dispense Refill     aspirin (ASA) 81 MG EC tablet Take 1 tablet (81 mg) by mouth daily       ezetimibe (ZETIA) 10 MG tablet Take 1 tablet (10 mg) by mouth daily 90 tablet 3     Glucosamine-Chondroit-Vit C-Mn (GLUCOSAMINE CHONDROITIN 1500 COMPLEX) CAPS Take by mouth daily       ipratropium (ATROVENT) 0.03 % nasal spray USE 2 SPRAYS INTO BOTH NOSTRILS EVERY 12 HOURS 90 mL 3     levothyroxine (SYNTHROID/LEVOTHROID) 137 MCG tablet TAKE 1 TAB (TU/TH/SAT/SUN) ALTERNATING WITH 150MCG TABS 60 tablet 3     levothyroxine (SYNTHROID/LEVOTHROID) 150 MCG tablet TAKE 1 TAB BY MOUTH 3 X A WEEK (MON/WED/FRI) ALTERNATE WITH  MCG TABLET ON THE OTHER DAYS 45 tablet 3     lisinopril (ZESTRIL) 20 MG tablet Take 1 tablet (20 mg) by mouth daily 90 tablet 3     meclizine (ANTIVERT) 25 MG tablet Take 1 tablet (25 mg) by mouth  every 6 hours as needed for dizziness 30 tablet 1     Multiple Vitamins-Minerals (CENTRUM SILVER) per tablet Take 1 tablet by mouth daily 30 tablet      omeprazole (PRILOSEC) 40 MG DR capsule TAKE 1 CAPSULE BY MOUTH ONCE DAILY TAKE 30-60 MINUTES BEFORE A MEAL. 90 capsule 2     rosuvastatin (CRESTOR) 10 MG tablet TAKE 1 TABLET BY MOUTH ONCE DAILY AT BEDTIME FOR CHOLESTEROL 90 tablet 3     sildenafil (REVATIO) 20 MG tablet TAKE 2-3 TABLETS BY MOUTH TWICE A WEEK 90 tablet 3     testosterone (ANDROGEL 1 % PUMP) 12.5 MG/ACT (1%) gel APPLY 2 PUMPS ONTO CLEAN DRY HAIRLESS SKIN OF SHOULDERS, UPPER ARMS OR ABDOMEN ONCE DAILY 150 g 2     rosuvastatin (CRESTOR) 40 MG tablet Take 1 tablet (40 mg) by mouth daily (Patient not taking: Reported on 11/16/2022) 90 tablet 3     Allergies   Allergen Reactions     Atorvastatin Other (See Comments)     Calf pain     Pravastatin Other (See Comments)     Leg cramps     Recent Labs   Lab Test 08/23/22  1008 07/18/22  1301 01/13/22  1046 08/12/21  1246 03/05/21  0808 12/14/20  0727 10/14/20  0736 07/15/20  1315 05/14/20  1528 11/21/19  0737 10/24/19  0708 09/12/19  1048 03/28/19  1604   0000   A1C  --   --   --   --   --   --   --  5.8*  --   --   --  5.9* 6.0*  --    LDL 58  --   --  66 34 88   < > 95  --   --   --  110* 116*  --    HDL 59  --   --  63 58 63   < > 58  --   --   --  46 60  --    TRIG 82  --   --  79 73 75   < > 79  --   --   --  98 57  --    ALT 34  --   --  30  --  18   < >  --  19  --   --  17  --   --    CR 1.14 1.18   < > 1.21  --   --   --   --  1.07  --  1.00 1.03  --   --    GFRESTIMATED 66 64   < > 58*  --   --   --   --  67  --  74 71  --   --    GFRESTBLACK  --   --   --   --   --   --   --   --  78  --  86 82  --   --    POTASSIUM 4.3 4.4   < > 4.4  --   --   --   --  4.2  --  3.9 4.5  --   --    TSH 3.74 4.43*  --  0.14*  --   --   --   --  1.65   < >  --   --  1.83   < >    < > = values in this interval not displayed.      Pneumonia Vaccine:For adults 65 years  "or older who do not have an immunocompromising condition, cerebrospinal fluid leak, or cochlear implant and want to receive PCV13 AND PPSV23: Administer 1 dose of PCV13 first then give 1 dose of PPSV23 at least 1 year later. If the patient already received PPSV23, give the dose of PCV13 at least 1 year after they received the most recent dose of PPSV23. Anyone who received any doses of PPSV23 before age 65 should receive 1 final dose of the vaccine at age 65 or older. Administer this last dose at least 5 years after the prior PPSV23 dose.        Review of Systems   Constitutional: Negative for chills and fever.   HENT: Positive for hearing loss. Negative for congestion, ear pain and sore throat.    Eyes: Negative for pain and visual disturbance.   Respiratory: Positive for cough. Negative for shortness of breath.    Cardiovascular: Negative for chest pain, palpitations and peripheral edema.   Gastrointestinal: Positive for constipation. Negative for abdominal pain, diarrhea, heartburn, hematochezia and nausea.   Genitourinary: Positive for impotence and urgency. Negative for dysuria, frequency, genital sores, hematuria and penile discharge.   Musculoskeletal: Negative for arthralgias, joint swelling and myalgias.   Skin: Negative for rash.   Neurological: Positive for dizziness. Negative for weakness and paresthesias.   Psychiatric/Behavioral: Negative for mood changes. The patient is not nervous/anxious.          OBJECTIVE:   /84   Pulse 106   Temp 97  F (36.1  C)   Resp 18   Ht 1.854 m (6' 1\")   Wt 96.6 kg (213 lb)   SpO2 97%   BMI 28.10 kg/m   Estimated body mass index is 28.1 kg/m  as calculated from the following:    Height as of this encounter: 1.854 m (6' 1\").    Weight as of this encounter: 96.6 kg (213 lb).  Physical Exam  GENERAL: healthy, alert and no distress  EYES: Eyes grossly normal to inspection, PERRL and conjunctivae and sclerae normal  HENT: ear canals and TM's normal, nose and mouth " without ulcers or lesions  NECK: no adenopathy, no asymmetry, masses, or scars and thyroid normal to palpation  RESP: lungs clear to auscultation - no rales, rhonchi or wheezes  BREAST: normal without masses, tenderness or nipple discharge and no palpable axillary masses or adenopathy  CV: regular rate and rhythm, normal S1 S2, no S3 or S4, no murmur, click or rub, no peripheral edema and peripheral pulses strong  ABDOMEN: soft, nontender, no hepatosplenomegaly, no masses and bowel sounds normal   (male): normal male genitalia without lesions or urethral discharge, no hernia.  Pt declined prostate/rectal exam today.   MS: no gross musculoskeletal defects noted, no edema  SKIN: no suspicious lesions or rashes  NEURO: Normal strength and tone, mentation intact and speech normal  PSYCH: mentation appears normal, affect normal/bright    Note:pt declined a chaperone for the genital and rectal exams. --Vee Beth MD       Diagnostic Test Results:  Labs reviewed in Epic    ASSESSMENT / PLAN:       ICD-10-CM    1. Encounter for annual wellness exam in Medicare patient  Z00.00       2. Atrial flutter, unspecified type (H)- currently asymptomatic - needs cardiology follow up   I48.92 Adult Cardiology Eval  Referral      3. Tubular adenoma 4/2019 - repeat 5 years  D36.9       4. Bilateral hearing loss, unspecified hearing loss type  H91.93 Adult ENT  Referral      5. 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 -  quit 7/2022  Z72.0       6. Personal history of tobacco use- quit 7/2022   Z87.891 Prof fee: Shared Decision Making for Lung Cancer Screening     CT Chest Lung Cancer Scrn Low Dose wo      7. Tobacco use disorder, moderate, in early remission- quit 7/2022   F17.201       8. Essential hypertension with goal blood pressure less than 140/90- well controlled today - needs labs and med refills   I10 REVIEW OF HEALTH MAINTENANCE PROTOCOL ORDERS     Albumin Random Urine Quantitative with  Creat Ratio     CBC with platelets     Comprehensive metabolic panel      9. Elevated fasting glucose- needs lab followup   R73.01 Comprehensive metabolic panel     Hemoglobin A1c      10. Enlarged prostate- has seen  Dr. Solomon in the past - no further PSA's needed as of 7/2019 - doesn't need to see him again per Dr. Solomon unless gross hematuria   N40.0       11. Gastroesophageal reflux disease without esophagitis = ran out of PPI- has been taking lots of TUMS  K21.9       12. Hyperlipidemia LDL goal <130 - pravastatin = leg cramps; lipitor =calf pains  E78.5 REVIEW OF HEALTH MAINTENANCE PROTOCOL ORDERS     Albumin Random Urine Quantitative with Creat Ratio     Comprehensive metabolic panel     Lipid panel reflex to direct LDL Fasting      13. Hypothyroidism, unspecified type- needs lab follow up and med refills today   E03.9 TSH     T4 free     T3 total      14. Non-recurrent unilateral inguinal hernia without obstruction or gangrene- not evident on exam today 11/16/2022   K40.90       15. Alcoholic intoxication with complication (H)- history of - now 4-5 drinks/week max 2 drinks at a time - gin & tonics - about 1-2 oz of alcohol per drink   F10.929       16. Orthostatic hypotension - got lightheaded  Dizziness and a bit hypotensive  with 30mg lisinopril - now back down to 20mg= improved    I95.1       17. Hypothyroidism, unspecified type  E03.9 levothyroxine (SYNTHROID/LEVOTHROID) 137 MCG tablet     levothyroxine (SYNTHROID/LEVOTHROID) 150 MCG tablet      18. Testosterone deficiency  E34.9 testosterone (ANDROGEL 1 % PUMP) 12.5 MG/ACT (1%) gel     Testosterone Free and Total      19. Hypogonadism in male  E29.1 testosterone (ANDROGEL 1 % PUMP) 12.5 MG/ACT (1%) gel     Testosterone Free and Total      20. Vasomotor rhinitis  J30.0 ipratropium (ATROVENT) 0.03 % nasal spray      21. Undiagnosed cardiac murmurs - 2/6 low pitched systolic murmur   R01.1 Echocardiogram Complete      22. History of malignant melanoma   "Z85.820 Adult Dermatology Referral      23. Sun-damaged skin  L57.8 Adult Dermatology Referral          Patient has been advised of split billing requirements and indicates understanding: Yes      COUNSELING:  Reviewed preventive health counseling, as reflected in patient instructions      BMI:   Estimated body mass index is 28.1 kg/m  as calculated from the following:    Height as of this encounter: 1.854 m (6' 1\").    Weight as of this encounter: 96.6 kg (213 lb).       He reports that he quit smoking about 4 months ago. He has a 30.00 pack-year smoking history. He has never used smokeless tobacco.      Appropriate preventive services were discussed with this patient, including applicable screening as appropriate for cardiovascular disease, diabetes, osteopenia/osteoporosis, and glaucoma.  As appropriate for age/gender, discussed screening for colorectal cancer, prostate cancer, breast cancer, and cervical cancer. Checklist reviewing preventive services available has been given to the patient.    Reviewed patients plan of care and provided an AVS. The Complex Care Plan (for patients with higher acuity and needing more deliberate coordination of services) for Dev meets the Care Plan requirement. This Care Plan has been established and reviewed with the Patient.                  Vee Beth MD  [facility]      Identified Health Risks:  Lung Cancer Screening Shared Decision Making Visit     Dev Powell, a 77 year old male, is eligible for lung cancer screening    History   Smoking Status     Former     Packs/day: 0.50     Years: 60.00     Quit date: 7/1/2022   Smokeless Tobacco     Never     Comment: strongly encourage smoking cessation with every visit       I have discussed with patient the risks and benefits of screening for lung cancer with low-dose CT.     The risks include:    radiation exposure: one low dose chest CT has as much ionizing radiation as about 15 chest " x-rays, or 6 months of background radiation living in [place]      false positives: most findings/nodules are NOT cancer, but some might still require additional diagnostic evaluation, including biopsy    over-diagnosis: some slow growing cancers that might never have been clinically significant will be detected and treated unnecessarily     The benefit of early detection of lung cancer is contingent upon adherence to annual screening or more frequent follow up if indicated.     Furthermore, to benefit from screening, Dev must be willing and able to undergo diagnostic procedures, if indicated. Although no specific guide is available for determining severity of comorbidities, it is reasonable to withhold screening in patients who have greater mortality risk from other diseases.     We did discuss that the best way to prevent lung cancer is to not smoke.    Some patients may value a numeric estimation of lung cancer risk when evaluating if lung cancer screening is right for them, here is one calculator:    ShouldIScreen

## 2022-11-17 LAB — SHBG SERPL-SCNC: 30 NMOL/L (ref 11–80)

## 2022-11-21 LAB
TESTOST FREE SERPL-MCNC: 5.81 NG/DL
TESTOST SERPL-MCNC: 281 NG/DL (ref 240–950)

## 2022-11-22 ENCOUNTER — OFFICE VISIT (OUTPATIENT)
Dept: DERMATOLOGY | Facility: CLINIC | Age: 77
End: 2022-11-22
Payer: COMMERCIAL

## 2022-11-22 DIAGNOSIS — D22.9 NEVUS: Primary | ICD-10-CM

## 2022-11-22 DIAGNOSIS — L57.8 SUN-DAMAGED SKIN: ICD-10-CM

## 2022-11-22 DIAGNOSIS — L81.4 LENTIGO: ICD-10-CM

## 2022-11-22 DIAGNOSIS — L82.1 SEBORRHEIC KERATOSIS: ICD-10-CM

## 2022-11-22 DIAGNOSIS — D18.01 ANGIOMA OF SKIN: ICD-10-CM

## 2022-11-22 DIAGNOSIS — Z85.820 HISTORY OF MALIGNANT MELANOMA: ICD-10-CM

## 2022-11-22 PROCEDURE — 99203 OFFICE O/P NEW LOW 30 MIN: CPT | Performed by: PHYSICIAN ASSISTANT

## 2022-11-22 NOTE — PROGRESS NOTES
HPI:   Chief complaints: Dev Powell is a pleasant 77 year old male who presents for Full skin cancer screening to rule out skin cancer   Last Skin Exam: 20-yvette years ago      1st Baseline: no  Personal HX of Skin Cancer: yes melanoma in 1996, unsure of depth. Was excised at [facility]   Personal HX of Malignant Melanoma: yes   Family HX of Skin Cancer / Malignant Melanoma: no  Personal HX of Atypical Moles:   no  Risk factors: history of sun exposure and burns  New / Changing lesions:none  Social History:   On review of systems, there are no further skin complaints, patient is feeling otherwise well.   ROS of the following were done and are negative: Constitutional, Eyes, Ears, Nose,   Mouth, Throat, Cardiovascular, Respiratory, GI, Genitourinary, Musculoskeletal,   Psychiatric, Endocrine, Allergic/Immunologic.    PHYSICAL EXAM:   There were no vitals taken for this visit.  Skin exam performed as follows: Type 2 skin. Mood appropriate  Alert and Oriented X 3. Well developed, well nourished in no distress.  General appearance: Normal  Head including face: Normal  Eyes: conjunctiva and lids: Normal  Mouth: Lips, teeth, gums: Normal  Neck: Normal  Chest-breast/axillae: Normal  Back: Normal  Spleen and liver: Normal  Cardiovascular: Exam of peripheral vascular system by observation for swelling, varicosities, edema: Normal  Genitalia: groin, buttocks: Normal  Extremities: digits/nails (clubbing): Normal  Eccrine and Apocrine glands: Normal  Right upper extremity: Normal  Left upper extremity: Normal  Right lower extremity: Normal  Left lower extremity: Normal  Skin: Scalp and body hair: See below    Pt deferred exam of breasts, groin, buttocks: No    Other physical findings:  1. Multiple pigmented macules on extremities and trunk  2. Multiple pigmented macules on face, trunk and extremities  3. Multiple vascular papules on trunk, arms and legs  4. Multiple scattered keratotic plaques       Except as noted above, no other  signs of skin cancer or melanoma.     ASSESSMENT/PLAN:   Benign Full skin cancer screening today. . Patient with history of melanoma  Advised on monthly self exams and 1 year  Patient Education: Appropriate brochures given.    1. Multiple benign appearing melanocytic nevi on arms, legs and trunk. Discussed ABCDEs of melanoma and sunscreen.   2. Multiple lentigos on arms, legs and trunk. Advised benign, no treatment needed.  3. Multiple scattered angiomas. Advised benign, no treatment needed.   4. Seborrheic keratosis on arms, legs and trunk. Advised benign, no treatment needed.            Follow-up: yearly/PRN sooner    1.) Patient was asked about new and changing moles. YES  2.) Patient received a complete physical skin examination: YES  3.) Patient was counseled to perform a monthly self skin examination: YES  Scribed By: Emily Ledezma MS, PA-C

## 2022-11-27 DIAGNOSIS — E03.9 HYPOTHYROIDISM, UNSPECIFIED TYPE: ICD-10-CM

## 2022-11-27 RX ORDER — LEVOTHYROXINE SODIUM 150 UG/1
TABLET ORAL
Qty: 60 TABLET | Refills: 3 | Status: SHIPPED | OUTPATIENT
Start: 2022-11-27 | End: 2023-11-15

## 2022-11-27 RX ORDER — LEVOTHYROXINE SODIUM 137 UG/1
TABLET ORAL
Qty: 45 TABLET | Refills: 3 | Status: SHIPPED | OUTPATIENT
Start: 2022-11-27 | End: 2023-11-15

## 2022-11-28 NOTE — RESULT ENCOUNTER NOTE
Staff: please put below results into letter and mail letter and copy of results to pt.       -TSH (thyroid stimulating hormone) is abnormal suggesting you are currently underreplaced.  ADVISE: changing your medication dose to flipping your 150mcg to 4x/week and 137mcg to 3x/week  (new prescriptions  have  been sent to your pharmacy) and rechecking your TSH , free t4 and total t3 with a lab appointment in 3 months.    otherwise All of your other labs are normal, improved, or pretty stable from previous.     Please,  continue your current medications and/or supplements and follow up as we discussed at your last visit.         For additional lab test information, [email or web address] is an excellent reference.

## 2022-12-12 ENCOUNTER — HOSPITAL ENCOUNTER (OUTPATIENT)
Dept: CARDIOLOGY | Facility: CLINIC | Age: 77
Discharge: HOME | End: 2022-12-12
Attending: FAMILY MEDICINE
Payer: COMMERCIAL

## 2022-12-12 ENCOUNTER — HOSPITAL ENCOUNTER (OUTPATIENT)
Dept: CT IMAGING | Facility: CLINIC | Age: 77
Discharge: HOME | End: 2022-12-12
Attending: FAMILY MEDICINE
Payer: COMMERCIAL

## 2022-12-12 DIAGNOSIS — R01.1 UNDIAGNOSED CARDIAC MURMURS: ICD-10-CM

## 2022-12-12 DIAGNOSIS — Z87.891 PERSONAL HISTORY OF TOBACCO USE: ICD-10-CM

## 2022-12-12 LAB — LVEF ECHO: NORMAL

## 2022-12-12 PROCEDURE — 71271 CT THORAX LUNG CANCER SCR C-: CPT

## 2022-12-12 PROCEDURE — 93306 TTE W/DOPPLER COMPLETE: CPT | Mod: 26 | Performed by: INTERNAL MEDICINE

## 2022-12-12 PROCEDURE — 93306 TTE W/DOPPLER COMPLETE: CPT

## 2022-12-12 NOTE — LETTER
December 15, 2022      Beelm Powell  [address]   [place] [address]        Dear ,    We are writing to inform you of your test results.    There has been progression of your valvular heart disease from 2020. Please discuss this with cardiology when you see them re: Calcium in coronary arteries seen on CT from lung cancer screening ( see other result note).  Referred to cardiology in separate encounter.      For additional lab test information, [email or web address] is an excellent reference.    Resulted Orders   Echocardiogram Complete   Result Value Ref Range    LVEF  60-65%     Narrative    [id number]  [id number]  [id number]  [id number]^ANGELA^JUSTIN^TERRA     [facility]  Echocardiography Laboratory  [address] [place] [address]     Name: BELEM POWELL  MRN: [id number]  : 1945  Study Date: 2022 08:41 AM  Age: 77 yrs  Gender: Male  Patient Location: [facility]  Reason For Study: Undiagnosed cardiac murmurs  Ordering Physician: JUSTIN MILLER  Referring Physician: JUSTIN MILLER  Performed By: Amy Jackson     BSA: 2.2 m2  Height: 73 in  Weight: 213 lb  HR: 74  BP: 153/84 mmHg  ______________________________________________________________________________  Procedure  Complete Echo Adult.  ______________________________________________________________________________  Interpretation Summary     Technically challenging study due to patient body habitus.     Left ventricular size, global systolic function, and wall motion are normal,  estimated LVEF 60-65%.  Right ventricular global function is normal.  Moderate aortic stenosis, Vmax 2.4 m/s, mean gradient 14 mmhg, GENEVA 1.1cm2. DI  0.39. SVi 28 cc/m2. There is trace aortic regurgitation. Valve morphology is  not well visualized, however it is heavily calcified.  Mild mitral stenosis, mean gradient 4 mmHg at 71 bpm. MVA 1.7cm2 (P1/2t).  There is trace mitral regurgitation. There is moderate to  severe mitral  annular calcification.  IVC not well visualized, however it appears small, suggesting possible  relative hypovolemia, clinical correlation recommended.  Mild aortic root dilatation. Max diameter of the visualized portion 3.9 cm.  The ascending aorta is Mildly dilated. Max diameter of the visualized portion  4.2 cm.     This study was compared to resting images from a stress echocardiogram from  9/2/2020. There has been progression of valvular abnormalities.  ______________________________________________________________________________  Left Ventricle  The left ventricle is normal in size. Proximal septal thickening is noted.  Left ventricular systolic function is normal. The visual ejection fraction is  60-65%. Left ventricular diastolic function is not assessable. No regional  wall motion abnormalities noted.     Right Ventricle  The right ventricle is grossly normal size. The right ventricular systolic  function is normal.     Atria  Normal left atrial size. Right atrial size is normal.     Mitral Valve  There is moderate to severe mitral annular calcification. There is trace  mitral regurgitation. Mild mitral stenosis, mean gradient 4 mmHg at 71 bpm.  MVA 1.7cm2 (P1/2t).     Tricuspid Valve  The tricuspid valve is not well visualized, but is grossly normal. There is  trace tricuspid regurgitation. Right ventricular systolic pressure could not  be approximated due to inadequate tricuspid regurgitation.     Aortic Valve  The aortic valve is not well visualized. Valve morphology is not well  visualized, however it is heavily calcified. There is trace aortic  regurgitation. Moderate aortic stenosis, Vmax 2.4 m/s, mean gradient 14 mmhg,  GENEVA 1.1cm2. DI 0.39. SVi 28 cc/m2.     Pulmonic Valve  The pulmonic valve is not well seen, but is grossly normal.     Vessels  Mild aortic root dilatation. Max diameter of the visualized portion 3.9 cm.  The ascending aorta is Mildly dilated. Max diameter of the  visualized portion  4.2 cm. IVC not well visualized, however it appears small, suggesting possible  relative hypovolemia, clinical correlation recommended.     Pericardium  There is no pericardial effusion.     ______________________________________________________________________________  MMode/2D Measurements & Calculations  IVSd: 1.1 cm  LVIDd: 4.4 cm  LVIDs: 2.7 cm  LVPWd: 0.98 cm  FS: 38.5 %  LV mass(C)d: 157.0 grams  LV mass(C)dI: 71.0 grams/m2     Ao root diam: 3.9 cm  asc Aorta Diam: 4.2 cm  LVOT diam: 1.9 cm  LVOT area: 3.0 cm2  LA Volume (BP): 75.0 ml  LA Volume Index (BP): 33.9 ml/m2  RWT: 0.44     Doppler Measurements & Calculations  MV E max les: 78.5 cm/sec  MV A max les: 128.8 cm/sec  MV E/A: 0.61  MV max P.6 mmHg  MV mean P.6 mmHg  MV V2 VTI: 34.8 cm  MVA(VTI): 1.8 cm2     MV P1/2t max les: 98.2 cm/sec  MV P1/2t: 130.6 msec  MVA(P1/2t): 1.7 cm2  MV dec slope: 220.1 cm/sec2  MV dec time: 0.30 sec  Ao V2 max: 236.3 cm/sec  Ao max P.0 mmHg  Ao V2 mean: 181.6 cm/sec  Ao mean P.4 mmHg  Ao V2 VTI: 53.7 cm  GENEVA(I,D): 1.1 cm2  GENEVA(V,D): 1.1 cm2  LV V1 max PG: 3.3 mmHg  LV V1 max: 91.4 cm/sec  LV V1 VTI: 20.7 cm  SV(LVOT): 61.4 ml  SI(LVOT): 27.8 ml/m2  AV Les Ratio (DI): 0.39  GENEVA Index (cm2/m2): 0.52  E/E' av.8  Lateral E/e': 15.8  Medial E/e': 13.9     ______________________________________________________________________________  Report approved by: Janis Fishman 2022 09:48 AM             If you have any questions or concerns, please call the clinic at the number listed above.       Sincerely,      Vee Beth MD

## 2022-12-13 ENCOUNTER — TELEPHONE (OUTPATIENT)
Dept: FAMILY MEDICINE | Facility: CLINIC | Age: 77
End: 2022-12-13

## 2022-12-13 NOTE — TELEPHONE ENCOUNTER
Imaging calling  [phone] for Incidental finding -please review   Ct-Chest  Lung screen , specifically impression # 2      Marianna JARRELL RN   [facility] Triage

## 2022-12-14 NOTE — RESULT ENCOUNTER NOTE
Please call pt with results below: (SEE CT LUNG CANCER SCREENING NOTE AS WELL)    There has been progression of pt's valvular heart disease from 2020. Please discuss this with cardiology when you see them re: Calcium in coronary arteries seen on CT from lung cancer screening ( see other result note).  Referred to cardiology in separate encounter.     For additional lab test information, [email or web address] is an excellent reference.

## 2023-01-02 DIAGNOSIS — E78.5 HYPERLIPIDEMIA LDL GOAL <70: ICD-10-CM

## 2023-01-02 RX ORDER — EZETIMIBE 10 MG/1
10 TABLET ORAL DAILY
Qty: 90 TABLET | Refills: 0 | Status: SHIPPED | OUTPATIENT
Start: 2023-01-02 | End: 2023-03-31

## 2023-01-06 ENCOUNTER — TELEPHONE (OUTPATIENT)
Dept: CARDIOLOGY | Facility: CLINIC | Age: 78
End: 2023-01-06

## 2023-01-06 NOTE — TELEPHONE ENCOUNTER
Patient has an EP appt on 1-11-23 for Atrial flutter. With further review patient does not have Atrial Flutter reviewed EKG by EP Cardiologist and visit not necessary on 1-11-23.  see note below  KASSANDRA Fletcher CNP   5/15/2020  1:12 PM CDT Back to Top     Results discussed directly with patient while patient was present. Any further details documented in the note.   Looked like Aflutter however on closer inspection looks to be artifact.  Reviewed with Dr. Morris and he agrees that its artifact reading as A- flutter.  KASSANDRA Varela CNP        Dr. Harp reviewed the Echo and patient has a follow up OV 2-21-23 ok to wait until annual OV in February.  Will forward to Dr. Beth and request Atrial Flutter be removed from problem list. Please contact patient and inform him that OV with Cardiology is not needed.  Ok to wait until February .    Adelia Rubin RN on 1/6/2023 at 11:32 AM  [phone]

## 2023-01-09 PROBLEM — I48.92 ATRIAL FLUTTER, UNSPECIFIED TYPE (H): Status: ACTIVE | Noted: 2021-08-12

## 2023-01-09 PROBLEM — I48.92 ATRIAL FLUTTER, UNSPECIFIED TYPE (H): Status: RESOLVED | Noted: 2021-08-12 | Resolved: 2023-01-09

## 2023-01-09 NOTE — TELEPHONE ENCOUNTER
Future Appointments 1/9/2023 - 7/8/2023      Date Visit Type Length Department Provider     1/11/2023  8:45 AM NEW EP 30 min [facility] Connor Crowe MD    Location Instructions:     Our clinic is located in the [facility] at [address], [address], [place] [address].  For your convenience we have parking in both our ramp and outside lot.  Please enter off of [address] for easiest access.              2/21/2023  8:15 AM RETURN CARDIOLOGY 30 min [facility] Roderick Harp MD    Location Instructions:     Our clinic is located in the [facility] at [address], [address], [place] [address].  For your convenience we have parking in both our ramp and outside lot.  Please enter off of [address] for easiest access.              3/16/2023  8:00 AM ADULT HEARING EVALUATION 60 min [facility] Rupinder Bledsoe AuD    Location Instructions:     Our clinic is located at:  [facility] [place] [address]  How to find our clinic: This is the building located across the parking lot from the main [facility] entrance. Check in is on the first floor.   Parking: Free parking available on surface lots.  Questions or to Reschedule: Contact our scheduling number: [phone].              5/17/2023  8:40 AM OFFICE VISIT 40 min [facility] Vee Beth MD    Location Instructions:     [facility] is located at [address] [place] [address], along [address]. Free parking is available; access the lot by turning north from [address] onto [address], then west onto [address].                  A flutter removed from problem list.    Triage, please call pt on the below - it looks like the flutter pattern seen on previous EKG was artifact.    1/11/2023 cardiology appointment is not needed per Cardiology  review.  Cancelled per cardiology request.  Please inform patient.

## 2023-01-11 ENCOUNTER — TELEPHONE (OUTPATIENT)
Dept: CARDIOLOGY | Facility: CLINIC | Age: 78
End: 2023-01-11

## 2023-01-11 ENCOUNTER — OFFICE VISIT (OUTPATIENT)
Dept: CARDIOLOGY | Facility: CLINIC | Age: 78
End: 2023-01-11
Payer: COMMERCIAL

## 2023-01-11 VITALS
OXYGEN SATURATION: 99 % | WEIGHT: 212.1 LBS | BODY MASS INDEX: 28.11 KG/M2 | HEART RATE: 100 BPM | SYSTOLIC BLOOD PRESSURE: 102 MMHG | HEIGHT: 73 IN | DIASTOLIC BLOOD PRESSURE: 72 MMHG

## 2023-01-11 DIAGNOSIS — I10 ESSENTIAL HYPERTENSION WITH GOAL BLOOD PRESSURE LESS THAN 140/90: ICD-10-CM

## 2023-01-11 DIAGNOSIS — I25.10 CORONARY ARTERY DISEASE INVOLVING NATIVE CORONARY ARTERY OF NATIVE HEART WITHOUT ANGINA PECTORIS: ICD-10-CM

## 2023-01-11 DIAGNOSIS — Z13.6 SCREENING FOR HEART DISEASE: Primary | ICD-10-CM

## 2023-01-11 DIAGNOSIS — R42 DIZZINESS: ICD-10-CM

## 2023-01-11 DIAGNOSIS — I35.0 NONRHEUMATIC AORTIC VALVE STENOSIS: ICD-10-CM

## 2023-01-11 DIAGNOSIS — G45.9 TIA (TRANSIENT ISCHEMIC ATTACK): ICD-10-CM

## 2023-01-11 PROCEDURE — 99215 OFFICE O/P EST HI 40 MIN: CPT | Mod: 25 | Performed by: INTERNAL MEDICINE

## 2023-01-11 PROCEDURE — 93000 ELECTROCARDIOGRAM COMPLETE: CPT | Performed by: INTERNAL MEDICINE

## 2023-01-11 RX ORDER — LISINOPRIL 20 MG/1
10 TABLET ORAL DAILY
Qty: 90 TABLET | Refills: 3 | COMMUNITY
Start: 2023-01-11 | End: 2023-06-07

## 2023-01-11 NOTE — TELEPHONE ENCOUNTER
After he left I ordered some labs to be done soon for Dev.  I had not mentioned this to him, would you please call him and let him know that I ordered the labs and arrange to have them done?   Thank you       Patient will go to his Primary clinic and have labs drawn per Dr. Harp.  ESR and CRP.

## 2023-01-11 NOTE — LETTER
"1/11/2023    Vee Beth MD  [address] [facility] [address]    RE: Dev Powell       Dear Colleague,     I had the pleasure of seeing Dev Powell in the [facility].  HISTORY:    Dev Powell is a 77-year-old male with a history of severe coronary calcification noted on chest CT.  He is an ex-smoker having recently quit, and has a history of hypertension, hyperlipidemia, and a family history of coronary artery disease but no history of diabetes.  When his calcification was discovered he underwent a stress echo which showed some exercise-induced ventricular ectopy and a questionable apical defect so a nuclear scan was done showing no inducible ischemia.    Dev is seen today with a number of complaints.  He states that starting in July he began having severe dizziness and lightheadedness.  At the time he was very tired and slept all day and his urine was dark and he thought he might be dehydrated.  Try to drink extra water.  He had associated headache, rhinorrhea, cough, and lightheadedness seem to be brought on by movement especially bending over and standing up.  This his stool is black but it been taking Pepto-Bismol, did not have chest pain shortness of breath or other cardiovascular symptoms at the time.  At that time he underwent multiple studies including a BMP, TSH, UA, high-sensitivity troponin, influenza AMB, COVID 19, T4, CBC, and ECG, all essentially normal.  He was felt possibly to have some vertigo and dehydration.    Today Dev reports that he continues to have off-and-on dizziness, this can occur when he is sitting still but is more frequent when he first stands up and especially if he leans over and stands up.  He has never checked his blood pressure with these episodes at least that he can recall.    Last week Dev did some heavy snow shoveling and felt dizzy as he was working.  He then came in the house and was \"totally out of it\".  He states that for " about half an hour he could speak but could not answer questions he cannot remember anything.  He does not think he would have been able to remember his son's name.  This is associate with diaphoresis weakness but no chest pain.  It lasted about half an hour and has not recurred.    An echocardiogram done on December 12 is reviewed him today.  Shows normal left ventricular and right ventricular size and function with a moderate degree of aortic stenosis with a mean gradient of just 14 and an aortic valve area calculated at 1.1 and a DI of 0.39.  He has a low stroke-volume index.  The IVC was noted to be small suggesting possible dehydration and the ascending aorta was noted to be mildly elevated measuring 4.2 cm.      ASSESSMENT/PLAN:    1.  Known coronary artery disease.  The patient was alarmed that his recent screening chest x-ray showed heavy coronary calcification but this has been noted in the past and I reassured him that this is nothing to be concerned about.  He was concerned because it was now described as severe.  A stress test will be repeated (see discussion below).  2.  Dizziness and lightheadedness.  The patient gives a fractured story of dizziness and lightheadedness.  There appears to be some orthostatic component but also activity seems to bring it on.  He has never checked his blood pressure with this and I asked him to do so.  This location also comes on at rest.  I will have him do a stress test to see what his heart rate and blood pressure response to exercises and to see if there are blood pressure or heart rate abnormalities leading to his dizziness.  We will do this as a nuclear stress test to also evaluate for CAD.  3.  Possible TIA.  The patient gives the story of a 1/2-hour episode about 10 days ago where he could not remember anything, even his son's name and he cannot answer questions although he could speak.  He reports no localizing symptoms.  I will have him see neurology for further  evaluation.  4.  Dizziness.  Unexplained, it sounds suspicious for being neurologic or inner ear although does seem to be a component of orthostasis with it, it is also positional.  5.  Possible temporal arteritis.  There is some tenderness in the left temporal region which seems to be just above the temporal artery.  I will arrange a ESR and CRP for screening purposes.  Temporal arteritis would not explain most of his symptoms and he has not had fevers.  6.  Aortic stenosis.  The patient has a moderate degree of aortic stenosis, not enough to explain his symptoms but he will need to be followed for this in the future.    Thank you for inviting me to participate in the care of your patient.  Please don't hesitate to call if I can be of further assistance.  55 minutes were spent today reviewing the chart and other records, interviewing and examining the patient, and documenting our visit.    Chart documentation was completed, in part, with [employer] voice-recognition software. Even though reviewed, some grammatical, spelling, and word errors may remain.       Orders Placed This Encounter   Procedures     EKG 12-lead complete w/read - Clinics     Orders Placed This Encounter   Medications     lisinopril (ZESTRIL) 20 MG tablet     Sig: Take 0.5 tablets (10 mg) by mouth daily     Dispense:  90 tablet     Refill:  3     Medications Discontinued During This Encounter   Medication Reason     lisinopril (ZESTRIL) 20 MG tablet        10 year ASCVD risk: The 10-year ASCVD risk score (Freda DICKERSON, et al., 2019) is: 19.8%    Values used to calculate the score:      Age: 77 years      Sex: Male      Is Non- : No      Diabetic: No      Tobacco smoker: No      Systolic Blood Pressure: 102 mmHg      Is BP treated: Yes      HDL Cholesterol: 63 mg/dL      Total Cholesterol: 154 mg/dL    Encounter Diagnoses   Name Primary?     Screening for heart disease Yes     Coronary artery disease involving native coronary  artery of native heart without angina pectoris      Essential hypertension with goal blood pressure less than 140/90        CURRENT MEDICATIONS:  Current Outpatient Medications   Medication Sig Dispense Refill     aspirin (ASA) 81 MG EC tablet Take 1 tablet (81 mg) by mouth daily       ezetimibe (ZETIA) 10 MG tablet Take 1 tablet (10 mg) by mouth daily 90 tablet 0     Glucosamine-Chondroit-Vit C-Mn (GLUCOSAMINE CHONDROITIN 1500 COMPLEX) CAPS Take by mouth daily       ipratropium (ATROVENT) 0.03 % nasal spray USE 2 SPRAYS INTO BOTH NOSTRILS EVERY 12 HOURS 90 mL 3     levothyroxine (SYNTHROID/LEVOTHROID) 137 MCG tablet TAKE 1 TAB BY MOUTH 3 X A WEEK (MON/WED/FRI) ALTERNATE WITH  MCG TABLET ON THE OTHER DAYS 45 tablet 3     levothyroxine (SYNTHROID/LEVOTHROID) 150 MCG tablet TAKE 1 TAB (TUES/THURS/SAT/SUN) ALTERNATING WITH 137MCG TABS on the other days 60 tablet 3     lisinopril (ZESTRIL) 20 MG tablet Take 0.5 tablets (10 mg) by mouth daily 90 tablet 3     meclizine (ANTIVERT) 25 MG tablet Take 1 tablet (25 mg) by mouth every 6 hours as needed for dizziness 30 tablet 1     Multiple Vitamins-Minerals (CENTRUM SILVER) per tablet Take 1 tablet by mouth daily 30 tablet      omeprazole (PRILOSEC) 40 MG DR capsule TAKE 1 CAPSULE BY MOUTH ONCE DAILY TAKE 30-60 MINUTES BEFORE A MEAL. 90 capsule 2     rosuvastatin (CRESTOR) 10 MG tablet TAKE 1 TABLET BY MOUTH ONCE DAILY AT BEDTIME FOR CHOLESTEROL 90 tablet 3     sildenafil (REVATIO) 20 MG tablet TAKE 2-3 TABLETS BY MOUTH TWICE A WEEK 90 tablet 3     testosterone (ANDROGEL 1 % PUMP) 12.5 MG/ACT (1%) gel APPLY 2 PUMPS ONTO CLEAN DRY HAIRLESS SKIN OF SHOULDERS, UPPER ARMS OR ABDOMEN ONCE DAILY 150 g 2     rosuvastatin (CRESTOR) 40 MG tablet Take 1 tablet (40 mg) by mouth daily (Patient not taking: Reported on 1/11/2023) 90 tablet 3       ALLERGIES     Allergies   Allergen Reactions     Atorvastatin Other (See Comments)     Calf pain     Pravastatin Other (See Comments)      Leg cramps       PAST MEDICAL HISTORY:  Past Medical History:   Diagnosis Date     Elevated blood pressure (not hypertension)      Enlarged prostate- has seen  Dr. Solomon in the past - no further PSA's needed as of 2019 - doesn't need to see him again per Dr. Solomon unless gross hematuria  2013     Erectile dysfunction      Gastric ulcer 2007    EGD @ [facility]-EGD - gastric ulcer w/ erosions/ LA grade B erosive esophagitis     GERD (gastroesophageal reflux disease)     worse lying down at night.      Hyperlipidemia LDL goal < 130     lipitor 40mg= calf pain-off since      Hypertension      Lipoma of skin     chest - biopsied at [facility] = benign      Malignant melanoma (H)      Osteoarthritis     mostly hands and knees      Snoring      Thyroid disease        PAST SURGICAL HISTORY:  Past Surgical History:   Procedure Laterality Date     COLONOSCOPY  2007    repeat in 2017     DAVINCI HERNIORRHAPHY INGUINAL Right 10/24/2019    Procedure: robotic assisted right inguinal hernia repair with mesh;  Surgeon: Rom Mccoy MD;  Location:  OR     ESOPHAGOSCOPY, GASTROSCOPY, DUODENOSCOPY (EGD), COMBINED N/A 2015    Procedure: COMBINED ESOPHAGOSCOPY, GASTROSCOPY, DUODENOSCOPY (EGD);  Surgeon: Ender Dixon MD;  Location:  GI     HC ESOPHAGOSCOPY, DIAGNOSTIC  2007    EGD - gastric ulcer w/ erosions/ LA grade B erosive esophagitis     wide excision      for malignant melanoma       FAMILY HISTORY:  Family History   Problem Relation Age of Onset     C.A.D. Father 62         at age 62 of MI     Diabetes Father         early type 2      Neurologic Disorder Mother         mild dementia - @ 92     C.A.D. 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    Tobacco Use     Smoking status: Former     Packs/day: 0.50     Years: 60.00     Pack years: 30.00     Types: Cigarettes     Quit date: 2022     Years since quittin.5     Smokeless tobacco: Never     Tobacco comments:     strongly encourage smoking cessation with every visit   Vaping Use     Vaping Use: Never used   Substance and Sexual Activity     Alcohol use: Yes     Alcohol/week: 0.0 standard drinks     Comment: not regular - very occasional 1-2 martinis when out to dinner 1-2x/month, if that      Drug use: No     Comment: no herbal meds      Sexual activity: Yes     Partners: Female     Comment:     Other Topics Concern     Parent/sibling w/ CABG, MI or angioplasty before 65F 55M? No      Service Yes     Comment: [facility] - May [id number] - fitted glasses for other servicemen      Weight Concern Yes     Exercise Yes     Comment: stays very active      Seat Belt Yes     Comment: always      Self-Exams Yes     Comment: RANJIT encouraged monthly    Social History Narrative    Has new [employer] puppy - copper and white - Jose - 11 months old - noted 2018 --Vee Beth MD         Wife , Mikayla, doing dialysis 3x/week - has 7 stents in her body. Heart, kidneys, liver.  ---Vee Beth MD         Wife, Mikayla,  2021 - from complications from her severe chronic kidney disease and cardiovascular disease with diabetes.  --Vee Beth MD             Review of Systems:  Skin:  Negative     Eyes:  Positive for glasses  ENT:  Negative hearing loss  Respiratory:  Negative    Cardiovascular:  Negative dizziness;Positive for;lightheadedness  Gastroenterology: Positive for reflux  Genitourinary:  Positive for nocturia  Musculoskeletal:  Positive for back pain  Neurologic:  Negative  "headaches  Psychiatric:  Positive for excessive stress  Heme/Lymph/Imm:  Negative    Endocrine:  Positive for thyroid disorder    Physical Exam:  Vitals: /72   Pulse 100   Ht 1.854 m (6' 1\")   Wt 96.2 kg (212 lb 1.6 oz)   SpO2 99%   BMI 27.98 kg/m      Constitutional:           Skin:           Head:           Eyes:           ENT:           Neck:           Chest:           Cardiac:                    Abdomen:           Vascular:                                        Extremities and Muscular Skeletal:              Neurological:           Psych:        Recent Lab Results:  LIPID RESULTS:  Lab Results   Component Value Date    CHOL 154 11/16/2022    CHOL 107 03/05/2021    HDL 63 11/16/2022    HDL 58 03/05/2021    LDL 71 11/16/2022    LDL 34 03/05/2021    TRIG 100 11/16/2022    TRIG 73 03/05/2021    CHOLHDLRATIO 5.2 (H) 01/30/2015       LIVER ENZYME RESULTS:  Lab Results   Component Value Date    AST 19 11/16/2022    AST 12 05/14/2020    ALT 14 11/16/2022    ALT 18 12/14/2020       CBC RESULTS:  Lab Results   Component Value Date    WBC 5.6 11/16/2022    WBC 7.7 05/14/2020    RBC 4.73 11/16/2022    RBC 4.97 05/14/2020    HGB 14.8 11/16/2022    HGB 15.2 05/14/2020    HCT 44.1 11/16/2022    HCT 45.7 05/14/2020    MCV 93 11/16/2022    MCV 92 05/14/2020    MCH 31.3 11/16/2022    MCH 30.6 05/14/2020    MCHC 33.6 11/16/2022    MCHC 33.3 05/14/2020    RDW 13.2 11/16/2022    RDW 13.6 05/14/2020     11/16/2022     05/14/2020       BMP RESULTS:  Lab Results   Component Value Date     11/16/2022     05/14/2020    POTASSIUM 4.2 11/16/2022    POTASSIUM 4.3 08/23/2022    POTASSIUM 4.2 05/14/2020    CHLORIDE 103 11/16/2022    CHLORIDE 107 08/23/2022    CHLORIDE 107 05/14/2020    CO2 26 11/16/2022    CO2 24 08/23/2022    CO2 25 05/14/2020    ANIONGAP 9 11/16/2022    ANIONGAP 6 08/23/2022    ANIONGAP 6 05/14/2020    GLC 98 11/16/2022    GLC 95 08/23/2022    GLC 97 05/14/2020    BUN 22.2 11/16/2022    " BUN 24 08/23/2022    BUN 29 05/14/2020    CR 1.00 11/16/2022    CR 1.07 05/14/2020    GFRESTIMATED 78 11/16/2022    GFRESTIMATED 67 05/14/2020    GFRESTBLACK 78 05/14/2020    HARI 9.2 11/16/2022    HARI 9.2 05/14/2020        A1C RESULTS:  Lab Results   Component Value Date    A1C 5.6 11/16/2022    A1C 5.8 (H) 07/15/2020       INR RESULTS:  No results found for: INR      Roderick Harp MD, [facility]    CC  No referring provider defined for this encounter.    Thank you for allowing me to participate in the care of your patient.      Sincerely,     Roderick Harp MD     [facility]

## 2023-01-11 NOTE — PROGRESS NOTES
"HISTORY:    Dev Powell is a 77-year-old male with a history of severe coronary calcification noted on chest CT.  He is an ex-smoker having recently quit, and has a history of hypertension, hyperlipidemia, and a family history of coronary artery disease but no history of diabetes.  When his calcification was discovered he underwent a stress echo which showed some exercise-induced ventricular ectopy and a questionable apical defect so a nuclear scan was done showing no inducible ischemia.    Dev is seen today with a number of complaints.  He states that starting in July he began having severe dizziness and lightheadedness.  At the time he was very tired and slept all day and his urine was dark and he thought he might be dehydrated.  Try to drink extra water.  He had associated headache, rhinorrhea, cough, and lightheadedness seem to be brought on by movement especially bending over and standing up.  This his stool is black but it been taking Pepto-Bismol, did not have chest pain shortness of breath or other cardiovascular symptoms at the time.  At that time he underwent multiple studies including a BMP, TSH, UA, high-sensitivity troponin, influenza AMB, COVID 19, T4, CBC, and ECG, all essentially normal.  He was felt possibly to have some vertigo and dehydration.    Today Dev reports that he continues to have off-and-on dizziness, this can occur when he is sitting still but is more frequent when he first stands up and especially if he leans over and stands up.  He has never checked his blood pressure with these episodes at least that he can recall.    Last week Dev did some heavy snow shoveling and felt dizzy as he was working.  He then came in the house and was \"totally out of it\".  He states that for about half an hour he could speak but could not answer questions he cannot remember anything.  He does not think he would have been able to remember his son's name.  This is associate with diaphoresis weakness but " no chest pain.  It lasted about half an hour and has not recurred.    An echocardiogram done on December 12 is reviewed him today.  Shows normal left ventricular and right ventricular size and function with a moderate degree of aortic stenosis with a mean gradient of just 14 and an aortic valve area calculated at 1.1 and a DI of 0.39.  He has a low stroke-volume index.  The IVC was noted to be small suggesting possible dehydration and the ascending aorta was noted to be mildly elevated measuring 4.2 cm.      ASSESSMENT/PLAN:    1.  Known coronary artery disease.  The patient was alarmed that his recent screening chest x-ray showed heavy coronary calcification but this has been noted in the past and I reassured him that this is nothing to be concerned about.  He was concerned because it was now described as severe.  A stress test will be repeated (see discussion below).  2.  Dizziness and lightheadedness.  The patient gives a fractured story of dizziness and lightheadedness.  There appears to be some orthostatic component but also activity seems to bring it on.  He has never checked his blood pressure with this and I asked him to do so.  This location also comes on at rest.  I will have him do a stress test to see what his heart rate and blood pressure response to exercises and to see if there are blood pressure or heart rate abnormalities leading to his dizziness.  We will do this as a nuclear stress test to also evaluate for CAD.  3.  Possible TIA.  The patient gives the story of a 1/2-hour episode about 10 days ago where he could not remember anything, even his son's name and he cannot answer questions although he could speak.  He reports no localizing symptoms.  I will have him see neurology for further evaluation.  4.  Dizziness.  Unexplained, it sounds suspicious for being neurologic or inner ear although does seem to be a component of orthostasis with it, it is also positional.  5.  Possible temporal  arteritis.  There is some tenderness in the left temporal region which seems to be just above the temporal artery.  I will arrange a ESR and CRP for screening purposes.  Temporal arteritis would not explain most of his symptoms and he has not had fevers.  6.  Aortic stenosis.  The patient has a moderate degree of aortic stenosis, not enough to explain his symptoms but he will need to be followed for this in the future.    Thank you for inviting me to participate in the care of your patient.  Please don't hesitate to call if I can be of further assistance.  55 minutes were spent today reviewing the chart and other records, interviewing and examining the patient, and documenting our visit.    Chart documentation was completed, in part, with [employer] voice-recognition software. Even though reviewed, some grammatical, spelling, and word errors may remain.       Orders Placed This Encounter   Procedures     EKG 12-lead complete w/read - Clinics     Orders Placed This Encounter   Medications     lisinopril (ZESTRIL) 20 MG tablet     Sig: Take 0.5 tablets (10 mg) by mouth daily     Dispense:  90 tablet     Refill:  3     Medications Discontinued During This Encounter   Medication Reason     lisinopril (ZESTRIL) 20 MG tablet        10 year ASCVD risk: The 10-year ASCVD risk score (Freda DK, et al., 2019) is: 19.8%    Values used to calculate the score:      Age: 77 years      Sex: Male      Is Non- : No      Diabetic: No      Tobacco smoker: No      Systolic Blood Pressure: 102 mmHg      Is BP treated: Yes      HDL Cholesterol: 63 mg/dL      Total Cholesterol: 154 mg/dL    Encounter Diagnoses   Name Primary?     Screening for heart disease Yes     Coronary artery disease involving native coronary artery of native heart without angina pectoris      Essential hypertension with goal blood pressure less than 140/90        CURRENT MEDICATIONS:  Current Outpatient Medications   Medication Sig Dispense  Refill     aspirin (ASA) 81 MG EC tablet Take 1 tablet (81 mg) by mouth daily       ezetimibe (ZETIA) 10 MG tablet Take 1 tablet (10 mg) by mouth daily 90 tablet 0     Glucosamine-Chondroit-Vit C-Mn (GLUCOSAMINE CHONDROITIN 1500 COMPLEX) CAPS Take by mouth daily       ipratropium (ATROVENT) 0.03 % nasal spray USE 2 SPRAYS INTO BOTH NOSTRILS EVERY 12 HOURS 90 mL 3     levothyroxine (SYNTHROID/LEVOTHROID) 137 MCG tablet TAKE 1 TAB BY MOUTH 3 X A WEEK (MON/WED/FRI) ALTERNATE WITH  MCG TABLET ON THE OTHER DAYS 45 tablet 3     levothyroxine (SYNTHROID/LEVOTHROID) 150 MCG tablet TAKE 1 TAB (TUES/THURS/SAT/SUN) ALTERNATING WITH 137MCG TABS on the other days 60 tablet 3     lisinopril (ZESTRIL) 20 MG tablet Take 0.5 tablets (10 mg) by mouth daily 90 tablet 3     meclizine (ANTIVERT) 25 MG tablet Take 1 tablet (25 mg) by mouth every 6 hours as needed for dizziness 30 tablet 1     Multiple Vitamins-Minerals (CENTRUM SILVER) per tablet Take 1 tablet by mouth daily 30 tablet      omeprazole (PRILOSEC) 40 MG DR capsule TAKE 1 CAPSULE BY MOUTH ONCE DAILY TAKE 30-60 MINUTES BEFORE A MEAL. 90 capsule 2     rosuvastatin (CRESTOR) 10 MG tablet TAKE 1 TABLET BY MOUTH ONCE DAILY AT BEDTIME FOR CHOLESTEROL 90 tablet 3     sildenafil (REVATIO) 20 MG tablet TAKE 2-3 TABLETS BY MOUTH TWICE A WEEK 90 tablet 3     testosterone (ANDROGEL 1 % PUMP) 12.5 MG/ACT (1%) gel APPLY 2 PUMPS ONTO CLEAN DRY HAIRLESS SKIN OF SHOULDERS, UPPER ARMS OR ABDOMEN ONCE DAILY 150 g 2     rosuvastatin (CRESTOR) 40 MG tablet Take 1 tablet (40 mg) by mouth daily (Patient not taking: Reported on 1/11/2023) 90 tablet 3       ALLERGIES     Allergies   Allergen Reactions     Atorvastatin Other (See Comments)     Calf pain     Pravastatin Other (See Comments)     Leg cramps       PAST MEDICAL HISTORY:  Past Medical History:   Diagnosis Date     Elevated blood pressure (not hypertension)      Enlarged prostate- has seen  Dr. Solomon in the past - no further PSA's  needed as of 2019 - doesn't need to see him again per Dr. Solomon unless gross hematuria  2013     Erectile dysfunction      Gastric ulcer 2007    EGD @ [place]-EGD - gastric ulcer w/ erosions/ LA grade B erosive esophagitis     GERD (gastroesophageal reflux disease)     worse lying down at night.      Hyperlipidemia LDL goal < 130     lipitor 40mg= calf pain-off since      Hypertension      Lipoma of skin     chest - biopsied at [place] = benign      Malignant melanoma ([facility])      Osteoarthritis     mostly hands and knees      Snoring      Thyroid disease        PAST SURGICAL HISTORY:  Past Surgical History:   Procedure Laterality Date     COLONOSCOPY  2007    repeat in 2017     DAVINCI HERNIORRHAPHY INGUINAL Right 10/24/2019    Procedure: robotic assisted right inguinal hernia repair with mesh;  Surgeon: Rom Mccoy MD;  Location: [facility] OR     ESOPHAGOSCOPY, GASTROSCOPY, DUODENOSCOPY (EGD), COMBINED N/A 2015    Procedure: COMBINED ESOPHAGOSCOPY, GASTROSCOPY, DUODENOSCOPY (EGD);  Surgeon: Ender Dixon MD;  Location:  GI     [facility] ESOPHAGOSCOPY, DIAGNOSTIC  2007    EGD - gastric ulcer w/ erosions/ LA grade B erosive esophagitis     wide excision      for malignant melanoma       FAMILY HISTORY:  Family History   Problem Relation Age of Onset     C.A.D. Father 62         at age 62 of MI     Diabetes Father         early type 2      Neurologic Disorder Mother         mild dementia - @ 92     C.A.D. 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    Tobacco Use      "Smoking status: Former     Packs/day: 0.50     Years: 60.00     Pack years: 30.00     Types: Cigarettes     Quit date: 2022     Years since quittin.5     Smokeless tobacco: Never     Tobacco comments:     strongly encourage smoking cessation with every visit   Vaping Use     Vaping Use: Never used   Substance and Sexual Activity     Alcohol use: Yes     Alcohol/week: 0.0 standard drinks     Comment: not regular - very occasional 1-2 martinis when out to dinner 1-2x/month, if that      Drug use: No     Comment: no herbal meds      Sexual activity: Yes     Partners: Female     Comment:     Other Topics Concern     Parent/sibling w/ CABG, MI or angioplasty before 65F 55M? 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 Heart, kidneys, liver.  ---Vee Beth MD         Wife, Mikayla,  2021 - from complications from her severe chronic kidney disease and cardiovascular disease with diabetes.  --Vee Beth MD             Review of Systems:  Skin:  Negative     Eyes:  Positive for glasses  ENT:  Negative hearing loss  Respiratory:  Negative    Cardiovascular:  Negative dizziness;Positive for;lightheadedness  Gastroenterology: Positive for reflux  Genitourinary:  Positive for nocturia  Musculoskeletal:  Positive for back pain  Neurologic:  Negative headaches  Psychiatric:  Positive for excessive stress  Heme/Lymph/Imm:  Negative    Endocrine:  Positive for thyroid disorder    Physical Exam:  Vitals: /72   Pulse 100   Ht 1.854 m (6' 1\") "   Wt 96.2 kg (212 lb 1.6 oz)   SpO2 99%   BMI 27.98 kg/m      Constitutional:           Skin:           Head:           Eyes:           ENT:           Neck:           Chest:           Cardiac:                    Abdomen:           Vascular:                                        Extremities and Muscular Skeletal:              Neurological:           Psych:        Recent Lab Results:  LIPID RESULTS:  Lab Results   Component Value Date    CHOL 154 11/16/2022    CHOL 107 03/05/2021    HDL 63 11/16/2022    HDL 58 03/05/2021    LDL 71 11/16/2022    LDL 34 03/05/2021    TRIG 100 11/16/2022    TRIG 73 03/05/2021    CHOLHDLRATIO 5.2 (H) 01/30/2015       LIVER ENZYME RESULTS:  Lab Results   Component Value Date    AST 19 11/16/2022    AST 12 05/14/2020    ALT 14 11/16/2022    ALT 18 12/14/2020       CBC RESULTS:  Lab Results   Component Value Date    WBC 5.6 11/16/2022    WBC 7.7 05/14/2020    RBC 4.73 11/16/2022    RBC 4.97 05/14/2020    HGB 14.8 11/16/2022    HGB 15.2 05/14/2020    HCT 44.1 11/16/2022    HCT 45.7 05/14/2020    MCV 93 11/16/2022    MCV 92 05/14/2020    MCH 31.3 11/16/2022    MCH 30.6 05/14/2020    MCHC 33.6 11/16/2022    MCHC 33.3 05/14/2020    RDW 13.2 11/16/2022    RDW 13.6 05/14/2020     11/16/2022     05/14/2020       BMP RESULTS:  Lab Results   Component Value Date     11/16/2022     05/14/2020    POTASSIUM 4.2 11/16/2022    POTASSIUM 4.3 08/23/2022    POTASSIUM 4.2 05/14/2020    CHLORIDE 103 11/16/2022    CHLORIDE 107 08/23/2022    CHLORIDE 107 05/14/2020    CO2 26 11/16/2022    CO2 24 08/23/2022    CO2 25 05/14/2020    ANIONGAP 9 11/16/2022    ANIONGAP 6 08/23/2022    ANIONGAP 6 05/14/2020    GLC 98 11/16/2022    GLC 95 08/23/2022    GLC 97 05/14/2020    BUN 22.2 11/16/2022    BUN 24 08/23/2022    BUN 29 05/14/2020    CR 1.00 11/16/2022    CR 1.07 05/14/2020    GFRESTIMATED 78 11/16/2022    GFRESTIMATED 67 05/14/2020    GFRESTBLACK 78 05/14/2020    HARI 9.2 11/16/2022     HARI 9.2 05/14/2020        A1C RESULTS:  Lab Results   Component Value Date    A1C 5.6 11/16/2022    A1C 5.8 (H) 07/15/2020       INR RESULTS:  No results found for: INR      Roderick Harp MD, [facility]    CC  No referring provider defined for this encounter.

## 2023-01-12 ENCOUNTER — LAB (OUTPATIENT)
Dept: LAB | Facility: CLINIC | Age: 78
End: 2023-01-12
Payer: COMMERCIAL

## 2023-01-12 DIAGNOSIS — G45.9 TIA (TRANSIENT ISCHEMIC ATTACK): ICD-10-CM

## 2023-01-12 DIAGNOSIS — R42 DIZZINESS: ICD-10-CM

## 2023-01-12 DIAGNOSIS — I25.10 CORONARY ARTERY DISEASE INVOLVING NATIVE CORONARY ARTERY OF NATIVE HEART WITHOUT ANGINA PECTORIS: ICD-10-CM

## 2023-01-12 DIAGNOSIS — E03.9 HYPOTHYROIDISM, UNSPECIFIED TYPE: ICD-10-CM

## 2023-01-12 LAB — ERYTHROCYTE [SEDIMENTATION RATE] IN BLOOD BY WESTERGREN METHOD: 9 MM/HR (ref 0–20)

## 2023-01-12 PROCEDURE — 84480 ASSAY TRIIODOTHYRONINE (T3): CPT

## 2023-01-12 PROCEDURE — 86141 C-REACTIVE PROTEIN HS: CPT

## 2023-01-12 PROCEDURE — 84443 ASSAY THYROID STIM HORMONE: CPT

## 2023-01-12 PROCEDURE — 84439 ASSAY OF FREE THYROXINE: CPT

## 2023-01-12 PROCEDURE — 36415 COLL VENOUS BLD VENIPUNCTURE: CPT

## 2023-01-12 PROCEDURE — 85652 RBC SED RATE AUTOMATED: CPT

## 2023-01-13 LAB
CRP SERPL HS-MCNC: 0.49 MG/L
T3 SERPL-MCNC: 74 NG/DL (ref 85–202)
T4 FREE SERPL-MCNC: 1.6 NG/DL (ref 0.9–1.7)
TSH SERPL DL<=0.005 MIU/L-ACNC: 3.68 UIU/ML (ref 0.3–4.2)

## 2023-01-19 ENCOUNTER — HOSPITAL ENCOUNTER (OUTPATIENT)
Dept: NUCLEAR MEDICINE | Facility: CLINIC | Age: 78
Setting detail: NUCLEAR MEDICINE
Discharge: HOME | End: 2023-01-19
Attending: INTERNAL MEDICINE
Payer: COMMERCIAL

## 2023-01-19 ENCOUNTER — HOSPITAL ENCOUNTER (OUTPATIENT)
Dept: CARDIOLOGY | Facility: CLINIC | Age: 78
Discharge: HOME | End: 2023-01-19
Attending: INTERNAL MEDICINE
Payer: COMMERCIAL

## 2023-01-19 DIAGNOSIS — I25.10 CORONARY ARTERY DISEASE INVOLVING NATIVE CORONARY ARTERY OF NATIVE HEART WITHOUT ANGINA PECTORIS: ICD-10-CM

## 2023-01-19 LAB
CV BLOOD PRESSURE: 76 MMHG
CV STRESS MAX HR HE: 131
RATE PRESSURE PRODUCT: NORMAL
STRESS ANGINA INDEX: 0
STRESS ECHO BASELINE DIASTOLIC HE: 90
STRESS ECHO BASELINE HR: 101 BPM
STRESS ECHO BASELINE SYSTOLIC BP: 120
STRESS ECHO CALCULATED PERCENT HR: 92 %
STRESS ECHO LAST STRESS DIASTOLIC BP: 90
STRESS ECHO LAST STRESS SYSTOLIC BP: 146
STRESS ECHO POST ESTIMATED WORKLOAD: 6 METS
STRESS ECHO POST EXERCISE DUR MIN: 10 MIN
STRESS ECHO POST EXERCISE DUR SEC: 24 SEC
STRESS ECHO TARGET HR: 143

## 2023-01-19 PROCEDURE — 93016 CV STRESS TEST SUPVJ ONLY: CPT | Performed by: INTERNAL MEDICINE

## 2023-01-19 PROCEDURE — 343N000001 HC RX 343: Performed by: INTERNAL MEDICINE

## 2023-01-19 PROCEDURE — 78452 HT MUSCLE IMAGE SPECT MULT: CPT | Mod: 26 | Performed by: INTERNAL MEDICINE

## 2023-01-19 PROCEDURE — 78452 HT MUSCLE IMAGE SPECT MULT: CPT

## 2023-01-19 PROCEDURE — 93018 CV STRESS TEST I&R ONLY: CPT | Performed by: INTERNAL MEDICINE

## 2023-01-19 PROCEDURE — 93017 CV STRESS TEST TRACING ONLY: CPT

## 2023-01-19 PROCEDURE — A9502 TC99M TETROFOSMIN: HCPCS | Performed by: INTERNAL MEDICINE

## 2023-01-19 RX ADMIN — TETROFOSMIN 10.6 MCI.: 1.38 INJECTION, POWDER, LYOPHILIZED, FOR SOLUTION INTRAVENOUS at 08:19

## 2023-01-19 RX ADMIN — TETROFOSMIN 33 MCI.: 1.38 INJECTION, POWDER, LYOPHILIZED, FOR SOLUTION INTRAVENOUS at 09:52

## 2023-01-25 NOTE — RESULT ENCOUNTER NOTE
Please call pt with results below:     All of your thyroid labs are normal, improved, or pretty stable from previous.     Please,  continue your current medications and/or supplements and follow up as we discussed at your last visit.         For additional lab test information, [email or web address] is an excellent reference.

## 2023-02-21 ENCOUNTER — OFFICE VISIT (OUTPATIENT)
Dept: CARDIOLOGY | Facility: CLINIC | Age: 78
End: 2023-02-21
Attending: FAMILY MEDICINE
Payer: COMMERCIAL

## 2023-02-21 VITALS
HEIGHT: 73 IN | BODY MASS INDEX: 28.69 KG/M2 | DIASTOLIC BLOOD PRESSURE: 94 MMHG | OXYGEN SATURATION: 98 % | HEART RATE: 111 BPM | SYSTOLIC BLOOD PRESSURE: 120 MMHG | WEIGHT: 216.5 LBS

## 2023-02-21 DIAGNOSIS — Z87.891 PERSONAL HISTORY OF TOBACCO USE: ICD-10-CM

## 2023-02-21 DIAGNOSIS — I25.10 CORONARY ARTERY CALCIFICATION SEEN ON CT SCAN: ICD-10-CM

## 2023-02-21 DIAGNOSIS — Z72.0 TOBACCO ABUSE DISORDER: ICD-10-CM

## 2023-02-21 DIAGNOSIS — R42 DIZZINESS: Primary | ICD-10-CM

## 2023-02-21 DIAGNOSIS — I35.0 NONRHEUMATIC AORTIC VALVE STENOSIS: ICD-10-CM

## 2023-02-21 PROCEDURE — 99215 OFFICE O/P EST HI 40 MIN: CPT | Performed by: INTERNAL MEDICINE

## 2023-02-21 NOTE — PROGRESS NOTES
"HISTORY:    Dev Powell is a pleasant and generally fairly healthy 77-year-old gentleman.  He is an ex-smoker and has been noted on lung cancer screening CTs to have coronary calcification.  He also has a history of hypertension and hyperlipidemia as well as moderate aortic stenosis by echocardiography.    I saw Dev in clinic just a month ago for his annual visit and he complained of a number of different issues which were new for him.  These included some palpable tenderness in his left temporal region felt might represent temporal arteritis, some new positional orthostatic dizziness and lightheadedness, and 1/2-hour episode of memory loss and difficulty speaking that I thought might have represented a TIA.  His dizziness did not follow the usual pattern of orthostasis and I thought he might have an inner ear disorder as well.    I arranged a nuclear stress test to evaluate for ischemia and that study was negative.  I also ordered an ESR and CRP on the day of her visit and those were both normal, virtually excluding temporal arteritis.    Today Dev reports that he continues to have some dizziness.  This is primarily when he first stands up, especially after leaning over, but also if he has been sitting for a while.  He states that he gets dizzy and lightheaded and even after passes he feels that his \"clarity is decreased\".    I checked orthostatic blood pressures today from a lying sitting and standing position and they were essentially unchanged.  At our last visit I asked him to cut his dose of lisinopril from 20 mg daily down to 10 mg daily.  He reports that he did so and does not think there is been much of a change in his dizziness, although perhaps slightly better..  He saw a physical therapist and was told he did not have vertigo.  He is scheduled to see both a neurologist and a ENT doctor within the next few weeks.    Overall Dev does not feel that his dizziness is making him unsafe.  He has never " passed out or felt like he was about to pass out.  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      ASSESSMENT/PLAN:    1.  Dizziness.  There appears to be some degree of orthostasis but this is rather mild and his symptoms are worrisome to him but not severe.  He has never had syncope.  Talked today about whether we should keep his lisinopril at the low dose or increase it.  His diastolic pressures have risen considerably and when I rechecked them today they were also elevated, but he states that his home blood pressures his diastolic pressure has been just fine, often in the 70s.  I asked him to keep an eye on this and let me know if it is consistently greater than 85.  This is the case we will go back on the higher dose of lisinopril after he contacts us.  2.  Possible TIA, seeing neurology in the near future  3.  Coronary artery disease, no symptoms of exertional angina and recent normal nuclear stress test.  Continue risk factor management.  4.  Aortic stenosis.  We will plan on repeating his echocardiogram at the time of his next annual visit for monitoring of this abnormality.    Thank you for inviting me to participate in the care of your patient.  Please don't hesitate to call if I can be of further assistance.  40 minutes were spent today reviewing the chart and other records, interviewing and examining the patient, and documenting our visit.    Chart documentation was completed, in part, with [employer] voice-recognition software. Even though reviewed, some grammatical, spelling, and word errors may remain.       Orders Placed This Encounter   Procedures     Follow-Up with Cardiology [facility]     No orders of the defined types were placed in this encounter.    There are no discontinued medications.    10 year ASCVD  risk: The 10-year ASCVD risk score (Freda DICKERSON, et al., 2019) is: 25.5%    Values used to calculate the score:      Age: 77 years      Sex: Male      Is Non- : No      Diabetic: No      Tobacco smoker: No      Systolic Blood Pressure: 120 mmHg      Is BP treated: Yes      HDL Cholesterol: 63 mg/dL      Total Cholesterol: 154 mg/dL    Encounter Diagnoses   Name Primary?     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 -  quit 7/2022      Personal history of tobacco use- quit 7/2022       Coronary artery calcification seen on CT scan      Dizziness Yes       CURRENT MEDICATIONS:  Current Outpatient Medications   Medication Sig Dispense Refill     aspirin (ASA) 81 MG EC tablet Take 1 tablet (81 mg) by mouth daily       ezetimibe (ZETIA) 10 MG tablet Take 1 tablet (10 mg) by mouth daily 90 tablet 0     Glucosamine-Chondroit-Vit C-Mn (GLUCOSAMINE CHONDROITIN 1500 COMPLEX) CAPS Take by mouth daily       ipratropium (ATROVENT) 0.03 % nasal spray USE 2 SPRAYS INTO BOTH NOSTRILS EVERY 12 HOURS 90 mL 3     levothyroxine (SYNTHROID/LEVOTHROID) 137 MCG tablet TAKE 1 TAB BY MOUTH 3 X A WEEK (MON/WED/FRI) ALTERNATE WITH  MCG TABLET ON THE OTHER DAYS 45 tablet 3     levothyroxine (SYNTHROID/LEVOTHROID) 150 MCG tablet TAKE 1 TAB (TUES/THURS/SAT/SUN) ALTERNATING WITH 137MCG TABS on the other days 60 tablet 3     lisinopril (ZESTRIL) 20 MG tablet Take 0.5 tablets (10 mg) by mouth daily 90 tablet 3     meclizine (ANTIVERT) 25 MG tablet Take 1 tablet (25 mg) by mouth every 6 hours as needed for dizziness 30 tablet 1     Multiple Vitamins-Minerals (CENTRUM SILVER) per tablet Take 1 tablet by mouth daily 30 tablet      omeprazole (PRILOSEC) 40 MG DR capsule TAKE 1 CAPSULE BY MOUTH ONCE DAILY TAKE 30-60 MINUTES BEFORE A MEAL. 90 capsule 2     rosuvastatin (CRESTOR) 10 MG tablet TAKE 1 TABLET BY MOUTH ONCE DAILY AT BEDTIME FOR CHOLESTEROL 90 tablet 3     sildenafil (REVATIO) 20 MG tablet TAKE 2-3 TABLETS BY  MOUTH TWICE A WEEK 90 tablet 3     testosterone (ANDROGEL 1 % PUMP) 12.5 MG/ACT (1%) gel APPLY 2 PUMPS ONTO CLEAN DRY HAIRLESS SKIN OF SHOULDERS, UPPER ARMS OR ABDOMEN ONCE DAILY 150 g 2     rosuvastatin (CRESTOR) 40 MG tablet Take 1 tablet (40 mg) by mouth daily (Patient not taking: Reported on 1/11/2023) 90 tablet 3       ALLERGIES     Allergies   Allergen Reactions     Atorvastatin Other (See Comments)     Calf pain     Pravastatin Other (See Comments)     Leg cramps       PAST MEDICAL HISTORY:  Past Medical History:   Diagnosis Date     Elevated blood pressure (not hypertension)      Enlarged prostate- has seen  Dr. Solomon in the past - no further PSA's needed as of 7/2019 - doesn't need to see him again per Dr. Solomon unless gross hematuria  9/18/2013     Erectile dysfunction      Gastric ulcer 9/25/2007    EGD @ [facility] - gastric ulcer w/ erosions/ LA grade B erosive esophagitis     GERD (gastroesophageal reflux disease)     worse lying down at night.      Hyperlipidemia LDL goal < 130     lipitor 40mg= calf pain-off since 2008     Hypertension      Lipoma of skin 2007    chest - biopsied at [facility] = benign      Malignant melanoma (H) 1995     Osteoarthritis     mostly hands and knees      Snoring      Thyroid disease        PAST SURGICAL HISTORY:  Past Surgical History:   Procedure Laterality Date     COLONOSCOPY  9/25/2007    repeat in 2017     DAVINCI HERNIORRHAPHY INGUINAL Right 10/24/2019    Procedure: robotic assisted right inguinal hernia repair with mesh;  Surgeon: Rom Mccoy MD;  Location:  OR     ESOPHAGOSCOPY, GASTROSCOPY, DUODENOSCOPY (EGD), COMBINED N/A 2/5/2015    Procedure: COMBINED ESOPHAGOSCOPY, GASTROSCOPY, DUODENOSCOPY (EGD);  Surgeon: Ender Dixon MD;  Location:  GI     HC ESOPHAGOSCOPY, DIAGNOSTIC  9/25/2007    EGD - gastric ulcer w/ erosions/ LA grade B erosive esophagitis     wide excision  1995    for malignant melanoma       FAMILY HISTORY:  Family History   Problem  Relation Age of Onset     C.A.D. Father 62         at age 62 of MI     Diabetes Father         early type 2      Neurologic Disorder Mother         mild dementia - @ 92     C.A.D. 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    Tobacco Use     Smoking status: Former     Packs/day: 0.50     Years: 60.00     Pack years: 30.00     Types: Cigarettes     Quit date: 2022     Years since quittin.6     Smokeless tobacco: Never     Tobacco comments:     strongly encourage smoking cessation with every visit   Vaping Use     Vaping Use: Never used   Substance and Sexual Activity     Alcohol use: Yes     Alcohol/week: 0.0 standard drinks     Comment: not regular - very occasional 1-2 martinis when out to dinner 1-2x/month, if that      Drug use: No     Comment: no herbal meds      Sexual activity: Yes     Partners: Female     Comment:     Other Topics Concern     Parent/sibling w/ CABG, MI or angioplasty before 65F 55M? 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 Heart, kidneys, liver.  ---Vee Beth MD         Wife, Mikayla,  2021 - from complications from her severe chronic  "kidney disease and cardiovascular disease with diabetes.  --Vee Beth MD             Review of Systems:  Skin:        Eyes:       ENT:       Respiratory:  Negative    Cardiovascular:    dizziness;Positive for;lightheadedness  Gastroenterology:      Genitourinary:       Musculoskeletal:       Neurologic:       Psychiatric:       Heme/Lymph/Imm:       Endocrine:         Physical Exam:  Vitals: BP (!) 120/94 (BP Location: Right arm, Patient Position: Sitting, Cuff Size: Adult Regular)   Pulse 111   Ht 1.854 m (6' 1\")   Wt 98.2 kg (216 lb 8 oz)   SpO2 98%   BMI 28.56 kg/m      Constitutional:           Skin:           Head:           Eyes:           ENT:           Neck:           Chest:           Cardiac:                    Abdomen:           Vascular:                                        Extremities and Muscular Skeletal:              Neurological:           Psych:        Recent Lab Results:  LIPID RESULTS:  Lab Results   Component Value Date    CHOL 154 11/16/2022    CHOL 107 03/05/2021    HDL 63 11/16/2022    HDL 58 03/05/2021    LDL 71 11/16/2022    LDL 34 03/05/2021    TRIG 100 11/16/2022    TRIG 73 03/05/2021    CHOLHDLRATIO 5.2 (H) 01/30/2015       LIVER ENZYME RESULTS:  Lab Results   Component Value Date    AST 19 11/16/2022    AST 12 05/14/2020    ALT 14 11/16/2022    ALT 18 12/14/2020       CBC RESULTS:  Lab Results   Component Value Date    WBC 5.6 11/16/2022    WBC 7.7 05/14/2020    RBC 4.73 11/16/2022    RBC 4.97 05/14/2020    HGB 14.8 11/16/2022    HGB 15.2 05/14/2020    HCT 44.1 11/16/2022    HCT 45.7 05/14/2020    MCV 93 11/16/2022    MCV 92 05/14/2020    MCH 31.3 11/16/2022    MCH 30.6 05/14/2020    MCHC 33.6 11/16/2022    MCHC 33.3 05/14/2020    RDW 13.2 11/16/2022    RDW 13.6 05/14/2020     11/16/2022     05/14/2020       BMP RESULTS:  Lab Results   Component Value Date     11/16/2022     05/14/2020    POTASSIUM 4.2 11/16/2022    POTASSIUM 4.3 08/23/2022 "    POTASSIUM 4.2 05/14/2020    CHLORIDE 103 11/16/2022    CHLORIDE 107 08/23/2022    CHLORIDE 107 05/14/2020    CO2 26 11/16/2022    CO2 24 08/23/2022    CO2 25 05/14/2020    ANIONGAP 9 11/16/2022    ANIONGAP 6 08/23/2022    ANIONGAP 6 05/14/2020    GLC 98 11/16/2022    GLC 95 08/23/2022    GLC 97 05/14/2020    BUN 22.2 11/16/2022    BUN 24 08/23/2022    BUN 29 05/14/2020    CR 1.00 11/16/2022    CR 1.07 05/14/2020    GFRESTIMATED 78 11/16/2022    GFRESTIMATED 67 05/14/2020    GFRESTBLACK 78 05/14/2020    HARI 9.2 11/16/2022    HARI 9.2 05/14/2020        A1C RESULTS:  Lab Results   Component Value Date    A1C 5.6 11/16/2022    A1C 5.8 (H) 07/15/2020       INR RESULTS:  No results found for: INR      Roderick Harp MD, FACC    CC  Vee Beth MD  [address] [place] [address]

## 2023-02-21 NOTE — LETTER
"2/21/2023    Vee Beth MD  [address] [facility] [address]    RE: Dev Powell       Dear Colleague,     I had the pleasure of seeing Dev Powell in the [facility].  HISTORY:    Dev Powell is a pleasant and generally fairly healthy 77-year-old gentleman.  He is an ex-smoker and has been noted on lung cancer screening CTs to have coronary calcification.  He also has a history of hypertension and hyperlipidemia as well as moderate aortic stenosis by echocardiography.    I saw Dev in clinic just a month ago for his annual visit and he complained of a number of different issues which were new for him.  These included some palpable tenderness in his left temporal region felt might represent temporal arteritis, some new positional orthostatic dizziness and lightheadedness, and 1/2-hour episode of memory loss and difficulty speaking that I thought might have represented a TIA.  His dizziness did not follow the usual pattern of orthostasis and I thought he might have an inner ear disorder as well.    I arranged a nuclear stress test to evaluate for ischemia and that study was negative.  I also ordered an ESR and CRP on the day of her visit and those were both normal, virtually excluding temporal arteritis.    Today Dev reports that he continues to have some dizziness.  This is primarily when he first stands up, especially after leaning over, but also if he has been sitting for a while.  He states that he gets dizzy and lightheaded and even after passes he feels that his \"clarity is decreased\".    I checked orthostatic blood pressures today from a lying sitting and standing position and they were essentially unchanged.  At our last visit I asked him to cut his dose of lisinopril from 20 mg daily down to 10 mg daily.  He reports that he did so and does not think there is been much of a change in his dizziness, although perhaps slightly better..  He saw a physical therapist and " was told he did not have vertigo.  He is scheduled to see both a neurologist and a ENT doctor within the next few weeks.    Overall Dev does not feel that his dizziness is making him unsafe.  He has never passed out or felt like he was about to pass out.  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      ASSESSMENT/PLAN:    1.  Dizziness.  There appears to be some degree of orthostasis but this is rather mild and his symptoms are worrisome to him but not severe.  He has never had syncope.  Talked today about whether we should keep his lisinopril at the low dose or increase it.  His diastolic pressures have risen considerably and when I rechecked them today they were also elevated, but he states that his home blood pressures his diastolic pressure has been just fine, often in the 70s.  I asked him to keep an eye on this and let me know if it is consistently greater than 85.  This is the case we will go back on the higher dose of lisinopril after he contacts us.  2.  Possible TIA, seeing neurology in the near future  3.  Coronary artery disease, no symptoms of exertional angina and recent normal nuclear stress test.  Continue risk factor management.  4.  Aortic stenosis.  We will plan on repeating his echocardiogram at the time of his next annual visit for monitoring of this abnormality.    Thank you for inviting me to participate in the care of your patient.  Please don't hesitate to call if I can be of further assistance.  40 minutes were spent today reviewing the chart and other records, interviewing and examining the patient, and documenting our visit.    Chart documentation was completed, in part, with [employer] voice-recognition software. Even though reviewed, some grammatical, spelling, and word errors may remain.        Orders Placed This Encounter   Procedures     Follow-Up with Cardiology STANLEY     No orders of the defined types were placed in this encounter.    There are no discontinued medications.    10 year ASCVD risk: The 10-year ASCVD risk score (Freda DICKERSON, et al., 2019) is: 25.5%    Values used to calculate the score:      Age: 77 years      Sex: Male      Is Non- : No      Diabetic: No      Tobacco smoker: No      Systolic Blood Pressure: 120 mmHg      Is BP treated: Yes      HDL Cholesterol: 63 mg/dL      Total Cholesterol: 154 mg/dL    Encounter Diagnoses   Name Primary?     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 -  quit 7/2022      Personal history of tobacco use- quit 7/2022       Coronary artery calcification seen on CT scan      Dizziness Yes       CURRENT MEDICATIONS:  Current Outpatient Medications   Medication Sig Dispense Refill     aspirin (ASA) 81 MG EC tablet Take 1 tablet (81 mg) by mouth daily       ezetimibe (ZETIA) 10 MG tablet Take 1 tablet (10 mg) by mouth daily 90 tablet 0     Glucosamine-Chondroit-Vit C-Mn (GLUCOSAMINE CHONDROITIN 1500 COMPLEX) CAPS Take by mouth daily       ipratropium (ATROVENT) 0.03 % nasal spray USE 2 SPRAYS INTO BOTH NOSTRILS EVERY 12 HOURS 90 mL 3     levothyroxine (SYNTHROID/LEVOTHROID) 137 MCG tablet TAKE 1 TAB BY MOUTH 3 X A WEEK (MON/WED/FRI) ALTERNATE WITH  MCG TABLET ON THE OTHER DAYS 45 tablet 3     levothyroxine (SYNTHROID/LEVOTHROID) 150 MCG tablet TAKE 1 TAB (TUES/THURS/SAT/SUN) ALTERNATING WITH 137MCG TABS on the other days 60 tablet 3     lisinopril (ZESTRIL) 20 MG tablet Take 0.5 tablets (10 mg) by mouth daily 90 tablet 3     meclizine (ANTIVERT) 25 MG tablet Take 1 tablet (25 mg) by mouth every 6 hours as needed for dizziness 30 tablet 1     Multiple Vitamins-Minerals (CENTRUM SILVER) per tablet Take 1 tablet by mouth daily 30 tablet      omeprazole (PRILOSEC) 40 MG DR capsule TAKE 1 CAPSULE BY MOUTH ONCE DAILY TAKE 30-60  MINUTES BEFORE A MEAL. 90 capsule 2     rosuvastatin (CRESTOR) 10 MG tablet TAKE 1 TABLET BY MOUTH ONCE DAILY AT BEDTIME FOR CHOLESTEROL 90 tablet 3     sildenafil (REVATIO) 20 MG tablet TAKE 2-3 TABLETS BY MOUTH TWICE A WEEK 90 tablet 3     testosterone (ANDROGEL 1 % PUMP) 12.5 MG/ACT (1%) gel APPLY 2 PUMPS ONTO CLEAN DRY HAIRLESS SKIN OF SHOULDERS, UPPER ARMS OR ABDOMEN ONCE DAILY 150 g 2     rosuvastatin (CRESTOR) 40 MG tablet Take 1 tablet (40 mg) by mouth daily (Patient not taking: Reported on 1/11/2023) 90 tablet 3       ALLERGIES     Allergies   Allergen Reactions     Atorvastatin Other (See Comments)     Calf pain     Pravastatin Other (See Comments)     Leg cramps       PAST MEDICAL HISTORY:  Past Medical History:   Diagnosis Date     Elevated blood pressure (not hypertension)      Enlarged prostate- has seen  Dr. Solomon in the past - no further PSA's needed as of 7/2019 - doesn't need to see him again per Dr. Solomon unless gross hematuria  9/18/2013     Erectile dysfunction      Gastric ulcer 9/25/2007    EGD @ [facility] - gastric ulcer w/ erosions/ LA grade B erosive esophagitis     GERD (gastroesophageal reflux disease)     worse lying down at night.      Hyperlipidemia LDL goal < 130     lipitor 40mg= calf pain-off since 2008     Hypertension      Lipoma of skin 2007    chest - biopsied at [facility] = benign      Malignant melanoma (H) 1995     Osteoarthritis     mostly hands and knees      Snoring      Thyroid disease        PAST SURGICAL HISTORY:  Past Surgical History:   Procedure Laterality Date     COLONOSCOPY  9/25/2007    repeat in 2017     DAVINCI HERNIORRHAPHY INGUINAL Right 10/24/2019    Procedure: robotic assisted right inguinal hernia repair with mesh;  Surgeon: Rom Mccoy MD;  Location:  OR     ESOPHAGOSCOPY, GASTROSCOPY, DUODENOSCOPY (EGD), COMBINED N/A 2/5/2015    Procedure: COMBINED ESOPHAGOSCOPY, GASTROSCOPY, DUODENOSCOPY (EGD);  Surgeon: Ender Dixon MD;  Location:  GI      HC ESOPHAGOSCOPY, DIAGNOSTIC  2007    EGD - gastric ulcer w/ erosions/ LA grade B erosive esophagitis     wide excision      for malignant melanoma       FAMILY HISTORY:  Family History   Problem Relation Age of Onset     C.A.D. Father 62         at age 62 of MI     Diabetes Father         early type 2      Neurologic Disorder Mother         mild dementia - @ 92     C.A.D. 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    Tobacco Use     Smoking status: Former     Packs/day: 0.50     Years: 60.00     Pack years: 30.00     Types: Cigarettes     Quit date: 2022     Years since quittin.6     Smokeless tobacco: Never     Tobacco comments:     strongly encourage smoking cessation with every visit   Vaping Use     Vaping Use: Never used   Substance and Sexual Activity     Alcohol use: Yes     Alcohol/week: 0.0 standard drinks     Comment: not regular - very occasional 1-2 martinis when out to dinner 1-2x/month, if that      Drug use: No     Comment: no herbal meds      Sexual activity: Yes     Partners: Female     Comment:     Other Topics Concern     Parent/sibling w/ CABG, MI or angioplasty before 65F 55M? 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 Heart, kidneys, liver.  ---Vee Beth MD         Wife, Mikayla,  2021 - from complications from her severe chronic kidney disease and cardiovascular disease with diabetes.  --Vee Beth MD             Review of Systems:  Skin:        Eyes:       ENT:       Respiratory:  Negative    Cardiovascular:    dizziness;Positive for;lightheadedness  Gastroenterology:      Genitourinary:       Musculoskeletal:       Neurologic:       Psychiatric:       Heme/Lymph/Imm:       Endocrine:         Physical Exam:  Vitals: BP (!) 120/94 (BP Location: Right arm, Patient Position: Sitting, Cuff Size: Adult Regular)   Pulse 111   Ht 1.854 m (6' 1\")   Wt 98.2 kg (216 lb 8 oz)   SpO2 98%   BMI 28.56 kg/m      Constitutional:           Skin:           Head:           Eyes:           ENT:           Neck:           Chest:           Cardiac:                    Abdomen:           Vascular:                                        Extremities and Muscular Skeletal:              Neurological:           Psych:        Recent Lab Results:  LIPID RESULTS:  Lab Results   Component Value Date    CHOL 154 2022    CHOL 107 2021    HDL 63 2022    HDL 58 2021    LDL 71 2022    LDL 34 2021    TRIG 100 2022    TRIG 73 2021    CHOLHDLRATIO 5.2 (H) 2015       LIVER ENZYME RESULTS:  Lab Results   Component Value Date    AST 19 2022    AST 12 2020    ALT 14 2022    ALT 18 2020       CBC RESULTS:  Lab Results   Component Value Date    WBC 5.6 2022    WBC 7.7 2020    RBC 4.73 2022    RBC 4.97 2020    HGB 14.8 2022    HGB 15.2 2020    HCT 44.1 2022    HCT 45.7 2020    MCV 93 2022    MCV 92 2020    MCH 31.3 2022    MCH 30.6 2020    MCHC 33.6 2022    MCHC 33.3 2020    RDW 13.2 2022    RDW 13.6 " 05/14/2020     11/16/2022     05/14/2020       BMP RESULTS:  Lab Results   Component Value Date     11/16/2022     05/14/2020    POTASSIUM 4.2 11/16/2022    POTASSIUM 4.3 08/23/2022    POTASSIUM 4.2 05/14/2020    CHLORIDE 103 11/16/2022    CHLORIDE 107 08/23/2022    CHLORIDE 107 05/14/2020    CO2 26 11/16/2022    CO2 24 08/23/2022    CO2 25 05/14/2020    ANIONGAP 9 11/16/2022    ANIONGAP 6 08/23/2022    ANIONGAP 6 05/14/2020    GLC 98 11/16/2022    GLC 95 08/23/2022    GLC 97 05/14/2020    BUN 22.2 11/16/2022    BUN 24 08/23/2022    BUN 29 05/14/2020    CR 1.00 11/16/2022    CR 1.07 05/14/2020    GFRESTIMATED 78 11/16/2022    GFRESTIMATED 67 05/14/2020    GFRESTBLACK 78 05/14/2020    HARI 9.2 11/16/2022    HARI 9.2 05/14/2020        A1C RESULTS:  Lab Results   Component Value Date    A1C 5.6 11/16/2022    A1C 5.8 (H) 07/15/2020       INR RESULTS:  No results found for: INR      Roderick Harp MD, [facility]    CC  Vee Beth MD  [address] [place] [address]    Thank you for allowing me to participate in the care of your patient.      Sincerely,     Roderick Harp MD     [facility]

## 2023-03-07 ENCOUNTER — TRANSFERRED RECORDS (OUTPATIENT)
Dept: HEALTH INFORMATION MANAGEMENT | Facility: CLINIC | Age: 78
End: 2023-03-07
Payer: COMMERCIAL

## 2023-03-10 DIAGNOSIS — G45.9 TIA (TRANSIENT ISCHEMIC ATTACK): Primary | ICD-10-CM

## 2023-03-14 ENCOUNTER — TELEPHONE (OUTPATIENT)
Dept: FAMILY MEDICINE | Facility: CLINIC | Age: 78
End: 2023-03-14
Payer: COMMERCIAL

## 2023-03-14 DIAGNOSIS — H91.93 BILATERAL HEARING LOSS, UNSPECIFIED HEARING LOSS TYPE: Primary | ICD-10-CM

## 2023-03-15 NOTE — TELEPHONE ENCOUNTER
----- Message from Damian Canseco sent at 3/14/2023  2:42 PM CDT -----  Regarding: need referral to audiology  Hello - this mutual patient is being seen in audiology this week, and has Medicare for his insurance carrier (requires orders to audiology). Would you please put in orders specifically for audiology evaluation? Please let me know if you have any questions.    Jessika Dominguez, [facility]  [place] Licensed Audiologist [id number]

## 2023-03-16 ENCOUNTER — OFFICE VISIT (OUTPATIENT)
Dept: AUDIOLOGY | Facility: CLINIC | Age: 78
End: 2023-03-16
Attending: FAMILY MEDICINE
Payer: COMMERCIAL

## 2023-03-16 DIAGNOSIS — R42 DIZZINESS AND GIDDINESS: ICD-10-CM

## 2023-03-16 DIAGNOSIS — H90.3 SENSORINEURAL HEARING LOSS (SNHL) OF BOTH EARS: Primary | ICD-10-CM

## 2023-03-16 DIAGNOSIS — H91.93 BILATERAL HEARING LOSS, UNSPECIFIED HEARING LOSS TYPE: ICD-10-CM

## 2023-03-16 PROCEDURE — 92557 COMPREHENSIVE HEARING TEST: CPT | Performed by: AUDIOLOGIST

## 2023-03-16 PROCEDURE — 92550 TYMPANOMETRY & REFLEX THRESH: CPT | Performed by: AUDIOLOGIST

## 2023-03-16 NOTE — PROGRESS NOTES
AUDIOLOGY REPORT    SUBJECTIVE:  Dev Powell is a 77 year old male who was seen in the Audiology Clinic at the [facility] for audiologic evaluation, referred by Vee Beth M.D. The patient reports he is currently being worked up for ongoing dizzy episodes. He denied true spinning vertigo, but reported what sounds like syncope/pre-syncope symptoms, which have been ongoing for some time. He has been through a vestibular workup with a physical therapist (negative for peripheral vestibular involvement), but has not had ENT consult/VNG testing to date. The episodes can last hours to days, without nausea or vomiting. There is no change to hearing ability, or tinnitus/aural fullness when dizzy. He noted the inability to see, think, or speak clearly when dizzy. It was recently fairly severe after physical exertion/clearing snow from driveway, deck, and sidewalks around his home. He is currently medicated for blood pressure issues, and had MRI of the head yesterday (3-15-23), per patient report. He denied spinning/sense of movement when dizzy, significant tinnitus, otalgia, otorrhea, recent illness, or aural fullness. 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     OBJECTIVE:  Abuse Screening:  Do you feel unsafe at home or work/school? No  Do you feel threatened by someone? No  Does anyone try to keep you from having contact with others, or doing things outside of your home? No  Physical signs of abuse present? No     Fall Risk Screen:  1. Have you fallen two or more times in the past year? No  2. Have you fallen and had an injury in the past year? No    Timed Up and Go Score (in seconds): not tested  Is patient a fall risk? No  Referral initiated: No  Fall Risk Assessment Completed by Audiology    Otoscopic exam indicates ears are clear of cerumen bilaterally.     Pure Tone  Thresholds assessed using conventional audiometry with good  reliability from 250-8000 Hz bilaterally using insert earphones and circumaural headphones.     RIGHT:  normal sloping to moderate-severe sensorineural hearing loss for 125-8000 Hz    LEFT:    normal sloping to moderate sensorineural hearing loss for 125-8000 Hz    Tympanogram:    RIGHT: normal/hypermobile eardrum mobility    LEFT:   Normal/hypermobile eardrum mobility    Reflexes for 1000 Hz (reported by stimulus ear):  RIGHT: Ipsilateral is present at normal levels  RIGHT: Contralateral is present at normal levels  LEFT:   Ipsilateral is present at normal levels  LEFT:   Contralateral is present at normal levels      Speech Reception Threshold:    RIGHT: 30 dB HL    LEFT:   25 dB HL  Word Recognition Score:     RIGHT: 80% at 65 dB HL using NU-6 recorded word list.    LEFT:   80% at 60 dB HL using NU-6 recorded word list.      ASSESSMENT:     ICD-10-CM    1. Sensorineural hearing loss (SNHL) of both ears  H90.3 Adult ENT  Referral      2. Dizziness and giddiness  R42           Today s results were discussed with the patient in detail. Appropriate communication strategies were discussed at length, as were reasonable expectations regarding hearing at a distance, in noisy environments, or when attention is diverted elsewhere.    PLAN:  Patient was counseled regarding hearing loss and impact on communication. Patient is a good candidate for binaural amplification at this time, although he indicated he does not wish to pursue it at this time. The audiogram obtained today is not consistent with peripheral vestibular involvement. Hearing thresholds should be re-evaluated annually to monitor. Wear hearing protection consistently in noise to preserve residual hearing sensitivity and to minimize the effects of tinnitus, Mr. Powell expressed verbal understanding of this information and plan. Please call this clinic with questions regarding these results or  recommendations.        Jessika Dominguez, [facility]  [facility] [id number]

## 2023-03-31 DIAGNOSIS — E78.5 HYPERLIPIDEMIA LDL GOAL <70: ICD-10-CM

## 2023-03-31 RX ORDER — EZETIMIBE 10 MG/1
10 TABLET ORAL DAILY
Qty: 90 TABLET | Refills: 4 | Status: SHIPPED | OUTPATIENT
Start: 2023-03-31 | End: 2023-12-04

## 2023-04-06 ENCOUNTER — TELEPHONE (OUTPATIENT)
Dept: FAMILY MEDICINE | Facility: CLINIC | Age: 78
End: 2023-04-06
Payer: COMMERCIAL

## 2023-04-06 NOTE — TELEPHONE ENCOUNTER
Prior Authorization Retail Medication Request    Medication/Dose: PA requested  ICD code (if different than what is on RX):    Previously Tried and Failed:    Rationale:      Insurance Name:  [employer]  Insurance ID:        Pharmacy Information (if different than what is on RX)  Name:  [facility]  Phone:  Fax [phone]

## 2023-04-11 NOTE — TELEPHONE ENCOUNTER
Central Prior Authorization Team - Phone: [phone]     PA Initiation    Medication: Testosterone 12.5 MG/ACT (1%) Transdermal Gel (ANDROGEL 1 % PUMP)  Insurance Company:    Pharmacy Filling the Rx: [id number] - [place], [place] - [address]  Filling Pharmacy Phone: [phone]  Filling Pharmacy Fax:    Start Date: 4/11/2023

## 2023-04-11 NOTE — TELEPHONE ENCOUNTER
Prior Authorization Follow Up    Fax received for additional information. Completed form and returned via fax. Waiting for determination.    PA DEPT  will follow up again on status in 1 to 2 business days.

## 2023-04-12 NOTE — TELEPHONE ENCOUNTER
Central Prior Authorization Team - Phone: [phone]     Prior Authorization Approval    Authorization Effective Date: 3/12/2023  Authorization Expiration Date: 4/10/2024  Medication: Testosterone 12.5 MG/ACT (1%) Transdermal Gel (ANDROGEL 1 % PUMP)- PA APPROVED  Approved Dose/Quantity: 150  Reference #:     Insurance Company:    Expected CoPay:       CoPay Card Available:      Foundation Assistance Needed:    Which Pharmacy is filling the prescription (Not needed for infusion/clinic administered): [id number] - [place], [place] - [address] [address]  Pharmacy Notified: Yes  Patient Notified: YesComment:  pharmacy will notify when ready

## 2023-04-20 ENCOUNTER — MYC MEDICAL ADVICE (OUTPATIENT)
Dept: FAMILY MEDICINE | Facility: CLINIC | Age: 78
End: 2023-04-20
Payer: COMMERCIAL

## 2023-04-20 NOTE — TELEPHONE ENCOUNTER
Left message to call back and MyChart message sent to patient to let patient know that the PA was approved.

## 2023-05-10 ENCOUNTER — HOSPITAL ENCOUNTER (OUTPATIENT)
Dept: CARDIOLOGY | Facility: CLINIC | Age: 78
Discharge: HOME | End: 2023-05-10
Attending: PSYCHIATRY & NEUROLOGY | Admitting: PSYCHIATRY & NEUROLOGY
Payer: COMMERCIAL

## 2023-05-10 DIAGNOSIS — G45.9 TIA (TRANSIENT ISCHEMIC ATTACK): ICD-10-CM

## 2023-05-10 PROCEDURE — 93272 ECG/REVIEW INTERPRET ONLY: CPT | Performed by: INTERNAL MEDICINE

## 2023-05-10 PROCEDURE — 93270 REMOTE 30 DAY ECG REV/REPORT: CPT

## 2023-05-17 ENCOUNTER — OFFICE VISIT (OUTPATIENT)
Dept: FAMILY MEDICINE | Facility: CLINIC | Age: 78
End: 2023-05-17
Payer: COMMERCIAL

## 2023-05-17 VITALS
RESPIRATION RATE: 18 BRPM | WEIGHT: 207 LBS | HEIGHT: 73 IN | BODY MASS INDEX: 27.43 KG/M2 | DIASTOLIC BLOOD PRESSURE: 88 MMHG | TEMPERATURE: 96.7 F | OXYGEN SATURATION: 94 % | HEART RATE: 94 BPM | SYSTOLIC BLOOD PRESSURE: 128 MMHG

## 2023-05-17 DIAGNOSIS — C43.62 MALIGNANT MELANOMA OF LEFT UPPER EXTREMITY INCLUDING SHOULDER (H): ICD-10-CM

## 2023-05-17 DIAGNOSIS — K21.9 GASTROESOPHAGEAL REFLUX DISEASE WITHOUT ESOPHAGITIS: ICD-10-CM

## 2023-05-17 DIAGNOSIS — E78.5 HYPERLIPIDEMIA LDL GOAL <130: ICD-10-CM

## 2023-05-17 DIAGNOSIS — R91.8 PULMONARY NODULES: Chronic | ICD-10-CM

## 2023-05-17 DIAGNOSIS — D22.9 MULTIPLE PIGMENTED NEVI: ICD-10-CM

## 2023-05-17 DIAGNOSIS — I10 ESSENTIAL HYPERTENSION WITH GOAL BLOOD PRESSURE LESS THAN 140/90: Primary | ICD-10-CM

## 2023-05-17 DIAGNOSIS — R42 DIZZINESS: ICD-10-CM

## 2023-05-17 DIAGNOSIS — I35.0 MODERATE AORTIC VALVE STENOSIS: ICD-10-CM

## 2023-05-17 DIAGNOSIS — H90.3 BILATERAL SENSORINEURAL HEARING LOSS: ICD-10-CM

## 2023-05-17 DIAGNOSIS — E03.9 HYPOTHYROIDISM, UNSPECIFIED TYPE: ICD-10-CM

## 2023-05-17 DIAGNOSIS — R73.03 PREDIABETES: ICD-10-CM

## 2023-05-17 DIAGNOSIS — G43.109 MIGRAINE WITH AURA AND WITHOUT STATUS MIGRAINOSUS, NOT INTRACTABLE: ICD-10-CM

## 2023-05-17 DIAGNOSIS — D36.9 TUBULAR ADENOMA: ICD-10-CM

## 2023-05-17 DIAGNOSIS — F10.929 ALCOHOLIC INTOXICATION WITH COMPLICATION (H): ICD-10-CM

## 2023-05-17 DIAGNOSIS — I05.2 MITRAL VALVE STENOSIS AND REGURGITATION: ICD-10-CM

## 2023-05-17 PROBLEM — Z87.891 PERSONAL HISTORY OF TOBACCO USE: Status: ACTIVE | Noted: 2022-11-16

## 2023-05-17 PROCEDURE — 99214 OFFICE O/P EST MOD 30 MIN: CPT | Performed by: FAMILY MEDICINE

## 2023-05-17 PROCEDURE — 82043 UR ALBUMIN QUANTITATIVE: CPT | Performed by: FAMILY MEDICINE

## 2023-05-17 PROCEDURE — 82570 ASSAY OF URINE CREATININE: CPT | Performed by: FAMILY MEDICINE

## 2023-05-17 RX ORDER — OMEPRAZOLE 40 MG/1
CAPSULE, DELAYED RELEASE ORAL
Qty: 90 CAPSULE | Refills: 3
Start: 2023-05-17 | End: 2023-08-17

## 2023-05-17 NOTE — PATIENT INSTRUCTIONS
" [facility]  [address] [place] [address]  Office: [phone]   Fax:    [phone]     For allergies, try over the counter claritin 10mg (smaller pill) or allegra 180mg daily. May cause some dryness of the mouth or eyes.      otherwise Please,  continue your current medications and/or supplements and follow up as we discussed today.     Thank you so much or choosing [facility]  for your Health Care. It was a pleasure seeing you at your visit today! Please contact us with any questions or concerns you may have.                   Vee Beth MD                              To reach your [facility] care team after hours call:   [phone] press #2 \"to speak with your care team\".  This will get you to our clinic instead of routing to central [facility]  scheduling.     PLEASE NOTE OUR HOURS HAVE CHANGED secondary to COVID-19 coronavirus pandemic, as we are trying to minimize patient exposure to the virus,  which is now widespread in the [facility].  These hours may change with very little notice.  We apologize for any inconvenience.       Our current clinic hours are:          Monday- Thursday   7:00am - 6:00pm  in person.      Friday  7:00am- 5:00pm                       Saturday and Sunday : Closed to in person and virtual visits        We have telephone and virtual visit times available between    7:00am - 6pm on Monday-Friday as well.                                                Phone:  [phone]      Our pharmacy hours: Monday through Friday 8:00am to 5:00pm                        Saturday - 9:00 am to 12 noon       Sunday : Closed.              Phone:  [phone]              ###  Please note: at this time we are not accepting any walk-in visits. ###      There is also information available at our web site:  [email or web address]    If your provider ordered any lab tests and you do not receive the " results within 10 business days, please call the clinic.    If you need a medication refill please contact your pharmacy.  Please allow 3 business days for your refill to be completed.    Our clinic offers telephone visits and e visits.  Please ask one of your team members to explain more.      Use [employer] (secure email communication and access to your chart) to send your primary care provider a message or make an appointment. Ask someone on your Team how to sign up for [employer]Mart.

## 2023-05-17 NOTE — PROGRESS NOTES
Assessment & Plan :       ICD-10-CM    1. Essential hypertension with goal blood pressure less than 140/90  I10 Comprehensive metabolic panel     CBC with platelets     Albumin Random Urine Quantitative with Creat Ratio     CANCELED: Albumin Random Urine Quantitative with Creat Ratio      2. Hyperlipidemia LDL goal <130 - pravastatin = leg cramps; lipitor =calf pains  E78.5 Lipid panel reflex to direct LDL Fasting     Comprehensive metabolic panel     CBC with platelets     Albumin Random Urine Quantitative with Creat Ratio     CANCELED: Albumin Random Urine Quantitative with Creat Ratio      3. Pulmonary nodules- tiny per screening chest CT -  repeat CT chest low dose w/o contrast 4/2017 = no change since 2015 - no need for further CT's for these nodules but ok for lung ca screening   R91.8       4. Bilateral sensorineural hearing loss- saw Audiology in 3/2023 - desires referral for hearing aids now 5/17/2023  H90.3 Adult Audiology  Referral      5. Alcoholic intoxication with complication (H)- history of - now 4-5 drinks/week max 2 drinks at a time - gin & tonics - about 1-2 oz of alcohol per drink   F10.929       6. Hypothyroidism, unspecified type  E03.9 TSH     T4 free     T3 total      7. Dizziness- lightheaded dizziness- wearing ZioPatch monitor and monitoring BP - gets lightheaded when BP is low - not drinking enough water   R42       8. Malignant melanoma of left upper extremity including shoulder (H) - just sees dermatology annually- last exam 11/2022 with Emily Ledezma PA-C in [place]   C43.62 Adult Dermatology Referral      9. Multiple pigmented nevi  D22.9 Adult Dermatology Referral      10. Prediabetes  R73.03 Hemoglobin A1c     Comprehensive metabolic panel     CBC with platelets     Albumin Random Urine Quantitative with Creat Ratio     CANCELED: Albumin Random Urine Quantitative with Creat Ratio      11. Tubular adenoma 4/2019 - repeat 5 years  D36.9       12. Migraine with aura and  "without status migrainosus, not intractable - unchanged in frequency and character - once a month - better since quitting smoking 7/4/2022   G43.109       13. Gastroesophageal reflux disease without esophagitis = ran out of PPI- has been taking lots of TUMS  K21.9 omeprazole (PRILOSEC) 40 MG DR capsule      14. Moderate aortic valve stenosis- with Mitral valve stenosis and regurgitation- on echocardiogram from 1/2023  I35.0       15. Mitral valve stenosis and regurgitation- on echocardiogram from 1/2023  I05.2            Return in about 6 months (around 11/17/2023) for Physical/Preventative Visit, blood pressure, cholesterol/lipids, w/ Dr Beth.   Ordering of each unique test  Prescription drug management  36 minutes spent by me on the date of the encounter doing chart review, history and exam, documentation and further activities per the note       BMI:   Estimated body mass index is 27.31 kg/m  as calculated from the following:    Height as of this encounter: 1.854 m (6' 1\").    Weight as of this encounter: 93.9 kg (207 lb).       MEDICATIONS:   Orders Placed This Encounter   Medications     omeprazole (PRILOSEC) 40 MG DR capsule     Sig: TAKE 1 CAPSULE BY MOUTH ONCE DAILY TAKE 30-60 MINUTES BEFORE A MEAL. As needed     Dispense:  90 capsule     Refill:  3     Profile Rx: patient will contact pharmacy when needed          - Continue other medications without change  Regular exercise  See Patient Instructions         Vee Beth MD  [facility] [place]      Subjective :   Dev is a 78 year old, presenting for the following health issues:  Recheck Medication  and the following other medical problems:      1. Essential hypertension with goal blood pressure less than 140/90    2. Hyperlipidemia LDL goal <130 - pravastatin = leg cramps; lipitor =calf pains    3. Pulmonary nodules- tiny per screening chest CT -  repeat CT chest low dose w/o contrast 4/2017 = no change since 2015 - no " need for further CT's for these nodules but ok for lung ca screening     4. Bilateral sensorineural hearing loss- saw Audiology in 3/2023 - desires referral for hearing aids now 5/17/2023    5. Alcoholic intoxication with complication (H)- history of - now 4-5 drinks/week max 2 drinks at a time - gin & tonics - about 1-2 oz of alcohol per drink     6. Hypothyroidism, unspecified type    7. Dizziness- lightheaded dizziness- wearing ZioPatch monitor and monitoring BP - gets lightheaded when BP is low - not drinking enough water     8. Malignant melanoma of left upper extremity including shoulder (H) - just sees dermatology annually- last exam 11/2022 with Emily Ledezma PA-C in [place]     9. Multiple pigmented nevi    10. Prediabetes    11. Tubular adenoma 4/2019 - repeat 5 years    12. Migraine with aura and without status migrainosus, not intractable - unchanged in frequency and character - once a month - better since quitting smoking 7/4/2022     13. Gastroesophageal reflux disease without esophagitis = ran out of PPI- has been taking lots of TUMS    14. Moderate aortic valve stenosis- with Mitral valve stenosis and regurgitation- on echocardiogram from 1/2023    15. Mitral valve stenosis and regurgitation- on echocardiogram from 1/2023 5/17/2023     8:39 AM   Additional Questions   Roomed by delbert young   Accompanied by self         5/17/2023     8:39 AM   Patient Reported Additional Medications   Patient reports taking the following new medications decreased lisinopril     History of Present Illness       Reason for visit:  Follow up    He eats 2-3 servings of fruits and vegetables daily.He consumes 0 sweetened beverage(s) daily.He exercises with enough effort to increase his heart rate 10 to 19 minutes per day.  He exercises with enough effort to increase his heart rate 5 days per week.   He is taking medications regularly.       Hyperlipidemia Follow-Up:       Are you regularly taking any medication  or supplement to lower your cholesterol?   Yes- rosuvastatin    Are you having muscle aches or other side effects that you think could be caused by your cholesterol lowering medication?  No    Hypertension Follow-up:       Do you check your blood pressure regularly outside of the clinic? Yes     Are you following a low salt diet? Yes    Are your blood pressures ever more than 140 on the top number (systolic) OR more   than 90 on the bottom number (diastolic), for example 140/90? No    Prediabetes Follow-up:       How often are you checking your blood sugar? Not at all    What concerns do you have today about your diabetes? None     Do you have any of these symptoms? (Select all that apply)  No numbness or tingling in feet.  No redness, sores or blisters on feet.  No complaints of excessive thirst.  No reports of blurry vision.  No significant changes to weight.    GERD /dyspepsia - decreased from previous. Now omeprazole 40mg prn.     Migraine headaches - unchanged from previous except for decreased frequency.     Grieving for his wife still - Wife of 40 years , Mikayla,  2021 - from complications from her severe chronic kidney disease and cardiovascular disease with diabetes. She was his second wife. First wife of 15 years didn't want to have children.     BP Readings from Last 2 Encounters:   23 128/88   23 (!) 120/94     Hemoglobin A1C (%)   Date Value   2022 5.6   07/15/2020 5.8 (H)   2019 5.9 (H)     LDL Cholesterol Calculated (mg/dL)   Date Value   2022 71   2022 58   2021 34   2020 88       Pt declines referral for speech therapy - getting a bit of a hoarse voice - ?allergies - will try claritin or allegra daily.     Patient Active Problem List   Diagnosis     Gastroesophageal reflux disease without esophagitis = ran out of PPI- has been taking lots of TUMS     Hyperlipidemia LDL goal <130 - pravastatin = leg cramps; lipitor =calf pains     Snoring      Vasculogenic erectile dysfunction, unspecified vasculogenic erectile dysfunction type     Osteoarthritis     Lipoma of skin     Gastric ulcer     Malignant melanoma of left upper extremity including shoulder (H)     Tobacco use disorder, moderate, in early remission     Enlarged prostate- has seen  Dr. Solomon in the past - no further PSA's needed as of 7/2019 - doesn't need to see him again per Dr. Solomon unless gross hematuria      Rectal pain     Hearing loss     Inguinal hernia- right      Pulmonary nodules- tiny per screening chest CT -  repeat CT chest low dose w/o contrast 4/2017 = no change since 2015 - no need for further CT's for these nodules but ok for lung ca screening      Essential hypertension with goal blood pressure less than 140/90     Testosterone deficiency     Hypothyroidism, unspecified type     Chronic constipation     Mass of left chest wall - ? there for 18+ years -left breast 1cm mass at 7:30      Multiple pigmented nevi     Reducible right inguinal hernia     Undiagnosed cardiac murmurs     Elevated fasting glucose     Tubular adenoma 4/2019 - repeat 5 years     Alcoholic intoxication with complication (H)- history of - now 4-5 drinks/week max 2 drinks at a time - gin & tonics - about 1-2 oz of alcohol per drink      Nocturia- up 1-3x/night      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 -  quit 7/2022     Nicotine dependence, uncomplicated, unspecified nicotine product type     Orthostatic hypotension - got lightheaded  Dizziness and a bit hypotensive  with 30mg lisinopril - now back down to 20mg= improved       Personal history of tobacco use- quit 7/2022      Dizziness- lightheaded dizziness- wearing ZioPatch monitor and monitoring BP - gets lightheaded when BP is low      Bilateral sensorineural hearing loss- saw Audiology in 3/2023 - desires referral for hearing aids now 5/17/2023     Prediabetes     Migraine with aura and without status migrainosus, not intractable - unchanged in  frequency and character - once a month - better since quitting smoking 7/4/2022      Moderate aortic valve stenosis- with Mitral valve stenosis and regurgitation- on echocardiogram from 1/2023       Current Outpatient Medications   Medication Sig Dispense Refill     aspirin (ASA) 81 MG EC tablet Take 1 tablet (81 mg) by mouth daily       ezetimibe (ZETIA) 10 MG tablet Take 1 tablet (10 mg) by mouth daily 90 tablet 4     Glucosamine-Chondroit-Vit C-Mn (GLUCOSAMINE CHONDROITIN 1500 COMPLEX) CAPS Take by mouth daily       ipratropium (ATROVENT) 0.03 % nasal spray USE 2 SPRAYS INTO BOTH NOSTRILS EVERY 12 HOURS 90 mL 3     levothyroxine (SYNTHROID/LEVOTHROID) 137 MCG tablet TAKE 1 TAB BY MOUTH 3 X A WEEK (MON/WED/FRI) ALTERNATE WITH  MCG TABLET ON THE OTHER DAYS 45 tablet 3     levothyroxine (SYNTHROID/LEVOTHROID) 150 MCG tablet TAKE 1 TAB (TUES/THURS/SAT/SUN) ALTERNATING WITH 137MCG TABS on the other days 60 tablet 3     lisinopril (ZESTRIL) 20 MG tablet Take 0.5 tablets (10 mg) by mouth daily 90 tablet 3     meclizine (ANTIVERT) 25 MG tablet Take 1 tablet (25 mg) by mouth every 6 hours as needed for dizziness 30 tablet 1     Multiple Vitamins-Minerals (CENTRUM SILVER) per tablet Take 1 tablet by mouth daily 30 tablet      omeprazole (PRILOSEC) 40 MG DR capsule TAKE 1 CAPSULE BY MOUTH ONCE DAILY TAKE 30-60 MINUTES BEFORE A MEAL. As needed 90 capsule 3     rosuvastatin (CRESTOR) 10 MG tablet TAKE 1 TABLET BY MOUTH ONCE DAILY AT BEDTIME FOR CHOLESTEROL 90 tablet 3     sildenafil (REVATIO) 20 MG tablet TAKE 2-3 TABLETS BY MOUTH TWICE A WEEK 90 tablet 3     testosterone (ANDROGEL 1 % PUMP) 12.5 MG/ACT (1%) gel APPLY 2 PUMPS ONTO CLEAN DRY HAIRLESS SKIN OF SHOULDERS, UPPER ARMS OR ABDOMEN ONCE DAILY 150 g 2          Allergies   Allergen Reactions     Atorvastatin Other (See Comments)     Calf pain     Pravastatin Other (See Comments)     Leg cramps          Review of Systems   Constitutional, HEENT, cardiovascular,  "pulmonary, GI, , musculoskeletal, neuro, skin, endocrine and psych systems are negative, except as otherwise noted.      Objective    /88   Pulse 94   Temp (!) 96.7  F (35.9  C)   Resp 18   Ht 1.854 m (6' 1\")   Wt 93.9 kg (207 lb)   SpO2 94%   BMI 27.31 kg/m    Body mass index is 27.31 kg/m .  Physical Exam   GENERAL: healthy, alert and no distress  EYES: Eyes grossly normal to inspection, PERRL and conjunctivae and sclerae normal  HENT: ear canals and TM's normal, nose and mouth without ulcers or lesions  NECK: no adenopathy, no asymmetry, masses, or scars and thyroid normal to palpation  RESP: lungs clear to auscultation - no rales, rhonchi or wheezes  CV: regular rate and rhythm, normal S1 S2, no S3 or S4, 3/6 mixed systolic ejection and diastolic  murmurs, click or rub, no peripheral edema and peripheral pulses strong  ABDOMEN: soft, nontender, no hepatosplenomegaly, no masses and bowel sounds normal  MS: no gross musculoskeletal defects noted, no edema  SKIN: no suspicious lesions or rashes  NEURO: Normal strength and tone, mentation intact and speech normal  PSYCH: mentation appears normal, affect normal/[facility] Visit on 01/19/2023   Component Date Value Ref Range Status     Target HR 01/19/2023 143   Final     Exercise duration (min) 01/19/2023 10  min Final     Exercise duration (sec) 01/19/2023 24  sec Final     Estimated workload 01/19/2023 6.0  METS Final     Angina Index 01/19/2023 0   Final     Baseline Systolic BP 01/19/2023 120   Final     Baseline Diastolic BP 01/19/2023 90   Final     Last Stress Systolic BP 01/19/2023 146   Final     Last Stress Diastolic BP 01/19/2023 90   Final     Baseline HR 01/19/2023 101  bpm Final     Max HR  01/19/2023 131   Final     Max Predicted HR  01/19/2023 92  % Final     Rate Pressure Product 01/19/2023 19,126.0   Final     BP 01/19/2023 76  mmHg Final             "

## 2023-05-18 LAB
CREAT UR-MCNC: 157 MG/DL
MICROALBUMIN UR-MCNC: 58.4 MG/L
MICROALBUMIN/CREAT UR: 37.2 MG/G CR (ref 0–17)

## 2023-06-02 ENCOUNTER — TELEPHONE (OUTPATIENT)
Dept: FAMILY MEDICINE | Facility: CLINIC | Age: 78
End: 2023-06-02
Payer: COMMERCIAL

## 2023-06-02 DIAGNOSIS — I10 ESSENTIAL HYPERTENSION WITH GOAL BLOOD PRESSURE LESS THAN 140/90: ICD-10-CM

## 2023-06-02 NOTE — TELEPHONE ENCOUNTER
Patient is calling to report that his pharmacy told him that his lisinopril was discontinued by his clinic. Patient currently takes this and was not told to stop taking this medication. Patient only has a 1/2 pill left. Please advise. Pharmacy desired attached    Patient would prefer a callback or MyChart once resolved.

## 2023-06-07 RX ORDER — LISINOPRIL 20 MG/1
10 TABLET ORAL DAILY
Qty: 90 TABLET | Refills: 3 | Status: SHIPPED | OUTPATIENT
Start: 2023-06-07 | End: 2023-12-04 | Stop reason: DRUGHIGH

## 2023-06-07 NOTE — TELEPHONE ENCOUNTER
Dr Harp did not discontinue lisinopril did refill and patient notified .  Patient verbalized understanding.

## 2023-06-10 DIAGNOSIS — E29.1 HYPOGONADISM IN MALE: ICD-10-CM

## 2023-06-10 DIAGNOSIS — E34.9 TESTOSTERONE DEFICIENCY: ICD-10-CM

## 2023-06-10 RX ORDER — TESTOSTERONE GEL, 1% 10 MG/G
GEL TRANSDERMAL
Qty: 150 G | Refills: 3 | Status: SHIPPED | OUTPATIENT
Start: 2023-06-10 | End: 2023-12-04

## 2023-08-17 ENCOUNTER — TELEPHONE (OUTPATIENT)
Dept: FAMILY MEDICINE | Facility: CLINIC | Age: 78
End: 2023-08-17
Payer: COMMERCIAL

## 2023-08-17 DIAGNOSIS — K21.9 GASTROESOPHAGEAL REFLUX DISEASE WITHOUT ESOPHAGITIS: ICD-10-CM

## 2023-08-17 RX ORDER — OMEPRAZOLE 40 MG/1
CAPSULE, DELAYED RELEASE ORAL
Qty: 90 CAPSULE | Refills: 3 | Status: SHIPPED | OUTPATIENT
Start: 2023-08-17

## 2023-08-17 NOTE — TELEPHONE ENCOUNTER
Patient calling about refill    Disp Refills Start End [facility]   omeprazole (PRILOSEC) 40 MG DR capsule 90 capsule 3 5/17/2023  No   Sig: TAKE 1 CAPSULE BY MOUTH ONCE DAILY TAKE 30-60 MINUTES BEFORE A MEAL. As needed   Class: No Print Out   Notes to Pharmacy: Profile Rx: patient will contact pharmacy when needed       TAKE 1 CAPSULE BY MOUTH ONCE DAILY TAKE 30-60 MINUTES BEFORE A MEAL. As needed      rx resent to pharmacy, rx had been marked no print out.     Marianna JARRELL RN   [facility]

## 2023-11-07 DIAGNOSIS — E78.5 HYPERLIPIDEMIA LDL GOAL <130: ICD-10-CM

## 2023-11-07 RX ORDER — ROSUVASTATIN CALCIUM 10 MG/1
TABLET, COATED ORAL
Qty: 90 TABLET | Refills: 1 | Status: SHIPPED | OUTPATIENT
Start: 2023-11-07 | End: 2024-06-04

## 2023-11-15 DIAGNOSIS — E03.9 HYPOTHYROIDISM, UNSPECIFIED TYPE: ICD-10-CM

## 2023-11-15 RX ORDER — LEVOTHYROXINE SODIUM 150 UG/1
TABLET ORAL
Qty: 45 TABLET | Refills: 2 | Status: SHIPPED | OUTPATIENT
Start: 2023-11-15 | End: 2023-12-04

## 2023-11-15 RX ORDER — LEVOTHYROXINE SODIUM 137 UG/1
TABLET ORAL
Qty: 45 TABLET | Refills: 2 | Status: SHIPPED | OUTPATIENT
Start: 2023-11-15 | End: 2023-12-04 | Stop reason: DRUGHIGH

## 2023-12-04 ENCOUNTER — OFFICE VISIT (OUTPATIENT)
Dept: FAMILY MEDICINE | Facility: CLINIC | Age: 78
End: 2023-12-04
Payer: COMMERCIAL

## 2023-12-04 VITALS
OXYGEN SATURATION: 98 % | DIASTOLIC BLOOD PRESSURE: 88 MMHG | HEIGHT: 73 IN | TEMPERATURE: 96.5 F | RESPIRATION RATE: 16 BRPM | WEIGHT: 209 LBS | BODY MASS INDEX: 27.7 KG/M2 | HEART RATE: 97 BPM | SYSTOLIC BLOOD PRESSURE: 138 MMHG

## 2023-12-04 DIAGNOSIS — N52.9 VASCULOGENIC ERECTILE DYSFUNCTION, UNSPECIFIED VASCULOGENIC ERECTILE DYSFUNCTION TYPE: ICD-10-CM

## 2023-12-04 DIAGNOSIS — R73.03 PREDIABETES: ICD-10-CM

## 2023-12-04 DIAGNOSIS — E78.5 HYPERLIPIDEMIA LDL GOAL <70: ICD-10-CM

## 2023-12-04 DIAGNOSIS — I10 ESSENTIAL HYPERTENSION WITH GOAL BLOOD PRESSURE LESS THAN 140/90: ICD-10-CM

## 2023-12-04 DIAGNOSIS — E34.9 TESTOSTERONE DEFICIENCY: ICD-10-CM

## 2023-12-04 DIAGNOSIS — D22.9 MULTIPLE PIGMENTED NEVI: ICD-10-CM

## 2023-12-04 DIAGNOSIS — F17.201 TOBACCO USE DISORDER, MODERATE, IN SUSTAINED REMISSION: ICD-10-CM

## 2023-12-04 DIAGNOSIS — L60.8 TOENAIL DEFORMITY: ICD-10-CM

## 2023-12-04 DIAGNOSIS — E78.5 HYPERLIPIDEMIA LDL GOAL <130: ICD-10-CM

## 2023-12-04 DIAGNOSIS — R73.01 ELEVATED FASTING GLUCOSE: ICD-10-CM

## 2023-12-04 DIAGNOSIS — E03.9 HYPOTHYROIDISM, UNSPECIFIED TYPE: ICD-10-CM

## 2023-12-04 DIAGNOSIS — Z23 NEEDS FLU SHOT: ICD-10-CM

## 2023-12-04 DIAGNOSIS — Z29.11 NEED FOR VACCINATION AGAINST RESPIRATORY SYNCYTIAL VIRUS: ICD-10-CM

## 2023-12-04 DIAGNOSIS — E29.1 HYPOGONADISM IN MALE: ICD-10-CM

## 2023-12-04 DIAGNOSIS — Z12.5 SCREENING FOR PROSTATE CANCER: ICD-10-CM

## 2023-12-04 DIAGNOSIS — E55.9 VITAMIN D DEFICIENCY: ICD-10-CM

## 2023-12-04 DIAGNOSIS — H90.3 BILATERAL SENSORINEURAL HEARING LOSS: ICD-10-CM

## 2023-12-04 DIAGNOSIS — Z23 NEED FOR TDAP VACCINATION: ICD-10-CM

## 2023-12-04 DIAGNOSIS — Z23 NEED FOR COVID-19 VACCINE: ICD-10-CM

## 2023-12-04 DIAGNOSIS — Z00.00 ENCOUNTER FOR MEDICARE ANNUAL WELLNESS EXAM: Primary | ICD-10-CM

## 2023-12-04 DIAGNOSIS — Z85.820 HISTORY OF MALIGNANT MELANOMA: ICD-10-CM

## 2023-12-04 DIAGNOSIS — J30.0 VASOMOTOR RHINITIS: ICD-10-CM

## 2023-12-04 LAB
BASOPHILS # BLD AUTO: 0 10E3/UL (ref 0–0.2)
BASOPHILS NFR BLD AUTO: 0 %
EOSINOPHIL # BLD AUTO: 0.1 10E3/UL (ref 0–0.7)
EOSINOPHIL NFR BLD AUTO: 3 %
ERYTHROCYTE [DISTWIDTH] IN BLOOD BY AUTOMATED COUNT: 12.8 % (ref 10–15)
HBA1C MFR BLD: 5.7 % (ref 0–5.6)
HCT VFR BLD AUTO: 44.6 % (ref 40–53)
HGB BLD-MCNC: 14.9 G/DL (ref 13.3–17.7)
IMM GRANULOCYTES # BLD: 0 10E3/UL
IMM GRANULOCYTES NFR BLD: 0 %
LYMPHOCYTES # BLD AUTO: 1.5 10E3/UL (ref 0.8–5.3)
LYMPHOCYTES NFR BLD AUTO: 30 %
MCH RBC QN AUTO: 31.6 PG (ref 26.5–33)
MCHC RBC AUTO-ENTMCNC: 33.4 G/DL (ref 31.5–36.5)
MCV RBC AUTO: 95 FL (ref 78–100)
MONOCYTES # BLD AUTO: 0.6 10E3/UL (ref 0–1.3)
MONOCYTES NFR BLD AUTO: 11 %
NEUTROPHILS # BLD AUTO: 2.8 10E3/UL (ref 1.6–8.3)
NEUTROPHILS NFR BLD AUTO: 56 %
PLATELET # BLD AUTO: 191 10E3/UL (ref 150–450)
RBC # BLD AUTO: 4.72 10E6/UL (ref 4.4–5.9)
WBC # BLD AUTO: 5 10E3/UL (ref 4–11)

## 2023-12-04 PROCEDURE — 99215 OFFICE O/P EST HI 40 MIN: CPT | Mod: 25 | Performed by: FAMILY MEDICINE

## 2023-12-04 PROCEDURE — 84439 ASSAY OF FREE THYROXINE: CPT | Performed by: FAMILY MEDICINE

## 2023-12-04 PROCEDURE — 90480 ADMN SARSCOV2 VAC 1/ONLY CMP: CPT | Performed by: FAMILY MEDICINE

## 2023-12-04 PROCEDURE — G0008 ADMIN INFLUENZA VIRUS VAC: HCPCS | Performed by: FAMILY MEDICINE

## 2023-12-04 PROCEDURE — G0439 PPPS, SUBSEQ VISIT: HCPCS | Performed by: FAMILY MEDICINE

## 2023-12-04 PROCEDURE — G0103 PSA SCREENING: HCPCS | Performed by: FAMILY MEDICINE

## 2023-12-04 PROCEDURE — 36415 COLL VENOUS BLD VENIPUNCTURE: CPT | Performed by: FAMILY MEDICINE

## 2023-12-04 PROCEDURE — 91320 SARSCV2 VAC 30MCG TRS-SUC IM: CPT | Performed by: FAMILY MEDICINE

## 2023-12-04 PROCEDURE — 90662 IIV NO PRSV INCREASED AG IM: CPT | Performed by: FAMILY MEDICINE

## 2023-12-04 PROCEDURE — 82570 ASSAY OF URINE CREATININE: CPT | Performed by: FAMILY MEDICINE

## 2023-12-04 PROCEDURE — 85025 COMPLETE CBC W/AUTO DIFF WBC: CPT | Performed by: FAMILY MEDICINE

## 2023-12-04 PROCEDURE — 82306 VITAMIN D 25 HYDROXY: CPT | Performed by: FAMILY MEDICINE

## 2023-12-04 PROCEDURE — 84443 ASSAY THYROID STIM HORMONE: CPT | Performed by: FAMILY MEDICINE

## 2023-12-04 PROCEDURE — 83036 HEMOGLOBIN GLYCOSYLATED A1C: CPT | Performed by: FAMILY MEDICINE

## 2023-12-04 PROCEDURE — 84480 ASSAY TRIIODOTHYRONINE (T3): CPT | Performed by: FAMILY MEDICINE

## 2023-12-04 PROCEDURE — 80053 COMPREHEN METABOLIC PANEL: CPT | Performed by: FAMILY MEDICINE

## 2023-12-04 PROCEDURE — 80061 LIPID PANEL: CPT | Performed by: FAMILY MEDICINE

## 2023-12-04 PROCEDURE — 82043 UR ALBUMIN QUANTITATIVE: CPT | Performed by: FAMILY MEDICINE

## 2023-12-04 RX ORDER — RESPIRATORY SYNCYTIAL VIRUS VACCINE 120MCG/0.5
0.5 KIT INTRAMUSCULAR ONCE
Qty: 1 EACH | Refills: 0 | Status: SHIPPED | OUTPATIENT
Start: 2023-12-04 | End: 2023-12-04

## 2023-12-04 RX ORDER — IPRATROPIUM BROMIDE 21 UG/1
SPRAY, METERED NASAL
Qty: 90 ML | Refills: 3 | Status: SHIPPED | OUTPATIENT
Start: 2023-12-04

## 2023-12-04 RX ORDER — LISINOPRIL 10 MG/1
10 TABLET ORAL DAILY
Qty: 90 TABLET | Refills: 3 | Status: SHIPPED | OUTPATIENT
Start: 2023-12-04

## 2023-12-04 RX ORDER — SILDENAFIL CITRATE 20 MG/1
80-100 TABLET ORAL
Qty: 110 TABLET | Refills: 3 | Status: SHIPPED | OUTPATIENT
Start: 2023-12-05

## 2023-12-04 RX ORDER — EZETIMIBE 10 MG/1
10 TABLET ORAL DAILY
Qty: 90 TABLET | Refills: 4 | Status: SHIPPED | OUTPATIENT
Start: 2023-12-04

## 2023-12-04 RX ORDER — LEVOTHYROXINE SODIUM 150 UG/1
150 TABLET ORAL DAILY
Qty: 90 TABLET | Refills: 3 | Status: SHIPPED | OUTPATIENT
Start: 2023-12-04

## 2023-12-04 RX ORDER — LEVOTHYROXINE SODIUM 150 UG/1
150 TABLET ORAL DAILY
Start: 2023-12-04 | End: 2023-12-04

## 2023-12-04 RX ORDER — TESTOSTERONE GEL, 1% 10 MG/G
GEL TRANSDERMAL
Qty: 150 G | Refills: 3 | Status: SHIPPED | OUTPATIENT
Start: 2023-12-04

## 2023-12-04 ASSESSMENT — ENCOUNTER SYMPTOMS
SHORTNESS OF BREATH: 0
ABDOMINAL PAIN: 0
ARTHRALGIAS: 1
FREQUENCY: 1
SORE THROAT: 0
WEAKNESS: 0
PARESTHESIAS: 0
JOINT SWELLING: 0
DIZZINESS: 0
PALPITATIONS: 0
NAUSEA: 0
HEADACHES: 0
HEMATURIA: 0
CHILLS: 0
HEARTBURN: 1
EYE PAIN: 0
FEVER: 0
COUGH: 0
CONSTIPATION: 1
HEMATOCHEZIA: 0
NERVOUS/ANXIOUS: 0
MYALGIAS: 1
DIARRHEA: 0

## 2023-12-04 ASSESSMENT — ACTIVITIES OF DAILY LIVING (ADL): CURRENT_FUNCTION: NO ASSISTANCE NEEDED

## 2023-12-04 NOTE — PATIENT INSTRUCTIONS
"Patient Education       To make a pharmacy only appointment online, please go to [email or web address] and select \"Click here to schedule a Vaccination or Blood Pressure Check at available [facility] \" at the bottom of the first page.  We can then select a date and time bubble that best fits your schedule.      [employer]  -- is a independent website that gives comparison prices for same rx at different area pharmacies.         Personalized Prevention Plan  You are due for the preventive services outlined below.  Your care team is available to assist you in scheduling these services.  If you have already completed any of these items, please share that information with your care team to update in your medical record.  Health Maintenance Due   Topic Date Due     RSV VACCINE (Pregnancy & 60+) (1 - 1-dose 60+ series) Never done     Flu Vaccine (1) 09/01/2023     COVID-19 Vaccine (5 - 2023-24 season) 09/01/2023     Diptheria Tetanus Pertussis (DTAP/TDAP/TD) Vaccine (2 - Td or Tdap) 09/18/2023     Basic Metabolic Panel  11/16/2023     Annual Wellness Visit  11/16/2023        Patient Education   Personalized Prevention Plan  You are due for the preventive services outlined below.  Your care team is available to assist you in scheduling these services.  If you have already completed any of these items, please share that information with your care team to update in your medical record.  Health Maintenance Due   Topic Date Due     RSV VACCINE (Pregnancy & 60+) (1 - 1-dose 60+ series) Never done     Flu Vaccine (1) 09/01/2023     COVID-19 Vaccine (5 - 2023-24 season) 09/01/2023     Diptheria Tetanus Pertussis (DTAP/TDAP/TD) Vaccine (2 - Td or Tdap) 09/18/2023     Basic Metabolic Panel  11/16/2023     Annual Wellness Visit  11/16/2023     Preventive Health Recommendations  See your health care provider every year to  Review health changes.   Discuss preventive care.    Review your medicines if your doctor has " prescribed any.  Talk with your health care provider about whether you should have a test to screen for prostate cancer (PSA).  Every 3 years, have a diabetes test (fasting glucose). If you are at risk for diabetes, you should have this test more often.  Every 5 years, have a cholesterol test. Have this test more often if you are at risk for high cholesterol or heart disease.   Every 10 years, have a colonoscopy. Or, have a yearly FIT test (stool test). These exams will check for colon cancer.  Talk to with your health care provider about screening for Abdominal Aortic Aneurysm if you have a family history of AAA or have a history of smoking.    Shots:   Get a flu shot each year.   Get a tetanus shot every 10 years.   Talk to your doctor about your pneumonia vaccines. There are now two you should receive - Pneumovax (PPSV 23) and Prevnar (PCV 13).  Talk to your pharmacist about a shingles vaccine.   Talk to your doctor about the hepatitis B vaccine.    Nutrition:   Eat at least 5 servings of fruits and vegetables each day.   Eat whole-grain bread, whole-wheat pasta and brown rice instead of white grains and rice.   Get adequate Calcium and Vitamin D.     Lifestyle  Exercise for at least 150 minutes a week (30 minutes a day, 5 days a week). This will help you control your weight and prevent disease.   Limit alcohol to one drink per day.   No smoking.   Wear sunscreen to prevent skin cancer.   See your dentist every six months for an exam and cleaning.   See your eye doctor every 1 to 2 years to screen for conditions such as glaucoma, macular degeneration and cataracts.    Personalized Prevention Plan  You are due for the preventive services outlined below.  Your care team is available to assist you in scheduling these services.  If you have already completed any of these items, please share that information with your care team to update in your medical record.  Health Maintenance   Topic Date Due     RSV VACCINE  "(Pregnancy & 60+) (1 - 1-dose 60+ series) Never done     INFLUENZA VACCINE (1) 09/01/2023     COVID-19 Vaccine (5 - 2023-24 season) 09/01/2023     DTAP/TDAP/TD IMMUNIZATION (2 - Td or Tdap) 09/18/2023     BMP  11/16/2023     MEDICARE ANNUAL WELLNESS VISIT  11/16/2023     TSH W/FREE T4 REFLEX  01/12/2024     COLORECTAL CANCER SCREENING  04/29/2024     MICROALBUMIN  05/17/2024     ANNUAL REVIEW OF HM ORDERS  12/04/2024     FALL RISK ASSESSMENT  12/04/2024     LIPID  11/16/2027     ADVANCE CARE PLANNING  12/04/2028     HEPATITIS C SCREENING  Completed     PHQ-2 (once per calendar year)  Completed     Pneumococcal Vaccine: 65+ Years  Completed     ZOSTER IMMUNIZATION  Completed     IPV IMMUNIZATION  Aged Out     HPV IMMUNIZATION  Aged Out     MENINGITIS IMMUNIZATION  Aged Out     RSV MONOCLONAL ANTIBODY  Aged Out     LUNG CANCER SCREENING  Discontinued       Learning About Being Physically Active  What is physical activity?     Being physically active means doing any kind of activity that gets your body moving.  The types of physical activity that can help you get fit and stay healthy include:  Aerobic or \"cardio\" activities. These make your heart beat faster and make you breathe harder, such as brisk walking, riding a bike, or running. They strengthen your heart and lungs and build up your endurance.  Strength training activities. These make your muscles work against, or \"resist,\" something. Examples include lifting weights or doing push-ups. These activities help tone and strengthen your muscles and bones.  Stretches. These let you move your joints and muscles through their full range of motion. Stretching helps you be more flexible.  Reaching a balance between these three types of physical activity is important because each one contributes to your overall fitness.  What are the benefits of being active?  Being active is one of the best things you can do for your health. It helps you to:  Feel stronger and have more " "energy to do all the things you like to do.  Focus better at school or work.  Feel, think, and sleep better.  Reach and stay at a healthy weight.  Lose fat and build lean muscle.  Lower your risk for serious health problems, including diabetes, heart attack, high blood pressure, and some cancers.  Keep your heart, lungs, bones, muscles, and joints strong and healthy.  How can you make being active part of your life?  Start slowly. Make it your long-term goal to get at least 30 minutes of exercise on most days of the week. Walking is a good choice. You also may want to do other activities, such as running, swimming, cycling, or playing tennis or team sports.  Pick activities that you like--ones that make your heart beat faster, your muscles stronger, and your muscles and joints more flexible. If you find more than one thing you like doing, do them all. You don't have to do the same thing every day.  Get your heart pumping every day. Any activity that makes your heart beat faster and keeps it at that rate for a while counts.  Here are some great ways to get your heart beating faster:  Go for a brisk walk, run, or hike.  Go for a swim or bike ride.  Take an online exercise class or dance.  Play a game of touch football, basketball, or soccer.  Play tennis, pickleball, or racquetball.  Climb stairs.  Even some household chores can be aerobic. Just do them at a faster pace. Raking or mowing the lawn, sweeping the garage, and vacuuming and cleaning your home all can help get your heart rate up.  Strengthen your muscles during the week. You don't have to lift heavy weights or grow big, bulky muscles to get stronger. Doing a few simple activities that make your muscles work against, or \"resist,\" something can help you get stronger. Aim for at least twice a week.  For example, you can:  Do push-ups or sit-ups, which use your own body weight as resistance.  Lift weights or dumbbells or use stretch bands at home or in a gym or " "[facility].  Stretch your muscles often. Stretching will help you as you become more active. It can help you stay flexible and loosen tight muscles. It can also help improve your balance and posture and can be a great way to relax.  Be sure to stretch the muscles you'll be using when you work out. It's best to warm your muscles slightly before you stretch them. Walk or do some other light aerobic activity for a few minutes. Then start stretching.  When you stretch your muscles:  Do it slowly. Stretching is not about going fast or making sudden movements.  Don't push or bounce during a stretch.  Hold each stretch for at least 15 to 30 seconds, if you can. You should feel a stretch in the muscle, but not pain.  Breathe out as you do the stretch. Then breathe in as you hold the stretch. Don't hold your breath.  If you're worried about how more activity might affect your health, have a checkup before you start. Follow any special advice your doctor gives you for getting a smart start.  Where can you learn more?  Go to https://www.[employer].net/patiented  Enter W332 in the search box to learn more about \"Learning About Being Physically Active.\"  Current as of: June 6, 2023               Content Version: 13.8    2711-3318 [employer].   Care instructions adapted under license by your healthcare professional. If you have questions about a medical condition or this instruction, always ask your healthcare professional. [employer] disclaims any warranty or liability for your use of this information.      Learning About Dietary Guidelines  What are the Dietary Guidelines for Americans?     Dietary Guidelines for Americans provide tips for eating well and staying healthy. This helps reduce the risk for long-term (chronic) diseases.  These guidelines recommend that you:  Eat and drink the right amount for you. The [place] government's food guide is called MyPlate. It can help you make your own " "well-balanced eating plan.  Try to balance your eating with your activity. This helps you stay at a healthy weight.  Drink alcohol in moderation, if at all.  Limit foods high in salt, saturated fat, trans fat, and added sugar.  These guidelines are from the U.[place] Department of Agriculture and the U.[place] Department of Health and Human Services. They are updated every 5 years.  What is MyPlate?  MyPlate is the [place] government's food guide. It can help you make your own well-balanced eating plan. A balanced eating plan means that you eat enough, but not too much, and that your food gives you the nutrients you need to stay healthy.  MyPlate focuses on eating plenty of whole grains, fruits, and vegetables, and on limiting fat and sugar. It is available online at www.ChooseMyPlate.gov.  How can you get started?  If you're trying to eat healthier, you can slowly change your eating habits over time. You don't have to make big changes all at once. Start by adding one or two healthy foods to your meals each day.  Grains  Choose whole-grain breads and cereals and whole-wheat pasta and whole-grain crackers.  Vegetables  Eat a variety of vegetables every day. They have lots of nutrients and are part of a heart-healthy diet.  Fruits  Eat a variety of fruits every day. Fruits contain lots of nutrients. Choose fresh fruit instead of fruit juice.  Protein foods  Choose fish and lean poultry more often. Eat red meat and fried meats less often. Dried beans, tofu, and nuts are also good sources of protein.  Dairy  Choose low-fat or fat-free products from this food group. If you have problems digesting milk, try eating cheese or yogurt instead.  Fats and oils  Limit fats and oils if you're trying to cut calories. Choose healthy fats when you cook. These include canola oil and olive oil.  Where can you learn more?  Go to https://www.healthwise.net/patiented  Enter D676 in the search box to learn more about \"Learning About Dietary " "Guidelines.\"  Current as of: February 28, 2023               Content Version: 13.8    1295-6551 [employer].   Care instructions adapted under license by your healthcare professional. If you have questions about a medical condition or this instruction, always ask your healthcare professional. [employer] disclaims any warranty or liability for your use of this information.      Hearing Loss: Care Instructions  Overview     Hearing loss is a sudden or slow decrease in how well you hear. It can range from slight to profound. Permanent hearing loss can occur with aging. It also can happen when you are exposed long-term to loud noise. Examples include listening to loud music, riding motorcycles, or being around other loud machines.  Hearing loss can affect your work and home life. It can make you feel lonely or depressed. You may feel that you have lost your independence. But hearing aids and other devices can help you hear better and feel connected to others.  Follow-up care is a key part of your treatment and safety. Be sure to make and go to all appointments, and call your doctor if you are having problems. It's also a good idea to know your test results and keep a list of the medicines you take.  How can you care for yourself at home?  Avoid loud noises whenever possible. This helps keep your hearing from getting worse.  Always wear hearing protection around loud noises.  Wear a hearing aid as directed.  A professional can help you pick a hearing aid that will work best for you.  You can also get hearing aids over the counter for mild to moderate hearing loss.  Have hearing tests as your doctor suggests. They can show whether your hearing has changed. Your hearing aid may need to be adjusted.  Use other devices as needed. These may include:  Telephone amplifiers and hearing aids that can connect to a television, stereo, radio, or microphone.  Devices that use lights or vibrations. These " "alert you to the doorbell, a ringing telephone, or a baby monitor.  Television closed-captioning. This shows the words at the bottom of the screen. Most new TVs can do this.  TTY (text telephone). This lets you type messages back and forth on the telephone instead of talking or listening. These devices are also called TDD. When messages are typed on the keyboard, they are sent over the phone line to a receiving TTY. The message is shown on a monitor.  Use text messaging, social media, and email if it is hard for you to communicate by telephone.  Try to learn a listening technique called speechreading. It is not lipreading. You pay attention to people's gestures, expressions, posture, and tone of voice. These clues can help you understand what a person is saying. Face the person you are talking to, and have them face you. Make sure the lighting is good. You need to see the other person's face clearly.  Think about counseling if you need help to adjust to your hearing loss.  When should you call for help?  Watch closely for changes in your health, and be sure to contact your doctor if:    You think your hearing is getting worse.     You have new symptoms, such as dizziness or nausea.   Where can you learn more?  Go to https://www.[employer].net/patiented  Enter R798 in the search box to learn more about \"Hearing Loss: Care Instructions.\"  Current as of: February 28, 2023               Content Version: 13.8    1127-0786 [employer].   Care instructions adapted under license by your healthcare professional. If you have questions about a medical condition or this instruction, always ask your healthcare professional. [employer] disclaims any warranty or liability for your use of this information.        Hearing Loss  You identified a concern for your hearing.  Please follow up with [facility] for further assistance.     "

## 2023-12-04 NOTE — PROGRESS NOTES
"SUBJECTIVE:   Dev is a 78 year old, presenting for the followin. Encounter for Medicare annual wellness exam    2. Need for Tdap vaccination    3. Need for vaccination against respiratory syncytial virus    4. Hypothyroidism, unspecified type    5. Prediabetes    6. Essential hypertension with goal blood pressure less than 140/90    7. Hyperlipidemia LDL goal <130 - pravastatin = leg cramps; lipitor =calf pains    8. Elevated fasting glucose    9. Need for COVID-19 vaccine    10. Needs flu shot    11. Tobacco use disorder, moderate, in sustained remission- quit 2022    12. Toenail deformity- left 3rd toenail after trauma    13. Vitamin D deficiency    14. Hyperlipidemia LDL goal <70    15. Vasomotor rhinitis    16. Screening for prostate cancer    17. Vasculogenic erectile dysfunction, unspecified vasculogenic erectile dysfunction type    18. Testosterone deficiency    19. Hypogonadism in male    20. Multiple pigmented nevi    21. History of malignant melanoma- left upper arm    22. Bilateral sensorineural hearing loss        Physical        2023     1:01 PM   Additional Questions   Roomed by delbert young       Are you in the first 12 months of your Medicare coverage?  No    Healthy Habits:     In general, how would you rate your overall health?  Good    Frequency of exercise:  1 day/week    Duration of exercise:  15-30 minutes    Do you usually eat at least 4 servings of fruit and vegetables a day, include whole grains    & fiber and avoid regularly eating high fat or \"junk\" foods?  No    Taking medications regularly:  Yes    Medication side effects:  None    Ability to successfully perform activities of daily living:  No assistance needed    Home Safety:  No safety concerns identified    Hearing Impairment:  Feel that people are mumbling or not speaking clearly and difficulty understanding soft or whispered speech    In the past 6 months, have you been bothered by leaking of urine?  No    In general, how " would you rate your overall mental or emotional health?  Good    Additional concerns today:  Yes    Left 3rd toe - injury to nail - dropped something on it.      Right sided neck ? Lymph node with right ear pain and sometimes right sided headache - all come and go.      Re: Hypertension - has been taking  lisinopril 10mg just as needed.  20mg daily was too much = dropped his BP too much.  Discussed needs to take lisinopril daily for best BP control.  Will try 10mg tab 1 tab po daily.  Took last 10mg dose about 30 hours ago .     Quit smoking about 2 years ago.    Lost 40 lbs over the last 3 years - eating better and exercising.      Last couple of months eating a bit more.  Has an 84 yr old girlfriend from [place], [place] - met through [employer] - wife  2021.  She has some family here in [place].    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  Her mother just  recently at 110 yrs old.     Wt Readings from Last 5 Encounters:   23 94.8 kg (209 lb)   23 93.9 kg (207 lb)   23 98.2 kg (216 lb 8 oz)   23 96.2 kg (212 lb 1.6 oz)   22 96.6 kg (213 lb)         Today's PHQ-2 Score:       2023    12:49 PM   PHQ-2 (  [employer])   Q1: Little interest or pleasure in doing things 0   Q2: Feeling down, depressed or hopeless 1   PHQ-2 Score 1   Q1: Little interest or pleasure in doing things Not at all   Q2: Feeling down, depressed or hopeless Several days   PHQ-2 Score 1       Have you ever done Advance Care Planning? (For example, a Health Directive, POLST, or a discussion with a medical provider or your loved ones about your wishes): Yes, advance care planning is on file.       Fall risk  Fallen 2 or more times in the past year?: No  Any fall with injury in the past year?: No    Cognitive Screening   1) Repeat 3 items (Leader, Season, Table)    2) Clock draw: NORMAL  3) 3 item recall: Recalls NO objects   Results: NORMAL clock, 1-2 items recalled: COGNITIVE IMPAIRMENT LESS  LIKELY    Mini-CogTM Copyright BINTA Galvez. Licensed by the author for use in [facility]; reprinted with permission (inés@[facility]). All rights reserved.      Do you have sleep apnea, excessive snoring or daytime drowsiness? : no    Reviewed and updated as needed this visit by clinical staff   Tobacco  Allergies  Meds  Problems  Med Hx  Surg Hx  Fam Hx          Reviewed and updated as needed this visit by Provider   Tobacco  Allergies  Meds  Problems  Med Hx  Surg Hx  Fam Hx         Social History     Tobacco Use    Smoking status: Former     Packs/day: 0.50     Years: 60.00     Additional pack years: 0.00     Total pack years: 30.00     Types: Cigarettes     Quit date: 2022     Years since quittin.4    Smokeless tobacco: Never    Tobacco comments:     strongly encourage smoking cessation with every visit   Substance Use Topics    Alcohol use: Yes     Alcohol/week: 0.0 standard drinks of alcohol     Comment: not regular - very occasional 1-2 martinis when out to dinner 1-2x/month, if that              2023    12:49 PM   Alcohol Use   Prescreen: >3 drinks/day or >7 drinks/week? No     Do you have a current opioid prescription? No  Do you use any other controlled substances or medications that are not prescribed by a provider? None    Pt doesn't qualify for lung cancer screening - he has aged out - medicare screening stops at age 77.        Hyperlipidemia Follow-Up- is fasting today.     Are you regularly taking any medication or supplement to lower your cholesterol?   Yes- rosuvastatin  Are you having muscle aches or other side effects that you think could be caused by your cholesterol lowering medication?  No    Recent Labs   Lab Test 22  1057 22  1008   CHOL 154 133   HDL 63 59   LDL 71 58   TRIG 100 82        Hypertension Follow-up    Do you check your blood pressure regularly outside of the clinic? Yes   Are you following a low salt diet? Yes  Are your blood  pressures ever more than 140 on the top number (systolic) OR more   than 90 on the bottom number (diastolic), for example 140/90? Yes    Hypothyroidism Follow-up    Since last visit, patient describes the following symptoms: Weight stable, no hair loss, no skin changes, no constipation, no loose stools    Current providers sharing in care for this patient include:   Patient Care Team:  Vee Beth MD as PCP - General (Family Practice)  Vee Beth MD as MD (Family Practice)  Vee Beth MD as Assigned PCP  Rupinder Bledsoe AuD as Audiologist (Audiology)  Emily Ledezma PA-C as Physician Assistant (Dermatology)  Emily Ledezma PA-C as Assigned Surgical Provider  Roderick Harp MD as Assigned Heart and Vascular Provider    The following health maintenance items are reviewed in Epic and correct as of today:  Health Maintenance   Topic Date Due    RSV VACCINE (Pregnancy & 60+) (1 - 1-dose 60+ series) Never done    INFLUENZA VACCINE (1) 09/01/2023    COVID-19 Vaccine (5 - 2023-24 season) 09/01/2023    DTAP/TDAP/TD IMMUNIZATION (2 - Td or Tdap) 09/18/2023    BMP  11/16/2023    MEDICARE ANNUAL WELLNESS VISIT  11/16/2023    TSH W/FREE T4 REFLEX  01/12/2024    COLORECTAL CANCER SCREENING  04/29/2024    MICROALBUMIN  05/17/2024    ANNUAL REVIEW OF HM ORDERS  12/04/2024    FALL RISK ASSESSMENT  12/04/2024    LIPID  11/16/2027    ADVANCE CARE PLANNING  12/04/2028    HEPATITIS C SCREENING  Completed    PHQ-2 (once per calendar year)  Completed    Pneumococcal Vaccine: 65+ Years  Completed    ZOSTER IMMUNIZATION  Completed    IPV IMMUNIZATION  Aged Out    HPV IMMUNIZATION  Aged Out    MENINGITIS IMMUNIZATION  Aged Out    RSV MONOCLONAL ANTIBODY  Aged Out    LUNG CANCER SCREENING  Discontinued     Lab work is in process  Labs reviewed in EPIC  BP Readings from Last 3 Encounters:   12/04/23 138/88   05/17/23 128/88   02/21/23 (!) 120/94    Wt Readings from Last 3 Encounters:    12/04/23 94.8 kg (209 lb)   05/17/23 93.9 kg (207 lb)   02/21/23 98.2 kg (216 lb 8 oz)                  Patient Active Problem List   Diagnosis    Gastroesophageal reflux disease without esophagitis = ran out of PPI- has been taking lots of TUMS    Hyperlipidemia LDL goal <130 - pravastatin = leg cramps; lipitor =calf pains    Snoring    Vasculogenic erectile dysfunction, unspecified vasculogenic erectile dysfunction type    Osteoarthritis    Lipoma of skin    Gastric ulcer    Malignant melanoma of left upper extremity including shoulder (H)    Tobacco use disorder, moderate, in sustained remission- quit 7/2022    Enlarged prostate- has seen  Dr. Solomon in the past - no further PSA's needed as of 7/2019 - doesn't need to see him again per Dr. Solomon unless gross hematuria     Rectal pain    Hearing loss    Inguinal hernia- right     Pulmonary nodules- tiny per screening chest CT -  repeat CT chest low dose w/o contrast 4/2017 = no change since 2015 - no need for further CT's for these nodules but ok for lung ca screening     Essential hypertension with goal blood pressure less than 140/90    Testosterone deficiency    Hypothyroidism, unspecified type    Chronic constipation    Mass of left chest wall - ? there for 18+ years -left breast 1cm mass at 7:30     Multiple pigmented nevi    Reducible right inguinal hernia    Undiagnosed cardiac murmurs    Elevated fasting glucose    Tubular adenoma 4/2019 - repeat 5 years    Alcoholic intoxication with complication (H)- history of - now 4-5 drinks/week max 2 drinks at a time - gin & tonics - about 1-2 oz of alcohol per drink     Nocturia- up 1-3x/night     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 -  quit 7/2022    Nicotine dependence, uncomplicated, unspecified nicotine product type    Orthostatic hypotension - got lightheaded  Dizziness and a bit hypotensive  with 30mg lisinopril - now back down to 20mg= improved      Personal history of tobacco use- quit 7/2022      Dizziness- lightheaded dizziness- wearing ZioPatch monitor and monitoring BP - gets lightheaded when BP is low     Bilateral sensorineural hearing loss- saw Audiology in 3/2023 - desires referral for hearing aids now 2023    Prediabetes    Migraine with aura and without status migrainosus, not intractable - unchanged in frequency and character - once a month - better since quitting smoking 2022     Moderate aortic valve stenosis- with Mitral valve stenosis and regurgitation- on echocardiogram from 2023    History of malignant melanoma- left upper arm    Hypogonadism in male    Hyperlipidemia LDL goal <70    Vitamin D deficiency    Toenail deformity- left 3rd toenail after trauma     Past Surgical History:   Procedure Laterality Date    COLONOSCOPY  2007    repeat in 2017    DAVINCI HERNIORRHAPHY INGUINAL Right 10/24/2019    Procedure: robotic assisted right inguinal hernia repair with mesh;  Surgeon: Rom Mccoy MD;  Location:  OR    ESOPHAGOSCOPY, GASTROSCOPY, DUODENOSCOPY (EGD), COMBINED N/A 2015    Procedure: COMBINED ESOPHAGOSCOPY, GASTROSCOPY, DUODENOSCOPY (EGD);  Surgeon: Ender Dixon MD;  Location:  GI    HC ESOPHAGOSCOPY, DIAGNOSTIC  2007    EGD - gastric ulcer w/ erosions/ LA grade B erosive esophagitis    wide excision      for malignant melanoma       Social History     Tobacco Use    Smoking status: Former     Packs/day: 0.50     Years: 60.00     Additional pack years: 0.00     Total pack years: 30.00     Types: Cigarettes     Quit date: 2022     Years since quittin.4    Smokeless tobacco: Never    Tobacco comments:     strongly encourage smoking cessation with every visit   Substance Use Topics    Alcohol use: Yes     Alcohol/week: 0.0 standard drinks of alcohol     Comment: not regular - very occasional 1-2 martinis when out to dinner 1-2x/month, if that      Family History   Problem Relation Age of Onset    C.A.D. Father 62         at age 62  of MI    Diabetes Father         early type 2     Neurologic Disorder Mother         mild dementia - @ 92    C.A.D. Mother         angina     Thyroid Disease Mother         s/p thyroid     Colon Cancer No family hx of          Current Outpatient Medications   Medication Sig Dispense Refill    aspirin (ASA) 81 MG EC tablet Take 1 tablet (81 mg) by mouth daily      ezetimibe (ZETIA) 10 MG tablet Take 1 tablet (10 mg) by mouth daily 90 tablet 4    Glucosamine-Chondroit-Vit C-Mn (GLUCOSAMINE CHONDROITIN 1500 COMPLEX) CAPS Take by mouth daily      ipratropium (ATROVENT) 0.03 % nasal spray USE 2 SPRAYS INTO BOTH NOSTRILS EVERY 12 HOURS 90 mL 3    levothyroxine (SYNTHROID/LEVOTHROID) 150 MCG tablet Take 1 tablet (150 mcg) by mouth daily 90 tablet 3    lisinopril (ZESTRIL) 10 MG tablet Take 1 tablet (10 mg) by mouth daily 90 tablet 3    meclizine (ANTIVERT) 25 MG tablet Take 1 tablet (25 mg) by mouth every 6 hours as needed for dizziness 30 tablet 1    Multiple Vitamins-Minerals (CENTRUM SILVER) per tablet Take 1 tablet by mouth daily 30 tablet     omeprazole (PRILOSEC) 40 MG DR capsule TAKE 1 CAPSULE BY MOUTH ONCE DAILY TAKE 30-60 MINUTES BEFORE A MEAL. As needed 90 capsule 3    respiratory syncytial virus vaccine, bivalent (ABRYSVO) injection Inject 0.5 mLs into the muscle once for 1 dose 1 each 0    rosuvastatin (CRESTOR) 10 MG tablet TAKE 1 TABLET BY MOUTH ONCE DAILY AT BEDTIME FOR CHOLESTEROL 90 tablet 1    [START ON 2023] sildenafil (REVATIO) 20 MG tablet Take 4-5 tablets ( mg) by mouth four times a week 110 tablet 3    Tdap, tetanus-diptheria-acell pertussis, (BOOSTRIX) 5-2.5-18.5 LF-MCG/0.5 SOFIYA injection Inject 0.5 mLs into the muscle once for 1 dose 0.5 mL 0    testosterone (ANDROGEL 1 % PUMP) 12.5 MG/ACT (1%) gel APPLY 3 PUMPS ONTO CLEAN DRY HAIRLESS SKIN OF SHOULDERS, UPPER ARMS OR ABDOMEN ONCE DAILY 150 g 3     Allergies   Allergen Reactions    Atorvastatin Other (See Comments)     Calf pain     "Pravastatin Other (See Comments)     Leg cramps     Recent Labs   Lab Test 01/12/23  1504 11/16/22  1057 08/23/22  1008 01/13/22  1046 08/12/21  1246 10/14/20  0736 07/15/20  1315 05/14/20  1528 11/21/19  0737 10/24/19  0708 09/12/19  1048   A1C  --  5.6  --   --   --   --  5.8*  --   --   --  5.9*   LDL  --  71 58  --  66   < > 95  --   --   --  110*   HDL  --  63 59  --  63   < > 58  --   --   --  46   TRIG  --  100 82  --  79   < > 79  --   --   --  98   ALT  --  14 34  --  30   < >  --  19  --   --  17   CR  --  1.00 1.14   < > 1.21  --   --  1.07  --  1.00 1.03   GFRESTIMATED  --  78 66   < > 58*  --   --  67  --  74 71   GFRESTBLACK  --   --   --   --   --   --   --  78  --  86 82   POTASSIUM  --  4.2 4.3   < > 4.4  --   --  4.2  --  3.9 4.5   TSH 3.68 6.49* 3.74   < > 0.14*  --   --  1.65   < >  --   --     < > = values in this interval not displayed.                Review of Systems   Constitutional:  Negative for chills and fever.   HENT:  Positive for congestion, ear pain and hearing loss. Negative for sore throat.    Eyes:  Negative for pain and visual disturbance.   Respiratory:  Negative for cough and shortness of breath.    Cardiovascular:  Negative for chest pain, palpitations and peripheral edema.   Gastrointestinal:  Positive for constipation and heartburn. Negative for abdominal pain, diarrhea, hematochezia and nausea.   Genitourinary:  Positive for frequency, impotence and urgency. Negative for genital sores, hematuria and penile discharge.   Musculoskeletal:  Positive for arthralgias and myalgias. Negative for joint swelling.   Skin:  Negative for rash.   Neurological:  Negative for dizziness, weakness, headaches and paresthesias.   Psychiatric/Behavioral:  Negative for mood changes. The patient is not nervous/anxious.          OBJECTIVE:   /88   Pulse 97   Temp (!) 96.5  F (35.8  C)   Resp 16   Ht 1.854 m (6' 1\")   Wt 94.8 kg (209 lb)   SpO2 98%   BMI 27.57 kg/m   Estimated body " "mass index is 27.57 kg/m  as calculated from the following:    Height as of this encounter: 1.854 m (6' 1\").    Weight as of this encounter: 94.8 kg (209 lb).  Physical Exam  GENERAL: healthy, alert and no distress  EYES: Eyes grossly normal to inspection, PERRL and conjunctivae and sclerae normal  HENT: ear canals and TM's normal, nose and mouth without ulcers or lesions  NECK: no adenopathy, no asymmetry, masses, or scars and thyroid normal to palpation  RESP: lungs clear to auscultation - no rales, rhonchi or wheezes  CV: regular rate and rhythm, normal S1 S2, no S3 or S4, no murmur, click or rub, no peripheral edema and peripheral pulses strong  ABDOMEN: soft, nontender, no hepatosplenomegaly, no masses and bowel sounds normal  MS: no gross musculoskeletal defects noted, no edema  SKIN: no suspicious lesions or rashes  NEURO: Normal strength and tone, mentation intact and speech normal  PSYCH: mentation appears normal, affect normal/bright    Diagnostic Test Results:  Labs reviewed in Epic    ASSESSMENT / PLAN:       ICD-10-CM    1. Encounter for Medicare annual wellness exam  Z00.00 REVIEW OF HEALTH MAINTENANCE PROTOCOL ORDERS     PRIMARY CARE FOLLOW-UP SCHEDULING      2. Need for Tdap vaccination  Z23 Tdap, tetanus-diptheria-acell pertussis, (BOOSTRIX) 5-2.5-18.5 LF-MCG/0.5 SOFIYA injection      3. Need for vaccination against respiratory syncytial virus  Z29.11 respiratory syncytial virus vaccine, bivalent (ABRYSVO) injection      4. Hypothyroidism, unspecified type  E03.9 TSH     T4 free     T3 total     levothyroxine (SYNTHROID/LEVOTHROID) 150 MCG tablet     TSH     T4 free     T3 total     DISCONTINUED: levothyroxine (SYNTHROID/LEVOTHROID) 150 MCG tablet     CANCELED: TSH     CANCELED: T4 free     CANCELED: T3 total      5. Prediabetes  R73.03 Hemoglobin A1c     Comprehensive metabolic panel     CANCELED: CBC with platelets      6. Essential hypertension with goal blood pressure less than 140/90  I10 " lisinopril (ZESTRIL) 10 MG tablet     Comprehensive metabolic panel     CANCELED: CBC with platelets      7. Hyperlipidemia LDL goal <130 - pravastatin = leg cramps; lipitor =calf pains  E78.5 Lipid panel reflex to direct LDL Fasting     Comprehensive metabolic panel     CBC with Platelets & Differential     Albumin Random Urine Quantitative with Creat Ratio     Lipid panel reflex to direct LDL Fasting     Comprehensive metabolic panel     CBC with Platelets & Differential     Albumin Random Urine Quantitative with Creat Ratio     CANCELED: CBC with platelets     CANCELED: Lipid panel reflex to direct LDL Fasting     CANCELED: Comprehensive metabolic panel      8. Elevated fasting glucose  R73.01 Hemoglobin A1c     CANCELED: Hemoglobin A1c      9. Need for COVID-19 vaccine  Z23 COVID-19 12+ (2023-24) (PFIZER)      10. Needs flu shot  Z23 INFLUENZA VACCINE 65+ (FLUZONE HD)      11. Tobacco use disorder, moderate, in sustained remission- quit 7/2022  F17.201       12. Toenail deformity- left 3rd toenail after trauma  L60.8 Orthopedic  Referral      13. Vitamin D deficiency  E55.9 25 Hydroxyvitamin D2 and D3     25 Hydroxyvitamin D2 and D3      14. Hyperlipidemia LDL goal <70  E78.5 ezetimibe (ZETIA) 10 MG tablet      15. Vasomotor rhinitis  J30.0 ipratropium (ATROVENT) 0.03 % nasal spray      16. Screening for prostate cancer  Z12.5 PSA, screen     PSA, screen      17. Vasculogenic erectile dysfunction, unspecified vasculogenic erectile dysfunction type  N52.9 sildenafil (REVATIO) 20 MG tablet      18. Testosterone deficiency  E34.9 testosterone (ANDROGEL 1 % PUMP) 12.5 MG/ACT (1%) gel      19. Hypogonadism in male  E29.1 testosterone (ANDROGEL 1 % PUMP) 12.5 MG/ACT (1%) gel      20. Multiple pigmented nevi  D22.9 Adult Dermatology  Referral      21. History of malignant melanoma- left upper arm  Z85.820 Adult Dermatology  Referral      22. Bilateral sensorineural hearing loss  H90.3 Adult  "Audiology Referral        Return in about 6 months (around 6/4/2024) for blood pressure, w/ Dr. HUTSON for 40 min appt. p      Please, call our clinic or go to the ER immediately if signs or symptoms worsen or fail to improve as anticipated.   Patient has been advised of split billing requirements and indicates understanding: Yes    53 minutes on patient care date of visit - 41 minutes on non-preventative care.   COUNSELING:  Reviewed preventive health counseling, as reflected in patient instructions      BMI:   Estimated body mass index is 27.57 kg/m  as calculated from the following:    Height as of this encounter: 1.854 m (6' 1\").    Weight as of this encounter: 94.8 kg (209 lb).         He reports that he quit smoking about 17 months ago. His smoking use included cigarettes. He has a 30.00 pack-year smoking history. He has never used smokeless tobacco.      Appropriate preventive services were discussed with this patient, including applicable screening as appropriate for fall prevention, nutrition, physical activity, Tobacco-use cessation, weight loss and cognition.  Checklist reviewing preventive services available has been given to the patient.    Reviewed patients plan of care and provided an AVS. The Complex Care Plan (for patients with higher acuity and needing more deliberate coordination of services) for Dev meets the Care Plan requirement. This Care Plan has been established and reviewed with the Patient.             Vee Beth MD  [facility] [place]    Identified Health Risks:  I have reviewed Opioid Use Disorder and Substance Use Disorder risk factors and made any needed referrals.   He is at risk for lack of exercise and has been provided with information to increase physical activity for the benefit of his well-being.  The patient was counseled and encouraged to consider modifying their diet and eating habits. He was provided with information on recommended healthy diet " options.  The patient was provided with written information regarding signs of hearing loss.  The patient identified a concern for his hearing.  I referred him to Audiology.

## 2023-12-05 LAB
ALBUMIN SERPL BCG-MCNC: 4.3 G/DL (ref 3.5–5.2)
ALP SERPL-CCNC: 85 U/L (ref 40–150)
ALT SERPL W P-5'-P-CCNC: 19 U/L (ref 0–70)
ANION GAP SERPL CALCULATED.3IONS-SCNC: 10 MMOL/L (ref 7–15)
AST SERPL W P-5'-P-CCNC: 31 U/L (ref 0–45)
BILIRUB SERPL-MCNC: 1.2 MG/DL
BUN SERPL-MCNC: 18.1 MG/DL (ref 8–23)
CALCIUM SERPL-MCNC: 9.4 MG/DL (ref 8.8–10.2)
CHLORIDE SERPL-SCNC: 104 MMOL/L (ref 98–107)
CHOLEST SERPL-MCNC: 169 MG/DL
CREAT SERPL-MCNC: 0.89 MG/DL (ref 0.67–1.17)
CREAT UR-MCNC: 65.3 MG/DL
DEPRECATED HCO3 PLAS-SCNC: 24 MMOL/L (ref 22–29)
EGFRCR SERPLBLD CKD-EPI 2021: 88 ML/MIN/1.73M2
GLUCOSE SERPL-MCNC: 95 MG/DL (ref 70–99)
HDLC SERPL-MCNC: 74 MG/DL
LDLC SERPL CALC-MCNC: 80 MG/DL
MICROALBUMIN UR-MCNC: 122 MG/L
MICROALBUMIN/CREAT UR: 186.83 MG/G CR (ref 0–17)
NONHDLC SERPL-MCNC: 95 MG/DL
POTASSIUM SERPL-SCNC: 5.1 MMOL/L (ref 3.4–5.3)
PROT SERPL-MCNC: 7.4 G/DL (ref 6.4–8.3)
PSA SERPL DL<=0.01 NG/ML-MCNC: 0.96 NG/ML (ref 0–6.5)
SODIUM SERPL-SCNC: 138 MMOL/L (ref 135–145)
T3 SERPL-MCNC: 93 NG/DL (ref 85–202)
T4 FREE SERPL-MCNC: 1.59 NG/DL (ref 0.9–1.7)
TRIGL SERPL-MCNC: 76 MG/DL
TSH SERPL DL<=0.005 MIU/L-ACNC: 2.62 UIU/ML (ref 0.3–4.2)

## 2023-12-11 LAB
DEPRECATED CALCIDIOL+CALCIFEROL SERPL-MC: <41 UG/L (ref 20–75)
VITAMIN D2 SERPL-MCNC: <5 UG/L
VITAMIN D3 SERPL-MCNC: 36 UG/L

## 2023-12-16 NOTE — RESULT ENCOUNTER NOTE
Please call pt with results below:   The ct scan of chest for lung cancer screening showed:     1.   Lung-RADS Category 2. Benign appearance  or behavior. Recommendation: Continue annual screening (please order  exam code [id number]).  2. Significant Incidental Finding(s):  Category S: Yes. coronary  artery calcium moderate or severe      Recommend urgent cardiology consultation to determine next best steps.  ? Any recent chest pain or discomfort , especially with exertion? referal placed. Start 81mg aspirin daily   Recommend immediately smoking cessation as well.      For additional lab test information, [email or web address] is an excellent reference.    
0 (no pain/absence of nonverbal indicators of pain)

## 2024-02-26 NOTE — RESULT ENCOUNTER NOTE
Dear Dev,     I was looking over some previous lab results and noted that I hadn't sent you a note on them as yet.     Thank you for your patience in getting my comments on your recent results to you.  My sincerest apologies in not getting these to you sooner.     All your last labs that I ordered are normal, improved, or pretty stable from previous.     Please, continue your current medications and/or supplements and follow up as we discussed at your last visit.     For additional lab test information, [email or web address] is an excellent reference.    Thank you so much for choosing [facility].  Please contact us with any questions that you may have.   We appreciate the opportunity to serve you now and look forward to supporting your healthcare needs for a long time to come!    Most Sincerely,     Vee Beth MD

## 2024-04-02 NOTE — TELEPHONE ENCOUNTER
[facility]    Phone Message    May a detailed message be left on voicemail: yes     Reason for Call: Other: Please call pt back to discuss his dizziness. He had an appt with Dr. Crowe this AM but the clinic cancelled it without informing the pt. He is going on a trip at the end of the month and is afraid to due to his symtpoms. Please call pt back to discuss     Action Taken: Message routed to:  Other: Cardiolody    Travel Screening: Not Applicable     Thank you!  [facility]                                                                        
Patient states he has had dizziness everyday and after having a CT it was thought that the increased calcium could be causing his symptoms.  He has dizziness with activity not at rest.  Reviewed with him the appt with Dr. Crowe was for Atrial flutter which he does not have.  Today he has a headache and is afraid to do any activity.  He has not fainted but is being very careful.  He has an OV with Dr. Harp in February but is scheduled for a trip at the end of January and he is concerned he will not be able to go. Would like a sooner appt with Dr. Harp  Reviewed chart hx of dizziness in July and went to the [facility].  He states he has been drinking water and staying hydrated.    [facility].  
normal...

## 2024-05-21 NOTE — TELEPHONE ENCOUNTER
Blood pressures overall look great. Agree with decreasing lisinopril to 20 mg once daily given the lower readings intermittently with lightheadedness previously. Thank you!   
Call placed to pt to review recent BP readings and review if amlodipine will be needed.   - Result note 1/27/2022 Cr 1.36 - decrease lisinopril from 40mg daily to 30mg daily and start amlodipine 5mg daily. Pt declined initiation of amlodipine at the time but did decrease lisinopril. Pt has not scheduled repeat labs yet (pt admits to forgetting to do this). Will plan to schedule upon call back with recommendations.   - please see BP readings and pt self change of medications as follows:     Pt reports the following blood pressure readings: (BP taken in AM between 8 - 9 AM. Pt takes lisinopril at bedtime)   1/20/2022 98/83  1/21/2022 154/97  2/1/2022 102/70  2/5/2022 125/68  2/6/2022 111/71  2/8/2022 97/43  2/9/2022 104/52  2/10/2022 97/43   - - - pt decreased dose to 20mg daily as he was having frequent lightheaded / dizzy spells associated with lower BP reading - pt afraid of a fall.     2/11/2022 117/64  2/13/2022 93/53  2/15/2022 104/52  2/17/2022 153/87, recheck 2 hours later 137/76    Pt reports his HR 60-70's.     Routing to provider for review   EVELIN Perez RN, BSN.   
Call placed to pt to review results and recommendations. No answer - LVM/ ACB 02/18/22 11:08 AM EVELIN Perez RN, BSN.     Call back received form pt to review recommendations for continuing lisinopril 20mg daily. Pt agreeable to change. Pt will continue to monitor blood pressure. Pt advised of call back parameters. Pt verbalized understanding and will reach out with concerning readings.     Med list updated.     EVELIN Perez RN, BSN. 02/18/22 11:26 AM   
Tele encounter on 1/14/2022. Lisinopril increased to 40mg/ daily. Follow up BMP ordered in 1-2 weeks. Results routed to ordering provider. Follow up orders for 12/2022.    Component      Latest Ref Rng & Units 1/26/2022   Sodium      133 - 144 mmol/L 137   Potassium      3.4 - 5.3 mmol/L 4.7   Chloride      94 - 109 mmol/L 105   Carbon Dioxide      20 - 32 mmol/L 28   Anion Gap      3 - 14 mmol/L 4   Urea Nitrogen      7 - 30 mg/dL 24   Creatinine      0.66 - 1.25 mg/dL 1.36 (H)   Calcium      8.5 - 10.1 mg/dL 9.2   Glucose      70 - 99 mg/dL 101 (H)   GFR Estimate      >60 mL/min/1.73m2 54 (L)         ----- Message from Julius Smith NP sent at 1/27/2022  8:44 AM CST -----  Please update Mr. Powell that his labs yesterday show a slight increase in creatinine level or that his kidney function looks a bit worse after starting on the higher dose of lisinopril. This may be a bit of a fluke depending on his hydration status when he came in for labs, but would recommend decreasing lisinopril back to 30 mg once daily. If he is willing, I would recommend starting amlodipine 5 mg once daily to help with blood pressure control. We can repeat a basic metabolic panel in 1-2 weeks to recheck his kidney function after decreasing the lisinopril dose.    Thank you!    Call placed to pt to review results and recommendations.  Pt verbalized understanding. Pt will decrease lisinopril to 30mg daily.  Pt was no at home at time of call and will not be until late evening. Pt did not want to start new medication at this time. Would like to watch BP for about a week and then will consider new medication if elevations. Will reach out to pt next week to review BP readings. Pt not able to schedule lab visit as he  Was in the car.   EVELIN Perez RN, BSN.     
[FreeTextEntry6] : Nasal Endoscopy: Bilateral nasal cavity debridement (CPT 07303)   Indication: post op   Procedure: There was expected post operative inflammation in both the right and left nasal cavity. Thick mucoid secretions suctioned   Left: The septum is midline, bowing slightly L anteriorly The inferior turbinate was well reduced/outfractured The MT was resected The max is clear SEC with thick mucus and polypoid edema, suctioned The sphenoid clear The frontal outflow tract patent with polypoid edema thick mcuus anteriorly    Right: The septum is midline The inferior turbinate was well reduced/outfractured The MT was resected The max clear SEC with thick mucus suctioned  The sphenoid clear The frontal outflow tract patent

## 2024-06-03 ENCOUNTER — TELEPHONE (OUTPATIENT)
Dept: FAMILY MEDICINE | Facility: CLINIC | Age: 79
End: 2024-06-03
Payer: COMMERCIAL

## 2024-06-03 DIAGNOSIS — E78.5 HYPERLIPIDEMIA LDL GOAL <130: ICD-10-CM

## 2024-06-03 NOTE — TELEPHONE ENCOUNTER
[facility] does not have a refill of synthroid.     Advised should have refills until 12/24     Asked if he was entering an old rx #- he states could be possible because he consolidated his ill bottles. He tried the automatic system.       Advised to call the pharmacy and talk with a  pharmacist.     Call back if not profiled and no refills available.

## 2024-06-04 RX ORDER — ROSUVASTATIN CALCIUM 10 MG/1
TABLET, COATED ORAL
Qty: 90 TABLET | Refills: 1 | Status: SHIPPED | OUTPATIENT
Start: 2024-06-04

## 2024-11-08 DIAGNOSIS — K21.9 GASTROESOPHAGEAL REFLUX DISEASE WITHOUT ESOPHAGITIS: ICD-10-CM

## 2024-11-08 RX ORDER — OMEPRAZOLE 40 MG/1
CAPSULE, DELAYED RELEASE ORAL
Qty: 90 CAPSULE | Refills: 0 | Status: SHIPPED | OUTPATIENT
Start: 2024-11-08

## 2024-12-25 DIAGNOSIS — I10 ESSENTIAL HYPERTENSION WITH GOAL BLOOD PRESSURE LESS THAN 140/90: ICD-10-CM

## 2024-12-26 RX ORDER — LISINOPRIL 10 MG/1
10 TABLET ORAL DAILY
Qty: 90 TABLET | Refills: 0 | Status: SHIPPED | OUTPATIENT
Start: 2024-12-26

## 2025-01-06 ENCOUNTER — OFFICE VISIT (OUTPATIENT)
Dept: FAMILY MEDICINE | Facility: CLINIC | Age: 80
End: 2025-01-06
Payer: COMMERCIAL

## 2025-01-06 VITALS
SYSTOLIC BLOOD PRESSURE: 128 MMHG | OXYGEN SATURATION: 97 % | WEIGHT: 218.1 LBS | TEMPERATURE: 87.6 F | HEIGHT: 71 IN | RESPIRATION RATE: 20 BRPM | BODY MASS INDEX: 30.53 KG/M2 | HEART RATE: 91 BPM | DIASTOLIC BLOOD PRESSURE: 76 MMHG

## 2025-01-06 DIAGNOSIS — J30.0 VASOMOTOR RHINITIS: ICD-10-CM

## 2025-01-06 DIAGNOSIS — K21.9 GASTROESOPHAGEAL REFLUX DISEASE WITHOUT ESOPHAGITIS: ICD-10-CM

## 2025-01-06 DIAGNOSIS — E03.9 HYPOTHYROIDISM, UNSPECIFIED TYPE: ICD-10-CM

## 2025-01-06 DIAGNOSIS — C43.62 MALIGNANT MELANOMA OF LEFT UPPER EXTREMITY INCLUDING SHOULDER (H): ICD-10-CM

## 2025-01-06 DIAGNOSIS — R73.01 ELEVATED FASTING GLUCOSE: ICD-10-CM

## 2025-01-06 DIAGNOSIS — G43.109 MIGRAINE WITH AURA AND WITHOUT STATUS MIGRAINOSUS, NOT INTRACTABLE: ICD-10-CM

## 2025-01-06 DIAGNOSIS — N40.0 ENLARGED PROSTATE: ICD-10-CM

## 2025-01-06 DIAGNOSIS — F10.929 ALCOHOLIC INTOXICATION WITH COMPLICATION: ICD-10-CM

## 2025-01-06 DIAGNOSIS — Z23 NEED FOR COVID-19 VACCINE: ICD-10-CM

## 2025-01-06 DIAGNOSIS — D12.6 ADENOMATOUS POLYP OF COLON, UNSPECIFIED PART OF COLON: ICD-10-CM

## 2025-01-06 DIAGNOSIS — I35.0 MODERATE AORTIC VALVE STENOSIS: ICD-10-CM

## 2025-01-06 DIAGNOSIS — I95.1 ORTHOSTATIC HYPOTENSION: ICD-10-CM

## 2025-01-06 DIAGNOSIS — I05.2 MITRAL VALVE STENOSIS AND REGURGITATION: ICD-10-CM

## 2025-01-06 DIAGNOSIS — E34.9 HYPOTESTOSTERONISM: ICD-10-CM

## 2025-01-06 DIAGNOSIS — R73.03 PREDIABETES: ICD-10-CM

## 2025-01-06 DIAGNOSIS — Z00.00 ENCOUNTER FOR MEDICARE ANNUAL WELLNESS EXAM: Primary | ICD-10-CM

## 2025-01-06 DIAGNOSIS — E78.5 HYPERLIPIDEMIA LDL GOAL <130: ICD-10-CM

## 2025-01-06 DIAGNOSIS — Z12.11 SCREEN FOR COLON CANCER: ICD-10-CM

## 2025-01-06 DIAGNOSIS — D36.9 TUBULAR ADENOMA: ICD-10-CM

## 2025-01-06 DIAGNOSIS — I10 ESSENTIAL HYPERTENSION WITH GOAL BLOOD PRESSURE LESS THAN 140/90: ICD-10-CM

## 2025-01-06 DIAGNOSIS — Z12.5 SCREENING FOR PROSTATE CANCER: ICD-10-CM

## 2025-01-06 DIAGNOSIS — E78.5 HYPERLIPIDEMIA LDL GOAL <70: ICD-10-CM

## 2025-01-06 DIAGNOSIS — Z29.11 NEED FOR VACCINATION AGAINST RESPIRATORY SYNCYTIAL VIRUS: ICD-10-CM

## 2025-01-06 DIAGNOSIS — Z23 NEEDS FLU SHOT: ICD-10-CM

## 2025-01-06 DIAGNOSIS — Z23 NEED FOR TDAP VACCINATION: ICD-10-CM

## 2025-01-06 LAB
ERYTHROCYTE [DISTWIDTH] IN BLOOD BY AUTOMATED COUNT: 13 % (ref 10–15)
EST. AVERAGE GLUCOSE BLD GHB EST-MCNC: 117 MG/DL
HBA1C MFR BLD: 5.7 % (ref 0–5.6)
HCT VFR BLD AUTO: 44.5 % (ref 40–53)
HGB BLD-MCNC: 15 G/DL (ref 13.3–17.7)
MCH RBC QN AUTO: 31 PG (ref 26.5–33)
MCHC RBC AUTO-ENTMCNC: 33.7 G/DL (ref 31.5–36.5)
MCV RBC AUTO: 92 FL (ref 78–100)
PLATELET # BLD AUTO: 198 10E3/UL (ref 150–450)
RBC # BLD AUTO: 4.84 10E6/UL (ref 4.4–5.9)
WBC # BLD AUTO: 5.3 10E3/UL (ref 4–11)

## 2025-01-06 PROCEDURE — G0439 PPPS, SUBSEQ VISIT: HCPCS | Mod: 25 | Performed by: FAMILY MEDICINE

## 2025-01-06 PROCEDURE — 85027 COMPLETE CBC AUTOMATED: CPT | Performed by: FAMILY MEDICINE

## 2025-01-06 PROCEDURE — G0008 ADMIN INFLUENZA VIRUS VAC: HCPCS | Performed by: FAMILY MEDICINE

## 2025-01-06 PROCEDURE — 36415 COLL VENOUS BLD VENIPUNCTURE: CPT | Performed by: FAMILY MEDICINE

## 2025-01-06 PROCEDURE — 91320 SARSCV2 VAC 30MCG TRS-SUC IM: CPT | Performed by: FAMILY MEDICINE

## 2025-01-06 PROCEDURE — 84403 ASSAY OF TOTAL TESTOSTERONE: CPT | Performed by: FAMILY MEDICINE

## 2025-01-06 PROCEDURE — 90662 IIV NO PRSV INCREASED AG IM: CPT | Performed by: FAMILY MEDICINE

## 2025-01-06 PROCEDURE — 84480 ASSAY TRIIODOTHYRONINE (T3): CPT | Performed by: FAMILY MEDICINE

## 2025-01-06 PROCEDURE — 90480 ADMN SARSCOV2 VAC 1/ONLY CMP: CPT | Performed by: FAMILY MEDICINE

## 2025-01-06 PROCEDURE — 84270 ASSAY OF SEX HORMONE GLOBUL: CPT | Performed by: FAMILY MEDICINE

## 2025-01-06 PROCEDURE — 82043 UR ALBUMIN QUANTITATIVE: CPT | Performed by: FAMILY MEDICINE

## 2025-01-06 PROCEDURE — 83036 HEMOGLOBIN GLYCOSYLATED A1C: CPT | Performed by: FAMILY MEDICINE

## 2025-01-06 PROCEDURE — 84439 ASSAY OF FREE THYROXINE: CPT | Performed by: FAMILY MEDICINE

## 2025-01-06 PROCEDURE — 99214 OFFICE O/P EST MOD 30 MIN: CPT | Mod: 25 | Performed by: FAMILY MEDICINE

## 2025-01-06 PROCEDURE — 80061 LIPID PANEL: CPT | Performed by: FAMILY MEDICINE

## 2025-01-06 PROCEDURE — 82570 ASSAY OF URINE CREATININE: CPT | Performed by: FAMILY MEDICINE

## 2025-01-06 PROCEDURE — G0103 PSA SCREENING: HCPCS | Performed by: FAMILY MEDICINE

## 2025-01-06 PROCEDURE — 84443 ASSAY THYROID STIM HORMONE: CPT | Performed by: FAMILY MEDICINE

## 2025-01-06 PROCEDURE — 80053 COMPREHEN METABOLIC PANEL: CPT | Performed by: FAMILY MEDICINE

## 2025-01-06 RX ORDER — ROSUVASTATIN CALCIUM 10 MG/1
10 TABLET, COATED ORAL DAILY
Qty: 90 TABLET | Refills: 3 | Status: SHIPPED | OUTPATIENT
Start: 2025-01-06

## 2025-01-06 RX ORDER — OMEPRAZOLE 40 MG/1
CAPSULE, DELAYED RELEASE ORAL
Qty: 90 CAPSULE | Refills: 3 | Status: SHIPPED | OUTPATIENT
Start: 2025-01-06

## 2025-01-06 RX ORDER — LISINOPRIL 10 MG/1
10 TABLET ORAL DAILY
Qty: 90 TABLET | Refills: 3 | Status: SHIPPED | OUTPATIENT
Start: 2025-01-06

## 2025-01-06 RX ORDER — LEVOTHYROXINE SODIUM 150 UG/1
150 TABLET ORAL DAILY
Qty: 90 TABLET | Refills: 3 | Status: SHIPPED | OUTPATIENT
Start: 2025-01-06

## 2025-01-06 RX ORDER — EZETIMIBE 10 MG/1
10 TABLET ORAL DAILY
Qty: 90 TABLET | Refills: 4 | Status: SHIPPED | OUTPATIENT
Start: 2025-01-06

## 2025-01-06 RX ORDER — IPRATROPIUM BROMIDE 21 UG/1
SPRAY, METERED NASAL
Qty: 90 ML | Refills: 3 | Status: SHIPPED | OUTPATIENT
Start: 2025-01-06

## 2025-01-06 SDOH — HEALTH STABILITY: PHYSICAL HEALTH: ON AVERAGE, HOW MANY DAYS PER WEEK DO YOU ENGAGE IN MODERATE TO STRENUOUS EXERCISE (LIKE A BRISK WALK)?: 1 DAY

## 2025-01-06 ASSESSMENT — SOCIAL DETERMINANTS OF HEALTH (SDOH): HOW OFTEN DO YOU GET TOGETHER WITH FRIENDS OR RELATIVES?: TWICE A WEEK

## 2025-01-06 NOTE — PROGRESS NOTES
Preventive Care Visit  [facility] [place]  Vee Beth MD, Family Medicine  Jan 6, 2025      Assessment & Plan :       ICD-10-CM    1. Encounter for Medicare annual wellness exam  Z00.00       2. Need for Tdap vaccination  Z23       3. Need for vaccination against respiratory syncytial virus  Z29.11       4. Screen for colon cancer  Z12.11 Colonoscopy Screening  Referral      5. Essential hypertension with goal blood pressure less than 140/90  I10 Albumin Random Urine Quantitative with Creat Ratio     Comprehensive metabolic panel     CBC with platelets     lisinopril (ZESTRIL) 10 MG tablet      6. Hyperlipidemia LDL goal <70  E78.5 Albumin Random Urine Quantitative with Creat Ratio     Lipid panel reflex to direct LDL Fasting     Comprehensive metabolic panel     ezetimibe (ZETIA) 10 MG tablet      7. Hypothyroidism, unspecified type  E03.9 TSH     T4 free     T3 total     levothyroxine (SYNTHROID/LEVOTHROID) 150 MCG tablet      8. Enlarged prostate- has seen  Dr. Solomon in the past - no further PSA's needed as of 7/2019 - doesn't need to see him again per Dr. Solomon unless gross hematuria   N40.0       9. Elevated fasting glucose  R73.01       10. Alcoholic intoxication with complication (H)- history of - now 4-5 drinks/week max 2 drinks at a time - gin & tonics - about 1-2 oz of alcohol per drink   F10.929       11. Orthostatic hypotension - got lightheaded  Dizziness and a bit hypotensive  with 30mg lisinopril - now back down to 10mg= improved. but worse with dehydration  I95.1       12. Prediabetes  R73.03 Hemoglobin A1c     Comprehensive metabolic panel     CBC with platelets      13. Migraine with aura and without status migrainosus, not intractable - unchanged in frequency and character - once a month - better since quitting smoking 7/4/2022   G43.109       14. Moderate aortic valve stenosis- with Mitral valve stenosis and regurgitation- on echocardiogram from 1/2023   "I35.0 Echocardiogram Complete      15. Adenomatous polyp of colon, unspecified part of colon  D12.6 Colonoscopy Screening  Referral      16. Tubular adenoma 4/2019 - repeat 5 years  D36.9 Colonoscopy Screening  Referral      17. Mitral valve stenosis and regurgitation  I05.2 Echocardiogram Complete      18. Needs flu shot  Z23 INFLUENZA HIGH DOSE, TRIVALENT, PF (FLUZONE)      19. Need for COVID-19 vaccine  Z23 COVID-19 12+ (PFIZER)      20. Malignant melanoma of left upper extremity including shoulder (H)  C43.62 Adult Dermatology  Referral      21. Vasomotor rhinitis  J30.0 ipratropium (ATROVENT) 0.03 % nasal spray      22. Gastroesophageal reflux disease without esophagitis = ran out of PPI- has been taking lots of TUMS  K21.9 omeprazole (PRILOSEC) 40 MG DR capsule      23. Hyperlipidemia LDL goal <130 - pravastatin = leg cramps; lipitor =calf pains  E78.5 rosuvastatin (CRESTOR) 10 MG tablet      24. Hypotestosteronism  E34.9 Testosterone Free and Total     PSA, screen      25. Screening for prostate cancer  Z12.5 PSA, screen        Please, call our clinic or go to the ER immediately if signs or symptoms worsen or fail to improve as anticipated.    Return in about 6 months (around 7/8/2025) for cholesterol, blood pressure, prediabetes, w/ Dr. HUTSON for 40 min appt.   Appt made.     Please get your TdaP and RSV vaccines in the pharmacy.     call [employer] help desk: Access Services [phone].     Patient has been advised of split billing requirements and indicates understanding: Yes    Pt will think about whether he wants to restart on testosterone supplementation or not.  Awaiting pt's call re: that.  He's looking for more cost-effective options.      BMI:   Estimated body mass index is 30.2 kg/m  as calculated from the following:    Height as of this encounter: 1.81 m (5' 11.26\").    Weight as of this encounter: 98.9 kg (218 lb 1.6 oz).   Weight management plan: Discussed healthy diet " and exercise guidelines    Counseling:   Appropriate preventive services were addressed with this patient via screening, questionnaire, or discussion as appropriate for fall prevention, nutrition, physical activity, Tobacco-use cessation, social engagement, weight loss and cognition.  Checklist reviewing preventive services available has been given to the patient.  Reviewed patient's diet, addressing concerns and/or questions.   He is at risk for lack of exercise and has been provided with information to increase physical activity for the benefit of his well-being.   Discussed possible causes of fatigue. The patient was provided with written information regarding signs of hearing loss.       MEDICATIONS:   Orders Placed This Encounter   Medications    ezetimibe (ZETIA) 10 MG tablet     Sig: Take 1 tablet (10 mg) by mouth daily.     Dispense:  90 tablet     Refill:  4    ipratropium (ATROVENT) 0.03 % nasal spray     Sig: USE 2 SPRAYS INTO BOTH NOSTRILS EVERY 12 HOURS     Dispense:  90 mL     Refill:  3    levothyroxine (SYNTHROID/LEVOTHROID) 150 MCG tablet     Sig: Take 1 tablet (150 mcg) by mouth daily.     Dispense:  90 tablet     Refill:  3    lisinopril (ZESTRIL) 10 MG tablet     Sig: Take 1 tablet (10 mg) by mouth daily.     Dispense:  90 tablet     Refill:  3    omeprazole (PRILOSEC) 40 MG DR capsule     Sig: TAKE 1 CAPSULE BY MOUTH ONCE DAILY TAKE 30-60 MINUTES BEFORE A MEAL. As needed     Dispense:  90 capsule     Refill:  3    rosuvastatin (CRESTOR) 10 MG tablet     Sig: Take 1 tablet (10 mg) by mouth daily.     Dispense:  90 tablet     Refill:  3     Rosuvastatin works well without side effects          - Continue other medications without change  Regular exercise  See Patient Instructions     Vee Beth MD         Nory Méndez is a 79 year old, presenting for the following:  Physical  and the following other medical problems:      1. Encounter for Medicare annual wellness exam    2. Need  for Tdap vaccination    3. Need for vaccination against respiratory syncytial virus    4. Screen for colon cancer    5. Essential hypertension with goal blood pressure less than 140/90    6. Hyperlipidemia LDL goal <70    7. Hypothyroidism, unspecified type    8. Enlarged prostate- has seen  Dr. Solomon in the past - no further PSA's needed as of 7/2019 - doesn't need to see him again per Dr. Solomon unless gross hematuria     9. Elevated fasting glucose    10. Alcoholic intoxication with complication (H)- history of - now 4-5 drinks/week max 2 drinks at a time - gin & tonics - about 1-2 oz of alcohol per drink     11. Orthostatic hypotension - got lightheaded  Dizziness and a bit hypotensive  with 30mg lisinopril - now back down to 10mg= improved. but worse with dehydration    12. Prediabetes    13. Migraine with aura and without status migrainosus, not intractable - unchanged in frequency and character - once a month - better since quitting smoking 7/4/2022     14. Moderate aortic valve stenosis- with Mitral valve stenosis and regurgitation- on echocardiogram from 1/2023    15. Adenomatous polyp of colon, unspecified part of colon    16. Tubular adenoma 4/2019 - repeat 5 years    17. Mitral valve stenosis and regurgitation    18. Needs flu shot    19. Need for COVID-19 vaccine    20. Malignant melanoma of left upper extremity including shoulder (H)    21. Vasomotor rhinitis    22. Gastroesophageal reflux disease without esophagitis = ran out of PPI- has been taking lots of TUMS    23. Hyperlipidemia LDL goal <130 - pravastatin = leg cramps; lipitor =calf pains    24. Hypotestosteronism    25. Screening for prostate cancer            1/6/2025    12:59 PM   Additional Questions   Roomed by treva COLINDRES   Accompanied by self     HPI  Burning pain in his legs top of right foot and left lateral calf - isolated - doesn't want to do anything about that right now. Will just watch that per pt request.      Bilateral hearing  issues- doesn't want to spend $4000 -$5000 on hearing aids.  Discussed [facility] and [facility] as options.  Pt declines formal referral right now.      Has a lady friend from near [place], [place] - staying with him for 7-10 days at a time.  Goes to visit her every other month for same time frame.  Nice person, nice family.  Doesn't want anything more than that.      history of malignant melanoma - needs Full body skin exam as well, please. -last FBSE - 11/2022       Hyperlipidemia Follow-Up: is fasting today.      Are you regularly taking any medication or supplement to lower your cholesterol?   Yes- rosuvastatin 10 mg  Are you having muscle aches or other side effects that you think could be caused by your cholesterol lowering medication?  No  Recent Labs   Lab Test 12/04/23  1442 11/16/22  1057   CHOL 169 154   HDL 74 63   LDL 80 71   TRIG 76 100     Desires to wait for testosterone gel refill -hasn't been using it.         Hypertension Follow-up:    Do you check your blood pressure regularly outside of the clinic? Yes   Are you following a low salt diet? No  Are your blood pressures ever more than 140 on the top number (systolic) OR more   than 90 on the bottom number (diastolic), for example 140/90? No    Creatinine   Date Value Ref Range Status   12/04/2023 0.89 0.67 - 1.17 mg/dL Final   05/14/2020 1.07 0.66 - 1.25 mg/dL Final        Hypothyroidism Follow-up:     Since last visit, patient describes the following symptoms: Weight stable, no hair loss, no skin changes, no constipation, no loose stools      Health Care Directive  Patient does not have a Health Care Directive: Discussed advance care planning with patient; however, patient declined at this time.      1/6/2025   General Health   How would you rate your overall physical health? Good   Feel stress (tense, anxious, or unable to sleep) Not at all         1/6/2025   Nutrition   Diet: Regular (no restrictions)         1/6/2025   Exercise   Days per week of  moderate/strenous exercise 1 day   (!) EXERCISE CONCERN      1/6/2025   Social Factors   Frequency of gathering with friends or relatives Twice a week   Worry food won't last until get money to buy more No   Food not last or not have enough money for food? No   Do you have housing? (Housing is defined as stable permanent housing and does not include staying ouside in a car, in a tent, in an abandoned building, in an overnight shelter, or couch-surfing.) Yes   Are you worried about losing your housing? Patient declined   Lack of transportation? No   Unable to get utilities (heat,electricity)? No         1/6/2025   Fall Risk   Fallen 2 or more times in the past year? No    No   Trouble with walking or balance? No    No       Multiple values from one day are sorted in reverse-chronological order          1/6/2025   Activities of Daily Living- Home Safety   Needs help with the following daily activites None of the above   Safety concerns in the home None of the above         1/6/2025   Dental   Dentist two times every year? Yes         1/6/2025   Hearing Screening   Hearing concerns? (!) IT'S HARDER TO UNDERSTAND WOMEN'S VOICES THAN MEN'S VOICES.    (!) TROUBLE UNDERSTANDING SOFT OR WHISPERED SPEECH.       Multiple values from one day are sorted in reverse-chronological order         1/6/2025   Driving Risk Screening   Patient/family members have concerns about driving No         1/6/2025   General Alertness/Fatigue Screening   Have you been more tired than usual lately? (!) YES         1/6/2025   Urinary Incontinence Screening   Bothered by leaking urine in past 6 months No         1/6/2025   TB Screening   Were you born outside of the [place]? Yes         Today's PHQ-2 Score:       1/6/2025    12:47 PM   PHQ-2 ( 1999 [employer])   Q1: Little interest or pleasure in doing things 1   Q2: Feeling down, depressed or hopeless 0   PHQ-2 Score 1    Q1: Little interest or pleasure in doing things Several days   Q2: Feeling down,  depressed or hopeless Not at all   PHQ-2 Score 1       Patient-reported           2025   Substance Use   Alcohol more than 3/day or more than 7/wk No   Do you have a current opioid prescription? No   How severe/bad is pain from 1 to 10? 1/10   Do you use any other substances recreationally? No     Social History     Tobacco Use    Smoking status: Former     Current packs/day: 0.00     Average packs/day: 0.5 packs/day for 60.0 years (30.0 ttl pk-yrs)     Types: Cigarettes     Start date: 1962     Quit date: 2022     Years since quittin.5    Smokeless tobacco: Never    Tobacco comments:     strongly encourage smoking cessation with every visit   Vaping Use    Vaping status: Never Used   Substance Use Topics    Alcohol use: Yes     Alcohol/week: 0.0 standard drinks of alcohol     Comment: not regular - very occasional 1-2 martinis when out to dinner 1-2x/month, if that     Drug use: No     Comment: no herbal meds      ASCVD Risk   The 10-year ASCVD risk score (Freda DICKERSON, et al., 2019) is: 31.6%    Values used to calculate the score:      Age: 79 years      Sex: Male      Is Non- : No      Diabetic: No      Tobacco smoker: No      Systolic Blood Pressure: 128 mmHg      Is BP treated: Yes      HDL Cholesterol: 74 mg/dL      Total Cholesterol: 169 mg/dL    Reviewed and updated as needed this visit by Provider   Tobacco  Allergies  Meds  Problems  Med Hx  Surg Hx  Fam Hx            Past Medical History:   Diagnosis Date    Elevated blood pressure (not hypertension)     Enlarged prostate- has seen  Dr. Solomon in the past - no further PSA's needed as of 2019 - doesn't need to see him again per Dr. Solomon unless gross hematuria  2013    Erectile dysfunction     Gastric ulcer 2007    [facility] w/ [facility]/ LA grade B erosive esophagitis    GERD (gastroesophageal reflux disease)     worse lying down at night.     Hyperlipidemia LDL goal <  130     lipitor 40mg= calf pain-off since 2008    Hypertension     Lipoma of skin 2007    chest - biopsied at [place] = benign     Malignant melanoma (H) 1995    Osteoarthritis     mostly hands and knees     Snoring     Thyroid disease      Past Surgical History:   Procedure Laterality Date    COLONOSCOPY  9/25/2007    repeat in 2017    DAVINCI HERNIORRHAPHY INGUINAL Right 10/24/2019    Procedure: robotic assisted right inguinal hernia repair with mesh;  Surgeon: Rom Mccoy MD;  Location: [facility] OR    ESOPHAGOSCOPY, GASTROSCOPY, DUODENOSCOPY (EGD), COMBINED N/A 2/5/2015    Procedure: COMBINED ESOPHAGOSCOPY, GASTROSCOPY, DUODENOSCOPY (EGD);  Surgeon: Ender Dixon MD;  Location:  GI    HC ESOPHAGOSCOPY, DIAGNOSTIC  9/25/2007    EGD - gastric ulcer w/ erosions/ LA grade B erosive esophagitis    wide excision  1995    for malignant melanoma     OB History   No obstetric history on file.     Lab work is in process  Labs reviewed in EPIC  BP Readings from Last 3 Encounters:   01/06/25 128/76   12/04/23 138/88   05/17/23 128/88    Wt Readings from Last 3 Encounters:   01/06/25 98.9 kg (218 lb 1.6 oz)   12/04/23 94.8 kg (209 lb)   05/17/23 93.9 kg (207 lb)                  Patient Active Problem List   Diagnosis    Gastroesophageal reflux disease without esophagitis = ran out of PPI- has been taking lots of TUMS    Snoring    Vasculogenic erectile dysfunction, unspecified vasculogenic erectile dysfunction type    Osteoarthritis    Lipoma of skin    Gastric ulcer    Malignant melanoma of left upper extremity including shoulder (H)    Tobacco use disorder, moderate, in sustained remission- quit 7/2022    Enlarged prostate- has seen  Dr. Solomon in the past - no further PSA's needed as of 7/2019 - doesn't need to see him again per Dr. Solomon unless gross hematuria     Rectal pain    Hearing loss    Inguinal hernia- right     Pulmonary nodules- tiny per screening chest CT -  repeat CT chest low dose w/o contrast 4/2017  = no change since 2015 - no need for further CT's for these nodules but ok for lung ca screening     Essential hypertension with goal blood pressure less than 140/90    Testosterone deficiency    Hypothyroidism, unspecified type    Chronic constipation    Mass of left chest wall - ? there for 18+ years -left breast 1cm mass at 7:30     Multiple pigmented nevi    Reducible right inguinal hernia    Undiagnosed cardiac murmurs    Elevated fasting glucose    Tubular adenoma 4/2019 - repeat 5 years    Alcoholic intoxication with complication (H)- history of - now 4-5 drinks/week max 2 drinks at a time - gin & tonics - about 1-2 oz of alcohol per drink     Nocturia- up 1-3x/night     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 -  quit 7/2022    Nicotine dependence, uncomplicated, unspecified nicotine product type    Orthostatic hypotension - got lightheaded  Dizziness and a bit hypotensive  with 30mg lisinopril - now back down to 10mg= improved    Personal history of tobacco use- quit 7/2022     Dizziness- lightheaded dizziness- wearing ZioPatch monitor and monitoring BP - gets lightheaded when BP is low     Bilateral sensorineural hearing loss- saw Audiology in 3/2023 - desires referral for hearing aids now 5/17/2023    Prediabetes    Migraine with aura and without status migrainosus, not intractable - unchanged in frequency and character - once a month - better since quitting smoking 7/4/2022     Moderate aortic valve stenosis- with Mitral valve stenosis and regurgitation- on echocardiogram from 1/2023    History of malignant melanoma- left upper arm    Hypogonadism in male    Hyperlipidemia LDL goal <70 - pravastatin = leg cramps; lipitor =calf pains    Vitamin D deficiency    Toenail deformity- left 3rd toenail after trauma     Past Surgical History:   Procedure Laterality Date    COLONOSCOPY  9/25/2007    repeat in 2017    DAVINCI HERNIORRHAPHY INGUINAL Right 10/24/2019    Procedure: robotic assisted right inguinal  hernia repair with mesh;  Surgeon: Rom Mccoy MD;  Location: [facility] OR    ESOPHAGOSCOPY, GASTROSCOPY, DUODENOSCOPY (EGD), COMBINED N/A 2015    Procedure: COMBINED ESOPHAGOSCOPY, GASTROSCOPY, DUODENOSCOPY (EGD);  Surgeon: Ender Dixon MD;  Location:  GI    HC ESOPHAGOSCOPY, DIAGNOSTIC  2007    EGD - gastric ulcer w/ erosions/ LA grade B erosive esophagitis    wide excision      for malignant melanoma       Social History     Tobacco Use    Smoking status: Former     Current packs/day: 0.00     Average packs/day: 0.5 packs/day for 60.0 years (30.0 ttl pk-yrs)     Types: Cigarettes     Start date: 1962     Quit date: 2022     Years since quittin.5    Smokeless tobacco: Never    Tobacco comments:     strongly encourage smoking cessation with every visit   Substance Use Topics    Alcohol use: Yes     Alcohol/week: 0.0 standard drinks of alcohol     Comment: not regular - very occasional 1-2 martinis when out to dinner 1-2x/month, if that      Family History   Problem Relation Age of Onset    C.A.D. Father 62         at age 62 of MI    Diabetes Father         early type 2     Neurologic Disorder Mother         mild dementia - @ 92    C.A.D. Mother         angina     Thyroid Disease Mother         s/p thyroid     Colon Cancer No family hx of          Current Outpatient Medications   Medication Sig Dispense Refill    aspirin (ASA) 81 MG EC tablet Take 1 tablet (81 mg) by mouth daily      ezetimibe (ZETIA) 10 MG tablet Take 1 tablet (10 mg) by mouth daily. 90 tablet 4    Glucosamine-Chondroit-Vit C-Mn (GLUCOSAMINE CHONDROITIN 1500 COMPLEX) CAPS Take by mouth daily      ipratropium (ATROVENT) 0.03 % nasal spray USE 2 SPRAYS INTO BOTH NOSTRILS EVERY 12 HOURS 90 mL 3    levothyroxine (SYNTHROID/LEVOTHROID) 150 MCG tablet Take 1 tablet (150 mcg) by mouth daily. 90 tablet 3    lisinopril (ZESTRIL) 10 MG tablet Take 1 tablet (10 mg) by mouth daily. 90 tablet 3    meclizine (ANTIVERT) 25  MG tablet Take 1 tablet (25 mg) by mouth every 6 hours as needed for dizziness 30 tablet 1    Multiple Vitamins-Minerals (CENTRUM SILVER) per tablet Take 1 tablet by mouth daily 30 tablet     omeprazole (PRILOSEC) 40 MG DR capsule TAKE 1 CAPSULE BY MOUTH ONCE DAILY TAKE 30-60 MINUTES BEFORE A MEAL. As needed 90 capsule 3    rosuvastatin (CRESTOR) 10 MG tablet Take 1 tablet (10 mg) by mouth daily. 90 tablet 3    sildenafil (REVATIO) 20 MG tablet Take 4-5 tablets ( mg) by mouth four times a week 110 tablet 3    testosterone (ANDROGEL 1 % PUMP) 12.5 MG/ACT (1%) gel APPLY 3 PUMPS ONTO CLEAN DRY HAIRLESS SKIN OF SHOULDERS, UPPER ARMS OR ABDOMEN ONCE DAILY 150 g 3     Allergies   Allergen Reactions    Atorvastatin Other (See Comments)     Calf pain    Pravastatin Other (See Comments)     Leg cramps     Recent Labs   Lab Test 12/04/23  1442 01/12/23  1504 11/16/22  1057 08/23/22  1008 08/23/22  1008 10/14/20  0736 07/15/20  1315 05/14/20  1528 11/21/19  0737 10/24/19  0708   A1C 5.7*  --  5.6  --   --   --  5.8*  --   --   --    LDL 80  --  71  --  58   < > 95  --   --   --    HDL 74  --  63  --  59   < > 58  --   --   --    TRIG 76  --  100  --  82   < > 79  --   --   --    ALT 19  --  14  --  34   < >  --  19  --   --    CR 0.89  --  1.00  --  1.14   < >  --  1.07  --  1.00   GFRESTIMATED 88  --  78  --  66   < >  --  67  --  74   GFRESTBLACK  --   --   --   --   --   --   --  78  --  86   POTASSIUM 5.1  --  4.2  --  4.3   < >  --  4.2  --  3.9   TSH 2.62 3.68 6.49*   < > 3.74   < >  --  1.65   < >  --     < > = values in this interval not displayed.      Current providers sharing in care for this patient include:  Patient Care Team:  Vee Beth MD as PCP - General (Family Practice)  Vee Beth MD as MD (Family Practice)  Vee Beth MD as Assigned PCP  Rupinder Bledsoe AuD as Audiologist (Audiology)  Emily Ledezma PA-C as Physician Assistant (Dermatology)  Meño Hutchins,  "MD as MD (Dermatology)    The following health maintenance items are reviewed in Epic and correct as of today:  Health Maintenance   Topic Date Due    RSV VACCINE (1 - 1-dose 75+ series) Never done    DTAP/TDAP/TD IMMUNIZATION (2 - Td or Tdap) 09/18/2023    COLORECTAL CANCER SCREENING  04/29/2024    INFLUENZA VACCINE (1) 09/01/2024    COVID-19 Vaccine (6 - 2024-25 season) 09/01/2024    BMP  12/04/2024    LIPID  12/04/2024    MICROALBUMIN  12/04/2024    TSH W/FREE T4 REFLEX  12/04/2024    MEDICARE ANNUAL WELLNESS VISIT  01/06/2026    ANNUAL REVIEW OF HM ORDERS  01/06/2026    FALL RISK ASSESSMENT  01/06/2026    GLUCOSE  12/04/2026    ADVANCE CARE PLANNING  01/06/2030    HEPATITIS C SCREENING  Completed    PHQ-2 (once per calendar year)  Completed    Pneumococcal Vaccine: 50+ Years  Completed    ZOSTER IMMUNIZATION  Completed    HPV IMMUNIZATION  Aged Out    MENINGITIS IMMUNIZATION  Aged Out    RSV MONOCLONAL ANTIBODY  Aged Out    LUNG CANCER SCREENING  Discontinued         Review of Systems  Constitutional, HEENT, cardiovascular, pulmonary, GI, , musculoskeletal, neuro, skin, endocrine and psych systems are negative, except as otherwise noted.     Objective    Exam  /76   Pulse 91   Temp (!) 87.6  F (30.9  C) (Tympanic)   Resp 20   Ht 1.81 m (5' 11.26\")   Wt 98.9 kg (218 lb 1.6 oz)   SpO2 97%   BMI 30.20 kg/m     Estimated body mass index is 30.2 kg/m  as calculated from the following:    Height as of this encounter: 1.81 m (5' 11.26\").    Weight as of this encounter: 98.9 kg (218 lb 1.6 oz).    Physical Exam  GENERAL: alert and no distress  EYES: Eyes grossly normal to inspection, PERRL and conjunctivae and sclerae normal  HENT: ear canals and TM's normal, nose and mouth without ulcers or lesions  NECK: no adenopathy, no asymmetry, masses, or scars  RESP: lungs clear to auscultation - no rales, rhonchi or wheezes  BREAST: normal without masses, tenderness or nipple discharge and no palpable axillary " masses or adenopathy  CV: regular rate and rhythm, normal S1 S2, no S3 or S4, 3/6 aortic stenotic  murmur heard best at RUSB , click or rub, no peripheral edema  ABDOMEN: soft, nontender, no hepatosplenomegaly, no masses and bowel sounds normal  MS: no gross musculoskeletal defects noted, no edema  SKIN: no suspicious lesions or rashes  NEURO: Normal strength and tone, mentation intact and speech normal  PSYCH: mentation appears normal, affect normal/bright         1/6/2025   Mini Cog   Clock Draw Score 2 Normal   3 Item Recall 3 objects recalled   Mini Cog Total Score 5              Signed Electronically by: Vee Beth MD

## 2025-01-06 NOTE — PATIENT INSTRUCTIONS
Patient Education     Please get your TdaP and RSV vaccines in the pharmacy.     call [employer] help desk: Access Services [phone].       Preventive Care Advice   This is general advice given by our system to help you stay healthy. However, your care team may have specific advice just for you. Please talk to your care team about your preventive care needs.  Nutrition  Eat 5 or more servings of fruits and vegetables each day.  Try wheat bread, brown rice and whole grain pasta (instead of white bread, rice, and pasta).  Get enough calcium and vitamin D. Check the label on foods and aim for 100% of the RDA (recommended daily allowance).  Lifestyle  Exercise at least 150 minutes each week  (30 minutes a day, 5 days a week).  Do muscle strengthening activities 2 days a week. These help control your weight and prevent disease.  No smoking.  Wear sunscreen to prevent skin cancer.  Have a dental exam and cleaning every 6 months.  Yearly exams  See your health care team every year to talk about:  Any changes in your health.  Any medicines your care team has prescribed.  Preventive care, family planning, and ways to prevent chronic diseases.  Shots (vaccines)   HPV shots (up to age 26), if you've never had them before.  Hepatitis B shots (up to age 59), if you've never had them before.  COVID-19 shot: Get this shot when it's due.  Flu shot: Get a flu shot every year.  Tetanus shot: Get a tetanus shot every 10 years.  Pneumococcal, hepatitis A, and RSV shots: Ask your care team if you need these based on your risk.  Shingles shot (for age 50 and up)  General health tests  Diabetes screening:  Starting at age 35, Get screened for diabetes at least every 3 years.  If you are younger than age 35, ask your care team if you should be screened for diabetes.  Cholesterol test: At age 39, start having a cholesterol test every 5 years, or more often if advised.  Bone density scan (DEXA): At age 50, ask your care team if you should  have this scan for osteoporosis (brittle bones).  Hepatitis C: Get tested at least once in your life.  STIs (sexually transmitted infections)  Before age 24: Ask your care team if you should be screened for STIs.  After age 24: Get screened for STIs if you're at risk. You are at risk for STIs (including HIV) if:  You are sexually active with more than one person.  You don't use condoms every time.  You or a partner was diagnosed with a sexually transmitted infection.  If you are at risk for HIV, ask about PrEP medicine to prevent HIV.  Get tested for HIV at least once in your life, whether you are at risk for HIV or not.  Cancer screening tests  Cervical cancer screening: If you have a cervix, begin getting regular cervical cancer screening tests starting at age 21.  Breast cancer scan (mammogram): If you've ever had breasts, begin having regular mammograms starting at age 40. This is a scan to check for breast cancer.  Colon cancer screening: It is important to start screening for colon cancer at age 45.  Have a colonoscopy test every 10 years (or more often if you're at risk) Or, ask your provider about stool tests like a FIT test every year or Cologuard test every 3 years.  To learn more about your testing options, visit:   .  For help making a decision, visit:   https://bit.ly/xt63704.  Prostate cancer screening test: If you have a prostate, ask your care team if a prostate cancer screening test (PSA) at age 55 is right for you.  Lung cancer screening: If you are a current or former smoker ages 50 to 80, ask your care team if ongoing lung cancer screenings are right for you.  For informational purposes only. Not to replace the advice of your health care provider. Copyright   2023 [employer]. All rights reserved. Clinically reviewed by the [facility] Transitions Program. [employer] [id number] - REV 01/24.  Eating Healthy Foods: Care Instructions  With every meal, you can make healthy food  "choices. Try to eat a variety of fruits, vegetables, whole grains, lean proteins, and low-fat dairy products. This can help you get the right balance of nutrients, including vitamins and minerals. Small changes add up over time. You can start by adding one healthy food to your meals each day.    Try to make half your plate fruits and vegetables, one-fourth whole grains, and one-fourth lean proteins. Try including dairy with your meals.   Eat more fruits and vegetables. Try to have them with most meals and snacks.   Foods for healthy eating        Fruits   These can be fresh, frozen, canned, or dried.  Try to choose whole fruit rather than fruit juice.  Eat a variety of colors.        Vegetables   These can be fresh, frozen, canned, or dried.  Beans, peas, and lentils count too.        Whole grains   Choose whole-grain breads, cereals, and noodles.  Try brown rice.        Lean proteins   These can include lean meat, poultry, fish, and eggs.  You can also have tofu, beans, peas, lentils, nuts, and seeds.        Dairy   Try milk, yogurt, and cheese.  Choose low-fat or fat-free when you can.  If you need to, use lactose-free milk or fortified plant-based milk products, such as soy milk.        Water   Drink water when you're thirsty.  Limit sugar-sweetened drinks, including soda, fruit drinks, and sports drinks.  Where can you learn more?  Go to https://www.[employer].net/patiented  Enter T756 in the search box to learn more about \"Eating Healthy Foods: Care Instructions.\"  Current as of: September 20, 2023  Content Version: 14.3    2024 [employer].   Care instructions adapted under license by your healthcare professional. If you have questions about a medical condition or this instruction, always ask your healthcare professional. [employer] disclaims any warranty or liability for your use of this information.    Nutrition for Older Adults: Care Instructions  Overview     Good nutrition is " important at any age. But it is especially important for older adults. Eating healthy foods helps keep your body strong. And it can help lower your risk for disease.  As you get older, your body needs more of certain nutrients. These include vitamin B12, calcium, and vitamin D. But it may be harder for you to get these and other important nutrients. This could be for many reasons. You may not feel as hungry as you used to. Or you could have problems with your teeth or mouth that make it hard to chew. Or you may not enjoy planning and preparing meals, especially if you live alone.  Talk with your doctor if you want help getting the most nutrition from what you eat. They may have you work with a dietitian to help you plan meals.  Follow-up care is a key part of your treatment and safety. Be sure to make and go to all appointments, and call your doctor if you are having problems. It's also a good idea to know your test results and keep a list of the medicines you take.  How can you care for yourself at home?  To stay healthy  Eat a variety of foods. The more you vary the foods you eat, the more vitamins, minerals, and other nutrients you get.  Ask your doctor if you should take a multivitamin. Choose one with about 100% of the daily value (DV) for vitamins and minerals. Do not take more than 100% of the daily value for any vitamin or mineral unless your doctor tells you to. Talk with your doctor if you are not sure which multivitamin is right for you.  Try to eat lots of fruits and vegetables. Fresh or frozen vegetables and fruits are healthy choices. Choose canned vegetables that have no salt added and fruits that are canned in their own juice or light syrup.  Include foods that are high in vitamin B12 in your diet. Good choices are fortified breakfast cereal, nonfat or low-fat milk and other dairy products, meat, poultry, fish, and eggs.  Get enough calcium and vitamin D. Good choices include nonfat or low-fat milk,  cheese, and yogurt. Other good options are tofu, orange juice with added calcium, and some leafy green vegetables, such as santiago greens and kale. If you don't use milk products, talk to your doctor about calcium and vitamin D supplements.  Try to eat protein foods every day. Good choices include lean meat, fish, poultry, eggs, and cheese. Other good options are cooked beans, peanut butter, and nuts and seeds.  Choose whole grains for half of the grains you eat. Look for 100% whole wheat bread, whole-grain cereals, brown rice, and other whole grains.  If you have constipation  Eat high-fiber foods every day if you can. These include fruits, vegetables, cooked dried beans, and whole grains.  Drink plenty of fluids. If you have kidney, heart, or liver disease and have to limit fluids, talk with your doctor before you increase the amount of fluids you drink.  Ask your doctor if stool softeners may help keep your bowels regular.  If you have mouth problems that make chewing hard  Pick canned or cooked fruits and vegetables. These are often softer.  Chop or shred meat, poultry, and fish. Add sauce or gravy to the meat to help keep it moist.  Pick other protein foods that are soft. These include cheese, peanut butter, cooked beans, cottage cheese, and eggs.  If you have trouble shopping for yourself  Ask a local food store to deliver groceries to your home.  Contact your local area agency on aging and ask about resources that can help.  Ask a family member or neighbor to help you.  If you have trouble preparing meals  Try easier cooking methods such as using a slow cooker or microwave oven.  Let the grocery store do some of the work for you. Look for precut, washed, and ready-to-eat foods.  Take part in group meal programs. You can find these through senior citizen programs.  Have meals brought to your home. Your community may offer programs that deliver meals, such as Meals on Wheels. Or you could use an online meal  "delivery service.  If you are able, take a cooking class.  If your appetite is poor  Try to eat meals on a regular schedule. It may help to eat smaller meals more often throughout the day.  If you can, eat some meals with other people. You could ask family or friends to eat with you. Or you could take part in group meal programs offered in your community.  Ask your doctor if your medicines could cause appetite or taste problems. If so, ask about changing medicines.  Add spices and herbs to increase the flavor of food.  If you think you are depressed, ask your doctor for help. Depression can affect your appetite. And it can make it hard to do everyday activities like grocery shopping and making meals. Treatment can help.  When should you call for help?  Watch closely for changes in your health, and be sure to contact your doctor if you have any problems.  Where can you learn more?  Go to https://www.[employer].net/patiented  Enter L643 in the search box to learn more about \"Nutrition for Older Adults: Care Instructions.\"  Current as of: September 25, 2023  Content Version: 14.3    2024 [employer].   Care instructions adapted under license by your healthcare professional. If you have questions about a medical condition or this instruction, always ask your healthcare professional. [employer] disclaims any warranty or liability for your use of this information.    Hearing Loss: Care Instructions  Overview     Hearing loss is a sudden or slow decrease in how well you hear. It can range from slight to profound. Permanent hearing loss can occur with aging. It also can happen when you are exposed long-term to loud noise. Examples include listening to loud music, riding motorcycles, or being around other loud machines.  Hearing loss can affect your work and home life. It can make you feel lonely or depressed. You may feel that you have lost your independence. But hearing aids and other devices can " help you hear better and feel connected to others.  Follow-up care is a key part of your treatment and safety. Be sure to make and go to all appointments, and call your doctor if you are having problems. It's also a good idea to know your test results and keep a list of the medicines you take.  How can you care for yourself at home?  Avoid loud noises whenever possible. This helps keep your hearing from getting worse.  Always wear hearing protection around loud noises.  Wear a hearing aid as directed.  A professional can help you pick a hearing aid that will work best for you.  You can also get hearing aids over the counter for mild to moderate hearing loss.  Have hearing tests as your doctor suggests. They can show whether your hearing has changed. Your hearing aid may need to be adjusted.  Use other devices as needed. These may include:  Telephone amplifiers and hearing aids that can connect to a television, stereo, radio, or microphone.  Devices that use lights or vibrations. These alert you to the doorbell, a ringing telephone, or a baby monitor.  Television closed-captioning. This shows the words at the bottom of the screen. Most new TVs can do this.  TTY (text telephone). This lets you type messages back and forth on the telephone instead of talking or listening. These devices are also called TDD. When messages are typed on the keyboard, they are sent over the phone line to a receiving TTY. The message is shown on a monitor.  Use text messaging, social media, and email if it is hard for you to communicate by telephone.  Try to learn a listening technique called speechreading. It is not lipreading. You pay attention to people's gestures, expressions, posture, and tone of voice. These clues can help you understand what a person is saying. Face the person you are talking to, and have them face you. Make sure the lighting is good. You need to see the other person's face clearly.  Think about counseling if you need  "help to adjust to your hearing loss.  When should you call for help?  Watch closely for changes in your health, and be sure to contact your doctor if:    You think your hearing is getting worse.     You have new symptoms, such as dizziness or nausea.   Where can you learn more?  Go to https://www.[employer].net/patiented  Enter R798 in the search box to learn more about \"Hearing Loss: Care Instructions.\"  Current as of: September 27, 2023  Content Version: 14.3    2024 [employer].   Care instructions adapted under license by your healthcare professional. If you have questions about a medical condition or this instruction, always ask your healthcare professional. [employer] disclaims any warranty or liability for your use of this information.    Learning About Sleeping Well  What does sleeping well mean?     Sleeping well means getting enough sleep to feel good and stay healthy. How much sleep is enough varies among people.  The number of hours you sleep and how you feel when you wake up are both important. If you do not feel refreshed, you probably need more sleep. Another sign of not getting enough sleep is feeling tired during the day.  Experts recommend that adults get at least 7 or more hours of sleep per day. Children and older adults need more sleep.  Why is getting enough sleep important?  Getting enough quality sleep is a basic part of good health. When your sleep suffers, your physical health, mood, and your thoughts can suffer too. You may find yourself feeling more grumpy or stressed. Not getting enough sleep also can lead to serious problems, including injury, accidents, anxiety, and depression.  What might cause poor sleeping?  Many things can cause sleep problems, including:  Changes to your sleep schedule.  Stress. Stress can be caused by fear about a single event, such as giving a speech. Or you may have ongoing stress, such as worry about work or school.  Depression, anxiety, " "and other mental or emotional conditions.  Changes in your sleep habits or surroundings. This includes changes that happen where you sleep, such as noise, light, or sleeping in a different bed. It also includes changes in your sleep pattern, such as having jet lag or working a late shift.  Health problems, such as pain, breathing problems, and restless legs syndrome.  Lack of regular exercise.  Using alcohol, nicotine, or caffeine before bed.  How can you help yourself?  Here are some tips that may help you sleep more soundly and wake up feeling more refreshed.  Your sleeping area   Use your bedroom only for sleeping and sex. A bit of light reading may help you fall asleep. But if it doesn't, do your reading elsewhere in the house. Try not to use your TV, computer, smartphone, or tablet while you are in bed.  Be sure your bed is big enough to stretch out comfortably, especially if you have a sleep partner.  Keep your bedroom quiet, dark, and cool. Use curtains, blinds, or a sleep mask to block out light. To block out noise, use earplugs, soothing music, or a \"white noise\" machine.  Your evening and bedtime routine   Create a relaxing bedtime routine. You might want to take a warm shower or bath, or listen to soothing music.  Go to bed at the same time every night. And get up at the same time every morning, even if you feel tired.  What to avoid   Limit caffeine (coffee, tea, caffeinated sodas) during the day, and don't have any for at least 6 hours before bedtime.  Avoid drinking alcohol before bedtime. Alcohol can cause you to wake up more often during the night.  Try not to smoke or use tobacco, especially in the evening. Nicotine can keep you awake.  Limit naps during the day, especially close to bedtime.  Avoid lying in bed awake for too long. If you can't fall asleep or if you wake up in the middle of the night and can't get back to sleep within about 20 minutes, get out of bed and go to another room until you " "feel sleepy.  Avoid taking medicine right before bed that may keep you awake or make you feel hyper or energized. Your doctor can tell you if your medicine may do this and if you can take it earlier in the day.  If you can't sleep   Imagine yourself in a peaceful, pleasant scene. Focus on the details and feelings of being in a place that is relaxing.  Get up and do a quiet or boring activity until you feel sleepy.  Avoid drinking any liquids before going to bed to help prevent waking up often to use the bathroom.  Where can you learn more?  Go to https://www.[employer].net/patiented  Enter J942 in the search box to learn more about \"Learning About Sleeping Well.\"  Current as of: July 31, 2024  Content Version: 14.3    2024 [employer].   Care instructions adapted under license by your healthcare professional. If you have questions about a medical condition or this instruction, always ask your healthcare professional. [employer] disclaims any warranty or liability for your use of this information.       "

## 2025-01-07 LAB
ALBUMIN SERPL BCG-MCNC: 4.5 G/DL (ref 3.5–5.2)
ALP SERPL-CCNC: 103 U/L (ref 40–150)
ALT SERPL W P-5'-P-CCNC: 18 U/L (ref 0–70)
ANION GAP SERPL CALCULATED.3IONS-SCNC: 10 MMOL/L (ref 7–15)
AST SERPL W P-5'-P-CCNC: 19 U/L (ref 0–45)
BILIRUB SERPL-MCNC: 1.2 MG/DL
BUN SERPL-MCNC: 19.9 MG/DL (ref 8–23)
CALCIUM SERPL-MCNC: 9.7 MG/DL (ref 8.8–10.4)
CHLORIDE SERPL-SCNC: 105 MMOL/L (ref 98–107)
CHOLEST SERPL-MCNC: 153 MG/DL
CREAT SERPL-MCNC: 1.03 MG/DL (ref 0.67–1.17)
CREAT UR-MCNC: 90.3 MG/DL
EGFRCR SERPLBLD CKD-EPI 2021: 74 ML/MIN/1.73M2
FASTING STATUS PATIENT QL REPORTED: YES
FASTING STATUS PATIENT QL REPORTED: YES
GLUCOSE SERPL-MCNC: 98 MG/DL (ref 70–99)
HCO3 SERPL-SCNC: 25 MMOL/L (ref 22–29)
HDLC SERPL-MCNC: 61 MG/DL
LDLC SERPL CALC-MCNC: 73 MG/DL
MICROALBUMIN UR-MCNC: 95.8 MG/L
MICROALBUMIN/CREAT UR: 106.09 MG/G CR (ref 0–17)
NONHDLC SERPL-MCNC: 92 MG/DL
POTASSIUM SERPL-SCNC: 4.7 MMOL/L (ref 3.4–5.3)
PROT SERPL-MCNC: 7.6 G/DL (ref 6.4–8.3)
PSA SERPL DL<=0.01 NG/ML-MCNC: 1.28 NG/ML (ref 0–6.5)
SHBG SERPL-SCNC: 48 NMOL/L (ref 11–80)
SODIUM SERPL-SCNC: 140 MMOL/L (ref 135–145)
T3 SERPL-MCNC: 100 NG/DL (ref 85–202)
T4 FREE SERPL-MCNC: 1.76 NG/DL (ref 0.9–1.7)
TRIGL SERPL-MCNC: 95 MG/DL
TSH SERPL DL<=0.005 MIU/L-ACNC: 0.76 UIU/ML (ref 0.3–4.2)

## 2025-01-09 LAB
TESTOST FREE SERPL-MCNC: 6.85 NG/DL
TESTOST SERPL-MCNC: 426 NG/DL (ref 240–950)

## 2025-01-16 ENCOUNTER — TELEPHONE (OUTPATIENT)
Dept: GASTROENTEROLOGY | Facility: CLINIC | Age: 80
End: 2025-01-16
Payer: COMMERCIAL

## 2025-01-16 NOTE — TELEPHONE ENCOUNTER
"Endoscopy Scheduling Screen    Have you had any respiratory illness or flu-like symptoms in the last 10 days?  No    What is your communication preference for Instructions and/or Bowel Prep?   Mail/USPS    What insurance is in the chart?  Other:  [facility]    Ordering/Referring Provider:     JUSTIN MILLER      (If ordering provider performs procedure, schedule with ordering provider unless otherwise instructed. )    BMI: Estimated body mass index is 30.2 kg/m  as calculated from the following:    Height as of 1/6/25: 1.81 m (5' 11.26\").    Weight as of 1/6/25: 98.9 kg (218 lb 1.6 oz).     Sedation Ordered  moderate sedation.   If patient BMI > 50 do not schedule in [facility].    If patient BMI > 45 do not schedule at [facility].    Are you taking methadone or Suboxone?  NO, No RN review required.    Have you been diagnosed and are being treated for severe PTSD or severe anxiety?  NO, No RN review required.    Are you taking any prescription medications for pain 3 or more times per week?   NO, No RN review required.    Do you have a history of malignant hyperthermia?  No    (Females) Are you currently pregnant?        Have you been diagnosed or told you have pulmonary hypertension?   No    Do you have an LVAD?  No    Have you been told you have moderate to severe sleep apnea?  No.    Have you been told you have COPD, asthma, or any other lung disease?  No    Do you have any heart conditions?  No     Have you ever had or are you waiting for an organ transplant?  No. Continue scheduling, no site restrictions.    Have you had a stroke or transient ischemic attack (TIA aka \"mini stroke\" in the last 6 months?   No    Have you been diagnosed with or been told you have cirrhosis of the liver?   No.    Are you currently on dialysis?   No    Do you need assistance transferring?   No    BMI: Estimated body mass index is 30.2 kg/m  as calculated from the following:    Height as of 1/6/25: 1.81 m (5' 11.26\").    Weight as of 1/6/25: " 98.9 kg (218 lb 1.6 oz).     Is patients BMI > 40 and scheduling location [facility]?  No    Do you take an injectable or oral medication for weight loss or diabetes (excluding insulin)?  No    Do you take the medication Naltrexone?  No    Do you take blood thinners?  No       Prep   Are you currently on dialysis or do you have chronic kidney disease?  No    Do you have a diagnosis of diabetes?  No    Do you have a diagnosis of cystic fibrosis (CF)?  No    On a regular basis do you go 3 -5 days between bowel movements?  No    BMI > 40?  No    Preferred Pharmacy:    [id number] [place], [place] - [address] [address]  [address] [address] [id number]  Phone: [phone] Fax: [phone]      Final Scheduling Details     Procedure scheduled  Colonoscopy    Surgeon:  RYAN     Date of procedure:  2/4     Pre-OP / PAC:   No - Not required for this site.    Location  [facility] - Per order.    Sedation   Moderate Sedation - Per order.      Patient Reminders:   You will receive a call from a Nurse to review instructions and health history.  This assessment must be completed prior to your procedure.  Failure to complete the Nurse assessment may result in the procedure being cancelled.      On the day of your procedure, please designate an adult(s) who can drive you home stay with you for the next 24 hours. The medicines used in the exam will make you sleepy. You will not be able to drive.      You cannot take public transportation, ride share services, or non-medical taxi service without a responsible caregiver.  Medical transport services are allowed with the requirement that a responsible caregiver will receive you at your destination.  We require that drivers and caregivers are confirmed prior to your procedure.

## 2025-01-24 ENCOUNTER — HOSPITAL ENCOUNTER (OUTPATIENT)
Dept: CARDIOLOGY | Facility: CLINIC | Age: 80
Discharge: HOME | End: 2025-01-24
Attending: FAMILY MEDICINE | Admitting: FAMILY MEDICINE
Payer: COMMERCIAL

## 2025-01-24 DIAGNOSIS — I05.2 MITRAL VALVE STENOSIS AND REGURGITATION: ICD-10-CM

## 2025-01-24 DIAGNOSIS — I35.0 MODERATE AORTIC VALVE STENOSIS: ICD-10-CM

## 2025-01-24 LAB — LVEF ECHO: NORMAL

## 2025-01-24 PROCEDURE — 93306 TTE W/DOPPLER COMPLETE: CPT

## 2025-01-24 PROCEDURE — 93306 TTE W/DOPPLER COMPLETE: CPT | Mod: 26 | Performed by: INTERNAL MEDICINE

## 2025-01-30 ENCOUNTER — TELEPHONE (OUTPATIENT)
Dept: FAMILY MEDICINE | Facility: CLINIC | Age: 80
End: 2025-01-30
Payer: COMMERCIAL

## 2025-01-30 NOTE — TELEPHONE ENCOUNTER
Hi Dr. West.  When do you like to see patients with ascending aortic dilation >4cm or >4.5cm or other?   I have a 79 yr old male,  former smoker with 60 pk yr hx (wuit in 2022) , asymptomatic pt with controlled hypertension and hyperlipidemia . Thanks! ---Vee Beth MD - [facility]   Estefania Eubanks PA-C, suggested I contact you.

## 2025-02-04 ENCOUNTER — HOSPITAL ENCOUNTER (OUTPATIENT)
Facility: CLINIC | Age: 80
Discharge: HOME | End: 2025-02-04
Attending: INTERNAL MEDICINE | Admitting: INTERNAL MEDICINE
Payer: COMMERCIAL

## 2025-02-04 VITALS
DIASTOLIC BLOOD PRESSURE: 79 MMHG | HEIGHT: 71 IN | SYSTOLIC BLOOD PRESSURE: 111 MMHG | BODY MASS INDEX: 29.4 KG/M2 | HEART RATE: 87 BPM | OXYGEN SATURATION: 96 % | TEMPERATURE: 97.5 F | RESPIRATION RATE: 16 BRPM | WEIGHT: 210 LBS

## 2025-02-04 LAB — COLONOSCOPY: NORMAL

## 2025-02-04 PROCEDURE — 45378 DIAGNOSTIC COLONOSCOPY: CPT | Performed by: INTERNAL MEDICINE

## 2025-02-04 PROCEDURE — 99153 MOD SED SAME PHYS/QHP EA: CPT | Performed by: INTERNAL MEDICINE

## 2025-02-04 PROCEDURE — G0105 COLORECTAL SCRN; HI RISK IND: HCPCS | Performed by: INTERNAL MEDICINE

## 2025-02-04 PROCEDURE — 250N000011 HC RX IP 250 OP 636: Performed by: INTERNAL MEDICINE

## 2025-02-04 PROCEDURE — G0500 MOD SEDAT ENDO SERVICE >5YRS: HCPCS | Performed by: INTERNAL MEDICINE

## 2025-02-04 RX ORDER — PROCHLORPERAZINE MALEATE 5 MG/1
5 TABLET ORAL EVERY 6 HOURS PRN
Status: DISCONTINUED | OUTPATIENT
Start: 2025-02-04 | End: 2025-02-04 | Stop reason: HOSPADM

## 2025-02-04 RX ORDER — ONDANSETRON 2 MG/ML
4 INJECTION INTRAMUSCULAR; INTRAVENOUS
Status: DISCONTINUED | OUTPATIENT
Start: 2025-02-04 | End: 2025-02-04 | Stop reason: HOSPADM

## 2025-02-04 RX ORDER — LIDOCAINE 40 MG/G
CREAM TOPICAL
Status: DISCONTINUED | OUTPATIENT
Start: 2025-02-04 | End: 2025-02-04 | Stop reason: HOSPADM

## 2025-02-04 RX ORDER — EPINEPHRINE 1 MG/ML
0.1 INJECTION, SOLUTION, CONCENTRATE INTRAVENOUS
Status: DISCONTINUED | OUTPATIENT
Start: 2025-02-04 | End: 2025-02-04 | Stop reason: HOSPADM

## 2025-02-04 RX ORDER — NALOXONE HYDROCHLORIDE 0.4 MG/ML
0.4 INJECTION, SOLUTION INTRAMUSCULAR; INTRAVENOUS; SUBCUTANEOUS
Status: DISCONTINUED | OUTPATIENT
Start: 2025-02-04 | End: 2025-02-04 | Stop reason: HOSPADM

## 2025-02-04 RX ORDER — SIMETHICONE 40MG/0.6ML
133 SUSPENSION, DROPS(FINAL DOSAGE FORM)(ML) ORAL
Status: DISCONTINUED | OUTPATIENT
Start: 2025-02-04 | End: 2025-02-04 | Stop reason: HOSPADM

## 2025-02-04 RX ORDER — FLUMAZENIL 0.1 MG/ML
0.2 INJECTION, SOLUTION INTRAVENOUS
Status: DISCONTINUED | OUTPATIENT
Start: 2025-02-04 | End: 2025-02-04 | Stop reason: HOSPADM

## 2025-02-04 RX ORDER — NALOXONE HYDROCHLORIDE 0.4 MG/ML
0.2 INJECTION, SOLUTION INTRAMUSCULAR; INTRAVENOUS; SUBCUTANEOUS
Status: DISCONTINUED | OUTPATIENT
Start: 2025-02-04 | End: 2025-02-04 | Stop reason: HOSPADM

## 2025-02-04 RX ORDER — ATROPINE SULFATE 0.1 MG/ML
1 INJECTION INTRAVENOUS
Status: DISCONTINUED | OUTPATIENT
Start: 2025-02-04 | End: 2025-02-04 | Stop reason: HOSPADM

## 2025-02-04 RX ORDER — ONDANSETRON 2 MG/ML
4 INJECTION INTRAMUSCULAR; INTRAVENOUS EVERY 6 HOURS PRN
Status: DISCONTINUED | OUTPATIENT
Start: 2025-02-04 | End: 2025-02-04 | Stop reason: HOSPADM

## 2025-02-04 RX ORDER — FENTANYL CITRATE 50 UG/ML
50-100 INJECTION, SOLUTION INTRAMUSCULAR; INTRAVENOUS EVERY 5 MIN PRN
Status: DISCONTINUED | OUTPATIENT
Start: 2025-02-04 | End: 2025-02-04 | Stop reason: HOSPADM

## 2025-02-04 RX ORDER — DIPHENHYDRAMINE HYDROCHLORIDE 50 MG/ML
25-50 INJECTION INTRAMUSCULAR; INTRAVENOUS
Status: DISCONTINUED | OUTPATIENT
Start: 2025-02-04 | End: 2025-02-04 | Stop reason: HOSPADM

## 2025-02-04 RX ORDER — ONDANSETRON 4 MG/1
4 TABLET, ORALLY DISINTEGRATING ORAL EVERY 6 HOURS PRN
Status: DISCONTINUED | OUTPATIENT
Start: 2025-02-04 | End: 2025-02-04 | Stop reason: HOSPADM

## 2025-02-04 RX ADMIN — MIDAZOLAM 1 MG: 1 INJECTION INTRAMUSCULAR; INTRAVENOUS at 08:25

## 2025-02-04 RX ADMIN — MIDAZOLAM 1 MG: 1 INJECTION INTRAMUSCULAR; INTRAVENOUS at 08:18

## 2025-02-04 RX ADMIN — FENTANYL CITRATE 50 MCG: 50 INJECTION, SOLUTION INTRAMUSCULAR; INTRAVENOUS at 08:25

## 2025-02-04 RX ADMIN — FENTANYL CITRATE 50 MCG: 50 INJECTION, SOLUTION INTRAMUSCULAR; INTRAVENOUS at 08:18

## 2025-02-04 ASSESSMENT — ACTIVITIES OF DAILY LIVING (ADL)
ADLS_ACUITY_SCORE: 41
ADLS_ACUITY_SCORE: 41

## 2025-02-04 NOTE — H&P
Pre-Endoscopy History and Physical     Dev Powell MRN# [id number]   YOB: 1945 Age: 79 year old     Date of Procedure: 2/4/2025  Primary care provider: Vee Beth  Type of Endoscopy: Colonoscopy with possible biopsy, possible polypectomy  Reason for Procedure: polyp  Type of Anesthesia Anticipated: Conscious Sedation    HPI:    Dev is a 79 year old male who will be undergoing the above procedure.      A history and physical has been performed. The patient's medications and allergies have been reviewed. The risks and benefits of the procedure and the sedation options and risks were discussed with the patient.  All questions were answered and informed consent was obtained.      He denies a personal or family history of anesthesia complications or bleeding disorders.     Patient Active Problem List   Diagnosis    Gastroesophageal reflux disease without esophagitis = ran out of PPI- has been taking lots of TUMS    Snoring    Vasculogenic erectile dysfunction, unspecified vasculogenic erectile dysfunction type    Osteoarthritis    Lipoma of skin    Gastric ulcer    Malignant melanoma of left upper extremity including shoulder (H)    Tobacco use disorder, moderate, in sustained remission- quit 7/2022    Enlarged prostate- has seen  Dr. Solomon in the past - no further PSA's needed as of 7/2019 - doesn't need to see him again per Dr. Solomon unless gross hematuria     Rectal pain    Hearing loss    Inguinal hernia- right     Pulmonary nodules- tiny per screening chest CT -  repeat CT chest low dose w/o contrast 4/2017 = no change since 2015 - no need for further CT's for these nodules but ok for lung ca screening     Essential hypertension with goal blood pressure less than 140/90    Testosterone deficiency    Hypothyroidism, unspecified type    Chronic constipation    Mass of left chest wall - ? there for 18+ years -left breast 1cm mass at 7:30     Multiple pigmented nevi    Reducible right  inguinal hernia    Undiagnosed cardiac murmurs    Elevated fasting glucose    Tubular adenoma 4/2019 - repeat 5 years    Alcoholic intoxication with complication (H)- history of - now 4-5 drinks/week max 2 drinks at a time - gin & tonics - about 1-2 oz of alcohol per drink     Nocturia- up 1-3x/night     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 -  quit 7/2022    Nicotine dependence, uncomplicated, unspecified nicotine product type    Orthostatic hypotension - got lightheaded  Dizziness and a bit hypotensive  with 30mg lisinopril - now back down to 10mg= improved    Personal history of tobacco use- quit 7/2022     Dizziness- lightheaded dizziness- wearing ZioPatch monitor and monitoring BP - gets lightheaded when BP is low     Bilateral sensorineural hearing loss- saw Audiology in 3/2023 - desires referral for hearing aids now 5/17/2023    Prediabetes    Migraine with aura and without status migrainosus, not intractable - unchanged in frequency and character - once a month - better since quitting smoking 7/4/2022     Moderate aortic valve stenosis- with Mitral valve stenosis and regurgitation- on echocardiogram from 1/2023    History of malignant melanoma- left upper arm    Hypogonadism in male    Hyperlipidemia LDL goal <70 - pravastatin = leg cramps; lipitor =calf pains    Vitamin D deficiency    Toenail deformity- left 3rd toenail after trauma        Past Medical History:   Diagnosis Date    Elevated blood pressure (not hypertension)     Enlarged prostate- has seen  Dr. Solomon in the past - no further PSA's needed as of 7/2019 - doesn't need to see him again per Dr. Solomon unless gross hematuria  9/18/2013    Erectile dysfunction     Gastric ulcer 9/25/2007    EGD @ [place]-EGD - gastric ulcer w/ erosions/ LA grade B erosive esophagitis    GERD (gastroesophageal reflux disease)     worse lying down at night.     Hyperlipidemia LDL goal < 130     lipitor 40mg= calf pain-off since 2008    Hypertension     Lipoma  of skin     chest - biopsied at [place] = benign     Malignant melanoma (H)     Osteoarthritis     mostly hands and knees     Snoring     Thyroid disease         Past Surgical History:   Procedure Laterality Date    COLONOSCOPY  2007    repeat in 2017    DAVINCI HERNIORRHAPHY INGUINAL Right 10/24/2019    Procedure: robotic assisted right inguinal hernia repair with mesh;  Surgeon: Rom Mccoy MD;  Location: [facility] OR    ESOPHAGOSCOPY, GASTROSCOPY, DUODENOSCOPY (EGD), COMBINED N/A 2015    Procedure: COMBINED ESOPHAGOSCOPY, GASTROSCOPY, DUODENOSCOPY (EGD);  Surgeon: Ender Dixon MD;  Location: [facility] GI    HC ESOPHAGOSCOPY, DIAGNOSTIC  2007    EGD - gastric ulcer w/ erosions/ LA grade B erosive esophagitis    wide excision      for malignant melanoma       Social History     Tobacco Use    Smoking status: Former     Current packs/day: 0.00     Average packs/day: 0.5 packs/day for 60.0 years (30.0 ttl pk-yrs)     Types: Cigarettes     Start date: 1962     Quit date: 2022     Years since quittin.6    Smokeless tobacco: Never    Tobacco comments:     strongly encourage smoking cessation with every visit   Substance Use Topics    Alcohol use: Yes     Alcohol/week: 0.0 standard drinks of alcohol     Comment: not regular - very occasional 1-2 martinis when out to dinner 1-2x/month, if that        Family History   Problem Relation Age of Onset    C.A.D. Father 62         at age 62 of MI    Diabetes Father         early type 2     Neurologic Disorder Mother         mild dementia - @ 92    C.A.D. Mother         angina     Thyroid Disease Mother         s/p thyroid     Colon Cancer No family hx of        Prior to Admission medications    Medication Sig Start Date End Date Taking? Authorizing Provider   aspirin (ASA) 81 MG EC tablet Take 1 tablet (81 mg) by mouth daily 8/10/20  Yes Vee Beth MD   ezetimibe (ZETIA) 10 MG tablet Take 1 tablet (10 mg) by mouth daily.  "1/6/25  Yes Vee Beth MD   ipratropium (ATROVENT) 0.03 % nasal spray USE 2 SPRAYS INTO BOTH NOSTRILS EVERY 12 HOURS 1/6/25  Yes Vee Beth MD   levothyroxine (SYNTHROID/LEVOTHROID) 150 MCG tablet Take 1 tablet (150 mcg) by mouth daily. 1/6/25  Yes Vee Beth MD   lisinopril (ZESTRIL) 10 MG tablet Take 1 tablet (10 mg) by mouth daily. 1/6/25  Yes Vee Beth MD   meclizine (ANTIVERT) 25 MG tablet Take 1 tablet (25 mg) by mouth every 6 hours as needed for dizziness 7/18/22  Yes Ranulfo Troy MD   Multiple Vitamins-Minerals (CENTRUM SILVER) per tablet Take 1 tablet by mouth daily 9/18/13  Yes Vee Beth MD   omeprazole (PRILOSEC) 40 MG DR capsule TAKE 1 CAPSULE BY MOUTH ONCE DAILY TAKE 30-60 MINUTES BEFORE A MEAL. As needed 1/6/25  Yes Vee Beth MD   rosuvastatin (CRESTOR) 10 MG tablet Take 1 tablet (10 mg) by mouth daily. 1/6/25  Yes Vee Beth MD   sildenafil (REVATIO) 20 MG tablet Take 4-5 tablets ( mg) by mouth four times a week 12/5/23  Yes Vee Beth MD   Glucosamine-Chondroit-Vit C-Mn (GLUCOSAMINE CHONDROITIN 1500 COMPLEX) CAPS Take by mouth daily 9/18/13   Vee Beth MD   testosterone (ANDROGEL 1 % PUMP) 12.5 MG/ACT (1%) gel APPLY 3 PUMPS ONTO CLEAN DRY HAIRLESS SKIN OF SHOULDERS, UPPER ARMS OR ABDOMEN ONCE DAILY 12/4/23   Vee Beth MD       Allergies   Allergen Reactions    Atorvastatin Other (See Comments)     Calf pain    Pravastatin Other (See Comments)     Leg cramps        REVIEW OF SYSTEMS:   5 point ROS negative except as noted above in HPI, including Gen., Resp., CV, GI &  system review.    PHYSICAL EXAM:   There were no vitals taken for this visit. Estimated body mass index is 30.2 kg/m  as calculated from the following:    Height as of 1/6/25: 1.81 m (5' 11.26\").    Weight as of 1/6/25: 98.9 kg (218 lb 1.6 oz).   GENERAL APPEARANCE: alert, and " oriented  MENTAL STATUS: alert  AIRWAY EXAM: Mallampatti Class I (visualization of the soft palate, fauces, uvula, anterior and posterior pillars)  RESP: lungs clear to auscultation - no rales, rhonchi or wheezes  CV: regular rates and rhythm  DIAGNOSTICS:    Not indicated    IMPRESSION   ASA Class 2 - Mild systemic disease    PLAN:   Plan for Colonoscopy with possible biopsy, possible polypectomy. We discussed the risks, benefits and alternatives and the patient wished to proceed.    The above has been forwarded to the consulting provider.      Signed Electronically by: Ender Dixon MD  February 4, 2025

## 2025-02-05 ENCOUNTER — MYC MEDICAL ADVICE (OUTPATIENT)
Dept: FAMILY MEDICINE | Facility: CLINIC | Age: 80
End: 2025-02-05
Payer: COMMERCIAL

## 2025-02-05 DIAGNOSIS — E03.9 HYPOTHYROIDISM, UNSPECIFIED TYPE: ICD-10-CM

## 2025-02-05 DIAGNOSIS — E78.5 HYPERLIPIDEMIA LDL GOAL <130: ICD-10-CM

## 2025-02-05 DIAGNOSIS — I77.810 ASCENDING AORTA DILATION: Primary | ICD-10-CM

## 2025-02-05 DIAGNOSIS — I10 ESSENTIAL HYPERTENSION WITH GOAL BLOOD PRESSURE LESS THAN 140/90: ICD-10-CM

## 2025-02-05 DIAGNOSIS — E78.5 HYPERLIPIDEMIA LDL GOAL <70: ICD-10-CM

## 2025-02-05 NOTE — PROGRESS NOTES
Thoracic surgery referral received for dx: Ascending aorta dilation. Thoracic surgery does not see pts for this diagnosis. Message sent to Cardiothoracic team. Per Vee Dillon, RN ok to send to their team. New referral transcribed.    DAYNA RamosN, RN, OCN  [facility] Oncology Nurse Navigator  [phone] / [phone]

## 2025-02-06 ENCOUNTER — TELEPHONE (OUTPATIENT)
Dept: CARDIOLOGY | Facility: CLINIC | Age: 80
End: 2025-02-06
Payer: COMMERCIAL

## 2025-02-11 ENCOUNTER — TELEPHONE (OUTPATIENT)
Dept: CARDIOLOGY | Facility: CLINIC | Age: 80
End: 2025-02-11
Payer: COMMERCIAL

## 2025-02-11 DIAGNOSIS — E78.5 HYPERLIPIDEMIA LDL GOAL <130: ICD-10-CM

## 2025-02-11 DIAGNOSIS — I10 ESSENTIAL HYPERTENSION WITH GOAL BLOOD PRESSURE LESS THAN 140/90: ICD-10-CM

## 2025-02-11 DIAGNOSIS — E03.9 HYPOTHYROIDISM, UNSPECIFIED TYPE: ICD-10-CM

## 2025-02-11 RX ORDER — LEVOTHYROXINE SODIUM 150 UG/1
150 TABLET ORAL DAILY
Qty: 14 TABLET | Refills: 0 | Status: CANCELLED | OUTPATIENT
Start: 2025-02-11

## 2025-02-11 RX ORDER — LISINOPRIL 10 MG/1
10 TABLET ORAL DAILY
Qty: 90 TABLET | Refills: 3 | Status: SHIPPED | OUTPATIENT
Start: 2025-02-11

## 2025-02-11 RX ORDER — EZETIMIBE 10 MG/1
10 TABLET ORAL DAILY
Qty: 14 TABLET | Refills: 0 | Status: SHIPPED | OUTPATIENT
Start: 2025-02-11

## 2025-02-11 RX ORDER — EZETIMIBE 10 MG/1
10 TABLET ORAL DAILY
Qty: 14 TABLET | Refills: 0 | Status: CANCELLED | OUTPATIENT
Start: 2025-02-11

## 2025-02-11 RX ORDER — LISINOPRIL 10 MG/1
10 TABLET ORAL DAILY
Qty: 14 TABLET | Refills: 0 | Status: CANCELLED | OUTPATIENT
Start: 2025-02-11

## 2025-02-11 RX ORDER — ROSUVASTATIN CALCIUM 10 MG/1
10 TABLET, COATED ORAL DAILY
Qty: 90 TABLET | Refills: 3 | Status: SHIPPED | OUTPATIENT
Start: 2025-02-11

## 2025-02-11 RX ORDER — ROSUVASTATIN CALCIUM 10 MG/1
10 TABLET, COATED ORAL DAILY
Qty: 14 TABLET | Refills: 0 | Status: SHIPPED | OUTPATIENT
Start: 2025-02-11

## 2025-02-11 RX ORDER — LEVOTHYROXINE SODIUM 150 UG/1
150 TABLET ORAL
Qty: 14 TABLET | Refills: 0 | Status: SHIPPED | OUTPATIENT
Start: 2025-02-11

## 2025-02-11 RX ORDER — LISINOPRIL 10 MG/1
10 TABLET ORAL DAILY
Qty: 14 TABLET | Refills: 0 | Status: SHIPPED | OUTPATIENT
Start: 2025-02-11

## 2025-02-11 RX ORDER — ROSUVASTATIN CALCIUM 10 MG/1
10 TABLET, COATED ORAL DAILY
Qty: 14 TABLET | Refills: 0 | Status: CANCELLED | OUTPATIENT
Start: 2025-02-11

## 2025-02-11 RX ORDER — LEVOTHYROXINE SODIUM 150 UG/1
150 TABLET ORAL DAILY
Qty: 90 TABLET | Refills: 3 | Status: SHIPPED | OUTPATIENT
Start: 2025-02-11

## 2025-02-11 NOTE — TELEPHONE ENCOUNTER
Multiple voicemails left asking pt to return call and schedule CTSurg consult. Max attempts reached. Letter sent to pt

## 2025-02-11 NOTE — TELEPHONE ENCOUNTER
Zetia, Levothyroxin, Lisinopril and Rosuvastatin needed. Pended for review and approval if appropriate.

## 2025-02-11 NOTE — TELEPHONE ENCOUNTER
Pt requesting 10 day supply of     Levothyroxine 150mg   Lisinopril 10mg   Rosuvastatin 10mg    Pt out of town, and left meds at home. Pharmacy desired attached.

## 2025-03-04 ENCOUNTER — TELEPHONE (OUTPATIENT)
Dept: CARDIOLOGY | Facility: CLINIC | Age: 80
End: 2025-03-04
Payer: COMMERCIAL

## 2025-03-04 DIAGNOSIS — I71.21 ANEURYSM OF ASCENDING AORTA WITHOUT RUPTURE: Primary | ICD-10-CM

## 2025-03-04 NOTE — TELEPHONE ENCOUNTER
Patient called back in regards to appt with Dr. Méndez. Scheduled for 3/10 @ 4:30pm.       Needs a CTA chest and referral for cardiologist placed for ascending aortic aneurysm. Patient states understanding and all questions asked.

## 2025-03-06 ENCOUNTER — TELEPHONE (OUTPATIENT)
Dept: CARDIOLOGY | Facility: CLINIC | Age: 80
End: 2025-03-06
Payer: COMMERCIAL

## 2025-03-06 NOTE — TELEPHONE ENCOUNTER
Ascending aorta 4.2mm, does not meet surgical criteria. Referred patient to cardiologist to further management. Informed patient and gave patient phone number to schedule a cardiology appt.

## 2025-05-12 ENCOUNTER — TELEPHONE (OUTPATIENT)
Dept: FAMILY MEDICINE | Facility: CLINIC | Age: 80
End: 2025-05-12
Payer: COMMERCIAL

## 2025-05-12 DIAGNOSIS — I10 ESSENTIAL HYPERTENSION WITH GOAL BLOOD PRESSURE LESS THAN 140/90: ICD-10-CM

## 2025-05-12 DIAGNOSIS — K21.9 GASTROESOPHAGEAL REFLUX DISEASE WITHOUT ESOPHAGITIS: ICD-10-CM

## 2025-05-12 DIAGNOSIS — E78.5 HYPERLIPIDEMIA LDL GOAL <70: ICD-10-CM

## 2025-05-12 DIAGNOSIS — E03.9 HYPOTHYROIDISM, UNSPECIFIED TYPE: ICD-10-CM

## 2025-05-12 RX ORDER — LEVOTHYROXINE SODIUM 150 UG/1
150 TABLET ORAL
Qty: 14 TABLET | Refills: 0 | Status: SHIPPED | OUTPATIENT
Start: 2025-05-12 | End: 2025-05-26

## 2025-05-12 RX ORDER — OMEPRAZOLE 40 MG/1
CAPSULE, DELAYED RELEASE ORAL
Qty: 14 CAPSULE | Refills: 0 | Status: SHIPPED | OUTPATIENT
Start: 2025-05-12 | End: 2025-05-26

## 2025-05-12 RX ORDER — LISINOPRIL 10 MG/1
10 TABLET ORAL DAILY
Qty: 14 TABLET | Refills: 0 | Status: SHIPPED | OUTPATIENT
Start: 2025-05-12 | End: 2025-05-26

## 2025-05-12 RX ORDER — EZETIMIBE 10 MG/1
10 TABLET ORAL DAILY
Qty: 14 TABLET | Refills: 0 | Status: SHIPPED | OUTPATIENT
Start: 2025-05-12 | End: 2025-05-26

## 2025-05-12 RX ORDER — ROSUVASTATIN CALCIUM 10 MG/1
10 TABLET, COATED ORAL DAILY
Qty: 14 TABLET | Refills: 0 | Status: SHIPPED | OUTPATIENT
Start: 2025-05-12 | End: 2025-05-26

## 2025-05-12 NOTE — TELEPHONE ENCOUNTER
Patient calling stating he left out of town and forgot his medications.      Sent needed medications to patient's requested pharmacy for a 2 week supply.  Patient verbalized appreciation.

## 2025-07-05 ENCOUNTER — HEALTH MAINTENANCE LETTER (OUTPATIENT)
Age: 80
End: 2025-07-05

## 2025-07-11 PROBLEM — I77.810 AORTIC ROOT DILATION: Status: ACTIVE | Noted: 2025-07-11

## 2025-07-11 PROBLEM — D36.9 TUBULAR ADENOMA: Status: ACTIVE | Noted: 2019-09-12

## 2025-07-11 PROBLEM — I35.0 NONRHEUMATIC AORTIC VALVE STENOSIS: Status: ACTIVE | Noted: 2025-07-11

## 2025-07-11 PROBLEM — D23.4 BLUE NEVUS OF SCALP: Status: ACTIVE | Noted: 2025-07-11

## 2025-07-11 PROBLEM — R51.9 SCALP PAIN: Status: ACTIVE | Noted: 2025-07-11

## 2025-07-11 PROBLEM — Z72.0 TOBACCO ABUSE DISORDER: Status: RESOLVED | Noted: 2021-08-12 | Resolved: 2025-07-11

## 2025-07-14 ENCOUNTER — PATIENT OUTREACH (OUTPATIENT)
Dept: CARE COORDINATION | Facility: CLINIC | Age: 80
End: 2025-07-14
Payer: COMMERCIAL

## 2025-07-16 ENCOUNTER — PATIENT OUTREACH (OUTPATIENT)
Dept: CARE COORDINATION | Facility: CLINIC | Age: 80
End: 2025-07-16
Payer: COMMERCIAL

## (undated) DEVICE — PROTECTOR ARM ONE-STEP TRENDELENBURG 40418

## (undated) DEVICE — BLADE CLIPPER 3M 9670

## (undated) DEVICE — DAVINCI SI DRAPE ARM CAMERA 420279

## (undated) DEVICE — ESU GROUND PAD ADULT W/CORD E7507

## (undated) DEVICE — SU WND CLOSURE VLOC 90 ABS 3-0 VIOLET 6" CV-23 VLOCM0804

## (undated) DEVICE — DAVINCI HOT SHEARS TIP COVER  400180

## (undated) DEVICE — KIT ENDO TURNOVER/PROCEDURE W/CLEAN A SCOPE LINERS 103888

## (undated) DEVICE — ENDO TRAP POLYP QUICK CATCH 710201

## (undated) DEVICE — SYSTEM CLEARIFY VISUALIZATION 21-345

## (undated) DEVICE — SU VICRYL 4-0 PS-2 18" UND J496H

## (undated) DEVICE — ENDO SNARE EXACTO COLD 9MM LOOP 2.4MMX230CM 00711115

## (undated) DEVICE — PREP SCRUB SOL EXIDINE 4% CHG 4OZ 29002-404

## (undated) DEVICE — GLOVE PROTEXIS W/NEU-THERA 6.5  2D73TE65

## (undated) DEVICE — SU VICRYL 0 CT-2 27" J334H

## (undated) DEVICE — PACK SET-UP STD 9102

## (undated) DEVICE — GLOVE PROTEXIS POWDER FREE SMT 6.5  2D72PT65X

## (undated) DEVICE — GLOVE PROTEXIS BLUE W/NEU-THERA 7.0  2D73EB70

## (undated) DEVICE — LUBRICANT INST ELECTROLUBE EL101

## (undated) DEVICE — GLOVE PROTEXIS BLUE W/NEU-THERA 8.0  2D73EB80

## (undated) DEVICE — LIGHT HANDLE X2

## (undated) DEVICE — LINEN DRAPE 54X72" 5467

## (undated) DEVICE — DAVINCI OBTURATOR 8MM BLADELESS 420023

## (undated) DEVICE — DAVINCI SI DRAPE ACCESSORY KIT 3-ARM 420290

## (undated) DEVICE — Device

## (undated) DEVICE — SOL WATER IRRIG 1000ML BOTTLE 2F7114

## (undated) DEVICE — PREP SKIN SCRUB TRAY 4461A

## (undated) DEVICE — ESU CORD MONOPOLAR 10'  E0510

## (undated) DEVICE — ESU PENCIL W/HOLSTER E2350H

## (undated) DEVICE — ESU ELEC BLADE 2.75" COATED/INSULATED E1455

## (undated) DEVICE — DAVINCI S CANNULA SEAL 8.5-13MM 420206

## (undated) DEVICE — GLOVE PROTEXIS BLUE W/NEU-THERA 6.5  2D73EB65

## (undated) DEVICE — LINEN ORTHO ACL PACK 5447

## (undated) DEVICE — GLOVE PROTEXIS W/NEU-THERA 7.5  2D73TE75

## (undated) RX ORDER — BUPIVACAINE HYDROCHLORIDE 2.5 MG/ML
INJECTION, SOLUTION EPIDURAL; INFILTRATION; INTRACAUDAL
Status: DISPENSED
Start: 2019-10-24

## (undated) RX ORDER — FENTANYL CITRATE 50 UG/ML
INJECTION, SOLUTION INTRAMUSCULAR; INTRAVENOUS
Status: DISPENSED
Start: 2019-10-24

## (undated) RX ORDER — LIDOCAINE HYDROCHLORIDE 10 MG/ML
INJECTION, SOLUTION EPIDURAL; INFILTRATION; INTRACAUDAL; PERINEURAL
Status: DISPENSED
Start: 2019-10-24

## (undated) RX ORDER — FENTANYL CITRATE 50 UG/ML
INJECTION, SOLUTION INTRAMUSCULAR; INTRAVENOUS
Status: DISPENSED
Start: 2019-04-29

## (undated) RX ORDER — GLYCOPYRROLATE 0.2 MG/ML
INJECTION INTRAMUSCULAR; INTRAVENOUS
Status: DISPENSED
Start: 2019-10-24

## (undated) RX ORDER — NEOSTIGMINE METHYLSULFATE 1 MG/ML
VIAL (ML) INJECTION
Status: DISPENSED
Start: 2019-10-24

## (undated) RX ORDER — FENTANYL CITRATE 50 UG/ML
INJECTION, SOLUTION INTRAMUSCULAR; INTRAVENOUS
Status: DISPENSED
Start: 2025-02-04

## (undated) RX ORDER — CEFAZOLIN SODIUM 2 G/100ML
INJECTION, SOLUTION INTRAVENOUS
Status: DISPENSED
Start: 2019-10-24

## (undated) RX ORDER — PROPOFOL 10 MG/ML
INJECTION, EMULSION INTRAVENOUS
Status: DISPENSED
Start: 2019-10-24

## (undated) RX ORDER — ONDANSETRON 2 MG/ML
INJECTION INTRAMUSCULAR; INTRAVENOUS
Status: DISPENSED
Start: 2019-10-24

## (undated) RX ORDER — OXYCODONE HYDROCHLORIDE 5 MG/1
TABLET ORAL
Status: DISPENSED
Start: 2019-10-24

## (undated) RX ORDER — ACETAMINOPHEN 325 MG/1
TABLET ORAL
Status: DISPENSED
Start: 2019-10-24

## (undated) RX ORDER — DEXAMETHASONE SODIUM PHOSPHATE 4 MG/ML
INJECTION, SOLUTION INTRA-ARTICULAR; INTRALESIONAL; INTRAMUSCULAR; INTRAVENOUS; SOFT TISSUE
Status: DISPENSED
Start: 2019-10-24